# Patient Record
Sex: MALE | Race: WHITE | NOT HISPANIC OR LATINO
[De-identification: names, ages, dates, MRNs, and addresses within clinical notes are randomized per-mention and may not be internally consistent; named-entity substitution may affect disease eponyms.]

---

## 2017-03-15 ENCOUNTER — APPOINTMENT (OUTPATIENT)
Dept: ELECTROPHYSIOLOGY | Facility: CLINIC | Age: 72
End: 2017-03-15

## 2017-04-05 ENCOUNTER — APPOINTMENT (OUTPATIENT)
Dept: ELECTROPHYSIOLOGY | Facility: CLINIC | Age: 72
End: 2017-04-05

## 2017-04-05 VITALS
WEIGHT: 183 LBS | HEIGHT: 70 IN | BODY MASS INDEX: 26.2 KG/M2 | DIASTOLIC BLOOD PRESSURE: 67 MMHG | SYSTOLIC BLOOD PRESSURE: 138 MMHG | HEART RATE: 60 BPM

## 2017-04-05 VITALS
SYSTOLIC BLOOD PRESSURE: 138 MMHG | HEART RATE: 50 BPM | HEIGHT: 70 IN | WEIGHT: 183 LBS | DIASTOLIC BLOOD PRESSURE: 67 MMHG | BODY MASS INDEX: 26.2 KG/M2

## 2017-05-15 ENCOUNTER — APPOINTMENT (OUTPATIENT)
Dept: ELECTROPHYSIOLOGY | Facility: CLINIC | Age: 72
End: 2017-05-15

## 2017-06-02 ENCOUNTER — APPOINTMENT (OUTPATIENT)
Dept: ORTHOPEDIC SURGERY | Facility: CLINIC | Age: 72
End: 2017-06-02

## 2017-06-12 ENCOUNTER — APPOINTMENT (OUTPATIENT)
Dept: ELECTROPHYSIOLOGY | Facility: CLINIC | Age: 72
End: 2017-06-12

## 2017-07-13 ENCOUNTER — APPOINTMENT (OUTPATIENT)
Dept: ELECTROPHYSIOLOGY | Facility: CLINIC | Age: 72
End: 2017-07-13
Payer: COMMERCIAL

## 2017-07-13 PROCEDURE — 93298 REM INTERROG DEV EVAL SCRMS: CPT

## 2017-07-13 PROCEDURE — 93299: CPT

## 2017-07-16 ENCOUNTER — MOBILE ON CALL (OUTPATIENT)
Age: 72
End: 2017-07-16

## 2017-07-19 ENCOUNTER — MEDICATION RENEWAL (OUTPATIENT)
Age: 72
End: 2017-07-19

## 2017-07-20 ENCOUNTER — MEDICATION RENEWAL (OUTPATIENT)
Age: 72
End: 2017-07-20

## 2017-09-11 ENCOUNTER — APPOINTMENT (OUTPATIENT)
Dept: ELECTROPHYSIOLOGY | Facility: CLINIC | Age: 72
End: 2017-09-11
Payer: COMMERCIAL

## 2017-09-11 PROCEDURE — 93299: CPT

## 2017-09-11 PROCEDURE — 93298 REM INTERROG DEV EVAL SCRMS: CPT

## 2017-10-05 ENCOUNTER — APPOINTMENT (OUTPATIENT)
Dept: ELECTROPHYSIOLOGY | Facility: CLINIC | Age: 72
End: 2017-10-05
Payer: COMMERCIAL

## 2017-10-05 VITALS
HEIGHT: 70 IN | DIASTOLIC BLOOD PRESSURE: 77 MMHG | SYSTOLIC BLOOD PRESSURE: 142 MMHG | HEART RATE: 61 BPM | BODY MASS INDEX: 25.77 KG/M2 | WEIGHT: 180 LBS

## 2017-10-05 PROCEDURE — 93285 PRGRMG DEV EVAL SCRMS IP: CPT

## 2017-10-11 ENCOUNTER — APPOINTMENT (OUTPATIENT)
Dept: ELECTROPHYSIOLOGY | Facility: CLINIC | Age: 72
End: 2017-10-11
Payer: COMMERCIAL

## 2017-10-11 PROCEDURE — 93299: CPT

## 2017-10-11 PROCEDURE — 93298 REM INTERROG DEV EVAL SCRMS: CPT

## 2017-11-10 ENCOUNTER — APPOINTMENT (OUTPATIENT)
Dept: ELECTROPHYSIOLOGY | Facility: CLINIC | Age: 72
End: 2017-11-10
Payer: COMMERCIAL

## 2017-11-10 PROCEDURE — 93299: CPT

## 2017-11-10 PROCEDURE — 93298 REM INTERROG DEV EVAL SCRMS: CPT

## 2017-12-11 ENCOUNTER — APPOINTMENT (OUTPATIENT)
Dept: ELECTROPHYSIOLOGY | Facility: CLINIC | Age: 72
End: 2017-12-11
Payer: COMMERCIAL

## 2017-12-11 PROCEDURE — 93299: CPT

## 2017-12-11 PROCEDURE — 93298 REM INTERROG DEV EVAL SCRMS: CPT

## 2018-01-02 ENCOUNTER — RX RENEWAL (OUTPATIENT)
Age: 73
End: 2018-01-02

## 2018-01-09 ENCOUNTER — APPOINTMENT (OUTPATIENT)
Dept: ELECTROPHYSIOLOGY | Facility: CLINIC | Age: 73
End: 2018-01-09
Payer: COMMERCIAL

## 2018-01-09 PROCEDURE — 93298 REM INTERROG DEV EVAL SCRMS: CPT

## 2018-01-09 PROCEDURE — 93299: CPT

## 2018-02-06 ENCOUNTER — APPOINTMENT (OUTPATIENT)
Dept: CARDIOLOGY | Facility: CLINIC | Age: 73
End: 2018-02-06
Payer: COMMERCIAL

## 2018-02-06 ENCOUNTER — NON-APPOINTMENT (OUTPATIENT)
Age: 73
End: 2018-02-06

## 2018-02-06 VITALS — DIASTOLIC BLOOD PRESSURE: 70 MMHG | SYSTOLIC BLOOD PRESSURE: 146 MMHG

## 2018-02-06 VITALS — BODY MASS INDEX: 25.2 KG/M2 | WEIGHT: 176 LBS | HEIGHT: 70 IN

## 2018-02-06 VITALS — DIASTOLIC BLOOD PRESSURE: 74 MMHG | OXYGEN SATURATION: 96 % | HEART RATE: 57 BPM | SYSTOLIC BLOOD PRESSURE: 164 MMHG

## 2018-02-06 PROCEDURE — 93000 ELECTROCARDIOGRAM COMPLETE: CPT

## 2018-02-06 PROCEDURE — 99214 OFFICE O/P EST MOD 30 MIN: CPT | Mod: 25

## 2018-02-08 ENCOUNTER — APPOINTMENT (OUTPATIENT)
Dept: ELECTROPHYSIOLOGY | Facility: CLINIC | Age: 73
End: 2018-02-08
Payer: COMMERCIAL

## 2018-02-08 PROCEDURE — 93299: CPT

## 2018-02-08 PROCEDURE — 93298 REM INTERROG DEV EVAL SCRMS: CPT

## 2018-03-12 ENCOUNTER — APPOINTMENT (OUTPATIENT)
Dept: ELECTROPHYSIOLOGY | Facility: CLINIC | Age: 73
End: 2018-03-12
Payer: COMMERCIAL

## 2018-03-12 PROCEDURE — 93296 REM INTERROG EVL PM/IDS: CPT

## 2018-03-12 PROCEDURE — 93294 REM INTERROG EVL PM/LDLS PM: CPT

## 2018-03-26 ENCOUNTER — RX RENEWAL (OUTPATIENT)
Age: 73
End: 2018-03-26

## 2018-04-09 ENCOUNTER — APPOINTMENT (OUTPATIENT)
Dept: ELECTROPHYSIOLOGY | Facility: CLINIC | Age: 73
End: 2018-04-09
Payer: COMMERCIAL

## 2018-04-09 PROCEDURE — 93298 REM INTERROG DEV EVAL SCRMS: CPT

## 2018-04-09 PROCEDURE — 93299: CPT

## 2018-04-19 ENCOUNTER — APPOINTMENT (OUTPATIENT)
Dept: ELECTROPHYSIOLOGY | Facility: CLINIC | Age: 73
End: 2018-04-19
Payer: COMMERCIAL

## 2018-04-19 VITALS
HEART RATE: 63 BPM | BODY MASS INDEX: 25.2 KG/M2 | WEIGHT: 176 LBS | HEIGHT: 70 IN | DIASTOLIC BLOOD PRESSURE: 74 MMHG | SYSTOLIC BLOOD PRESSURE: 142 MMHG

## 2018-04-19 PROCEDURE — 93285 PRGRMG DEV EVAL SCRMS IP: CPT

## 2018-05-11 ENCOUNTER — APPOINTMENT (OUTPATIENT)
Dept: CARDIOLOGY | Facility: CLINIC | Age: 73
End: 2018-05-11
Payer: COMMERCIAL

## 2018-05-11 VITALS
SYSTOLIC BLOOD PRESSURE: 150 MMHG | OXYGEN SATURATION: 94 % | HEIGHT: 70 IN | BODY MASS INDEX: 25.77 KG/M2 | WEIGHT: 180 LBS | DIASTOLIC BLOOD PRESSURE: 72 MMHG | HEART RATE: 63 BPM

## 2018-05-11 VITALS — DIASTOLIC BLOOD PRESSURE: 70 MMHG | SYSTOLIC BLOOD PRESSURE: 146 MMHG

## 2018-05-11 PROCEDURE — 99214 OFFICE O/P EST MOD 30 MIN: CPT

## 2018-06-09 ENCOUNTER — APPOINTMENT (OUTPATIENT)
Dept: ELECTROPHYSIOLOGY | Facility: CLINIC | Age: 73
End: 2018-06-09
Payer: COMMERCIAL

## 2018-06-09 PROCEDURE — 93298 REM INTERROG DEV EVAL SCRMS: CPT

## 2018-06-09 PROCEDURE — 93299: CPT

## 2018-07-09 ENCOUNTER — APPOINTMENT (OUTPATIENT)
Dept: ELECTROPHYSIOLOGY | Facility: CLINIC | Age: 73
End: 2018-07-09
Payer: COMMERCIAL

## 2018-07-09 PROCEDURE — 93299: CPT

## 2018-07-09 PROCEDURE — 93298 REM INTERROG DEV EVAL SCRMS: CPT

## 2018-08-08 ENCOUNTER — APPOINTMENT (OUTPATIENT)
Dept: ELECTROPHYSIOLOGY | Facility: CLINIC | Age: 73
End: 2018-08-08
Payer: COMMERCIAL

## 2018-08-08 PROCEDURE — 93298 REM INTERROG DEV EVAL SCRMS: CPT

## 2018-08-08 PROCEDURE — 93299: CPT

## 2018-09-07 ENCOUNTER — APPOINTMENT (OUTPATIENT)
Dept: ELECTROPHYSIOLOGY | Facility: CLINIC | Age: 73
End: 2018-09-07
Payer: COMMERCIAL

## 2018-09-07 PROCEDURE — 93298 REM INTERROG DEV EVAL SCRMS: CPT

## 2018-09-07 PROCEDURE — 93299: CPT

## 2018-09-10 ENCOUNTER — RX RENEWAL (OUTPATIENT)
Age: 73
End: 2018-09-10

## 2018-09-14 ENCOUNTER — NON-APPOINTMENT (OUTPATIENT)
Age: 73
End: 2018-09-14

## 2018-09-14 ENCOUNTER — APPOINTMENT (OUTPATIENT)
Dept: CARDIOLOGY | Facility: CLINIC | Age: 73
End: 2018-09-14
Payer: COMMERCIAL

## 2018-09-14 VITALS
WEIGHT: 180 LBS | BODY MASS INDEX: 25.77 KG/M2 | SYSTOLIC BLOOD PRESSURE: 150 MMHG | HEART RATE: 68 BPM | DIASTOLIC BLOOD PRESSURE: 71 MMHG | HEIGHT: 70 IN | OXYGEN SATURATION: 96 %

## 2018-09-14 PROCEDURE — 93000 ELECTROCARDIOGRAM COMPLETE: CPT

## 2018-09-14 PROCEDURE — 99214 OFFICE O/P EST MOD 30 MIN: CPT | Mod: 25

## 2018-09-19 ENCOUNTER — MEDICATION RENEWAL (OUTPATIENT)
Age: 73
End: 2018-09-19

## 2018-09-20 LAB
ALBUMIN SERPL ELPH-MCNC: 3.4 G/DL
ALP BLD-CCNC: 105 U/L
ALT SERPL-CCNC: 19 U/L
ANION GAP SERPL CALC-SCNC: 11 MMOL/L
AST SERPL-CCNC: 24 U/L
BASOPHILS # BLD AUTO: 0.02 K/UL
BASOPHILS NFR BLD AUTO: 0.3 %
BILIRUB SERPL-MCNC: 0.8 MG/DL
BUN SERPL-MCNC: 10 MG/DL
CALCIUM SERPL-MCNC: 9.5 MG/DL
CHLORIDE SERPL-SCNC: 103 MMOL/L
CHOLEST SERPL-MCNC: 143 MG/DL
CHOLEST/HDLC SERPL: 4 RATIO
CO2 SERPL-SCNC: 27 MMOL/L
CREAT SERPL-MCNC: 0.84 MG/DL
EOSINOPHIL # BLD AUTO: 0.22 K/UL
EOSINOPHIL NFR BLD AUTO: 3.7 %
GLUCOSE SERPL-MCNC: 92 MG/DL
HBA1C MFR BLD HPLC: 6.2 %
HCT VFR BLD CALC: 56.2 %
HDLC SERPL-MCNC: 36 MG/DL
HGB BLD-MCNC: 17.6 G/DL
IMM GRANULOCYTES NFR BLD AUTO: 0.2 %
LDLC SERPL CALC-MCNC: 94 MG/DL
LYMPHOCYTES # BLD AUTO: 1.18 K/UL
LYMPHOCYTES NFR BLD AUTO: 20.1 %
MAN DIFF?: NORMAL
MCHC RBC-ENTMCNC: 25.4 PG
MCHC RBC-ENTMCNC: 31.3 GM/DL
MCV RBC AUTO: 81.2 FL
MONOCYTES # BLD AUTO: 0.75 K/UL
MONOCYTES NFR BLD AUTO: 12.8 %
NEUTROPHILS # BLD AUTO: 3.69 K/UL
NEUTROPHILS NFR BLD AUTO: 62.9 %
PLATELET # BLD AUTO: 259 K/UL
POTASSIUM SERPL-SCNC: 5 MMOL/L
PROT SERPL-MCNC: 7.4 G/DL
PSA SERPL-MCNC: 2.92 NG/ML
RBC # BLD: 6.92 M/UL
RBC # FLD: 18.3 %
SODIUM SERPL-SCNC: 141 MMOL/L
TRIGL SERPL-MCNC: 67 MG/DL
WBC # FLD AUTO: 5.87 K/UL

## 2018-09-28 ENCOUNTER — MEDICATION RENEWAL (OUTPATIENT)
Age: 73
End: 2018-09-28

## 2018-10-07 ENCOUNTER — APPOINTMENT (OUTPATIENT)
Dept: ELECTROPHYSIOLOGY | Facility: CLINIC | Age: 73
End: 2018-10-07
Payer: COMMERCIAL

## 2018-10-07 PROCEDURE — 93299: CPT

## 2018-10-07 PROCEDURE — 93298 REM INTERROG DEV EVAL SCRMS: CPT

## 2018-10-29 ENCOUNTER — APPOINTMENT (OUTPATIENT)
Dept: ELECTROPHYSIOLOGY | Facility: CLINIC | Age: 73
End: 2018-10-29
Payer: COMMERCIAL

## 2018-10-29 VITALS
HEART RATE: 63 BPM | DIASTOLIC BLOOD PRESSURE: 71 MMHG | HEIGHT: 70 IN | SYSTOLIC BLOOD PRESSURE: 148 MMHG | BODY MASS INDEX: 24.77 KG/M2 | WEIGHT: 173 LBS

## 2018-10-29 PROCEDURE — 93285 PRGRMG DEV EVAL SCRMS IP: CPT

## 2018-11-03 ENCOUNTER — RX RENEWAL (OUTPATIENT)
Age: 73
End: 2018-11-03

## 2018-11-07 ENCOUNTER — APPOINTMENT (OUTPATIENT)
Dept: ELECTROPHYSIOLOGY | Facility: CLINIC | Age: 73
End: 2018-11-07
Payer: COMMERCIAL

## 2018-11-07 PROCEDURE — 93299: CPT

## 2018-11-07 PROCEDURE — 93298 REM INTERROG DEV EVAL SCRMS: CPT

## 2018-11-29 ENCOUNTER — APPOINTMENT (OUTPATIENT)
Dept: ELECTROPHYSIOLOGY | Facility: CLINIC | Age: 73
End: 2018-11-29
Payer: COMMERCIAL

## 2018-11-29 VITALS
HEIGHT: 70 IN | SYSTOLIC BLOOD PRESSURE: 130 MMHG | WEIGHT: 171 LBS | DIASTOLIC BLOOD PRESSURE: 70 MMHG | HEART RATE: 76 BPM | BODY MASS INDEX: 24.48 KG/M2

## 2018-11-29 PROCEDURE — 99213 OFFICE O/P EST LOW 20 MIN: CPT

## 2018-11-29 PROCEDURE — 93291 INTERROG DEV EVAL SCRMS IP: CPT

## 2018-12-08 ENCOUNTER — APPOINTMENT (OUTPATIENT)
Dept: ELECTROPHYSIOLOGY | Facility: CLINIC | Age: 73
End: 2018-12-08
Payer: COMMERCIAL

## 2018-12-08 PROCEDURE — 93298 REM INTERROG DEV EVAL SCRMS: CPT

## 2018-12-08 PROCEDURE — 93299: CPT

## 2018-12-10 ENCOUNTER — APPOINTMENT (OUTPATIENT)
Dept: CARDIOLOGY | Facility: CLINIC | Age: 73
End: 2018-12-10
Payer: COMMERCIAL

## 2018-12-10 PROCEDURE — 93306 TTE W/DOPPLER COMPLETE: CPT

## 2018-12-13 ENCOUNTER — APPOINTMENT (OUTPATIENT)
Dept: CARDIOLOGY | Facility: CLINIC | Age: 73
End: 2018-12-13

## 2018-12-14 ENCOUNTER — APPOINTMENT (OUTPATIENT)
Dept: CARDIOLOGY | Facility: CLINIC | Age: 73
End: 2018-12-14
Payer: COMMERCIAL

## 2018-12-14 PROCEDURE — 93015 CV STRESS TEST SUPVJ I&R: CPT

## 2018-12-17 ENCOUNTER — APPOINTMENT (OUTPATIENT)
Dept: CARDIOLOGY | Facility: CLINIC | Age: 73
End: 2018-12-17

## 2018-12-20 ENCOUNTER — MEDICATION RENEWAL (OUTPATIENT)
Age: 73
End: 2018-12-20

## 2018-12-26 ENCOUNTER — APPOINTMENT (OUTPATIENT)
Dept: CARDIOLOGY | Facility: CLINIC | Age: 73
End: 2018-12-26

## 2018-12-31 NOTE — PHYSICAL EXAM
[General Appearance - Well Developed] : well developed [Normal Appearance] : normal appearance [Well Groomed] : well groomed [General Appearance - Well Nourished] : well nourished [No Deformities] : no deformities [General Appearance - In No Acute Distress] : no acute distress [Normal Conjunctiva] : the conjunctiva exhibited no abnormalities [Eyelids - No Xanthelasma] : the eyelids demonstrated no xanthelasmas [Normal Oral Mucosa] : normal oral mucosa [No Oral Pallor] : no oral pallor [No Oral Cyanosis] : no oral cyanosis [Normal Jugular Venous A Waves Present] : normal jugular venous A waves present [Normal Jugular Venous V Waves Present] : normal jugular venous V waves present [No Jugular Venous Canela A Waves] : no jugular venous canela A waves [Respiration, Rhythm And Depth] : normal respiratory rhythm and effort [Exaggerated Use Of Accessory Muscles For Inspiration] : no accessory muscle use [Auscultation Breath Sounds / Voice Sounds] : lungs were clear to auscultation bilaterally [Heart Rate And Rhythm] : heart rate and rhythm were normal [Heart Sounds] : normal S1 and S2 [Murmurs] : no murmurs present [Abdomen Soft] : soft [Abdomen Tenderness] : non-tender [Abdomen Mass (___ Cm)] : no abdominal mass palpated [Abnormal Walk] : normal gait [Gait - Sufficient For Exercise Testing] : the gait was sufficient for exercise testing [Nail Clubbing] : no clubbing of the fingernails [Cyanosis, Localized] : no localized cyanosis [Petechial Hemorrhages (___cm)] : no petechial hemorrhages [Skin Color & Pigmentation] : normal skin color and pigmentation [] : no rash [No Venous Stasis] : no venous stasis [Skin Lesions] : no skin lesions [No Skin Ulcers] : no skin ulcer [No Xanthoma] : no  xanthoma was observed [Oriented To Time, Place, And Person] : oriented to person, place, and time [Affect] : the affect was normal [Mood] : the mood was normal [No Anxiety] : not feeling anxious

## 2018-12-31 NOTE — ASSESSMENT
[FreeTextEntry1] : Interrogation reveals episode of PACs with clear p waves prior to QRS.  Will continue to monitor.

## 2018-12-31 NOTE — HISTORY OF PRESENT ILLNESS
[FreeTextEntry1] : Patient presents with palpitations. He marked episode on his Linq. He was feeling in usual state otherwise. CHARLENE TAM  denies chest pain, chest pressure, shortness of breath, lightheadedness, dizziness, syncope, presyncope, orthopnea, PND, or edema.

## 2019-01-04 ENCOUNTER — TRANSCRIPTION ENCOUNTER (OUTPATIENT)
Age: 74
End: 2019-01-04

## 2019-01-08 ENCOUNTER — APPOINTMENT (OUTPATIENT)
Dept: ELECTROPHYSIOLOGY | Facility: CLINIC | Age: 74
End: 2019-01-08
Payer: COMMERCIAL

## 2019-01-08 PROCEDURE — 93299: CPT

## 2019-01-08 PROCEDURE — 93298 REM INTERROG DEV EVAL SCRMS: CPT

## 2019-01-15 ENCOUNTER — APPOINTMENT (OUTPATIENT)
Dept: CARDIOLOGY | Facility: CLINIC | Age: 74
End: 2019-01-15
Payer: COMMERCIAL

## 2019-01-15 ENCOUNTER — NON-APPOINTMENT (OUTPATIENT)
Age: 74
End: 2019-01-15

## 2019-01-15 VITALS
SYSTOLIC BLOOD PRESSURE: 131 MMHG | DIASTOLIC BLOOD PRESSURE: 68 MMHG | BODY MASS INDEX: 25.34 KG/M2 | OXYGEN SATURATION: 97 % | HEIGHT: 70 IN | HEART RATE: 57 BPM | WEIGHT: 177 LBS

## 2019-01-15 VITALS — SYSTOLIC BLOOD PRESSURE: 128 MMHG | DIASTOLIC BLOOD PRESSURE: 68 MMHG

## 2019-01-15 PROCEDURE — 36415 COLL VENOUS BLD VENIPUNCTURE: CPT

## 2019-01-15 PROCEDURE — 99214 OFFICE O/P EST MOD 30 MIN: CPT | Mod: 25

## 2019-01-15 PROCEDURE — 93000 ELECTROCARDIOGRAM COMPLETE: CPT

## 2019-01-15 NOTE — REASON FOR VISIT
[Follow-Up - Clinic] : a clinic follow-up of [Atrial Fibrillation] : atrial fibrillation [Hypertension] : hypertension [Medication Management] : Medication management

## 2019-01-15 NOTE — DISCUSSION/SUMMARY
[___ Month(s)] : [unfilled] month(s) [With Me] : with me [FreeTextEntry1] : \par \par Hypertension: Blood pressure has responded well to recent titration of medications and is now satisfactorily controlled.\par \par Atrial fibrillation: Paroxysmal; now well controlled with Multaq; continue Xarelto; check LFTs today.\par \par Preprocedure cardiac examination:  Mr. Johnson appears optimized from a cardiac standpoint for upcoming GI endoscopic procedures (colonoscopy and/or endoscopy); I recommend discontinuing Xarelto 3 days prior to these procedures and resuming 24 hours afterwards; he should Metoprolol.\par

## 2019-01-15 NOTE — REVIEW OF SYSTEMS
[Eyeglasses] : currently wearing eyeglasses [Impotence] : impotence [Negative] : Heme/Lymph [Headache] : no headache [Recent Weight Gain (___ Lbs)] : recent [unfilled] ~Ulb weight gain [Loss Of Hearing] : no hearing loss [see HPI] : see HPI [Shortness Of Breath] : no shortness of breath [Dyspnea on exertion] : not dyspnea during exertion [Chest  Pressure] : no chest pressure [Chest Pain] : no chest pain [Lower Ext Edema] : no extremity edema [Palpitations] : palpitations [Under Stress] : not under stress [FreeTextEntry5] : Erectile dysfunction

## 2019-01-15 NOTE — HISTORY OF PRESENT ILLNESS
[FreeTextEntry1] : Denny Johnson is a 73-year-old man with a history of paroxysmal atrial fibrillation, implantable loop monitor, and hypertension, who returns for  routine examination.  Approximately one month ago he described frequent (daily), symptomatic recurrences of atrial fibrillation (confirmed with interrogation of his implantable loop recorder); symptoms have almost completely resolved following the initiation of Multaq.  He feels well - offers no complaints; denies dyspnea, angina, syncope, and lightheadedness.

## 2019-01-20 ENCOUNTER — RX RENEWAL (OUTPATIENT)
Age: 74
End: 2019-01-20

## 2019-01-25 LAB
ALBUMIN SERPL ELPH-MCNC: 3.8 G/DL
ALP BLD-CCNC: 123 U/L
ALT SERPL-CCNC: 21 U/L
AST SERPL-CCNC: 27 U/L
BASOPHILS # BLD AUTO: 0.03 K/UL
BASOPHILS NFR BLD AUTO: 0.3 %
BILIRUB DIRECT SERPL-MCNC: 0.2 MG/DL
BILIRUB INDIRECT SERPL-MCNC: 0.6 MG/DL
BILIRUB SERPL-MCNC: 0.8 MG/DL
EOSINOPHIL # BLD AUTO: 0.15 K/UL
EOSINOPHIL NFR BLD AUTO: 1.4 %
HCT VFR BLD CALC: 54.5 %
HGB BLD-MCNC: 16.4 G/DL
IMM GRANULOCYTES NFR BLD AUTO: 0.4 %
LYMPHOCYTES # BLD AUTO: 1.44 K/UL
LYMPHOCYTES NFR BLD AUTO: 13.7 %
MAN DIFF?: NORMAL
MCHC RBC-ENTMCNC: 23.6 PG
MCHC RBC-ENTMCNC: 30.1 GM/DL
MCV RBC AUTO: 78.4 FL
MONOCYTES # BLD AUTO: 1.39 K/UL
MONOCYTES NFR BLD AUTO: 13.2 %
NEUTROPHILS # BLD AUTO: 7.48 K/UL
NEUTROPHILS NFR BLD AUTO: 71 %
PLATELET # BLD AUTO: 321 K/UL
PROT SERPL-MCNC: 7.4 G/DL
RBC # BLD: 6.95 M/UL
RBC # FLD: 19.6 %
WBC # FLD AUTO: 10.53 K/UL

## 2019-02-04 ENCOUNTER — MEDICATION RENEWAL (OUTPATIENT)
Age: 74
End: 2019-02-04

## 2019-02-08 ENCOUNTER — APPOINTMENT (OUTPATIENT)
Dept: ELECTROPHYSIOLOGY | Facility: CLINIC | Age: 74
End: 2019-02-08
Payer: COMMERCIAL

## 2019-02-08 PROCEDURE — 93298 REM INTERROG DEV EVAL SCRMS: CPT

## 2019-02-08 PROCEDURE — 93299: CPT

## 2019-03-11 ENCOUNTER — APPOINTMENT (OUTPATIENT)
Dept: ELECTROPHYSIOLOGY | Facility: CLINIC | Age: 74
End: 2019-03-11
Payer: COMMERCIAL

## 2019-03-11 PROCEDURE — 93298 REM INTERROG DEV EVAL SCRMS: CPT

## 2019-03-11 PROCEDURE — 93299: CPT

## 2019-04-11 ENCOUNTER — APPOINTMENT (OUTPATIENT)
Dept: ELECTROPHYSIOLOGY | Facility: CLINIC | Age: 74
End: 2019-04-11
Payer: COMMERCIAL

## 2019-04-11 PROCEDURE — 93298 REM INTERROG DEV EVAL SCRMS: CPT

## 2019-04-11 PROCEDURE — 93299: CPT

## 2019-05-03 ENCOUNTER — APPOINTMENT (OUTPATIENT)
Dept: ELECTROPHYSIOLOGY | Facility: CLINIC | Age: 74
End: 2019-05-03
Payer: COMMERCIAL

## 2019-05-03 VITALS
SYSTOLIC BLOOD PRESSURE: 116 MMHG | WEIGHT: 171 LBS | HEART RATE: 60 BPM | DIASTOLIC BLOOD PRESSURE: 66 MMHG | BODY MASS INDEX: 24.48 KG/M2 | HEIGHT: 70 IN

## 2019-05-03 PROCEDURE — 93285 PRGRMG DEV EVAL SCRMS IP: CPT

## 2019-05-12 ENCOUNTER — APPOINTMENT (OUTPATIENT)
Dept: ELECTROPHYSIOLOGY | Facility: CLINIC | Age: 74
End: 2019-05-12
Payer: COMMERCIAL

## 2019-05-12 PROCEDURE — 93298 REM INTERROG DEV EVAL SCRMS: CPT

## 2019-05-12 PROCEDURE — 93299: CPT

## 2019-06-12 ENCOUNTER — APPOINTMENT (OUTPATIENT)
Dept: ELECTROPHYSIOLOGY | Facility: CLINIC | Age: 74
End: 2019-06-12
Payer: COMMERCIAL

## 2019-06-12 PROCEDURE — 93299: CPT

## 2019-06-12 PROCEDURE — 93298 REM INTERROG DEV EVAL SCRMS: CPT

## 2019-06-13 ENCOUNTER — MEDICATION RENEWAL (OUTPATIENT)
Age: 74
End: 2019-06-13

## 2019-06-18 ENCOUNTER — RX RENEWAL (OUTPATIENT)
Age: 74
End: 2019-06-18

## 2019-06-18 ENCOUNTER — MEDICATION RENEWAL (OUTPATIENT)
Age: 74
End: 2019-06-18

## 2019-06-19 ENCOUNTER — NON-APPOINTMENT (OUTPATIENT)
Age: 74
End: 2019-06-19

## 2019-06-19 ENCOUNTER — APPOINTMENT (OUTPATIENT)
Dept: ELECTROPHYSIOLOGY | Facility: CLINIC | Age: 74
End: 2019-06-19

## 2019-06-19 VITALS
DIASTOLIC BLOOD PRESSURE: 58 MMHG | HEART RATE: 61 BPM | HEIGHT: 70 IN | SYSTOLIC BLOOD PRESSURE: 119 MMHG | WEIGHT: 171 LBS | BODY MASS INDEX: 24.48 KG/M2

## 2019-06-28 ENCOUNTER — APPOINTMENT (OUTPATIENT)
Dept: ELECTROPHYSIOLOGY | Facility: CLINIC | Age: 74
End: 2019-06-28
Payer: COMMERCIAL

## 2019-06-28 ENCOUNTER — NON-APPOINTMENT (OUTPATIENT)
Age: 74
End: 2019-06-28

## 2019-06-28 VITALS — WEIGHT: 171 LBS | HEIGHT: 70 IN | BODY MASS INDEX: 24.48 KG/M2

## 2019-06-28 VITALS — DIASTOLIC BLOOD PRESSURE: 63 MMHG | SYSTOLIC BLOOD PRESSURE: 114 MMHG | HEART RATE: 57 BPM

## 2019-06-28 PROCEDURE — 93291 INTERROG DEV EVAL SCRMS IP: CPT

## 2019-06-28 PROCEDURE — 99213 OFFICE O/P EST LOW 20 MIN: CPT

## 2019-07-13 ENCOUNTER — APPOINTMENT (OUTPATIENT)
Dept: ELECTROPHYSIOLOGY | Facility: CLINIC | Age: 74
End: 2019-07-13
Payer: COMMERCIAL

## 2019-07-13 PROCEDURE — 93298 REM INTERROG DEV EVAL SCRMS: CPT

## 2019-07-13 PROCEDURE — 93299: CPT

## 2019-07-29 NOTE — HISTORY OF PRESENT ILLNESS
[FreeTextEntry1] : This is a 74 year old man with paroxysmal atrial fibrillation, He is feeling well since beginning Multaq. CHARLENE ANEL  denies chest pain, chest pressure, shortness of breath, lightheadedness, dizziness, palpitations, syncope, presyncope, orthopnea, PND, or edema.

## 2019-07-29 NOTE — PHYSICAL EXAM
[Normal Appearance] : normal appearance [General Appearance - Well Developed] : well developed [Well Groomed] : well groomed [General Appearance - Well Nourished] : well nourished [No Deformities] : no deformities [Normal Conjunctiva] : the conjunctiva exhibited no abnormalities [General Appearance - In No Acute Distress] : no acute distress [Eyelids - No Xanthelasma] : the eyelids demonstrated no xanthelasmas [Normal Oral Mucosa] : normal oral mucosa [No Oral Pallor] : no oral pallor [No Oral Cyanosis] : no oral cyanosis [Normal Jugular Venous A Waves Present] : normal jugular venous A waves present [Normal Jugular Venous V Waves Present] : normal jugular venous V waves present [No Jugular Venous Canela A Waves] : no jugular venous canela A waves [Exaggerated Use Of Accessory Muscles For Inspiration] : no accessory muscle use [Respiration, Rhythm And Depth] : normal respiratory rhythm and effort [Auscultation Breath Sounds / Voice Sounds] : lungs were clear to auscultation bilaterally [Heart Rate And Rhythm] : heart rate and rhythm were normal [Heart Sounds] : normal S1 and S2 [Murmurs] : no murmurs present [Abdomen Tenderness] : non-tender [Abdomen Soft] : soft [Abdomen Mass (___ Cm)] : no abdominal mass palpated [Abnormal Walk] : normal gait [Gait - Sufficient For Exercise Testing] : the gait was sufficient for exercise testing [Nail Clubbing] : no clubbing of the fingernails [Cyanosis, Localized] : no localized cyanosis [Petechial Hemorrhages (___cm)] : no petechial hemorrhages [Skin Color & Pigmentation] : normal skin color and pigmentation [] : no rash [No Venous Stasis] : no venous stasis [Skin Lesions] : no skin lesions [No Skin Ulcers] : no skin ulcer [No Xanthoma] : no  xanthoma was observed [Oriented To Time, Place, And Person] : oriented to person, place, and time [Affect] : the affect was normal [Mood] : the mood was normal [No Anxiety] : not feeling anxious

## 2019-08-12 ENCOUNTER — MEDICATION RENEWAL (OUTPATIENT)
Age: 74
End: 2019-08-12

## 2019-08-13 ENCOUNTER — APPOINTMENT (OUTPATIENT)
Dept: ELECTROPHYSIOLOGY | Facility: CLINIC | Age: 74
End: 2019-08-13
Payer: COMMERCIAL

## 2019-08-13 PROCEDURE — 93299: CPT

## 2019-08-13 PROCEDURE — 93298 REM INTERROG DEV EVAL SCRMS: CPT

## 2019-08-20 ENCOUNTER — APPOINTMENT (OUTPATIENT)
Dept: CARDIOLOGY | Facility: CLINIC | Age: 74
End: 2019-08-20
Payer: COMMERCIAL

## 2019-08-20 ENCOUNTER — NON-APPOINTMENT (OUTPATIENT)
Age: 74
End: 2019-08-20

## 2019-08-20 VITALS — WEIGHT: 177 LBS | HEIGHT: 70 IN | BODY MASS INDEX: 25.34 KG/M2

## 2019-08-20 VITALS — OXYGEN SATURATION: 98 % | HEART RATE: 55 BPM | SYSTOLIC BLOOD PRESSURE: 133 MMHG | DIASTOLIC BLOOD PRESSURE: 68 MMHG

## 2019-08-20 PROCEDURE — 93000 ELECTROCARDIOGRAM COMPLETE: CPT

## 2019-08-20 PROCEDURE — 99214 OFFICE O/P EST MOD 30 MIN: CPT | Mod: 25

## 2019-08-20 NOTE — HISTORY OF PRESENT ILLNESS
[FreeTextEntry1] : Denny Johnson is a 74-year-old man with a history of paroxysmal atrial fibrillation, implantable loop monitor, and hypertension, who returns for  routine cardiac examination.   He continues to feel well and offers no new complaints. He is tolerating anticoagulation. He has moderated his intake of alcohol in an effort to reduce the frequency of atrial fibrillation recurrences. His functional status remains excellent.

## 2019-08-20 NOTE — REVIEW OF SYSTEMS
[Eyeglasses] : currently wearing eyeglasses [see HPI] : see HPI [Palpitations] : palpitations [Impotence] : impotence [Negative] : Heme/Lymph [Headache] : no headache [Recent Weight Loss (___ Lbs)] : no recent weight loss [Recent Weight Gain (___ Lbs)] : no recent weight gain [Loss Of Hearing] : no hearing loss [Shortness Of Breath] : no shortness of breath [Dyspnea on exertion] : not dyspnea during exertion [Chest Pain] : no chest pain [Chest  Pressure] : no chest pressure [FreeTextEntry5] : Erectile dysfunction [Lower Ext Edema] : no extremity edema [Under Stress] : not under stress

## 2019-08-20 NOTE — PHYSICAL EXAM
[General Appearance - In No Acute Distress] : no acute distress [No Oral Cyanosis] : no oral cyanosis [Respiration, Rhythm And Depth] : normal respiratory rhythm and effort [Auscultation Breath Sounds / Voice Sounds] : lungs were clear to auscultation bilaterally [Heart Rate And Rhythm] : heart rate and rhythm were normal [Heart Sounds] : normal S1 and S2 [Murmurs] : no murmurs present [Abnormal Walk] : normal gait [Edema] : no peripheral edema present [Nail Clubbing] : no clubbing of the fingernails [Cyanosis, Localized] : no localized cyanosis [Oriented To Time, Place, And Person] : oriented to person, place, and time [Impaired Insight] : insight and judgment were intact [Mood] : the mood was normal [Affect] : the affect was normal [Normal Appearance] : normal appearance

## 2019-08-20 NOTE — DISCUSSION/SUMMARY
[___ Month(s)] : [unfilled] month(s) [With Me] : with me [FreeTextEntry1] : \par Hypertension: Satisfactory control; continue current antihypertensive regimen.\par \par Atrial fibrillation: Paroxysmal; continue Multaq and Xarelto; check metabolic panel.\par \par

## 2019-08-27 ENCOUNTER — LABORATORY RESULT (OUTPATIENT)
Age: 74
End: 2019-08-27

## 2019-08-27 LAB
ALBUMIN SERPL ELPH-MCNC: 3.4 G/DL
ALP BLD-CCNC: 116 U/L
ALT SERPL-CCNC: 14 U/L
ANION GAP SERPL CALC-SCNC: 16 MMOL/L
AST SERPL-CCNC: 19 U/L
BILIRUB SERPL-MCNC: 0.8 MG/DL
BUN SERPL-MCNC: 18 MG/DL
CALCIUM SERPL-MCNC: 8.9 MG/DL
CHLORIDE SERPL-SCNC: 101 MMOL/L
CO2 SERPL-SCNC: 22 MMOL/L
CREAT SERPL-MCNC: 1.02 MG/DL
GLUCOSE SERPL-MCNC: 97 MG/DL
POTASSIUM SERPL-SCNC: 4.5 MMOL/L
PROT SERPL-MCNC: 6.5 G/DL
SODIUM SERPL-SCNC: 139 MMOL/L

## 2019-08-28 LAB
BASOPHILS # BLD AUTO: 0.03 K/UL
BASOPHILS NFR BLD AUTO: 0.6 %
EOSINOPHIL # BLD AUTO: 0.14 K/UL
EOSINOPHIL NFR BLD AUTO: 2.6 %
HCT VFR BLD CALC: 48.7 %
HGB BLD-MCNC: 14.1 G/DL
IMM GRANULOCYTES NFR BLD AUTO: 0.2 %
LYMPHOCYTES # BLD AUTO: 0.79 K/UL
LYMPHOCYTES NFR BLD AUTO: 14.7 %
MAN DIFF?: NORMAL
MCHC RBC-ENTMCNC: 22 PG
MCHC RBC-ENTMCNC: 29 GM/DL
MCV RBC AUTO: 76 FL
MONOCYTES # BLD AUTO: 0.97 K/UL
MONOCYTES NFR BLD AUTO: 18.1 %
NEUTROPHILS # BLD AUTO: 3.42 K/UL
NEUTROPHILS NFR BLD AUTO: 63.8 %
PLATELET # BLD AUTO: 337 K/UL
RBC # BLD: 6.41 M/UL
RBC # FLD: 20.8 %
WBC # FLD AUTO: 5.36 K/UL

## 2019-09-09 ENCOUNTER — TRANSCRIPTION ENCOUNTER (OUTPATIENT)
Age: 74
End: 2019-09-09

## 2019-09-13 ENCOUNTER — APPOINTMENT (OUTPATIENT)
Dept: ELECTROPHYSIOLOGY | Facility: CLINIC | Age: 74
End: 2019-09-13
Payer: COMMERCIAL

## 2019-09-13 PROCEDURE — 93298 REM INTERROG DEV EVAL SCRMS: CPT

## 2019-09-13 PROCEDURE — 93299: CPT

## 2019-10-15 ENCOUNTER — APPOINTMENT (OUTPATIENT)
Dept: ELECTROPHYSIOLOGY | Facility: CLINIC | Age: 74
End: 2019-10-15
Payer: COMMERCIAL

## 2019-10-15 PROCEDURE — 93298 REM INTERROG DEV EVAL SCRMS: CPT

## 2019-10-15 PROCEDURE — 93299: CPT

## 2019-10-24 ENCOUNTER — APPOINTMENT (OUTPATIENT)
Dept: UROLOGY | Facility: CLINIC | Age: 74
End: 2019-10-24
Payer: COMMERCIAL

## 2019-10-24 VITALS
DIASTOLIC BLOOD PRESSURE: 62 MMHG | HEART RATE: 64 BPM | OXYGEN SATURATION: 97 % | TEMPERATURE: 97.8 F | BODY MASS INDEX: 23.77 KG/M2 | WEIGHT: 166 LBS | HEIGHT: 70 IN | SYSTOLIC BLOOD PRESSURE: 116 MMHG

## 2019-10-24 PROCEDURE — 99203 OFFICE O/P NEW LOW 30 MIN: CPT

## 2019-10-24 NOTE — REVIEW OF SYSTEMS
[Feeling Tired] : feeling tired [Eyesight Problems] : eyesight problems [Palpitations] : palpitations [Heartburn] : heartburn [Loss of interest] : loss of interest in sexual activity [Poor quality erections] : Poor quality erections [Blood in urine that you can see] : blood visible in urine [Wake up at night to urinate  How many times?  ___] : wakes up to urinate [unfilled] times during the night [Wait a long time to urinate] : waits a long time to urinate [see HPI] : see HPI [Joint Pain] : joint pain [Negative] : Heme/Lymph

## 2019-10-24 NOTE — PHYSICAL EXAM
[General Appearance - Well Developed] : well developed [General Appearance - Well Nourished] : well nourished [Normal Appearance] : normal appearance [Well Groomed] : well groomed [Edema] : no peripheral edema [General Appearance - In No Acute Distress] : no acute distress [Respiration, Rhythm And Depth] : normal respiratory rhythm and effort [Exaggerated Use Of Accessory Muscles For Inspiration] : no accessory muscle use [Abdomen Tenderness] : non-tender [Abdomen Soft] : soft [Costovertebral Angle Tenderness] : no ~M costovertebral angle tenderness [Urethral Meatus] : meatus normal [Penis Abnormality] : normal circumcised penis [Urinary Bladder Findings] : the bladder was normal on palpation [Scrotum] : the scrotum was normal [Testes Tenderness] : no tenderness of the testes [Testes Mass (___cm)] : there were no testicular masses [No Prostate Nodules] : no prostate nodules [Prostate Size ___ gm] : prostate size [unfilled] gm [Normal Station and Gait] : the gait and station were normal for the patient's age [] : no rash [No Focal Deficits] : no focal deficits [Oriented To Time, Place, And Person] : oriented to person, place, and time [Affect] : the affect was normal [Mood] : the mood was normal [Not Anxious] : not anxious [No Palpable Adenopathy] : no palpable adenopathy

## 2019-10-24 NOTE — HISTORY OF PRESENT ILLNESS
[FreeTextEntry1] : 74 year old man  with complaint of gross hematuria. This began about a month ago, without any associated symptoms. He was put on Abx by urgent care center and his cardiologist held his xarelto. Hematuria then spontaneously resolved. \par It is mild in severity as the patient denies any further gross hematuria or other LUTS. Nothing makes symptoms occur. It is associated with nothing.\par No current gross hematuria, no dysuria, no frequency, no urgency, no hesitancy, no straining. No incontinence. No fevers, no chills, no nausea, no vomiting, no flank pain. Of note, his wife was a heavy smoker and he was exposed to benzene many years ago. \par \par No family history contributory to gross hematuria.

## 2019-10-24 NOTE — ASSESSMENT
[FreeTextEntry1] : 74 year old man with gross hematuria, second hand smoke and aromatic chemical exposure. We discussed that the differential diagnosis includes both benign and malignant conditions including renal stones, BPH, urinary tract infections, and cancer of the bladder or ureter or kidney. Cystoscopy was recommended to rule out pathology in the bladder. CT Urogram was recommended to evaluate for presence of nephroureteral stones or malignancies. Urinalysis and urine culture were recommended to check for urinary tract infection. Pt agrees and understands.

## 2019-10-25 ENCOUNTER — APPOINTMENT (OUTPATIENT)
Dept: INTERNAL MEDICINE | Facility: CLINIC | Age: 74
End: 2019-10-25
Payer: COMMERCIAL

## 2019-10-25 VITALS
BODY MASS INDEX: 23.62 KG/M2 | OXYGEN SATURATION: 98 % | WEIGHT: 165 LBS | SYSTOLIC BLOOD PRESSURE: 124 MMHG | DIASTOLIC BLOOD PRESSURE: 58 MMHG | HEIGHT: 70 IN | RESPIRATION RATE: 18 BRPM | TEMPERATURE: 98.1 F | HEART RATE: 68 BPM

## 2019-10-25 DIAGNOSIS — Z13.1 ENCOUNTER FOR SCREENING FOR DIABETES MELLITUS: ICD-10-CM

## 2019-10-25 DIAGNOSIS — Z13.220 ENCOUNTER FOR SCREENING FOR LIPOID DISORDERS: ICD-10-CM

## 2019-10-25 LAB
APPEARANCE: ABNORMAL
BACTERIA: NEGATIVE
BILIRUBIN URINE: NEGATIVE
BLOOD URINE: NORMAL
COLOR: YELLOW
GLUCOSE QUALITATIVE U: NEGATIVE
HYALINE CASTS: 0 /LPF
KETONES URINE: NEGATIVE
LEUKOCYTE ESTERASE URINE: NEGATIVE
MICROSCOPIC-UA: NORMAL
NITRITE URINE: NEGATIVE
PH URINE: 6
PROTEIN URINE: NORMAL
RED BLOOD CELLS URINE: 8 /HPF
SPECIFIC GRAVITY URINE: 1.02
SQUAMOUS EPITHELIAL CELLS: 1 /HPF
UROBILINOGEN URINE: NORMAL
WHITE BLOOD CELLS URINE: 2 /HPF

## 2019-10-25 PROCEDURE — 90472 IMMUNIZATION ADMIN EACH ADD: CPT

## 2019-10-25 PROCEDURE — G0008: CPT

## 2019-10-25 PROCEDURE — 99205 OFFICE O/P NEW HI 60 MIN: CPT | Mod: 25

## 2019-10-25 PROCEDURE — 90662 IIV NO PRSV INCREASED AG IM: CPT

## 2019-10-25 PROCEDURE — 90732 PPSV23 VACC 2 YRS+ SUBQ/IM: CPT

## 2019-10-25 NOTE — HEALTH RISK ASSESSMENT
[Very Good] : ~his/her~  mood as very good [No] : In the past 12 months have you used drugs other than those required for medical reasons? No [No falls in past year] : Patient reported no falls in the past year [1] : 2) Feeling down, depressed, or hopeless for several days (1) [Patient reported colonoscopy was normal] : Patient reported colonoscopy was normal [With Family] : lives with family [Retired] : retired [College] : College [] :  [Feels Safe at Home] : Feels safe at home [Fully functional (bathing, dressing, toileting, transferring, walking, feeding)] : Fully functional (bathing, dressing, toileting, transferring, walking, feeding) [Fully functional (using the telephone, shopping, preparing meals, housekeeping, doing laundry, using] : Fully functional and needs no help or supervision to perform IADLs (using the telephone, shopping, preparing meals, housekeeping, doing laundry, using transportation, managing medications and managing finances) [Aggressive treatment] : aggressive treatment [] : No [de-identified] : Neurology, cardiology, gastroenterology [de-identified] : sedentary [Audit-CScore] : Zero [Change in mental status noted] : No change in mental status noted [Sexually Active] : not sexually active [ColonoscopyComments] : as per GI [FreeTextEntry2] : Retired [AdvancecareDate] : 10/19

## 2019-10-25 NOTE — PHYSICAL EXAM
[No Acute Distress] : no acute distress [Well Nourished] : well nourished [Well Developed] : well developed [Well-Appearing] : well-appearing [Normal Sclera/Conjunctiva] : normal sclera/conjunctiva [PERRL] : pupils equal round and reactive to light [EOMI] : extraocular movements intact [Normal Outer Ear/Nose] : the outer ears and nose were normal in appearance [Normal Oropharynx] : the oropharynx was normal [No JVD] : no jugular venous distention [No Lymphadenopathy] : no lymphadenopathy [Supple] : supple [Thyroid Normal, No Nodules] : the thyroid was normal and there were no nodules present [No Respiratory Distress] : no respiratory distress  [No Accessory Muscle Use] : no accessory muscle use [Clear to Auscultation] : lungs were clear to auscultation bilaterally [Normal Rate] : normal rate  [Regular Rhythm] : with a regular rhythm [Normal S1, S2] : normal S1 and S2 [No Murmur] : no murmur heard [No Abdominal Bruit] : a ~M bruit was not heard ~T in the abdomen [No Varicosities] : no varicosities [No Carotid Bruits] : no carotid bruits [Pedal Pulses Present] : the pedal pulses are present [No Palpable Aorta] : no palpable aorta [No Extremity Clubbing/Cyanosis] : no extremity clubbing/cyanosis [No Edema] : there was no peripheral edema [Soft] : abdomen soft [Non Tender] : non-tender [Non-distended] : non-distended [No Masses] : no abdominal mass palpated [No HSM] : no HSM [Normal Bowel Sounds] : normal bowel sounds [Normal Posterior Cervical Nodes] : no posterior cervical lymphadenopathy [Normal Anterior Cervical Nodes] : no anterior cervical lymphadenopathy [No Joint Swelling] : no joint swelling [No Spinal Tenderness] : no spinal tenderness [No CVA Tenderness] : no CVA  tenderness [Coordination Grossly Intact] : coordination grossly intact [No Focal Deficits] : no focal deficits [No Rash] : no rash [Grossly Normal Strength/Tone] : grossly normal strength/tone [Deep Tendon Reflexes (DTR)] : deep tendon reflexes were 2+ and symmetric [Normal Gait] : normal gait [Normal Affect] : the affect was normal [Normal Insight/Judgement] : insight and judgment were intact

## 2019-10-25 NOTE — PLAN
[FreeTextEntry1] : Mr. Johnson presents for an initial primary care evaluation. He is been given a prescription for comprehensive blood profile which will include Lyme titers and testosterone level. He will followup with Dr. Butler for urology. His only the patient is active Lyme disease. Mr. Johnson will receive the high-dose flu vaccine as well as a tetanus/pertussis vaccine. He will make an appointment to receive the shingles vaccine.

## 2019-10-25 NOTE — HISTORY OF PRESENT ILLNESS
[FreeTextEntry1] : Fatigue [de-identified] : Mr. Johnson is a 74-year-old male who presents for her initial primary care evaluation. He is complaining of some episodes of muscle soreness and fatigue. He denies any chest pain or palpitations. He has no shortness of breath. Mr. Chery does have a history of atrial fibrillation and is currently on anticoagulation therapy with xarelto 20 mg daily and takes multi-pack 400 mg daily. A possibly 4 weeks ago he had no recurrent hematuria. He went for evaluation with Dr. Maco Butler yesterday and is scheduled for a CT scan as well cystoscopy. The patient is concerned that he may have Lyme disease because of his fatigue and muscle ache. He states he has had that circular rashes on his body in the past, however, he does not have any documented episode of a tick bite. He is scheduled for endoscopy and colonoscopy with Dr. Lemons on 11/18. He currently is wearing a loop recorder. Mr. Johnson states that he is also been experiencing some erectile dysfunction, however, he has not had relations with his wife in several years.

## 2019-10-28 LAB — BACTERIA UR CULT: NORMAL

## 2019-10-31 ENCOUNTER — FORM ENCOUNTER (OUTPATIENT)
Age: 74
End: 2019-10-31

## 2019-11-01 ENCOUNTER — OUTPATIENT (OUTPATIENT)
Dept: OUTPATIENT SERVICES | Facility: HOSPITAL | Age: 74
LOS: 1 days | End: 2019-11-01
Payer: COMMERCIAL

## 2019-11-01 ENCOUNTER — APPOINTMENT (OUTPATIENT)
Dept: CT IMAGING | Facility: CLINIC | Age: 74
End: 2019-11-01
Payer: COMMERCIAL

## 2019-11-01 ENCOUNTER — APPOINTMENT (OUTPATIENT)
Dept: INTERNAL MEDICINE | Facility: CLINIC | Age: 74
End: 2019-11-01
Payer: COMMERCIAL

## 2019-11-01 DIAGNOSIS — R31.0 GROSS HEMATURIA: ICD-10-CM

## 2019-11-01 PROCEDURE — 90471 IMMUNIZATION ADMIN: CPT

## 2019-11-01 PROCEDURE — 74178 CT ABD&PLV WO CNTR FLWD CNTR: CPT | Mod: 26

## 2019-11-01 PROCEDURE — 90715 TDAP VACCINE 7 YRS/> IM: CPT

## 2019-11-01 PROCEDURE — 82565 ASSAY OF CREATININE: CPT

## 2019-11-01 PROCEDURE — 74178 CT ABD&PLV WO CNTR FLWD CNTR: CPT

## 2019-11-05 ENCOUNTER — APPOINTMENT (OUTPATIENT)
Dept: UROLOGY | Facility: CLINIC | Age: 74
End: 2019-11-05
Payer: COMMERCIAL

## 2019-11-05 ENCOUNTER — LABORATORY RESULT (OUTPATIENT)
Age: 74
End: 2019-11-05

## 2019-11-05 VITALS
OXYGEN SATURATION: 98 % | SYSTOLIC BLOOD PRESSURE: 125 MMHG | HEIGHT: 70 IN | WEIGHT: 163 LBS | HEART RATE: 67 BPM | DIASTOLIC BLOOD PRESSURE: 69 MMHG | BODY MASS INDEX: 23.34 KG/M2

## 2019-11-05 PROCEDURE — 52000 CYSTOURETHROSCOPY: CPT

## 2019-11-06 ENCOUNTER — CLINICAL ADVICE (OUTPATIENT)
Age: 74
End: 2019-11-06

## 2019-11-07 ENCOUNTER — FORM ENCOUNTER (OUTPATIENT)
Age: 74
End: 2019-11-07

## 2019-11-07 LAB — URINE CYTOLOGY: NORMAL

## 2019-11-08 ENCOUNTER — RX RENEWAL (OUTPATIENT)
Age: 74
End: 2019-11-08

## 2019-11-08 ENCOUNTER — APPOINTMENT (OUTPATIENT)
Dept: CT IMAGING | Facility: CLINIC | Age: 74
End: 2019-11-08
Payer: COMMERCIAL

## 2019-11-08 ENCOUNTER — OUTPATIENT (OUTPATIENT)
Dept: OUTPATIENT SERVICES | Facility: HOSPITAL | Age: 74
LOS: 1 days | End: 2019-11-08
Payer: COMMERCIAL

## 2019-11-08 DIAGNOSIS — Z00.8 ENCOUNTER FOR OTHER GENERAL EXAMINATION: ICD-10-CM

## 2019-11-08 LAB
ALBUMIN SERPL ELPH-MCNC: 3.5 G/DL
ALP BLD-CCNC: 139 U/L
ALT SERPL-CCNC: 19 U/L
ANION GAP SERPL CALC-SCNC: 16 MMOL/L
APPEARANCE: ABNORMAL
APTT BLD: 51.3 SEC
AST SERPL-CCNC: 18 U/L
B BURGDOR AB SER-IMP: NEGATIVE
B BURGDOR IGG+IGM SER QL IB: NORMAL
B BURGDOR IGM PATRN SER IB-IMP: NEGATIVE
B BURGDOR18KD IGG SER QL IB: NORMAL
B BURGDOR23KD IGG SER QL IB: NORMAL
B BURGDOR23KD IGM SER QL IB: NORMAL
B BURGDOR28KD IGG SER QL IB: NORMAL
B BURGDOR30KD IGG SER QL IB: NORMAL
B BURGDOR31KD IGG SER QL IB: NORMAL
B BURGDOR39KD IGG SER QL IB: NORMAL
B BURGDOR39KD IGM SER QL IB: NORMAL
B BURGDOR41KD IGG SER QL IB: NORMAL
B BURGDOR41KD IGM SER QL IB: NORMAL
B BURGDOR45KD IGG SER QL IB: NORMAL
B BURGDOR58KD IGG SER QL IB: NORMAL
B BURGDOR66KD IGG SER QL IB: NORMAL
B BURGDOR93KD IGG SER QL IB: NORMAL
BACTERIA: NEGATIVE
BASOPHILS # BLD AUTO: 0.04 K/UL
BASOPHILS NFR BLD AUTO: 0.5 %
BILIRUB SERPL-MCNC: 1 MG/DL
BILIRUBIN URINE: NEGATIVE
BLOOD URINE: ABNORMAL
BUN SERPL-MCNC: 16 MG/DL
CALCIUM SERPL-MCNC: 9.2 MG/DL
CHLORIDE SERPL-SCNC: 96 MMOL/L
CHOLEST SERPL-MCNC: 131 MG/DL
CHOLEST/HDLC SERPL: 3.6 RATIO
CO2 SERPL-SCNC: 25 MMOL/L
COLOR: YELLOW
CREAT SERPL-MCNC: 1.02 MG/DL
EOSINOPHIL # BLD AUTO: 0.13 K/UL
EOSINOPHIL NFR BLD AUTO: 1.7 %
ESTIMATED AVERAGE GLUCOSE: 123 MG/DL
GLUCOSE QUALITATIVE U: NEGATIVE
GLUCOSE SERPL-MCNC: 86 MG/DL
HBA1C MFR BLD HPLC: 5.9 %
HCT VFR BLD CALC: 54 %
HDLC SERPL-MCNC: 36 MG/DL
HGB BLD-MCNC: 15.3 G/DL
HYALINE CASTS: 0 /LPF
IMM GRANULOCYTES NFR BLD AUTO: 0.3 %
INR PPP: 3.57 RATIO
IRON SERPL-MCNC: 21 UG/DL
KETONES URINE: NEGATIVE
LDLC SERPL CALC-MCNC: 82 MG/DL
LEUKOCYTE ESTERASE URINE: NEGATIVE
LYMPHOCYTES # BLD AUTO: 0.96 K/UL
LYMPHOCYTES NFR BLD AUTO: 12.9 %
MAN DIFF?: NORMAL
MCHC RBC-ENTMCNC: 21.2 PG
MCHC RBC-ENTMCNC: 28.3 GM/DL
MCV RBC AUTO: 74.9 FL
MICROSCOPIC-UA: NORMAL
MONOCYTES # BLD AUTO: 0.83 K/UL
MONOCYTES NFR BLD AUTO: 11.1 %
NEUTROPHILS # BLD AUTO: 5.47 K/UL
NEUTROPHILS NFR BLD AUTO: 73.5 %
NITRITE URINE: NEGATIVE
PH URINE: 6
PLATELET # BLD AUTO: 430 K/UL
POTASSIUM SERPL-SCNC: 5.3 MMOL/L
PROT SERPL-MCNC: 7.3 G/DL
PROTEIN URINE: ABNORMAL
PSA SERPL-MCNC: 3.27 NG/ML
PT BLD: 41.9 SEC
RBC # BLD: 7.21 M/UL
RBC # FLD: 21 %
RED BLOOD CELLS URINE: 98 /HPF
SODIUM SERPL-SCNC: 137 MMOL/L
SPECIFIC GRAVITY URINE: 1.02
SQUAMOUS EPITHELIAL CELLS: 10 /HPF
TRIGL SERPL-MCNC: 67 MG/DL
TSH SERPL-ACNC: 3.11 UIU/ML
URINE COMMENTS: NORMAL
UROBILINOGEN URINE: NORMAL
WBC # FLD AUTO: 7.45 K/UL
WHITE BLOOD CELLS URINE: 3 /HPF

## 2019-11-08 PROCEDURE — 71260 CT THORAX DX C+: CPT | Mod: 26

## 2019-11-08 PROCEDURE — 71260 CT THORAX DX C+: CPT

## 2019-11-11 LAB
TESTOST BND SERPL-MCNC: 8.5 PG/ML
TESTOST SERPL-MCNC: 243.9 NG/DL

## 2019-11-13 ENCOUNTER — APPOINTMENT (OUTPATIENT)
Dept: UROLOGY | Facility: CLINIC | Age: 74
End: 2019-11-13
Payer: COMMERCIAL

## 2019-11-13 VITALS
HEIGHT: 68.3 IN | DIASTOLIC BLOOD PRESSURE: 73 MMHG | BODY MASS INDEX: 26.43 KG/M2 | WEIGHT: 174.4 LBS | SYSTOLIC BLOOD PRESSURE: 122 MMHG | HEART RATE: 64 BPM

## 2019-11-13 PROCEDURE — 99214 OFFICE O/P EST MOD 30 MIN: CPT

## 2019-11-14 NOTE — ASSESSMENT
[FreeTextEntry1] : Discussed surgical options with patients. Explained pre and post operative procedures. Risks and benefits explained to patient for a right radical nephrectomy. Surgical consent signed at time of visit. \par Instructed patient that he will need to obtain medical, cardiac and hematology clearance. \par Dr. Ibarra PCP\par Dr. Lopes - cardiology\par Recent HGB/HCT 15/54. Dx: polycythemia\par Pt. is also Xarelto for atrial fibrillation and has a loop recorder.\par \par Will proceed with scheduled surgery pending all clearances requested.

## 2019-11-14 NOTE — HISTORY OF PRESENT ILLNESS
[FreeTextEntry1] : 75 yo presents to the office for his initial visit with Dr. Brown as a referral from Dr. Butler for right renal mass. His initial complaint was gross hematuria which began approximately one month ago. Recent CT urogram showed a partially necrotic large mass in lower pole right kidney measuring 9.7 x 10 x 7.5 cm, Mild thickening of bladder noted. 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 Urine cytology (11/5/19) negative.  \par Pt. has been exposed to second hand smoke and aromatic chemical exposure in the past.

## 2019-11-14 NOTE — PHYSICAL EXAM
[Normal Appearance] : normal appearance [General Appearance - In No Acute Distress] : no acute distress [Edema] : no peripheral edema [Urethral Meatus] : meatus normal [Abdomen Soft] : soft [Normal Station and Gait] : the gait and station were normal for the patient's age [No Focal Deficits] : no focal deficits [] : no rash [Oriented To Time, Place, And Person] : oriented to person, place, and time [No Palpable Adenopathy] : no palpable adenopathy [Affect] : the affect was normal

## 2019-11-15 ENCOUNTER — APPOINTMENT (OUTPATIENT)
Dept: ELECTROPHYSIOLOGY | Facility: CLINIC | Age: 74
End: 2019-11-15
Payer: COMMERCIAL

## 2019-11-15 PROCEDURE — 93298 REM INTERROG DEV EVAL SCRMS: CPT

## 2019-11-15 PROCEDURE — 93299: CPT

## 2019-11-19 ENCOUNTER — APPOINTMENT (OUTPATIENT)
Age: 74
End: 2019-11-19

## 2019-11-19 LAB
ALBUMIN SERPL ELPH-MCNC: 3.4 G/DL
ALP BLD-CCNC: 149 U/L
ALT SERPL-CCNC: 23 U/L
ANION GAP SERPL CALC-SCNC: 16 MMOL/L
AST SERPL-CCNC: 19 U/L
B2 MICROGLOB SERPL-MCNC: 4 MG/L
BASOPHILS # BLD AUTO: 0.03 K/UL
BASOPHILS NFR BLD AUTO: 0.5 %
BILIRUB SERPL-MCNC: 1 MG/DL
BUN SERPL-MCNC: 15 MG/DL
CALCIUM SERPL-MCNC: 9 MG/DL
CHLORIDE SERPL-SCNC: 97 MMOL/L
CO2 SERPL-SCNC: 25 MMOL/L
CREAT SERPL-MCNC: 1 MG/DL
EOSINOPHIL # BLD AUTO: 0.08 K/UL
EOSINOPHIL NFR BLD AUTO: 1.2 %
EPO SERPL-MCNC: 81.8 MIU/ML
FERRITIN SERPL-MCNC: 475 NG/ML
GLUCOSE SERPL-MCNC: 132 MG/DL
HCT VFR BLD CALC: 52 %
HGB BLD-MCNC: 14.8 G/DL
IMM GRANULOCYTES NFR BLD AUTO: 0.5 %
LDH SERPL-CCNC: 122 U/L
LYMPHOCYTES # BLD AUTO: 0.91 K/UL
LYMPHOCYTES NFR BLD AUTO: 13.9 %
MAN DIFF?: NORMAL
MCHC RBC-ENTMCNC: 21.5 PG
MCHC RBC-ENTMCNC: 28.5 GM/DL
MCV RBC AUTO: 75.7 FL
MONOCYTES # BLD AUTO: 0.73 K/UL
MONOCYTES NFR BLD AUTO: 11.2 %
NEUTROPHILS # BLD AUTO: 4.76 K/UL
NEUTROPHILS NFR BLD AUTO: 72.7 %
PLATELET # BLD AUTO: 410 K/UL
POTASSIUM SERPL-SCNC: 4.6 MMOL/L
PROT SERPL-MCNC: 7.2 G/DL
RBC # BLD: 6.87 M/UL
RBC # FLD: 21.2 %
SODIUM SERPL-SCNC: 138 MMOL/L
WBC # FLD AUTO: 6.54 K/UL

## 2019-11-20 ENCOUNTER — OUTPATIENT (OUTPATIENT)
Dept: OUTPATIENT SERVICES | Facility: HOSPITAL | Age: 74
LOS: 1 days | End: 2019-11-20
Payer: COMMERCIAL

## 2019-11-20 VITALS
RESPIRATION RATE: 20 BRPM | HEIGHT: 70 IN | WEIGHT: 162.92 LBS | DIASTOLIC BLOOD PRESSURE: 53 MMHG | OXYGEN SATURATION: 100 % | TEMPERATURE: 98 F | SYSTOLIC BLOOD PRESSURE: 110 MMHG | HEART RATE: 61 BPM

## 2019-11-20 DIAGNOSIS — Z90.49 ACQUIRED ABSENCE OF OTHER SPECIFIED PARTS OF DIGESTIVE TRACT: Chronic | ICD-10-CM

## 2019-11-20 DIAGNOSIS — Z90.89 ACQUIRED ABSENCE OF OTHER ORGANS: Chronic | ICD-10-CM

## 2019-11-20 DIAGNOSIS — Z98.890 OTHER SPECIFIED POSTPROCEDURAL STATES: Chronic | ICD-10-CM

## 2019-11-20 DIAGNOSIS — C64.9 MALIGNANT NEOPLASM OF UNSPECIFIED KIDNEY, EXCEPT RENAL PELVIS: ICD-10-CM

## 2019-11-20 DIAGNOSIS — Z01.818 ENCOUNTER FOR OTHER PREPROCEDURAL EXAMINATION: ICD-10-CM

## 2019-11-20 DIAGNOSIS — N28.89 OTHER SPECIFIED DISORDERS OF KIDNEY AND URETER: ICD-10-CM

## 2019-11-20 DIAGNOSIS — Z29.9 ENCOUNTER FOR PROPHYLACTIC MEASURES, UNSPECIFIED: ICD-10-CM

## 2019-11-20 LAB
ANION GAP SERPL CALC-SCNC: 5 MMOL/L — SIGNIFICANT CHANGE UP (ref 5–17)
APPEARANCE UR: CLEAR — SIGNIFICANT CHANGE UP
APTT BLD: 51.1 SEC — HIGH (ref 27.5–36.3)
BASOPHILS # BLD AUTO: 0.03 K/UL — SIGNIFICANT CHANGE UP (ref 0–0.2)
BASOPHILS NFR BLD AUTO: 0.4 % — SIGNIFICANT CHANGE UP (ref 0–2)
BILIRUB UR-MCNC: NEGATIVE — SIGNIFICANT CHANGE UP
BUN SERPL-MCNC: 25 MG/DL — HIGH (ref 7–23)
CALCIUM SERPL-MCNC: 8.7 MG/DL — SIGNIFICANT CHANGE UP (ref 8.5–10.1)
CHLORIDE SERPL-SCNC: 103 MMOL/L — SIGNIFICANT CHANGE UP (ref 96–108)
CO2 SERPL-SCNC: 26 MMOL/L — SIGNIFICANT CHANGE UP (ref 22–31)
COLOR SPEC: YELLOW — SIGNIFICANT CHANGE UP
CREAT SERPL-MCNC: 1.09 MG/DL — SIGNIFICANT CHANGE UP (ref 0.5–1.3)
DIFF PNL FLD: ABNORMAL
EOSINOPHIL # BLD AUTO: 0.1 K/UL — SIGNIFICANT CHANGE UP (ref 0–0.5)
EOSINOPHIL NFR BLD AUTO: 1.3 % — SIGNIFICANT CHANGE UP (ref 0–6)
GLUCOSE SERPL-MCNC: 95 MG/DL — SIGNIFICANT CHANGE UP (ref 70–99)
GLUCOSE UR QL: NEGATIVE MG/DL — SIGNIFICANT CHANGE UP
HCT VFR BLD CALC: 49.4 % — SIGNIFICANT CHANGE UP (ref 39–50)
HGB BLD-MCNC: 14.1 G/DL — SIGNIFICANT CHANGE UP (ref 13–17)
IMM GRANULOCYTES NFR BLD AUTO: 0.3 % — SIGNIFICANT CHANGE UP (ref 0–1.5)
INR BLD: 3.29 RATIO — HIGH (ref 0.88–1.16)
JAK2 GENE MUT ANL BLD/T: NORMAL
KETONES UR-MCNC: ABNORMAL
LEUKOCYTE ESTERASE UR-ACNC: ABNORMAL
LYMPHOCYTES # BLD AUTO: 0.81 K/UL — LOW (ref 1–3.3)
LYMPHOCYTES # BLD AUTO: 10.3 % — LOW (ref 13–44)
MCHC RBC-ENTMCNC: 21.3 PG — LOW (ref 27–34)
MCHC RBC-ENTMCNC: 28.5 GM/DL — LOW (ref 32–36)
MCV RBC AUTO: 74.5 FL — LOW (ref 80–100)
MONOCYTES # BLD AUTO: 0.87 K/UL — SIGNIFICANT CHANGE UP (ref 0–0.9)
MONOCYTES NFR BLD AUTO: 11.1 % — SIGNIFICANT CHANGE UP (ref 2–14)
NEUTROPHILS # BLD AUTO: 6.03 K/UL — SIGNIFICANT CHANGE UP (ref 1.8–7.4)
NEUTROPHILS NFR BLD AUTO: 76.6 % — SIGNIFICANT CHANGE UP (ref 43–77)
NITRITE UR-MCNC: NEGATIVE — SIGNIFICANT CHANGE UP
PH UR: 5 — SIGNIFICANT CHANGE UP (ref 5–8)
PLATELET # BLD AUTO: 326 K/UL — SIGNIFICANT CHANGE UP (ref 150–400)
POTASSIUM SERPL-MCNC: 3.9 MMOL/L — SIGNIFICANT CHANGE UP (ref 3.5–5.3)
POTASSIUM SERPL-SCNC: 3.9 MMOL/L — SIGNIFICANT CHANGE UP (ref 3.5–5.3)
PROT UR-MCNC: 15 MG/DL
PROTHROM AB SERPL-ACNC: 37.9 SEC — HIGH (ref 10–12.9)
RBC # BLD: 6.63 M/UL — HIGH (ref 4.2–5.8)
RBC # FLD: 21 % — HIGH (ref 10.3–14.5)
SODIUM SERPL-SCNC: 134 MMOL/L — LOW (ref 135–145)
SP GR SPEC: 1.01 — SIGNIFICANT CHANGE UP (ref 1.01–1.02)
UROBILINOGEN FLD QL: NEGATIVE MG/DL — SIGNIFICANT CHANGE UP
WBC # BLD: 7.86 K/UL — SIGNIFICANT CHANGE UP (ref 3.8–10.5)
WBC # FLD AUTO: 7.86 K/UL — SIGNIFICANT CHANGE UP (ref 3.8–10.5)

## 2019-11-20 PROCEDURE — 86900 BLOOD TYPING SEROLOGIC ABO: CPT

## 2019-11-20 PROCEDURE — 86920 COMPATIBILITY TEST SPIN: CPT

## 2019-11-20 PROCEDURE — 85610 PROTHROMBIN TIME: CPT

## 2019-11-20 PROCEDURE — 85730 THROMBOPLASTIN TIME PARTIAL: CPT

## 2019-11-20 PROCEDURE — 36415 COLL VENOUS BLD VENIPUNCTURE: CPT

## 2019-11-20 PROCEDURE — 86850 RBC ANTIBODY SCREEN: CPT

## 2019-11-20 PROCEDURE — G0463: CPT | Mod: 25

## 2019-11-20 PROCEDURE — 93010 ELECTROCARDIOGRAM REPORT: CPT

## 2019-11-20 PROCEDURE — 81001 URINALYSIS AUTO W/SCOPE: CPT

## 2019-11-20 PROCEDURE — 80048 BASIC METABOLIC PNL TOTAL CA: CPT

## 2019-11-20 PROCEDURE — 85025 COMPLETE CBC W/AUTO DIFF WBC: CPT

## 2019-11-20 PROCEDURE — 87086 URINE CULTURE/COLONY COUNT: CPT

## 2019-11-20 PROCEDURE — 86901 BLOOD TYPING SEROLOGIC RH(D): CPT

## 2019-11-20 PROCEDURE — 93005 ELECTROCARDIOGRAM TRACING: CPT

## 2019-11-20 RX ORDER — METOPROLOL TARTRATE 50 MG
1 TABLET ORAL
Qty: 0 | Refills: 0 | DISCHARGE

## 2019-11-20 NOTE — H&P PST ADULT - NSICDXPASTMEDICALHX_GEN_ALL_CORE_FT
PAST MEDICAL HISTORY:  Atrial fibrillation has loop recorder    BPH (benign prostatic hyperplasia)     Colon polyp benign    GERD (gastroesophageal reflux disease)     Hematuria     HTN (hypertension)     Renal mass

## 2019-11-20 NOTE — H&P PST ADULT - HISTORY OF PRESENT ILLNESS
73 y/o male with right renal mass. Pt reports he had hematuria, he was sent for renal imaging study which showed right renal mass. Scheduled for Laparoscopic right radical nephrectomy.

## 2019-11-20 NOTE — H&P PST ADULT - ASSESSMENT
75 y/o male with right renal mass.  Scheduled for Laparoscopic right radical nephrectomy.  Plan  1. Stop all NSAIDS, herbal supplements and vitamins for 7 days.  2. NPO at midnight.  3. Take the following medications ( Multaq, Lisinopril, Cartia, Famotidine ) with small sips of water on the morning of your procedure/surgery.  4. Use EZ sponges as directed  5. Labs, EKG as per surgeon  6. PMD visit for optimization prior to surgery as per surgeon  7. Pt to follow preop Xarelto instructions from cardiologist and surgeon.     CAPRINI SCORE [CLOT]  AGE RELATED RISK FACTORS                                                       MOBILITY RELATED FACTORS  [ ] Age 41-60 years                                            (1 Point)                  [ ] Bed rest                                                        (1 Point)  [ ] Age: 61-74 years                                           (2 Points)                 [ ] Plaster cast                                                   (2 Points)  [ x ] Age= 75 years                                              (3 Points)                 [ ] Bed bound for more than 72 hours                 (2 Points)    DISEASE RELATED RISK FACTORS                                               GENDER SPECIFIC FACTORS  [ ] Edema in the lower extremities                       (1 Point)                  [ ] Pregnancy                                                     (1 Point)  [ ] Varicose veins                                               (1 Point)                  [ ] Post-partum < 6 weeks                                   (1 Point)             [ ] BMI > 25 Kg/m2                                            (1 Point)                  [ ] Hormonal therapy  or oral contraception          (1 Point)                 [ ] Sepsis (in the previous month)                        (1 Point)                  [ ] History of pregnancy complications                 (1 point)  [ ] Pneumonia or serious lung disease                                               [ ] Unexplained or recurrent                     (1 Point)           (in the previous month)                               (1 Point)  [ ] Abnormal pulmonary function test                     (1 Point)                 SURGERY RELATED RISK FACTORS  [ ] Acute myocardial infarction                              (1 Point)                 [ ]  Section                                             (1 Point)  [ ] Congestive heart failure (in the previous month)  (1 Point)               [ ] Minor surgery                                                  (1 Point)   [ ] Inflammatory bowel disease                             (1 Point)                 [ ] Arthroscopic surgery                                        (2 Points)  [ ] Central venous access                                      (2 Points)                [ x ] General surgery lasting more than 45 minutes   (2 Points)       [ ] Stroke (in the previous month)                          (5 Points)               [ ] Elective arthroplasty                                         (5 Points)     (x )  malignancy                                                             (2 points )                                                                                                                                      HEMATOLOGY RELATED FACTORS                                                 TRAUMA RELATED RISK FACTORS  [ ] Prior episodes of VTE                                     (3 Points)                 [ ] Fracture of the hip, pelvis, or leg                       (5 Points)  [ ] Positive family history for VTE                         (3 Points)                 [ ] Acute spinal cord injury (in the previous month)  (5 Points)  [ ] Prothrombin 13671 A                                     (3 Points)                 [ ] Paralysis  (less than 1 month)                             (5 Points)  [ ] Factor V Leiden                                             (3 Points)                  [ ] Multiple Trauma within 1 month                        (5 Points)  [ ] Lupus anticoagulants                                     (3 Points)                                                           [ ] Anticardiolipin antibodies                               (3 Points)                                                       [ ] High homocysteine in the blood                      (3 Points)                                             [ ] Other congenital or acquired thrombophilia      (3 Points)                                                [ ] Heparin induced thrombocytopenia                  (3 Points)    (  ) Malignancy                                        Total Score [       7   ] 73 y/o male with right renal mass.  Scheduled for Laparoscopic right radical nephrectomy.  Plan  1. Stop all NSAIDS, herbal supplements and vitamins for 7 days.  2. NPO at midnight.  3. Take the following medications ( Multaq, Lisinopril, Cartia, Famotidine ) with small sips of water on the morning of your procedure/surgery.  4. Use EZ sponges as directed  5. Labs, EKG as per surgeon. Paul CT report on chart.  6. PMD and cardiologist visit for optimization prior to surgery as per surgeon  7. Pt to follow preop Xarelto instructions from cardiologist and surgeon.     CAPRINI SCORE [CLOT]  AGE RELATED RISK FACTORS                                                       MOBILITY RELATED FACTORS  [ ] Age 41-60 years                                            (1 Point)                  [ ] Bed rest                                                        (1 Point)  [ ] Age: 61-74 years                                           (2 Points)                 [ ] Plaster cast                                                   (2 Points)  [ x ] Age= 75 years                                              (3 Points)                 [ ] Bed bound for more than 72 hours                 (2 Points)    DISEASE RELATED RISK FACTORS                                               GENDER SPECIFIC FACTORS  [ ] Edema in the lower extremities                       (1 Point)                  [ ] Pregnancy                                                     (1 Point)  [ ] Varicose veins                                               (1 Point)                  [ ] Post-partum < 6 weeks                                   (1 Point)             [ ] BMI > 25 Kg/m2                                            (1 Point)                  [ ] Hormonal therapy  or oral contraception          (1 Point)                 [ ] Sepsis (in the previous month)                        (1 Point)                  [ ] History of pregnancy complications                 (1 point)  [ ] Pneumonia or serious lung disease                                               [ ] Unexplained or recurrent                     (1 Point)           (in the previous month)                               (1 Point)  [ ] Abnormal pulmonary function test                     (1 Point)                 SURGERY RELATED RISK FACTORS  [ ] Acute myocardial infarction                              (1 Point)                 [ ]  Section                                             (1 Point)  [ ] Congestive heart failure (in the previous month)  (1 Point)               [ ] Minor surgery                                                  (1 Point)   [ ] Inflammatory bowel disease                             (1 Point)                 [ ] Arthroscopic surgery                                        (2 Points)  [ ] Central venous access                                      (2 Points)                [ x ] General surgery lasting more than 45 minutes   (2 Points)       [ ] Stroke (in the previous month)                          (5 Points)               [ ] Elective arthroplasty                                         (5 Points)     (x )  malignancy                                                             (2 points )                                                                                                                                      HEMATOLOGY RELATED FACTORS                                                 TRAUMA RELATED RISK FACTORS  [ ] Prior episodes of VTE                                     (3 Points)                 [ ] Fracture of the hip, pelvis, or leg                       (5 Points)  [ ] Positive family history for VTE                         (3 Points)                 [ ] Acute spinal cord injury (in the previous month)  (5 Points)  [ ] Prothrombin 07019 A                                     (3 Points)                 [ ] Paralysis  (less than 1 month)                             (5 Points)  [ ] Factor V Leiden                                             (3 Points)                  [ ] Multiple Trauma within 1 month                        (5 Points)  [ ] Lupus anticoagulants                                     (3 Points)                                                           [ ] Anticardiolipin antibodies                               (3 Points)                                                       [ ] High homocysteine in the blood                      (3 Points)                                             [ ] Other congenital or acquired thrombophilia      (3 Points)                                                [ ] Heparin induced thrombocytopenia                  (3 Points)    (  ) Malignancy                                        Total Score [       7   ]

## 2019-11-20 NOTE — H&P PST ADULT - NSANTHOSAYNRD_GEN_A_CORE
No. KORY screening performed.  STOP BANG Legend: 0-2 = LOW Risk; 3-4 = INTERMEDIATE Risk; 5-8 = HIGH Risk

## 2019-11-20 NOTE — H&P PST ADULT - NSICDXPROBLEM_GEN_ALL_CORE_FT
PROBLEM DIAGNOSES  Problem: Need for prophylactic measure  Assessment and Plan: The Caprini score indicates that this patient is at high risk for a VTE event (score 6 or greater). Surgical patients in this group will benefit from both pharmacologic prophylaxis and intermittent compression devices.  The surgical team will determine the balance between VTE risk and bleeding risk, and other clinical considerations  6

## 2019-11-20 NOTE — H&P PST ADULT - NSICDXPASTSURGICALHX_GEN_ALL_CORE_FT
PAST SURGICAL HISTORY:  H/O colonoscopy and upper endoscopy 2019    H/O hemorrhoidectomy     S/P cholecystectomy     S/P tonsillectomy

## 2019-11-21 ENCOUNTER — APPOINTMENT (OUTPATIENT)
Age: 74
End: 2019-11-21
Payer: COMMERCIAL

## 2019-11-21 VITALS
TEMPERATURE: 98.1 F | DIASTOLIC BLOOD PRESSURE: 74 MMHG | BODY MASS INDEX: 23.6 KG/M2 | HEART RATE: 61 BPM | RESPIRATION RATE: 16 BRPM | HEIGHT: 69.5 IN | SYSTOLIC BLOOD PRESSURE: 143 MMHG | WEIGHT: 163 LBS

## 2019-11-21 DIAGNOSIS — Z01.818 ENCOUNTER FOR OTHER PREPROCEDURAL EXAMINATION: ICD-10-CM

## 2019-11-21 DIAGNOSIS — N28.89 OTHER SPECIFIED DISORDERS OF KIDNEY AND URETER: ICD-10-CM

## 2019-11-21 DIAGNOSIS — C64.9 MALIGNANT NEOPLASM OF UNSPECIFIED KIDNEY, EXCEPT RENAL PELVIS: ICD-10-CM

## 2019-11-21 LAB
CULTURE RESULTS: SIGNIFICANT CHANGE UP
SPECIMEN SOURCE: SIGNIFICANT CHANGE UP
T(9;22)(ABL1,BCR)/CONTROL BLD/T: NORMAL

## 2019-11-21 PROCEDURE — 99244 OFF/OP CNSLTJ NEW/EST MOD 40: CPT

## 2019-11-21 RX ORDER — UBIDECARENONE 100 MG
100 CAPSULE ORAL DAILY
Refills: 0 | Status: DISCONTINUED | COMMUNITY
End: 2019-11-21

## 2019-11-22 ENCOUNTER — APPOINTMENT (OUTPATIENT)
Dept: CARDIOLOGY | Facility: CLINIC | Age: 74
End: 2019-11-22
Payer: COMMERCIAL

## 2019-11-22 ENCOUNTER — APPOINTMENT (OUTPATIENT)
Dept: INTERNAL MEDICINE | Facility: CLINIC | Age: 74
End: 2019-11-22
Payer: COMMERCIAL

## 2019-11-22 VITALS
HEART RATE: 63 BPM | HEIGHT: 69.5 IN | BODY MASS INDEX: 24.76 KG/M2 | WEIGHT: 171 LBS | DIASTOLIC BLOOD PRESSURE: 51 MMHG | OXYGEN SATURATION: 99 % | SYSTOLIC BLOOD PRESSURE: 102 MMHG

## 2019-11-22 PROBLEM — I48.91 UNSPECIFIED ATRIAL FIBRILLATION: Chronic | Status: ACTIVE | Noted: 2019-11-20

## 2019-11-22 PROBLEM — N40.0 BENIGN PROSTATIC HYPERPLASIA WITHOUT LOWER URINARY TRACT SYMPTOMS: Chronic | Status: ACTIVE | Noted: 2019-11-20

## 2019-11-22 PROBLEM — K63.5 POLYP OF COLON: Chronic | Status: ACTIVE | Noted: 2019-11-20

## 2019-11-22 PROBLEM — R31.9 HEMATURIA, UNSPECIFIED: Chronic | Status: ACTIVE | Noted: 2019-11-20

## 2019-11-22 PROBLEM — I10 ESSENTIAL (PRIMARY) HYPERTENSION: Chronic | Status: ACTIVE | Noted: 2019-11-20

## 2019-11-22 PROBLEM — K21.9 GASTRO-ESOPHAGEAL REFLUX DISEASE WITHOUT ESOPHAGITIS: Chronic | Status: ACTIVE | Noted: 2019-11-20

## 2019-11-22 PROCEDURE — 99214 OFFICE O/P EST MOD 30 MIN: CPT

## 2019-11-22 PROCEDURE — 99213 OFFICE O/P EST LOW 20 MIN: CPT

## 2019-11-22 NOTE — REVIEW OF SYSTEMS
[Eyeglasses] : currently wearing eyeglasses [Impotence] : impotence [Negative] : Heme/Lymph [Fever] : no fever [Recent Weight Gain (___ Lbs)] : no recent weight gain [Chills] : no chills [Recent Weight Loss (___ Lbs)] : no recent weight loss [Loss Of Hearing] : no hearing loss [Shortness Of Breath] : no shortness of breath [Dyspnea on exertion] : not dyspnea during exertion [Chest  Pressure] : no chest pressure [Chest Pain] : no chest pain [Lower Ext Edema] : no extremity edema [Palpitations] : no palpitations [see HPI] : see HPI [Hematuria] : hematuria [Under Stress] : under stress [FreeTextEntry5] : Erectile dysfunction

## 2019-11-22 NOTE — PHYSICAL EXAM
[Normal Appearance] : normal appearance [General Appearance - In No Acute Distress] : no acute distress [No Oral Cyanosis] : no oral cyanosis [Respiration, Rhythm And Depth] : normal respiratory rhythm and effort [Auscultation Breath Sounds / Voice Sounds] : lungs were clear to auscultation bilaterally [Heart Rate And Rhythm] : heart rate and rhythm were normal [Heart Sounds] : normal S1 and S2 [Murmurs] : no murmurs present [Abnormal Walk] : normal gait [Nail Clubbing] : no clubbing of the fingernails [Cyanosis, Localized] : no localized cyanosis [Oriented To Time, Place, And Person] : oriented to person, place, and time [Impaired Insight] : insight and judgment were intact [Affect] : the affect was normal [Mood] : the mood was normal [FreeTextEntry1] : No wheeze or crackles [Edema] : no peripheral edema present

## 2019-11-22 NOTE — ASSESSMENT
[Patient Optimized for Surgery] : Patient optimized for surgery [No Further Testing Recommended] : no further testing recommended [As per surgery] : as per surgery [Modify anticoagulant treatment prior to procedure] : Modify anticoagulant treatment prior to procedure [FreeTextEntry4] : There is no contraindication to planned right nephrectomy with sedation/anesthesia. [FreeTextEntry5] : as per cardiology

## 2019-11-22 NOTE — REASON FOR VISIT
[Atrial Fibrillation] : atrial fibrillation [Hypertension] : hypertension [Medication Management] : Medication management [FreeTextEntry1] : preoperative cardiac examination

## 2019-11-22 NOTE — PHYSICAL EXAM
[No Acute Distress] : no acute distress [Well Developed] : well developed [Well Nourished] : well nourished [Well-Appearing] : well-appearing [Normal Sclera/Conjunctiva] : normal sclera/conjunctiva [PERRL] : pupils equal round and reactive to light [Normal Oropharynx] : the oropharynx was normal [EOMI] : extraocular movements intact [Normal Outer Ear/Nose] : the outer ears and nose were normal in appearance [No JVD] : no jugular venous distention [Supple] : supple [No Lymphadenopathy] : no lymphadenopathy [No Respiratory Distress] : no respiratory distress  [Thyroid Normal, No Nodules] : the thyroid was normal and there were no nodules present [Clear to Auscultation] : lungs were clear to auscultation bilaterally [Normal Rate] : normal rate  [No Accessory Muscle Use] : no accessory muscle use [No Carotid Bruits] : no carotid bruits [No Murmur] : no murmur heard [Regular Rhythm] : with a regular rhythm [Normal S1, S2] : normal S1 and S2 [No Varicosities] : no varicosities [No Abdominal Bruit] : a ~M bruit was not heard ~T in the abdomen [Pedal Pulses Present] : the pedal pulses are present [No Edema] : there was no peripheral edema [No Palpable Aorta] : no palpable aorta [Soft] : abdomen soft [No Extremity Clubbing/Cyanosis] : no extremity clubbing/cyanosis [Non Tender] : non-tender [No HSM] : no HSM [Non-distended] : non-distended [No Masses] : no abdominal mass palpated [Normal Posterior Cervical Nodes] : no posterior cervical lymphadenopathy [Normal Anterior Cervical Nodes] : no anterior cervical lymphadenopathy [Normal Bowel Sounds] : normal bowel sounds [No Spinal Tenderness] : no spinal tenderness [No CVA Tenderness] : no CVA  tenderness [No Rash] : no rash [No Joint Swelling] : no joint swelling [Grossly Normal Strength/Tone] : grossly normal strength/tone [No Focal Deficits] : no focal deficits [Coordination Grossly Intact] : coordination grossly intact [Deep Tendon Reflexes (DTR)] : deep tendon reflexes were 2+ and symmetric [Normal Gait] : normal gait [Normal Insight/Judgement] : insight and judgment were intact [Normal Affect] : the affect was normal

## 2019-11-22 NOTE — DISCUSSION/SUMMARY
[FreeTextEntry1] : \par Preoperative cardiac examination: Mr. Johnson appears optimized from a cardiac standpoint to proceed with nephrectomy as planned.  He has already stopped all over-the-counter supplements; I recommended that he discontinue anticoagulation with Xarelto 3 days prior to surgery; anticoagulation should be resumed postoperatively once hemostasis has been achieved. He will continue perioperative use of diltiazem, metoprolol, and Multaq; he will not take hydrochlorothiazide on the morning of surgery.\par \par Atrial fibrillation: Paroxysmal; currently in sinus rhythm.\par \par Hypertension: Controlled.\par

## 2019-11-22 NOTE — HISTORY OF PRESENT ILLNESS
[FreeTextEntry1] : Denny Johnson is a 74-year-old man with a history of paroxysmal atrial fibrillation (with implantable loop monitor), and hypertension, who returns for  cardiac examination prior to surgery.  He recently experienced gross hematuria and urologic evaluation revealed the presence of a right renal mass suspicious for renal cell carcinoma; he is scheduled for nephrectomy next week at [facility].   He continues to feel well from a cardiac standpoint and has not been experiencing shortness of breath, angina, or palpitations.  His baseline function status is excellent (> 4 METS).\par

## 2019-11-22 NOTE — HISTORY OF PRESENT ILLNESS
[FreeTextEntry1] : right nephrectomy [FreeTextEntry3] : Dr. Santhosh Hanna [FreeTextEntry4] : Mr. Johnson presents for preoperative evaluation for scheduled right nephrectomy. He has a history of gross hematuria. CT scan of the abdomen or pelvis demonstrated a necrotic mass at the lower pole of the right kidney. Patient is now scheduled for elective right nephrectomy.

## 2019-11-26 ENCOUNTER — OUTPATIENT (OUTPATIENT)
Dept: OUTPATIENT SERVICES | Facility: HOSPITAL | Age: 74
LOS: 1 days | End: 2019-11-26
Payer: COMMERCIAL

## 2019-11-26 ENCOUNTER — OTHER (OUTPATIENT)
Age: 74
End: 2019-11-26

## 2019-11-26 DIAGNOSIS — Z98.890 OTHER SPECIFIED POSTPROCEDURAL STATES: Chronic | ICD-10-CM

## 2019-11-26 DIAGNOSIS — Z90.49 ACQUIRED ABSENCE OF OTHER SPECIFIED PARTS OF DIGESTIVE TRACT: Chronic | ICD-10-CM

## 2019-11-26 DIAGNOSIS — Z90.89 ACQUIRED ABSENCE OF OTHER ORGANS: Chronic | ICD-10-CM

## 2019-11-26 PROCEDURE — 85730 THROMBOPLASTIN TIME PARTIAL: CPT

## 2019-11-26 PROCEDURE — 85610 PROTHROMBIN TIME: CPT

## 2019-11-26 PROCEDURE — 36415 COLL VENOUS BLD VENIPUNCTURE: CPT

## 2019-11-28 DIAGNOSIS — N28.89 OTHER SPECIFIED DISORDERS OF KIDNEY AND URETER: ICD-10-CM

## 2019-11-28 DIAGNOSIS — C64.9 MALIGNANT NEOPLASM OF UNSPECIFIED KIDNEY, EXCEPT RENAL PELVIS: ICD-10-CM

## 2019-11-29 DIAGNOSIS — N28.89 OTHER SPECIFIED DISORDERS OF KIDNEY AND URETER: ICD-10-CM

## 2019-11-29 DIAGNOSIS — C64.9 MALIGNANT NEOPLASM OF UNSPECIFIED KIDNEY, EXCEPT RENAL PELVIS: ICD-10-CM

## 2019-12-03 ENCOUNTER — MEDICATION RENEWAL (OUTPATIENT)
Age: 74
End: 2019-12-03

## 2019-12-04 LAB
APTT BLD: 32.5 SEC
INR PPP: 1.39 RATIO
PT BLD: 16 SEC

## 2019-12-05 ENCOUNTER — APPOINTMENT (OUTPATIENT)
Dept: UROLOGY | Facility: HOSPITAL | Age: 74
End: 2019-12-05

## 2019-12-05 ENCOUNTER — INPATIENT (INPATIENT)
Facility: HOSPITAL | Age: 74
LOS: 3 days | Discharge: HOME | DRG: 657 | End: 2019-12-09
Attending: UROLOGY | Admitting: UROLOGY
Payer: MEDICARE

## 2019-12-05 ENCOUNTER — RESULT REVIEW (OUTPATIENT)
Age: 74
End: 2019-12-05

## 2019-12-05 VITALS
OXYGEN SATURATION: 99 % | SYSTOLIC BLOOD PRESSURE: 127 MMHG | HEIGHT: 70 IN | WEIGHT: 162.92 LBS | RESPIRATION RATE: 16 BRPM | DIASTOLIC BLOOD PRESSURE: 64 MMHG | HEART RATE: 65 BPM | TEMPERATURE: 98 F

## 2019-12-05 DIAGNOSIS — Z90.89 ACQUIRED ABSENCE OF OTHER ORGANS: Chronic | ICD-10-CM

## 2019-12-05 DIAGNOSIS — Z98.890 OTHER SPECIFIED POSTPROCEDURAL STATES: Chronic | ICD-10-CM

## 2019-12-05 DIAGNOSIS — N28.89 OTHER SPECIFIED DISORDERS OF KIDNEY AND URETER: ICD-10-CM

## 2019-12-05 DIAGNOSIS — C64.9 MALIGNANT NEOPLASM OF UNSPECIFIED KIDNEY, EXCEPT RENAL PELVIS: ICD-10-CM

## 2019-12-05 DIAGNOSIS — Z90.49 ACQUIRED ABSENCE OF OTHER SPECIFIED PARTS OF DIGESTIVE TRACT: Chronic | ICD-10-CM

## 2019-12-05 LAB
ANION GAP SERPL CALC-SCNC: 5 MMOL/L — SIGNIFICANT CHANGE UP (ref 5–17)
APTT BLD: 34.9 SEC — SIGNIFICANT CHANGE UP (ref 27.5–36.3)
BASOPHILS # BLD AUTO: 0.03 K/UL — SIGNIFICANT CHANGE UP (ref 0–0.2)
BASOPHILS NFR BLD AUTO: 0.4 % — SIGNIFICANT CHANGE UP (ref 0–2)
BUN SERPL-MCNC: 18 MG/DL — SIGNIFICANT CHANGE UP (ref 7–23)
CALCIUM SERPL-MCNC: 8.7 MG/DL — SIGNIFICANT CHANGE UP (ref 8.5–10.1)
CHLORIDE SERPL-SCNC: 103 MMOL/L — SIGNIFICANT CHANGE UP (ref 96–108)
CO2 SERPL-SCNC: 28 MMOL/L — SIGNIFICANT CHANGE UP (ref 22–31)
CREAT SERPL-MCNC: 1.14 MG/DL — SIGNIFICANT CHANGE UP (ref 0.5–1.3)
EOSINOPHIL # BLD AUTO: 0.03 K/UL — SIGNIFICANT CHANGE UP (ref 0–0.5)
EOSINOPHIL NFR BLD AUTO: 0.4 % — SIGNIFICANT CHANGE UP (ref 0–6)
GLUCOSE SERPL-MCNC: 108 MG/DL — HIGH (ref 70–99)
HCT VFR BLD CALC: 44.1 % — SIGNIFICANT CHANGE UP (ref 39–50)
HGB BLD-MCNC: 12.5 G/DL — LOW (ref 13–17)
IMM GRANULOCYTES NFR BLD AUTO: 0.4 % — SIGNIFICANT CHANGE UP (ref 0–1.5)
INR BLD: 1.55 RATIO — HIGH (ref 0.88–1.16)
LYMPHOCYTES # BLD AUTO: 0.47 K/UL — LOW (ref 1–3.3)
LYMPHOCYTES # BLD AUTO: 6.2 % — LOW (ref 13–44)
MCHC RBC-ENTMCNC: 21.2 PG — LOW (ref 27–34)
MCHC RBC-ENTMCNC: 28.3 GM/DL — LOW (ref 32–36)
MCV RBC AUTO: 74.6 FL — LOW (ref 80–100)
MONOCYTES # BLD AUTO: 0.63 K/UL — SIGNIFICANT CHANGE UP (ref 0–0.9)
MONOCYTES NFR BLD AUTO: 8.3 % — SIGNIFICANT CHANGE UP (ref 2–14)
NEUTROPHILS # BLD AUTO: 6.44 K/UL — SIGNIFICANT CHANGE UP (ref 1.8–7.4)
NEUTROPHILS NFR BLD AUTO: 84.3 % — HIGH (ref 43–77)
PLATELET # BLD AUTO: 277 K/UL — SIGNIFICANT CHANGE UP (ref 150–400)
POTASSIUM SERPL-MCNC: 4.3 MMOL/L — SIGNIFICANT CHANGE UP (ref 3.5–5.3)
POTASSIUM SERPL-SCNC: 4.3 MMOL/L — SIGNIFICANT CHANGE UP (ref 3.5–5.3)
PROTHROM AB SERPL-ACNC: 17.4 SEC — HIGH (ref 10–12.9)
RBC # BLD: 5.91 M/UL — HIGH (ref 4.2–5.8)
RBC # FLD: 20.5 % — HIGH (ref 10.3–14.5)
SODIUM SERPL-SCNC: 136 MMOL/L — SIGNIFICANT CHANGE UP (ref 135–145)
WBC # BLD: 7.63 K/UL — SIGNIFICANT CHANGE UP (ref 3.8–10.5)
WBC # FLD AUTO: 7.63 K/UL — SIGNIFICANT CHANGE UP (ref 3.8–10.5)

## 2019-12-05 PROCEDURE — 85027 COMPLETE CBC AUTOMATED: CPT

## 2019-12-05 PROCEDURE — 86901 BLOOD TYPING SEROLOGIC RH(D): CPT

## 2019-12-05 PROCEDURE — 93005 ELECTROCARDIOGRAM TRACING: CPT

## 2019-12-05 PROCEDURE — 88309 TISSUE EXAM BY PATHOLOGIST: CPT

## 2019-12-05 PROCEDURE — 84100 ASSAY OF PHOSPHORUS: CPT

## 2019-12-05 PROCEDURE — 85730 THROMBOPLASTIN TIME PARTIAL: CPT

## 2019-12-05 PROCEDURE — 85025 COMPLETE CBC W/AUTO DIFF WBC: CPT

## 2019-12-05 PROCEDURE — 83735 ASSAY OF MAGNESIUM: CPT

## 2019-12-05 PROCEDURE — 50545 LAPARO RADICAL NEPHRECTOMY: CPT | Mod: RT

## 2019-12-05 PROCEDURE — 88309 TISSUE EXAM BY PATHOLOGIST: CPT | Mod: 26

## 2019-12-05 PROCEDURE — 86900 BLOOD TYPING SEROLOGIC ABO: CPT

## 2019-12-05 PROCEDURE — 86850 RBC ANTIBODY SCREEN: CPT

## 2019-12-05 PROCEDURE — 99024 POSTOP FOLLOW-UP VISIT: CPT

## 2019-12-05 PROCEDURE — 85610 PROTHROMBIN TIME: CPT

## 2019-12-05 PROCEDURE — C1889: CPT

## 2019-12-05 PROCEDURE — 50545 LAPARO RADICAL NEPHRECTOMY: CPT | Mod: AS,RT

## 2019-12-05 PROCEDURE — 80048 BASIC METABOLIC PNL TOTAL CA: CPT

## 2019-12-05 PROCEDURE — 36415 COLL VENOUS BLD VENIPUNCTURE: CPT

## 2019-12-05 RX ORDER — HYDROCHLOROTHIAZIDE 25 MG
25 TABLET ORAL DAILY
Refills: 0 | Status: DISCONTINUED | OUTPATIENT
Start: 2019-12-05 | End: 2019-12-06

## 2019-12-05 RX ORDER — DILTIAZEM HCL 120 MG
180 CAPSULE, EXT RELEASE 24 HR ORAL DAILY
Refills: 0 | Status: DISCONTINUED | OUTPATIENT
Start: 2019-12-05 | End: 2019-12-06

## 2019-12-05 RX ORDER — LISINOPRIL 2.5 MG/1
40 TABLET ORAL DAILY
Refills: 0 | Status: DISCONTINUED | OUTPATIENT
Start: 2019-12-05 | End: 2019-12-09

## 2019-12-05 RX ORDER — DRONEDARONE 400 MG/1
400 TABLET, FILM COATED ORAL
Refills: 0 | Status: DISCONTINUED | OUTPATIENT
Start: 2019-12-05 | End: 2019-12-09

## 2019-12-05 RX ORDER — FAMOTIDINE 10 MG/ML
20 INJECTION INTRAVENOUS
Refills: 0 | Status: DISCONTINUED | OUTPATIENT
Start: 2019-12-05 | End: 2019-12-09

## 2019-12-05 RX ORDER — SENNA PLUS 8.6 MG/1
2 TABLET ORAL AT BEDTIME
Refills: 0 | Status: DISCONTINUED | OUTPATIENT
Start: 2019-12-05 | End: 2019-12-09

## 2019-12-05 RX ORDER — SODIUM CHLORIDE 9 MG/ML
1000 INJECTION INTRAMUSCULAR; INTRAVENOUS; SUBCUTANEOUS
Refills: 0 | Status: DISCONTINUED | OUTPATIENT
Start: 2019-12-05 | End: 2019-12-09

## 2019-12-05 RX ORDER — CEFAZOLIN SODIUM 1 G
2000 VIAL (EA) INJECTION EVERY 8 HOURS
Refills: 0 | Status: COMPLETED | OUTPATIENT
Start: 2019-12-05 | End: 2019-12-06

## 2019-12-05 RX ORDER — HEPARIN SODIUM 5000 [USP'U]/ML
5000 INJECTION INTRAVENOUS; SUBCUTANEOUS THREE TIMES A DAY
Refills: 0 | Status: DISCONTINUED | OUTPATIENT
Start: 2019-12-05 | End: 2019-12-08

## 2019-12-05 RX ORDER — HYDROMORPHONE HYDROCHLORIDE 2 MG/ML
0.5 INJECTION INTRAMUSCULAR; INTRAVENOUS; SUBCUTANEOUS
Refills: 0 | Status: DISCONTINUED | OUTPATIENT
Start: 2019-12-05 | End: 2019-12-06

## 2019-12-05 RX ORDER — METOPROLOL TARTRATE 50 MG
50 TABLET ORAL DAILY
Refills: 0 | Status: DISCONTINUED | OUTPATIENT
Start: 2019-12-05 | End: 2019-12-08

## 2019-12-05 RX ORDER — SODIUM CHLORIDE 9 MG/ML
1000 INJECTION, SOLUTION INTRAVENOUS
Refills: 0 | Status: DISCONTINUED | OUTPATIENT
Start: 2019-12-05 | End: 2019-12-05

## 2019-12-05 RX ORDER — OXYCODONE AND ACETAMINOPHEN 5; 325 MG/1; MG/1
1 TABLET ORAL EVERY 4 HOURS
Refills: 0 | Status: DISCONTINUED | OUTPATIENT
Start: 2019-12-05 | End: 2019-12-09

## 2019-12-05 RX ORDER — ONDANSETRON 8 MG/1
4 TABLET, FILM COATED ORAL ONCE
Refills: 0 | Status: DISCONTINUED | OUTPATIENT
Start: 2019-12-05 | End: 2019-12-05

## 2019-12-05 RX ORDER — OXYCODONE HYDROCHLORIDE 5 MG/1
10 TABLET ORAL ONCE
Refills: 0 | Status: DISCONTINUED | OUTPATIENT
Start: 2019-12-05 | End: 2019-12-05

## 2019-12-05 RX ORDER — MORPHINE SULFATE 50 MG/1
4 CAPSULE, EXTENDED RELEASE ORAL EVERY 6 HOURS
Refills: 0 | Status: DISCONTINUED | OUTPATIENT
Start: 2019-12-05 | End: 2019-12-09

## 2019-12-05 RX ORDER — FENTANYL CITRATE 50 UG/ML
50 INJECTION INTRAVENOUS
Refills: 0 | Status: DISCONTINUED | OUTPATIENT
Start: 2019-12-05 | End: 2019-12-05

## 2019-12-05 RX ORDER — OXYCODONE AND ACETAMINOPHEN 5; 325 MG/1; MG/1
2 TABLET ORAL EVERY 6 HOURS
Refills: 0 | Status: DISCONTINUED | OUTPATIENT
Start: 2019-12-05 | End: 2019-12-09

## 2019-12-05 RX ADMIN — HEPARIN SODIUM 5000 UNIT(S): 5000 INJECTION INTRAVENOUS; SUBCUTANEOUS at 21:13

## 2019-12-05 RX ADMIN — OXYCODONE AND ACETAMINOPHEN 1 TABLET(S): 5; 325 TABLET ORAL at 21:14

## 2019-12-05 RX ADMIN — FAMOTIDINE 20 MILLIGRAM(S): 10 INJECTION INTRAVENOUS at 18:46

## 2019-12-05 RX ADMIN — SENNA PLUS 2 TABLET(S): 8.6 TABLET ORAL at 21:14

## 2019-12-05 RX ADMIN — HYDROMORPHONE HYDROCHLORIDE 0.5 MILLIGRAM(S): 2 INJECTION INTRAMUSCULAR; INTRAVENOUS; SUBCUTANEOUS at 16:05

## 2019-12-05 RX ADMIN — Medication 100 MILLIGRAM(S): at 21:20

## 2019-12-05 NOTE — PROGRESS NOTE ADULT - SUBJECTIVE AND OBJECTIVE BOX
POC    74yMale with right kidney mass POD 0 s/p laparoscopic radical right nephrectomy presents for POC.  Pt seen and examined without complaints. Pain is controlled. Denies SOB/CP/palpitations, N/V.     ceFAZolin   IVPB 2000 milliGRAM(s) IV Intermittent every 8 hours  diltiazem    milliGRAM(s) Oral daily  dronedarone 400 milliGRAM(s) Oral two times a day  famotidine    Tablet 20 milliGRAM(s) Oral two times a day  heparin  Injectable 5000 Unit(s) SubCutaneous three times a day  hydrochlorothiazide 25 milliGRAM(s) Oral daily  HYDROmorphone  Injectable 0.5 milliGRAM(s) IV Push every 5 minutes PRN  lisinopril 40 milliGRAM(s) Oral daily  metoprolol succinate ER 50 milliGRAM(s) Oral daily  morphine  - Injectable 4 milliGRAM(s) IV Push every 6 hours PRN  oxycodone    5 mG/acetaminophen 325 mG 1 Tablet(s) Oral every 4 hours PRN  oxycodone    5 mG/acetaminophen 325 mG 2 Tablet(s) Oral every 6 hours PRN  senna 2 Tablet(s) Oral at bedtime  sodium chloride 0.9%. 1000 milliLiter(s) IV Continuous <Continuous>      Vital Signs Last 24 Hrs  T(C): 35 (05 Dec 2019 20:47), Max: 36.6 (05 Dec 2019 17:15)  T(F): 95 (05 Dec 2019 20:47), Max: 97.9 (05 Dec 2019 17:15)  HR: 45 (05 Dec 2019 20:47) (45 - 71)  BP: 104/47 (05 Dec 2019 20:47) (103/84 - 127/64)  BP(mean): --  RR: 18 (05 Dec 2019 20:47) (12 - 22)  SpO2: 98% (05 Dec 2019 20:47) (95% - 100%)    I&O's Summary    05 Dec 2019 07:01  -  05 Dec 2019 20:55  --------------------------------------------------------  IN: 1750 mL / OUT: 460 mL / NET: 1290 mL        Physical Exam  Gen: NAD, comfortable in bed  Lungs: CTAB  Heart: S1/S2,regular rhythm  Abd: Soft, ND, + incisional tenderness, no rebound no guarding. +BS. incisions c/d/i  : leung in place with clear urine  Neuro: A&Ox3  Extremities: venodynes in place. no edema. pulses 2+                          12.5   7.63  )-----------( 277      ( 05 Dec 2019 16:30 )             44.1       12-05    136  |  103  |  18  ----------------------------<  108<H>  4.3   |  28  |  1.14    Ca    8.7      05 Dec 2019 16:30        A/P: 74y Male POD 0 s/p laparoscopic radical right nephrectomy presents with incsional tenderness.  DVT prophylaxis/OOB  Encourage Incentive spirometry  Strict I&O's  pain control  ADAT  AM labs

## 2019-12-05 NOTE — ASU PREOP CHECKLIST - PATIENT'S PERSONAL PROPERTY GIVEN TO
I reviewed the H&P, I examined the patient, and there are no changes in the patient's condition.  
on unit/locker 7d

## 2019-12-06 DIAGNOSIS — R42 DIZZINESS AND GIDDINESS: ICD-10-CM

## 2019-12-06 DIAGNOSIS — I10 ESSENTIAL (PRIMARY) HYPERTENSION: ICD-10-CM

## 2019-12-06 DIAGNOSIS — I48.0 PAROXYSMAL ATRIAL FIBRILLATION: ICD-10-CM

## 2019-12-06 LAB
ANION GAP SERPL CALC-SCNC: 5 MMOL/L — SIGNIFICANT CHANGE UP (ref 5–17)
BASOPHILS # BLD AUTO: 0.01 K/UL — SIGNIFICANT CHANGE UP (ref 0–0.2)
BASOPHILS NFR BLD AUTO: 0.2 % — SIGNIFICANT CHANGE UP (ref 0–2)
BUN SERPL-MCNC: 17 MG/DL — SIGNIFICANT CHANGE UP (ref 7–23)
CALCIUM SERPL-MCNC: 8.5 MG/DL — SIGNIFICANT CHANGE UP (ref 8.5–10.1)
CHLORIDE SERPL-SCNC: 106 MMOL/L — SIGNIFICANT CHANGE UP (ref 96–108)
CO2 SERPL-SCNC: 27 MMOL/L — SIGNIFICANT CHANGE UP (ref 22–31)
CREAT SERPL-MCNC: 1.45 MG/DL — HIGH (ref 0.5–1.3)
EOSINOPHIL # BLD AUTO: 0.11 K/UL — SIGNIFICANT CHANGE UP (ref 0–0.5)
EOSINOPHIL NFR BLD AUTO: 1.7 % — SIGNIFICANT CHANGE UP (ref 0–6)
GLUCOSE SERPL-MCNC: 105 MG/DL — HIGH (ref 70–99)
HCT VFR BLD CALC: 45.4 % — SIGNIFICANT CHANGE UP (ref 39–50)
HGB BLD-MCNC: 12.9 G/DL — LOW (ref 13–17)
IMM GRANULOCYTES NFR BLD AUTO: 0.5 % — SIGNIFICANT CHANGE UP (ref 0–1.5)
LYMPHOCYTES # BLD AUTO: 1.05 K/UL — SIGNIFICANT CHANGE UP (ref 1–3.3)
LYMPHOCYTES # BLD AUTO: 16.6 % — SIGNIFICANT CHANGE UP (ref 13–44)
MCHC RBC-ENTMCNC: 21.4 PG — LOW (ref 27–34)
MCHC RBC-ENTMCNC: 28.4 GM/DL — LOW (ref 32–36)
MCV RBC AUTO: 75.3 FL — LOW (ref 80–100)
MONOCYTES # BLD AUTO: 0.52 K/UL — SIGNIFICANT CHANGE UP (ref 0–0.9)
MONOCYTES NFR BLD AUTO: 8.2 % — SIGNIFICANT CHANGE UP (ref 2–14)
NEUTROPHILS # BLD AUTO: 4.62 K/UL — SIGNIFICANT CHANGE UP (ref 1.8–7.4)
NEUTROPHILS NFR BLD AUTO: 72.8 % — SIGNIFICANT CHANGE UP (ref 43–77)
PLATELET # BLD AUTO: 281 K/UL — SIGNIFICANT CHANGE UP (ref 150–400)
POTASSIUM SERPL-MCNC: 4.1 MMOL/L — SIGNIFICANT CHANGE UP (ref 3.5–5.3)
POTASSIUM SERPL-SCNC: 4.1 MMOL/L — SIGNIFICANT CHANGE UP (ref 3.5–5.3)
RBC # BLD: 6.03 M/UL — HIGH (ref 4.2–5.8)
RBC # FLD: 20.7 % — HIGH (ref 10.3–14.5)
SODIUM SERPL-SCNC: 138 MMOL/L — SIGNIFICANT CHANGE UP (ref 135–145)
WBC # BLD: 6.34 K/UL — SIGNIFICANT CHANGE UP (ref 3.8–10.5)
WBC # FLD AUTO: 6.34 K/UL — SIGNIFICANT CHANGE UP (ref 3.8–10.5)

## 2019-12-06 PROCEDURE — 99223 1ST HOSP IP/OBS HIGH 75: CPT

## 2019-12-06 PROCEDURE — 93010 ELECTROCARDIOGRAM REPORT: CPT

## 2019-12-06 RX ORDER — ONDANSETRON 8 MG/1
4 TABLET, FILM COATED ORAL EVERY 6 HOURS
Refills: 0 | Status: DISCONTINUED | OUTPATIENT
Start: 2019-12-06 | End: 2019-12-06

## 2019-12-06 RX ORDER — HYDROCHLOROTHIAZIDE 25 MG
12.5 TABLET ORAL DAILY
Refills: 0 | Status: DISCONTINUED | OUTPATIENT
Start: 2019-12-06 | End: 2019-12-06

## 2019-12-06 RX ORDER — SODIUM CHLORIDE 9 MG/ML
1000 INJECTION, SOLUTION INTRAVENOUS
Refills: 0 | Status: DISCONTINUED | OUTPATIENT
Start: 2019-12-06 | End: 2019-12-06

## 2019-12-06 RX ORDER — DILTIAZEM HCL 120 MG
120 CAPSULE, EXT RELEASE 24 HR ORAL DAILY
Refills: 0 | Status: DISCONTINUED | OUTPATIENT
Start: 2019-12-06 | End: 2019-12-08

## 2019-12-06 RX ADMIN — OXYCODONE AND ACETAMINOPHEN 1 TABLET(S): 5; 325 TABLET ORAL at 11:30

## 2019-12-06 RX ADMIN — FAMOTIDINE 20 MILLIGRAM(S): 10 INJECTION INTRAVENOUS at 05:28

## 2019-12-06 RX ADMIN — Medication 100 MILLIGRAM(S): at 05:28

## 2019-12-06 RX ADMIN — Medication 100 MILLIGRAM(S): at 15:01

## 2019-12-06 RX ADMIN — Medication 200 MILLIGRAM(S): at 20:58

## 2019-12-06 RX ADMIN — OXYCODONE AND ACETAMINOPHEN 1 TABLET(S): 5; 325 TABLET ORAL at 06:05

## 2019-12-06 RX ADMIN — OXYCODONE AND ACETAMINOPHEN 1 TABLET(S): 5; 325 TABLET ORAL at 10:58

## 2019-12-06 RX ADMIN — HEPARIN SODIUM 5000 UNIT(S): 5000 INJECTION INTRAVENOUS; SUBCUTANEOUS at 21:07

## 2019-12-06 RX ADMIN — SODIUM CHLORIDE 125 MILLILITER(S): 9 INJECTION INTRAMUSCULAR; INTRAVENOUS; SUBCUTANEOUS at 14:58

## 2019-12-06 RX ADMIN — OXYCODONE AND ACETAMINOPHEN 1 TABLET(S): 5; 325 TABLET ORAL at 15:00

## 2019-12-06 RX ADMIN — HEPARIN SODIUM 5000 UNIT(S): 5000 INJECTION INTRAVENOUS; SUBCUTANEOUS at 14:59

## 2019-12-06 RX ADMIN — HEPARIN SODIUM 5000 UNIT(S): 5000 INJECTION INTRAVENOUS; SUBCUTANEOUS at 05:28

## 2019-12-06 NOTE — CONSULT NOTE ADULT - PROBLEM SELECTOR RECOMMENDATION 9
possibly due to low normal SBP , dehydration ( worsened bun/creatinine )  or vasovagal reaction , doubt SB contributing to his symptoms , encourage po fluid intake , decrease Cardizem to 120 mg po daily possibly due to low normal SBP , dehydration ( worsened bun/creatinine )  or vasovagal reaction , doubt SB contributing to his symptoms , encourage po fluid intake , decrease Cardizem to 120 mg po daily.

## 2019-12-06 NOTE — PROGRESS NOTE ADULT - SUBJECTIVE AND OBJECTIVE BOX
Pt resting in bed feels well,+ mild lower Abd pain, tolerating diet, No CP, dyspnea, N/V. + Pain/tearing of  Rt eye since post op     Vital Signs Last 24 Hrs  T(C): 36.2 (06 Dec 2019 05:00), Max: 36.6 (05 Dec 2019 17:15)  T(F): 97.1 (06 Dec 2019 05:00), Max: 97.9 (05 Dec 2019 17:15)  HR: 46 (06 Dec 2019 05:00) (45 - 71)  BP: 122/50 (06 Dec 2019 05:00) (103/84 - 127/64)  BP(mean): --  RR: 18 (06 Dec 2019 05:00) (12 - 22)  SpO2: 95% (06 Dec 2019 05:00) (94% - 100%)    I-2450   O- 2620 leung    Heart: [id number]    Lungs: CTA B/L    Abdomen: ND, +BS, Incisions with dermabond-no erythema or exudates  Rt DAMARIS with min serosanguinous , Mild lower Abd tenderness to palpation, no rebound or guarding    Extremities:  No swelling , nontender    A/P:  POD#1 S/P Robotic Prostatectomy  feels well, tolerating Diet   remove  DAMARIS drain, D/C home today  Rt eye pain/tearing, possible corneal abrasion  to see Opthamologist as an outpatient and will discuss with Anesthesia prior to discharge  Pt seen with Dr Brown,

## 2019-12-06 NOTE — PROVIDER CONTACT NOTE (OTHER) - SITUATION
Pt s/p lap rad R. nephrectomy with HR:46, BP:122/50. PA made aware. Pt asymptomatic. Morning cardiac meds held. Medicine to possibly be put on pt's case. PA will pass to day shift and Dr. Brown.

## 2019-12-06 NOTE — CONSULT NOTE ADULT - SUBJECTIVE AND OBJECTIVE BOX
CHIEF COMPLAINT: dizziness     HPI: 74 year old male with hx of  HTN PAF (loop in place ) who underwent right  radical nephrectomy  yesterday  patient tolerated well , Patient felt mild dizziness this morning when he got out ot bed  and ambulating , lasted briefly , patient denies any chest pain or shortness of breath or headache or nausea , Patient ekg showed marked sinus bradycardia in 40s ,   patient baseline EKG showing sinus bradycardia low 50s , blood pressure is low normal range SBP  ,   patients heart rate during surgery varying between 50-60 . Patient did recieve  cartia , metoprolol     Patient had normal exercise stress test  9 METS 90% MPHR  in december 2018 , Echo  dec 2018 normal LVEF mild AI TR.        PAST MEDICAL & SURGICAL HISTORY:  Colon polyp: benign  BPH (benign prostatic hyperplasia)  Hematuria  GERD (gastroesophageal reflux disease)  HTN (hypertension)  Atrial fibrillation: has loop recorder  Renal mass  H/O colonoscopy: and upper endoscopy 2019  S/P tonsillectomy  H/O hemorrhoidectomy  S/P cholecystectomy      Allergies    No Known Allergies    Intolerances        SOCIAL HISTORY: never smoker    FAMILY HISTORY:  Family history of breast cancer in mother      MEDICATIONS:Home Medications:  Calcium 500+D oral tablet, chewable: 0.5 tab(s) orally once a day (05 Dec 2019 10:34)  Cartia  mg/24 hours oral capsule, extended release: 1 cap(s) orally once a day (05 Dec 2019 10:34)  famotidine 20 mg oral tablet: 1 tab(s) orally 2 times a day (05 Dec 2019 10:34)  hydroCHLOROthiazide 25 mg oral tablet: 1 tab(s) orally once a day (05 Dec 2019 10:34)  lisinopril 40 mg oral tablet: 1 tab(s) orally once a day (05 Dec 2019 10:34)  magnesium: 1  orally once a day (05 Dec 2019 10:34)  Metamucil: 1 tbsp orally once a day with OJ (05 Dec 2019 10:34)  metoprolol succinate 50 mg oral capsule, extended release: 1 cap(s) orally once a day (at bedtime) (05 Dec 2019 10:34)  Multaq 400 mg oral tablet: 1 tab(s) orally 2 times a day (05 Dec 2019 10:34)  multivitamin: 1 tab(s) orally once a day (05 Dec 2019 10:34)  sildenafil 100 mg oral tablet: 1 tab(s) orally once a day, As Needed (05 Dec 2019 10:34)  Xarelto 20 mg oral tablet: 1 tab(s) orally once a day (in the evening)  pt to follow preop instructions by surgeon and cardiologist (05 Dec 2019 10:34)    MEDICATIONS  (STANDING):  ceFAZolin   IVPB 2000 milliGRAM(s) IV Intermittent every 8 hours  diltiazem    milliGRAM(s) Oral daily  dronedarone 400 milliGRAM(s) Oral two times a day  famotidine    Tablet 20 milliGRAM(s) Oral two times a day  heparin  Injectable 5000 Unit(s) SubCutaneous three times a day  hydrochlorothiazide 25 milliGRAM(s) Oral daily  lisinopril 40 milliGRAM(s) Oral daily  metoprolol succinate ER 50 milliGRAM(s) Oral daily  senna 2 Tablet(s) Oral at bedtime  sodium chloride 0.9%. 1000 milliLiter(s) (125 mL/Hr) IV Continuous <Continuous>    MEDICATIONS  (PRN):  HYDROmorphone  Injectable 0.5 milliGRAM(s) IV Push every 5 minutes PRN Moderate Pain (4 - 6)  morphine  - Injectable 4 milliGRAM(s) IV Push every 6 hours PRN breakthrough pain  oxycodone    5 mG/acetaminophen 325 mG 1 Tablet(s) Oral every 4 hours PRN Moderate Pain (4 - 6)  oxycodone    5 mG/acetaminophen 325 mG 2 Tablet(s) Oral every 6 hours PRN Severe Pain (7 - 10)      REVIEW OF SYSTEMS:    CONSTITUTIONAL: No weakness, fevers or chills  EYES/ENT: No visual changes;  No vertigo or throat pain   NECK: No pain or stiffness  RESPIRATORY: No cough, wheezing, hemoptysis; No shortness of breath  CARDIOVASCULAR: No chest pain or palpitations  GASTROINTESTINAL: post op pain  No nausea, vomiting, or hematemesis; No diarrhea or constipation. No melena or hematochezia.  GENITOURINARY: No dysuria, frequency or hematuria  NEUROLOGICAL: No numbness or weakness  SKIN: No itching, burning, rashes, or lesions   All other review of systems is negative unless indicated above    Vital Signs Last 24 Hrs  T(C): 36.3 (06 Dec 2019 10:56), Max: 36.6 (05 Dec 2019 17:15)  T(F): 97.4 (06 Dec 2019 10:56), Max: 97.9 (05 Dec 2019 17:15)  HR: 46 (06 Dec 2019 10:56) (45 - 71)  BP: 116/57 (06 Dec 2019 10:56) (103/84 - 124/47)  BP(mean): --  RR: 16 (06 Dec 2019 10:56) (12 - 22)  SpO2: 98% (06 Dec 2019 10:56) (94% - 100%)    I&O's Summary    05 Dec 2019 07:01  -  06 Dec 2019 07:00  --------------------------------------------------------  IN: 1750 mL / OUT: 1460 mL / NET: 290 mL        PHYSICAL EXAM:    Constitutional: NAD, awake and alert, well-developed  HEENT: PERR, EOMI,  No oral cyanosis  Neck:  supple,  No JVD  Respiratory: Breath sounds are clear bilaterally, No wheezing, rales or rhonchi  Cardiovascular: S1 and S2, regular bradycardia , no Murmurs, gallops or rubs  Gastrointestinal: Bowel Sounds present, soft, nontender.   Extremities: No peripheral edema. No clubbing or cyanosis.  Vascular: 2+ peripheral pulses  Neurological: A/O x 3, no focal deficits  Musculoskeletal: no calf tenderness.  Skin: No rashes.      LABS: All Labs Reviewed:                        12.9   6.34  )-----------( 281      ( 06 Dec 2019 06:47 )             45.4                         12.5   7.63  )-----------( 277      ( 05 Dec 2019 16:30 )             44.1     06 Dec 2019 06:47    138    |  106    |  17     ----------------------------<  105    4.1     |  27     |  1.45   05 Dec 2019 16:30    136    |  103    |  18     ----------------------------<  108    4.3     |  28     |  1.14     Ca    8.5        06 Dec 2019 06:47  Ca    8.7        05 Dec 2019 16:30      PT/INR - ( 05 Dec 2019 10:35 )   PT: 17.4 sec;   INR: 1.55 ratio         PTT - ( 05 Dec 2019 10:35 )  PTT:34.9 sec      Blood Culture:         RADIOLOGY/EKG:    < from: 12 Lead ECG (11.20.19 @ 08:47) >  inus bradycardia  Junctional ST depression, probably normal  Borderline ECG  Confirmed by ALBARO AWAD MD ([id number]) on 11/20/2019 11:26:24 AM    < end of copied text >      ekg 12/6/19  marked sinus bradycardia 44 CHIEF COMPLAINT: dizziness     HPI: 74 year old male with hx of  HTN PAF (loop in place ) who underwent right  radical nephrectomy  yesterday  patient tolerated well , Patient felt mild dizziness this morning when he got out ot bed  and ambulating , lasted briefly , patient denies any chest pain or shortness of breath or headache or nausea , Patient ekg showed marked sinus bradycardia in 40s ,   patient baseline EKG showing sinus bradycardia low 50s , blood pressure is low normal range SBP  ,   patients heart rate during surgery varying between 50-60 . Patient did not receive  cartia , metoprolol     Patient had normal exercise stress test  9 METS 90% MPHR  in december 2018 , Echo  dec 2018 normal LVEF mild AI TR.        PAST MEDICAL & SURGICAL HISTORY:  Colon polyp: benign  BPH (benign prostatic hyperplasia)  Hematuria  GERD (gastroesophageal reflux disease)  HTN (hypertension)  Atrial fibrillation: has loop recorder  Renal mass  H/O colonoscopy: and upper endoscopy 2019  S/P tonsillectomy  H/O hemorrhoidectomy  S/P cholecystectomy      Allergies    No Known Allergies    Intolerances        SOCIAL HISTORY: never smoker    FAMILY HISTORY:  Family history of breast cancer in mother      MEDICATIONS:Home Medications:  Calcium 500+D oral tablet, chewable: 0.5 tab(s) orally once a day (05 Dec 2019 10:34)  Cartia  mg/24 hours oral capsule, extended release: 1 cap(s) orally once a day (05 Dec 2019 10:34)  famotidine 20 mg oral tablet: 1 tab(s) orally 2 times a day (05 Dec 2019 10:34)  hydroCHLOROthiazide 25 mg oral tablet: 1 tab(s) orally once a day (05 Dec 2019 10:34)  lisinopril 40 mg oral tablet: 1 tab(s) orally once a day (05 Dec 2019 10:34)  magnesium: 1  orally once a day (05 Dec 2019 10:34)  Metamucil: 1 tbsp orally once a day with OJ (05 Dec 2019 10:34)  metoprolol succinate 50 mg oral capsule, extended release: 1 cap(s) orally once a day (at bedtime) (05 Dec 2019 10:34)  Multaq 400 mg oral tablet: 1 tab(s) orally 2 times a day (05 Dec 2019 10:34)  multivitamin: 1 tab(s) orally once a day (05 Dec 2019 10:34)  sildenafil 100 mg oral tablet: 1 tab(s) orally once a day, As Needed (05 Dec 2019 10:34)  Xarelto 20 mg oral tablet: 1 tab(s) orally once a day (in the evening)  pt to follow preop instructions by surgeon and cardiologist (05 Dec 2019 10:34)    MEDICATIONS  (STANDING):  ceFAZolin   IVPB 2000 milliGRAM(s) IV Intermittent every 8 hours  diltiazem    milliGRAM(s) Oral daily  dronedarone 400 milliGRAM(s) Oral two times a day  famotidine    Tablet 20 milliGRAM(s) Oral two times a day  heparin  Injectable 5000 Unit(s) SubCutaneous three times a day  hydrochlorothiazide 25 milliGRAM(s) Oral daily  lisinopril 40 milliGRAM(s) Oral daily  metoprolol succinate ER 50 milliGRAM(s) Oral daily  senna 2 Tablet(s) Oral at bedtime  sodium chloride 0.9%. 1000 milliLiter(s) (125 mL/Hr) IV Continuous <Continuous>    MEDICATIONS  (PRN):  HYDROmorphone  Injectable 0.5 milliGRAM(s) IV Push every 5 minutes PRN Moderate Pain (4 - 6)  morphine  - Injectable 4 milliGRAM(s) IV Push every 6 hours PRN breakthrough pain  oxycodone    5 mG/acetaminophen 325 mG 1 Tablet(s) Oral every 4 hours PRN Moderate Pain (4 - 6)  oxycodone    5 mG/acetaminophen 325 mG 2 Tablet(s) Oral every 6 hours PRN Severe Pain (7 - 10)      REVIEW OF SYSTEMS:    CONSTITUTIONAL: No weakness, fevers or chills  EYES/ENT: No visual changes;  No vertigo or throat pain   NECK: No pain or stiffness  RESPIRATORY: No cough, wheezing, hemoptysis; No shortness of breath  CARDIOVASCULAR: No chest pain or palpitations  GASTROINTESTINAL: post op pain  No nausea, vomiting, or hematemesis; No diarrhea or constipation. No melena or hematochezia.  GENITOURINARY: No dysuria, frequency or hematuria  NEUROLOGICAL: No numbness or weakness  SKIN: No itching, burning, rashes, or lesions   All other review of systems is negative unless indicated above    Vital Signs Last 24 Hrs  T(C): 36.3 (06 Dec 2019 10:56), Max: 36.6 (05 Dec 2019 17:15)  T(F): 97.4 (06 Dec 2019 10:56), Max: 97.9 (05 Dec 2019 17:15)  HR: 46 (06 Dec 2019 10:56) (45 - 71)  BP: 116/57 (06 Dec 2019 10:56) (103/84 - 124/47)  BP(mean): --  RR: 16 (06 Dec 2019 10:56) (12 - 22)  SpO2: 98% (06 Dec 2019 10:56) (94% - 100%)    I&O's Summary    05 Dec 2019 07:01  -  06 Dec 2019 07:00  --------------------------------------------------------  IN: 1750 mL / OUT: 1460 mL / NET: 290 mL        PHYSICAL EXAM:    Constitutional: NAD, awake and alert, well-developed  HEENT: PERR, EOMI,  No oral cyanosis  Neck:  supple,  No JVD  Respiratory: Breath sounds are clear bilaterally, No wheezing, rales or rhonchi  Cardiovascular: S1 and S2, regular bradycardia , no Murmurs, gallops or rubs  Gastrointestinal: Bowel Sounds present, soft, nontender.   Extremities: No peripheral edema. No clubbing or cyanosis.  Vascular: 2+ peripheral pulses  Neurological: A/O x 3, no focal deficits  Musculoskeletal: no calf tenderness.  Skin: No rashes.      LABS: All Labs Reviewed:                        12.9   6.34  )-----------( 281      ( 06 Dec 2019 06:47 )             45.4                         12.5   7.63  )-----------( 277      ( 05 Dec 2019 16:30 )             44.1     06 Dec 2019 06:47    138    |  106    |  17     ----------------------------<  105    4.1     |  27     |  1.45   05 Dec 2019 16:30    136    |  103    |  18     ----------------------------<  108    4.3     |  28     |  1.14     Ca    8.5        06 Dec 2019 06:47  Ca    8.7        05 Dec 2019 16:30      PT/INR - ( 05 Dec 2019 10:35 )   PT: 17.4 sec;   INR: 1.55 ratio         PTT - ( 05 Dec 2019 10:35 )  PTT:34.9 sec      Blood Culture:         RADIOLOGY/EKG:    < from: 12 Lead ECG (11.20.19 @ 08:47) >  inus bradycardia  Junctional ST depression, probably normal  Borderline ECG  Confirmed by ALBARO AWAD MD ([id number]) on 11/20/2019 11:26:24 AM    < end of copied text >      ekg 12/6/19  marked sinus bradycardia 44

## 2019-12-06 NOTE — PROGRESS NOTE ADULT - SUBJECTIVE AND OBJECTIVE BOX
Pt resting in bed, Min RLQ Abd pain while resting in bed, worse with movement/ambulation, no CP, dyspnea, N/V tolerating diet.  Pt felt "lightheaded" when ambulating earlier this AM, currently denies dizziness/weakness,.     Vital Signs Last 24 Hrs  T(C): 36.2 (06 Dec 2019 05:00), Max: 36.6 (05 Dec 2019 17:15)  T(F): 97.1 (06 Dec 2019 05:00), Max: 97.9 (05 Dec 2019 17:15)  HR: 46 (06 Dec 2019 05:00) (45 - 71)  BP: 122/50 (06 Dec 2019 05:00) (103/84 - 127/64)  BP(mean): --  RR: 18 (06 Dec 2019 05:00) (12 - 22)  SpO2: 95% (06 Dec 2019 05:00) (94% - 100%)     I and O's   I -150   1300 urine                              12.9   6.34  )-----------( 281      ( 06 Dec 2019 06:47 )             45.4     12-06    138  |  106  |  17  ----------------------------<  105<H>  4.1   |  27  |  1.45<H>    Ca    8.5      06 Dec 2019 06:47            PT/INR - ( 05 Dec 2019 10:35 )   PT: 17.4 sec;   INR: 1.55 ratio         PTT - ( 05 Dec 2019 10:35 )  PTT:34.9 sec      EKG: Sinus bradycardia 44     RADIOLOGY STUDIES:          Gen: Pt resting in bed, comfortable     Heart: S1S2 rate-40's, regular    Lungs: CTA B/L    Abdomen: ND, +BS, soft, Incisions with dermabond, no erythema or exudate, RT LQ dressing C/D/I, mild tenderness to palpation of RLQ, no rebound or guarding    Quiroz: Clear yellow urine    Extremities Venodynes on, no swelling or tenderness     A/P:   POD#1 S/P Robotic Rt Radical Nephrectomy  Tolerating diet  minimal pain  Hx Afib on Multiple Cardiac meds, has a loop recorder  EKG done due to HR in 40's , Sinus bradycardia 46, 60s pre-op and runs in 50s at home  Cardiology consult to Pt Cardiologist Dr Lopes  Above discussed with Dr Brown

## 2019-12-07 DIAGNOSIS — R00.1 BRADYCARDIA, UNSPECIFIED: ICD-10-CM

## 2019-12-07 LAB
ANION GAP SERPL CALC-SCNC: 6 MMOL/L — SIGNIFICANT CHANGE UP (ref 5–17)
BASOPHILS # BLD AUTO: 0.01 K/UL — SIGNIFICANT CHANGE UP (ref 0–0.2)
BASOPHILS NFR BLD AUTO: 0.1 % — SIGNIFICANT CHANGE UP (ref 0–2)
BUN SERPL-MCNC: 13 MG/DL — SIGNIFICANT CHANGE UP (ref 7–23)
CALCIUM SERPL-MCNC: 8.5 MG/DL — SIGNIFICANT CHANGE UP (ref 8.5–10.1)
CHLORIDE SERPL-SCNC: 110 MMOL/L — HIGH (ref 96–108)
CO2 SERPL-SCNC: 25 MMOL/L — SIGNIFICANT CHANGE UP (ref 22–31)
CREAT SERPL-MCNC: 1.2 MG/DL — SIGNIFICANT CHANGE UP (ref 0.5–1.3)
EOSINOPHIL # BLD AUTO: 0.15 K/UL — SIGNIFICANT CHANGE UP (ref 0–0.5)
EOSINOPHIL NFR BLD AUTO: 2.1 % — SIGNIFICANT CHANGE UP (ref 0–6)
GLUCOSE SERPL-MCNC: 89 MG/DL — SIGNIFICANT CHANGE UP (ref 70–99)
HCT VFR BLD CALC: 46.3 % — SIGNIFICANT CHANGE UP (ref 39–50)
HGB BLD-MCNC: 13 G/DL — SIGNIFICANT CHANGE UP (ref 13–17)
IMM GRANULOCYTES NFR BLD AUTO: 0.3 % — SIGNIFICANT CHANGE UP (ref 0–1.5)
LYMPHOCYTES # BLD AUTO: 0.59 K/UL — LOW (ref 1–3.3)
LYMPHOCYTES # BLD AUTO: 8.1 % — LOW (ref 13–44)
MAGNESIUM SERPL-MCNC: 2.2 MG/DL — SIGNIFICANT CHANGE UP (ref 1.6–2.6)
MCHC RBC-ENTMCNC: 21.1 PG — LOW (ref 27–34)
MCHC RBC-ENTMCNC: 28.1 GM/DL — LOW (ref 32–36)
MCV RBC AUTO: 75.3 FL — LOW (ref 80–100)
MONOCYTES # BLD AUTO: 0.5 K/UL — SIGNIFICANT CHANGE UP (ref 0–0.9)
MONOCYTES NFR BLD AUTO: 6.9 % — SIGNIFICANT CHANGE UP (ref 2–14)
NEUTROPHILS # BLD AUTO: 6.02 K/UL — SIGNIFICANT CHANGE UP (ref 1.8–7.4)
NEUTROPHILS NFR BLD AUTO: 82.5 % — HIGH (ref 43–77)
PHOSPHATE SERPL-MCNC: 2.5 MG/DL — SIGNIFICANT CHANGE UP (ref 2.5–4.5)
PLATELET # BLD AUTO: 255 K/UL — SIGNIFICANT CHANGE UP (ref 150–400)
POTASSIUM SERPL-MCNC: 4.3 MMOL/L — SIGNIFICANT CHANGE UP (ref 3.5–5.3)
POTASSIUM SERPL-SCNC: 4.3 MMOL/L — SIGNIFICANT CHANGE UP (ref 3.5–5.3)
RBC # BLD: 6.15 M/UL — HIGH (ref 4.2–5.8)
RBC # FLD: 21.1 % — HIGH (ref 10.3–14.5)
SODIUM SERPL-SCNC: 141 MMOL/L — SIGNIFICANT CHANGE UP (ref 135–145)
WBC # BLD: 7.29 K/UL — SIGNIFICANT CHANGE UP (ref 3.8–10.5)
WBC # FLD AUTO: 7.29 K/UL — SIGNIFICANT CHANGE UP (ref 3.8–10.5)

## 2019-12-07 PROCEDURE — 99233 SBSQ HOSP IP/OBS HIGH 50: CPT

## 2019-12-07 RX ADMIN — Medication 200 MILLIGRAM(S): at 05:24

## 2019-12-07 RX ADMIN — HEPARIN SODIUM 5000 UNIT(S): 5000 INJECTION INTRAVENOUS; SUBCUTANEOUS at 13:27

## 2019-12-07 RX ADMIN — SENNA PLUS 2 TABLET(S): 8.6 TABLET ORAL at 09:00

## 2019-12-07 RX ADMIN — SODIUM CHLORIDE 125 MILLILITER(S): 9 INJECTION INTRAMUSCULAR; INTRAVENOUS; SUBCUTANEOUS at 12:23

## 2019-12-07 RX ADMIN — HEPARIN SODIUM 5000 UNIT(S): 5000 INJECTION INTRAVENOUS; SUBCUTANEOUS at 21:33

## 2019-12-07 RX ADMIN — FAMOTIDINE 20 MILLIGRAM(S): 10 INJECTION INTRAVENOUS at 17:16

## 2019-12-07 RX ADMIN — HEPARIN SODIUM 5000 UNIT(S): 5000 INJECTION INTRAVENOUS; SUBCUTANEOUS at 05:34

## 2019-12-07 RX ADMIN — LISINOPRIL 40 MILLIGRAM(S): 2.5 TABLET ORAL at 05:34

## 2019-12-07 NOTE — PROGRESS NOTE ADULT - SUBJECTIVE AND OBJECTIVE BOX
CHIEF COMPLAINT: dizziness     HPI: 74 year old male with hx of  HTN PAF (loop in place ) who underwent right  radical nephrectomy  yesterday  patient tolerated well , Patient felt mild dizziness this morning when he got out ot bed  and ambulating , lasted briefly , patient denies any chest pain or shortness of breath or headache or nausea , Patient ekg showed marked sinus bradycardia in 40s ,   patient baseline EKG showing sinus bradycardia low 50s , blood pressure is low normal range SBP  ,   patients heart rate during surgery varying between 50-60 . Patient did not receive  cartia , metoprolol     Patient had normal exercise stress test  9 METS 90% MPHR  in december 2018 , Echo  dec 2018 normal LVEF mild AI TR.      12/7/19  Patient is feeling nauseous ,  patient had persistently bradycardic  in high 30s to low 40s , did not recieve BB or Cardizem for last 2 days , on multaq  patient  denies dizziness       PAST MEDICAL & SURGICAL HISTORY:  Colon polyp: benign  BPH (benign prostatic hyperplasia)  Hematuria  GERD (gastroesophageal reflux disease)  HTN (hypertension)  Atrial fibrillation: has loop recorder  Renal mass  H/O colonoscopy: and upper endoscopy 2019  S/P tonsillectomy  H/O hemorrhoidectomy  S/P cholecystectomy    MEDICATIONS  (STANDING):  diltiazem    milliGRAM(s) Oral daily  dronedarone 400 milliGRAM(s) Oral two times a day  famotidine    Tablet 20 milliGRAM(s) Oral two times a day  heparin  Injectable 5000 Unit(s) SubCutaneous three times a day  lisinopril 40 milliGRAM(s) Oral daily  metoprolol succinate ER 50 milliGRAM(s) Oral daily  senna 2 Tablet(s) Oral at bedtime  sodium chloride 0.9%. 1000 milliLiter(s) (125 mL/Hr) IV Continuous <Continuous>    MEDICATIONS  (PRN):  morphine  - Injectable 4 milliGRAM(s) IV Push every 6 hours PRN breakthrough pain  oxycodone    5 mG/acetaminophen 325 mG 1 Tablet(s) Oral every 4 hours PRN Moderate Pain (4 - 6)  oxycodone    5 mG/acetaminophen 325 mG 2 Tablet(s) Oral every 6 hours PRN Severe Pain (7 - 10)  trimethobenzamide Injectable 200 milliGRAM(s) IntraMuscular every 6 hours PRN Nausea and/or Vomiting    REVIEW OF SYSTEMS:    as above   All other review of systems is negative unless indicated above    Vital Signs Last 24 Hrs  T(C): 36.6 (07 Dec 2019 05:41), Max: 36.6 (07 Dec 2019 05:41)  T(F): 97.8 (07 Dec 2019 05:41), Max: 97.8 (07 Dec 2019 05:41)  HR: 43 (07 Dec 2019 06:49) (43 - 49)  BP: 159/52 (07 Dec 2019 06:49) (116/57 - 182/62)  BP(mean): --  RR: 20 (07 Dec 2019 05:41) (16 - 20)  SpO2: 95% (07 Dec 2019 05:41) (95% - 98%)    I&O's Summary    06 Dec 2019 07:01  -  07 Dec 2019 07:00  --------------------------------------------------------  IN: 1050 mL / OUT: 950 mL / NET: 100 mL            PHYSICAL EXAM:    Constitutional: NAD, awake and alert, well-developed  HEENT: PERR, EOMI,  No oral cyanosis  Neck:  supple,  No JVD  Respiratory: Breath sounds are clear bilaterally, No wheezing, rales or rhonchi  Cardiovascular: S1 and S2, regular bradycardia , no Murmurs, gallops or rubs  Gastrointestinal: Bowel Sounds present, soft, nontender.   Extremities: No peripheral edema. No clubbing or cyanosis.  Vascular: 2+ peripheral pulses  Neurological: A/O x 3, no focal deficits  Musculoskeletal: no calf tenderness.  Skin: No rashes.      LABS: All Labs Reviewed:                          13.0   7.29  )-----------( 255      ( 07 Dec 2019 07:56 )             46.3     12-06    138  |  106  |  17  ----------------------------<  105<H>  4.1   |  27  |  1.45<H>    Ca    8.5      06 Dec 2019 06:47            PT/INR - ( 05 Dec 2019 10:35 )   PT: 17.4 sec;   INR: 1.55 ratio         PTT - ( 05 Dec 2019 10:35 )  PTT:34.9 sec          RADIOLOGY/EKG:    < from: 12 Lead ECG (11.20.19 @ 08:47) >  inus bradycardia  Junctional ST depression, probably normal  Borderline ECG  Confirmed by RITESH ESCOBEDO, ALBARO ([id number]) on 11/20/2019 11:26:24 AM    < end of copied text >      ekg 12/6/19  marked sinus bradycardia 44

## 2019-12-07 NOTE — PROGRESS NOTE ADULT - ASSESSMENT
73 yo male POD#2 s/p lap right radical nephrectomy with continued nausea and bradycardia.  -Cardiac meds changed by Dr. Palla.  Mostly held yesterday due to bradycardia.  Will continue to monitor heart rate today  -Continue tigan for nausea, unfortunately cannot add other anti-emetics which prolong qt interval  -OOB to chair today  -Free voiding without difficulty  -Heparin for dvt ppx. 73 yo male POD#2 s/p lap right radical nephrectomy with continued nausea and bradycardia.  -Cardiac meds changed by Dr. Palla.  Mostly held yesterday due to bradycardia.  Will continue to monitor heart rate today.  -Continue tigan for nausea, unfortunately cannot add other anti-emetics which prolong qt interval. Continue IVF.  ADAT.   -Encourage OOB to chair and ambulation today.  -Free voiding without difficulty  -Heparin for dvt ppx.  Can resume xarelto tomorrow 12/8 per Dr. Brown.     Discussed above with Dr. Brown.

## 2019-12-07 NOTE — PROGRESS NOTE ADULT - PROBLEM SELECTOR PLAN 2
currently markedly sinus bradycardia , resume AC ASAP  discussed with surgery PA who will discuss with surgeon

## 2019-12-07 NOTE — PROGRESS NOTE ADULT - SUBJECTIVE AND OBJECTIVE BOX
Pt complaining mostly about constant nausea, tigan not helping much.  Only able to tolerate liquids and crackers.  Was slightly dizzy when he got up yesterday.  Denies any dizziness, palpations, CP, SOB, abdominal pain while lying in bed now.  No flatus or BM.  Wants to try to get OOB to chair today.  All meds except lisinopril held overnight due to continued bradycardia.      Vital Signs Last 24 Hrs  T(F): 97.8 (07 Dec 2019 05:41), Max: 97.8 (07 Dec 2019 05:41)  HR: 43 (07 Dec 2019 06:49) (43 - 49)  BP: 159/52 (07 Dec 2019 06:49) (116/57 - 182/62)  RR: 20 (07 Dec 2019 05:41) (16 - 20)  SpO2: 95% (07 Dec 2019 05:41) (95% - 98%)    Gen- uncomfortable  CV- regular, bradycardic  Chest- CTA bilat  Abd- soft, NTND, hypoactive BS, incisions intact with dermabond, dressing c/d/i  Ext- no edema    Labs: Pt complaining mostly about constant nausea, tigan not helping much.  Only able to tolerate liquids and crackers.  Was slightly dizzy when he got up yesterday.  Denies any dizziness, palpations, CP, SOB, abdominal pain while lying in bed now.  No flatus or BM.  Wants to try to get OOB to chair today.  All meds except lisinopril held overnight due to continued bradycardia.      Vital Signs Last 24 Hrs  T(F): 97.8 (07 Dec 2019 05:41), Max: 97.8 (07 Dec 2019 05:41)  HR: 43 (07 Dec 2019 06:49) (43 - 49)  BP: 159/52 (07 Dec 2019 06:49) (116/57 - 182/62)  RR: 20 (07 Dec 2019 05:41) (16 - 20)  SpO2: 95% (07 Dec 2019 05:41) (95% - 98%)    Gen- uncomfortable  CV- regular, bradycardic  Chest- CTA bilat  Abd- soft, NTND, hypoactive BS, incisions intact with dermabond, dressing c/d/i  Ext- no edema    Labs:  12-07    141  |  110<H>  |  13  ----------------------------<  89  4.3   |  25  |  1.20    Ca    8.5      07 Dec 2019 07:56  Phos  2.5     12-07  Mg     2.2     12-07                            13.0   7.29  )-----------( 255      ( 07 Dec 2019 07:56 )             46.3

## 2019-12-07 NOTE — PROGRESS NOTE ADULT - PROBLEM SELECTOR PLAN 1
persistent  , without dizziness, possible due to medication and vagal effect from surgery  as patient is nauseous , will hold BB CCB  , will continue multaq if HR permits , for now on hold , would avoid pain medication

## 2019-12-08 LAB
ANION GAP SERPL CALC-SCNC: 6 MMOL/L — SIGNIFICANT CHANGE UP (ref 5–17)
BUN SERPL-MCNC: 12 MG/DL — SIGNIFICANT CHANGE UP (ref 7–23)
CALCIUM SERPL-MCNC: 8.5 MG/DL — SIGNIFICANT CHANGE UP (ref 8.5–10.1)
CHLORIDE SERPL-SCNC: 110 MMOL/L — HIGH (ref 96–108)
CO2 SERPL-SCNC: 25 MMOL/L — SIGNIFICANT CHANGE UP (ref 22–31)
CREAT SERPL-MCNC: 1.08 MG/DL — SIGNIFICANT CHANGE UP (ref 0.5–1.3)
GLUCOSE SERPL-MCNC: 88 MG/DL — SIGNIFICANT CHANGE UP (ref 70–99)
HCT VFR BLD CALC: 42.3 % — SIGNIFICANT CHANGE UP (ref 39–50)
HGB BLD-MCNC: 12.4 G/DL — LOW (ref 13–17)
MCHC RBC-ENTMCNC: 21.7 PG — LOW (ref 27–34)
MCHC RBC-ENTMCNC: 29.3 GM/DL — LOW (ref 32–36)
MCV RBC AUTO: 74 FL — LOW (ref 80–100)
PLATELET # BLD AUTO: 246 K/UL — SIGNIFICANT CHANGE UP (ref 150–400)
POTASSIUM SERPL-MCNC: 3.7 MMOL/L — SIGNIFICANT CHANGE UP (ref 3.5–5.3)
POTASSIUM SERPL-SCNC: 3.7 MMOL/L — SIGNIFICANT CHANGE UP (ref 3.5–5.3)
RBC # BLD: 5.72 M/UL — SIGNIFICANT CHANGE UP (ref 4.2–5.8)
RBC # FLD: 20.8 % — HIGH (ref 10.3–14.5)
SODIUM SERPL-SCNC: 141 MMOL/L — SIGNIFICANT CHANGE UP (ref 135–145)
WBC # BLD: 5.57 K/UL — SIGNIFICANT CHANGE UP (ref 3.8–10.5)
WBC # FLD AUTO: 5.57 K/UL — SIGNIFICANT CHANGE UP (ref 3.8–10.5)

## 2019-12-08 PROCEDURE — 99233 SBSQ HOSP IP/OBS HIGH 50: CPT

## 2019-12-08 RX ORDER — BENZOCAINE AND MENTHOL 5; 1 G/100ML; G/100ML
1 LIQUID ORAL ONCE
Refills: 0 | Status: COMPLETED | OUTPATIENT
Start: 2019-12-08 | End: 2019-12-08

## 2019-12-08 RX ORDER — AMLODIPINE BESYLATE 2.5 MG/1
5 TABLET ORAL DAILY
Refills: 0 | Status: DISCONTINUED | OUTPATIENT
Start: 2019-12-08 | End: 2019-12-09

## 2019-12-08 RX ORDER — RIVAROXABAN 15 MG-20MG
20 KIT ORAL
Refills: 0 | Status: DISCONTINUED | OUTPATIENT
Start: 2019-12-08 | End: 2019-12-09

## 2019-12-08 RX ADMIN — FAMOTIDINE 20 MILLIGRAM(S): 10 INJECTION INTRAVENOUS at 06:36

## 2019-12-08 RX ADMIN — AMLODIPINE BESYLATE 5 MILLIGRAM(S): 2.5 TABLET ORAL at 08:40

## 2019-12-08 RX ADMIN — HEPARIN SODIUM 5000 UNIT(S): 5000 INJECTION INTRAVENOUS; SUBCUTANEOUS at 06:37

## 2019-12-08 RX ADMIN — LISINOPRIL 40 MILLIGRAM(S): 2.5 TABLET ORAL at 06:36

## 2019-12-08 RX ADMIN — SENNA PLUS 2 TABLET(S): 8.6 TABLET ORAL at 06:37

## 2019-12-08 RX ADMIN — RIVAROXABAN 20 MILLIGRAM(S): KIT at 17:41

## 2019-12-08 RX ADMIN — BENZOCAINE AND MENTHOL 1 LOZENGE: 5; 1 LIQUID ORAL at 21:08

## 2019-12-08 NOTE — PROGRESS NOTE ADULT - PROBLEM SELECTOR PLAN 2
currently markedly sinus bradycardia , resume AC ASAP  discussed with surgery PA who will discuss with surgeon  león starting today ( spoke to PA  yesterday )

## 2019-12-08 NOTE — PROGRESS NOTE ADULT - SUBJECTIVE AND OBJECTIVE BOX
CHIEF COMPLAINT: dizziness     HPI: 74 year old male with hx of  HTN PAF (loop in place ) who underwent right  radical nephrectomy  yesterday  patient tolerated well , Patient felt mild dizziness this morning when he got out ot bed  and ambulating , lasted briefly , patient denies any chest pain or shortness of breath or headache or nausea , Patient ekg showed marked sinus bradycardia in 40s ,   patient baseline EKG showing sinus bradycardia low 50s , blood pressure is low normal range SBP  ,   patients heart rate during surgery varying between 50-60 . Patient did not receive  cartia , metoprolol     Patient had normal exercise stress test  9 METS 90% MPHR  in december 2018 , Echo  dec 2018 normal LVEF mild AI TR.      12/7/19  Patient is feeling nauseous ,  patient had persistently bradycardic  in high 30s to low 40s , did not recieve BB or Cardizem for last 2 days , on multaq  patient  denies dizziness   12/8/19 Patient is feeling nauseous  still markedly bradycardic , did not recieve  BB CCB and multaq for after surgery , now hypertensive , improved renal function with FLUIDs       PAST MEDICAL & SURGICAL HISTORY:  Colon polyp: benign  BPH (benign prostatic hyperplasia)  Hematuria  GERD (gastroesophageal reflux disease)  HTN (hypertension)  Atrial fibrillation: has loop recorder  Renal mass  H/O colonoscopy: and upper endoscopy 2019  S/P tonsillectomy  H/O hemorrhoidectomy  S/P cholecystectomy    MEDICATIONS  (STANDING):  diltiazem    milliGRAM(s) Oral daily  dronedarone 400 milliGRAM(s) Oral two times a day  famotidine    Tablet 20 milliGRAM(s) Oral two times a day  heparin  Injectable 5000 Unit(s) SubCutaneous three times a day  lisinopril 40 milliGRAM(s) Oral daily  metoprolol succinate ER 50 milliGRAM(s) Oral daily  senna 2 Tablet(s) Oral at bedtime  sodium chloride 0.9%. 1000 milliLiter(s) (125 mL/Hr) IV Continuous <Continuous>    MEDICATIONS  (PRN):  morphine  - Injectable 4 milliGRAM(s) IV Push every 6 hours PRN breakthrough pain  oxycodone    5 mG/acetaminophen 325 mG 1 Tablet(s) Oral every 4 hours PRN Moderate Pain (4 - 6)  oxycodone    5 mG/acetaminophen 325 mG 2 Tablet(s) Oral every 6 hours PRN Severe Pain (7 - 10)  trimethobenzamide Injectable 200 milliGRAM(s) IntraMuscular every 6 hours PRN Nausea and/or Vomiting    REVIEW OF SYSTEMS:    as above   All other review of systems is negative unless indicated above    Vital Signs Last 24 Hrs  T(C): 36.3 (08 Dec 2019 05:09), Max: 36.6 (07 Dec 2019 11:05)  T(F): 97.4 (08 Dec 2019 05:09), Max: 97.8 (07 Dec 2019 11:05)  HR: 51 (08 Dec 2019 06:33) (46 - 53)  BP: 170/64 (08 Dec 2019 06:33) (134/54 - 170/64)  BP(mean): --  RR: 18 (07 Dec 2019 16:53) (17 - 18)  SpO2: 99% (08 Dec 2019 05:09) (95% - 99%)    I&O's Summary    07 Dec 2019 07:01  -  08 Dec 2019 07:00  --------------------------------------------------------  IN: 3629 mL / OUT: 1900 mL / NET: 1729 mL              PHYSICAL EXAM:    Constitutional: NAD, awake and alert, well-developed  HEENT: PERR, EOMI,  No oral cyanosis  Neck:  supple,  No JVD  Respiratory: Breath sounds are clear bilaterally, No wheezing, rales or rhonchi  Cardiovascular: S1 and S2, regular bradycardia , no Murmurs, gallops or rubs  Gastrointestinal: Bowel Sounds present, soft, nontender.   Extremities: No peripheral edema. No clubbing or cyanosis.  Vascular: 2+ peripheral pulses  Neurological: A/O x 3, no focal deficits  Musculoskeletal: no calf tenderness.  Skin: No rashes.      LABS: All Labs Reviewed:                          13.0   7.29  )-----------( 255      ( 07 Dec 2019 07:56 )             46.3     12-07    141  |  110<H>  |  13  ----------------------------<  89  4.3   |  25  |  1.20    Ca    8.5      07 Dec 2019 07:56  Phos  2.5     12-07  Mg     2.2     12-07        < from: 12 Lead ECG (12.06.19 @ 07:01) >  Marked sinus bradycardia  Abnormal ECG  When compared with ECG of 20-NOV-2019 08:47,  No significant change was found  Confirmed by MD JOAQUIM, STEPHANIE ([id number]) on 12/7/2019 12:54:01 PM    < end of copied text >                    RADIOLOGY/EKG:    < from: 12 Lead ECG (11.20.19 @ 08:47) >  inus bradycardia  Junctional ST depression, probably normal  Borderline ECG  Confirmed by ALBARO AWAD MD ([id number]) on 11/20/2019 11:26:24 AM    < end of copied text >      ekg 12/6/19  marked sinus bradycardia 44

## 2019-12-08 NOTE — PROGRESS NOTE ADULT - PROBLEM SELECTOR PLAN 1
persistent  , without dizziness, possible due to medication and vagal effect from surgery  as patient is nauseous , will hold BB CCB  , will continue multaq if HR permits , for now on hold for after surgery due to parameters , would avoid pain medication, may be vagotonia contributing to his worsening of bradycardia ?

## 2019-12-09 ENCOUNTER — TRANSCRIPTION ENCOUNTER (OUTPATIENT)
Age: 74
End: 2019-12-09

## 2019-12-09 VITALS
OXYGEN SATURATION: 98 % | DIASTOLIC BLOOD PRESSURE: 75 MMHG | SYSTOLIC BLOOD PRESSURE: 129 MMHG | TEMPERATURE: 98 F | RESPIRATION RATE: 18 BRPM | HEART RATE: 75 BPM

## 2019-12-09 LAB
ANION GAP SERPL CALC-SCNC: 7 MMOL/L — SIGNIFICANT CHANGE UP (ref 5–17)
BUN SERPL-MCNC: 15 MG/DL — SIGNIFICANT CHANGE UP (ref 7–23)
CALCIUM SERPL-MCNC: 8.6 MG/DL — SIGNIFICANT CHANGE UP (ref 8.5–10.1)
CHLORIDE SERPL-SCNC: 108 MMOL/L — SIGNIFICANT CHANGE UP (ref 96–108)
CO2 SERPL-SCNC: 25 MMOL/L — SIGNIFICANT CHANGE UP (ref 22–31)
CREAT SERPL-MCNC: 1.04 MG/DL — SIGNIFICANT CHANGE UP (ref 0.5–1.3)
GLUCOSE SERPL-MCNC: 91 MG/DL — SIGNIFICANT CHANGE UP (ref 70–99)
HCT VFR BLD CALC: 45.7 % — SIGNIFICANT CHANGE UP (ref 39–50)
HGB BLD-MCNC: 13.2 G/DL — SIGNIFICANT CHANGE UP (ref 13–17)
MCHC RBC-ENTMCNC: 21.4 PG — LOW (ref 27–34)
MCHC RBC-ENTMCNC: 28.9 GM/DL — LOW (ref 32–36)
MCV RBC AUTO: 74.1 FL — LOW (ref 80–100)
PLATELET # BLD AUTO: 284 K/UL — SIGNIFICANT CHANGE UP (ref 150–400)
POTASSIUM SERPL-MCNC: 3.8 MMOL/L — SIGNIFICANT CHANGE UP (ref 3.5–5.3)
POTASSIUM SERPL-SCNC: 3.8 MMOL/L — SIGNIFICANT CHANGE UP (ref 3.5–5.3)
RBC # BLD: 6.17 M/UL — HIGH (ref 4.2–5.8)
RBC # FLD: 21.1 % — HIGH (ref 10.3–14.5)
SODIUM SERPL-SCNC: 140 MMOL/L — SIGNIFICANT CHANGE UP (ref 135–145)
WBC # BLD: 5.2 K/UL — SIGNIFICANT CHANGE UP (ref 3.8–10.5)
WBC # FLD AUTO: 5.2 K/UL — SIGNIFICANT CHANGE UP (ref 3.8–10.5)

## 2019-12-09 PROCEDURE — 99233 SBSQ HOSP IP/OBS HIGH 50: CPT

## 2019-12-09 RX ORDER — METOPROLOL TARTRATE 50 MG
50 TABLET ORAL ONCE
Refills: 0 | Status: COMPLETED | OUTPATIENT
Start: 2019-12-09 | End: 2019-12-09

## 2019-12-09 RX ORDER — DILTIAZEM HCL 120 MG
1 CAPSULE, EXT RELEASE 24 HR ORAL
Qty: 0 | Refills: 0 | DISCHARGE

## 2019-12-09 RX ADMIN — LISINOPRIL 40 MILLIGRAM(S): 2.5 TABLET ORAL at 05:24

## 2019-12-09 RX ADMIN — AMLODIPINE BESYLATE 5 MILLIGRAM(S): 2.5 TABLET ORAL at 05:24

## 2019-12-09 RX ADMIN — RIVAROXABAN 20 MILLIGRAM(S): KIT at 17:54

## 2019-12-09 RX ADMIN — Medication 50 MILLIGRAM(S): at 17:54

## 2019-12-09 RX ADMIN — FAMOTIDINE 20 MILLIGRAM(S): 10 INJECTION INTRAVENOUS at 05:24

## 2019-12-09 RX ADMIN — DRONEDARONE 400 MILLIGRAM(S): 400 TABLET, FILM COATED ORAL at 17:54

## 2019-12-09 RX ADMIN — SENNA PLUS 2 TABLET(S): 8.6 TABLET ORAL at 05:24

## 2019-12-09 NOTE — DISCHARGE NOTE PROVIDER - HOSPITAL COURSE
74 year old male with history of HTN, PAF on Xarelto, Multaq, Toprol and Diltiazem prior to admission, ILR who is s/p right radical nephrectomy on 12/6/19.  Post op patient has had significant bradycardia and Multaq, Metoprolol and Diltiazem have been held.  Patient had one brief episodes of tachycardia on12/8.         TELEMETRY: SR with HR 80s at present.  Episode of  atrial tachycardia yesterday with -130, episodes of bradycardia with sleep with HR 30-40s, no pauses.        ILR interrogation:    No events noted, no pauses.        Plan:    Resume Multaq 400mg BID and Toprol 50mg daily to be given now.    Continue to hold Diltiazem.    Patient has ILR and will send transmission tomorrow.    Discussed possibility of staying for one more night for monitoring and patient would prefer to go home.    Stable for discharge from EP standpoint.

## 2019-12-09 NOTE — PROGRESS NOTE ADULT - PROBLEM SELECTOR PLAN 2
Back on xarelto but off his cardiac meds.  D/w EP NP and await Dr. Mi's decision regarding further therapy.

## 2019-12-09 NOTE — SBIRT NOTE ADULT - NSSBIRTALCPOSREINDET_GEN_A_CORE
Positive reinforcement provided of importance to adhere to NIH/NIAAA guidelines for safe ETOH consumption. Guidelines provided. Pt verbalizes understanding

## 2019-12-09 NOTE — PROGRESS NOTE ADULT - SUBJECTIVE AND OBJECTIVE BOX
CHIEF COMPLAINT: dizziness     HPI: 74 year old male with hx of  HTN PAF (loop in place ) who underwent right  radical nephrectomy  yesterday  patient tolerated well , Patient felt mild dizziness this morning when he got out ot bed  and ambulating , lasted briefly , patient denies any chest pain or shortness of breath or headache or nausea , Patient ekg showed marked sinus bradycardia in 40s ,   patient baseline EKG showing sinus bradycardia low 50s , blood pressure is low normal range SBP  ,   patients heart rate during surgery varying between 50-60 . Patient did not receive  cartia , metoprolol     Patient had normal exercise stress test  9 METS 90% MPHR  in 2018 , Echo  dec 2018 normal LVEF mild AI TR.      19  Patient is feeling nauseous ,  patient had persistently bradycardic  in high 30s to low 40s , did not recieve BB or Cardizem for last 2 days , on multaq  patient  denies dizziness   19 Patient is feeling nauseous  still markedly bradycardic , did not recieve  BB CCB and multaq for after surgery , now hypertensive , improved renal function with FLUIDs   19: No nausea today.  Last night had SVT.  Andreas last night as well.   No CP or SOB.  Incisional pain as well.     PAST MEDICAL & SURGICAL HISTORY:  Colon polyp: benign  BPH (benign prostatic hyperplasia)  Hematuria  GERD (gastroesophageal reflux disease)  HTN (hypertension)  Atrial fibrillation: has loop recorder  Renal mass  H/O colonoscopy: and upper endoscopy   S/P tonsillectomy  H/O hemorrhoidectomy  S/P cholecystectomy    MEDICATIONS  (STANDING):  amLODIPine   Tablet 5 milliGRAM(s) Oral daily  famotidine    Tablet 20 milliGRAM(s) Oral two times a day  lisinopril 40 milliGRAM(s) Oral daily  rivaroxaban 20 milliGRAM(s) Oral with dinner  senna 2 Tablet(s) Oral at bedtime  sodium chloride 0.9%. 1000 milliLiter(s) (125 mL/Hr) IV Continuous <Continuous>    MEDICATIONS  (PRN):  morphine  - Injectable 4 milliGRAM(s) IV Push every 6 hours PRN breakthrough pain  oxycodone    5 mG/acetaminophen 325 mG 1 Tablet(s) Oral every 4 hours PRN Moderate Pain (4 - 6)  oxycodone    5 mG/acetaminophen 325 mG 2 Tablet(s) Oral every 6 hours PRN Severe Pain (7 - 10)  trimethobenzamide Injectable 200 milliGRAM(s) IntraMuscular every 6 hours PRN Nausea and/or Vomiting      Vital Signs Last 24 Hrs  T(C): 36.6 (09 Dec 2019 11:00), Max: 36.9 (08 Dec 2019 19:43)  T(F): 97.9 (09 Dec 2019 11:00), Max: 98.5 (08 Dec 2019 19:43)  HR: 51 (09 Dec 2019 11:00) (45 - 67)  BP: 150/59 (09 Dec 2019 11:00) (125/62 - 174/70)  BP(mean): --  RR: 18 (09 Dec 2019 11:00) (18 - 18)  SpO2: 97% (09 Dec 2019 11:00) (95% - 97%)    I&O's Detail      Daily     Daily Weight in k.1 (09 Dec 2019 08:12)    PHYSICAL EXAM:  Constitutional: NAD, awake and alert, well-developed  HEENT: PERR, EOMI,  No oral cyanosis  Neck:  supple,  No JVD  Respiratory: Breath sounds are clear bilaterally, No wheezing, rales or rhonchi  Cardiovascular: S1 and S2, regular bradycardia , no Murmurs, gallops or rubs  Gastrointestinal: Bowel Sounds present, soft incision covered and bandaged.   Extremities: No peripheral edema. No clubbing or cyanosis.  Vascular: 2+ peripheral pulses  Neurological: A/O x 3, no focal deficits  Musculoskeletal: no calf tenderness.  Skin: No rashes.      LABS: All Labs Reviewed:                          13.2   5.20  )-----------( 284      ( 09 Dec 2019 07:29 )             45.7         140  |  108  |  15  ----------------------------<  91  3.8   |  25  |  1.04    Ca    8.6      09 Dec 2019 07:29    < from: 12 Lead ECG (19 @ 07:01) >  Marked sinus bradycardia  Abnormal ECG  When compared with ECG of 2019 08:47,  No significant change was found  Confirmed by MD JOAQUIM, STEPHANIE ([id number]) on 2019 12:54:01 PM    < end of copied text >    RADIOLOGY/EKG:    < from: 12 Lead ECG (19 @ 08:47) >  inus bradycardia  Junctional ST depression, probably normal  Borderline ECG  Confirmed by ALBARO AWAD MD ([id number]) on 2019 11:26:24 AM    < end of copied text >      ekg 19  marked sinus bradycardia 44     TELE: NSR now.  SVT and PAT last night.  SB last night as well. yes

## 2019-12-09 NOTE — DISCHARGE NOTE PROVIDER - CARE PROVIDER_API CALL
Santhosh Brown)  Urology  [address] [place] [address]  Phone: [phone]  Fax: [phone]  Follow Up Time: 2 weeks    PMD,   1 week  Phone: (   )    -  Fax: (   )    -  Follow Up Time:

## 2019-12-09 NOTE — CONSULT NOTE ADULT - SUBJECTIVE AND OBJECTIVE BOX
HPI:  74 year old male with history of HTN, PAF on Xarelto, Multaq, Toprol and Diltiazem prior to admission, ILR who is s/p right radical nephrectomy on 12/6/19.  Post op patient has had significant bradycardia and Multaq, Metoprolol and Diltiazem have been held.  Patient had one brief episodes of SVT on12/8    74y Male    PAST MEDICAL & SURGICAL HISTORY:  Colon polyp: benign  BPH (benign prostatic hyperplasia)  Hematuria  GERD (gastroesophageal reflux disease)  HTN (hypertension)  Atrial fibrillation: has loop recorder  Renal mass  H/O colonoscopy: and upper endoscopy 2019  S/P tonsillectomy  H/O hemorrhoidectomy  S/P cholecystectomy      MEDICATIONS  (STANDING):  amLODIPine   Tablet 5 milliGRAM(s) Oral daily  dronedarone 400 milliGRAM(s) Oral two times a day  famotidine    Tablet 20 milliGRAM(s) Oral two times a day  lisinopril 40 milliGRAM(s) Oral daily  rivaroxaban 20 milliGRAM(s) Oral with dinner  senna 2 Tablet(s) Oral at bedtime  sodium chloride 0.9%. 1000 milliLiter(s) (125 mL/Hr) IV Continuous <Continuous>    MEDICATIONS  (PRN):  morphine  - Injectable 4 milliGRAM(s) IV Push every 6 hours PRN breakthrough pain  oxycodone    5 mG/acetaminophen 325 mG 1 Tablet(s) Oral every 4 hours PRN Moderate Pain (4 - 6)  oxycodone    5 mG/acetaminophen 325 mG 2 Tablet(s) Oral every 6 hours PRN Severe Pain (7 - 10)  trimethobenzamide Injectable 200 milliGRAM(s) IntraMuscular every 6 hours PRN Nausea and/or Vomiting      Allergies    No Known Allergies    Intolerances        SOCIAL HISTORY: Denies tobacco, etoh abuse or illicit drug use    FAMILY HISTORY:  Family history of breast cancer in mother      Vital Signs Last 24 Hrs  T(C): 36.6 (09 Dec 2019 11:00), Max: 36.9 (08 Dec 2019 19:43)  T(F): 97.9 (09 Dec 2019 11:00), Max: 98.5 (08 Dec 2019 19:43)  HR: 51 (09 Dec 2019 11:00) (45 - 67)  BP: 150/59 (09 Dec 2019 11:00) (125/62 - 174/70)  BP(mean): --  RR: 18 (09 Dec 2019 11:00) (18 - 18)  SpO2: 97% (09 Dec 2019 11:00) (95% - 97%)    REVIEW OF SYSTEMS:    CONSTITUTIONAL:  As per HPI.  HEENT:  Eyes:  No diplopia or blurred vision. ENT:  No earache, sore throat or runny nose.  CARDIOVASCULAR:  No pressure, squeezing, strangling, tightness, heaviness or aching about the chest, neck, axilla or epigastrium.  RESPIRATORY:  No cough, shortness of breath, PND or orthopnea.  GASTROINTESTINAL:  No nausea, vomiting or diarrhea.  GENITOURINARY:  No dysuria, frequency or urgency.  MUSCULOSKELETAL:  As per HPI.  SKIN:  No change in skin, hair or nails.  NEUROLOGIC:  No paresthesias, fasciculations, seizures or weakness.  PSYCHIATRIC:  No disorder of thought or mood.  ENDOCRINE:  No heat or cold intolerance, polyuria or polydipsia.  HEMATOLOGICAL:  No easy bruising or bleedings:  .     PHYSICAL EXAMINATION:    GENERAL APPEARANCE:  Pt. is not currently dyspneic, in no distress. Pt. is alert, oriented, and pleasant.  HEENT:  Pupils are normal and react normally. No icterus. Mucous membranes well colored.  NECK:  Supple. No lymphadenopathy. Jugular venous pressure not elevated. Carotids equal.   HEART:   The cardiac impulse has a normal quality. There are no murmurs, rubs or gallops noted  CHEST:  Chest is clear to auscultation. Normal respiratory effort.  ABDOMEN:  Soft and nontender.   EXTREMITIES:  There is no edema.   SKIN:  No rash or significant lesions are noted.    I&O's Summary      LABS:                        13.2   5.20  )-----------( 284      ( 09 Dec 2019 07:29 )             45.7     12-09    140  |  108  |  15  ----------------------------<  91  3.8   |  25  |  1.04    Ca    8.6      09 Dec 2019 07:29                    EKG:    TELEMETRY:    CARDIAC TESTS:    RADIOLOGY & ADDITIONAL STUDIES:    ASSESSMENT & PLAN: HPI:  74 year old male with history of HTN, PAF on Xarelto, Multaq, Toprol and Diltiazem prior to admission, ILR who is s/p right radical nephrectomy on 12/6/19.  Post op patient has had significant bradycardia and Multaq, Metoprolol and Diltiazem have been held.  Patient had one brief episodes of SVT on12/8.  EP asked to consult to evaluate recent tachycardia.    74y Male    PAST MEDICAL & SURGICAL HISTORY:  Colon polyp: benign  BPH (benign prostatic hyperplasia)  Hematuria  GERD (gastroesophageal reflux disease)  HTN (hypertension)  Atrial fibrillation: has loop recorder  Renal mass  H/O colonoscopy: and upper endoscopy 2019  S/P tonsillectomy  H/O hemorrhoidectomy  S/P cholecystectomy      MEDICATIONS  (STANDING):  amLODIPine   Tablet 5 milliGRAM(s) Oral daily  dronedarone 400 milliGRAM(s) Oral two times a day  famotidine    Tablet 20 milliGRAM(s) Oral two times a day  lisinopril 40 milliGRAM(s) Oral daily  rivaroxaban 20 milliGRAM(s) Oral with dinner  senna 2 Tablet(s) Oral at bedtime  sodium chloride 0.9%. 1000 milliLiter(s) (125 mL/Hr) IV Continuous <Continuous>    MEDICATIONS  (PRN):  morphine  - Injectable 4 milliGRAM(s) IV Push every 6 hours PRN breakthrough pain  oxycodone    5 mG/acetaminophen 325 mG 1 Tablet(s) Oral every 4 hours PRN Moderate Pain (4 - 6)  oxycodone    5 mG/acetaminophen 325 mG 2 Tablet(s) Oral every 6 hours PRN Severe Pain (7 - 10)  trimethobenzamide Injectable 200 milliGRAM(s) IntraMuscular every 6 hours PRN Nausea and/or Vomiting      Allergies    No Known Allergies        SOCIAL HISTORY: Denies tobacco, etoh abuse or illicit drug use    FAMILY HISTORY:  Family history of breast cancer in mother      Vital Signs Last 24 Hrs  T(C): 36.6 (09 Dec 2019 11:00), Max: 36.9 (08 Dec 2019 19:43)  T(F): 97.9 (09 Dec 2019 11:00), Max: 98.5 (08 Dec 2019 19:43)  HR: 51 (09 Dec 2019 11:00) (45 - 67)  BP: 150/59 (09 Dec 2019 11:00) (125/62 - 174/70)  BP(mean): --  RR: 18 (09 Dec 2019 11:00) (18 - 18)  SpO2: 97% (09 Dec 2019 11:00) (95% - 97%)    REVIEW OF SYSTEMS:    CONSTITUTIONAL:  As per HPI.  HEENT:  Eyes:  No diplopia or blurred vision. ENT:  No earache, sore throat or runny nose.  CARDIOVASCULAR:  No pressure, squeezing, strangling, tightness, heaviness or aching about the chest, neck, axilla or epigastrium.  RESPIRATORY:  No cough, shortness of breath, PND or orthopnea.  GASTROINTESTINAL:  No nausea, vomiting or diarrhea.  GENITOURINARY:  No dysuria, frequency or urgency.  MUSCULOSKELETAL:  As per HPI.  SKIN:  No change in skin, hair or nails.  NEUROLOGIC:  No paresthesias, fasciculations, seizures or weakness.  PSYCHIATRIC:  No disorder of thought or mood.  ENDOCRINE:  No heat or cold intolerance, polyuria or polydipsia.  HEMATOLOGICAL:  No easy bruising or bleedings:  .     PHYSICAL EXAMINATION:    GENERAL APPEARANCE:  Pt. is not currently dyspneic, in no distress. Pt. is alert, oriented, and pleasant.  HEENT:  Pupils are normal and react normally. No icterus. Mucous membranes well colored.  NECK:  Supple. No lymphadenopathy. Jugular venous pressure not elevated. Carotids equal.   HEART:   The cardiac impulse has a normal quality. There are no murmurs, rubs or gallops noted  CHEST:  Chest is clear to auscultation. Normal respiratory effort.  ABDOMEN:  Soft and nontender. Nephrectomy site with dressing C/D/I.  EXTREMITIES:  There is no edema.   SKIN:  No rash or significant lesions are noted.    I&O's Summary      LABS:                        13.2   5.20  )-----------( 284      ( 09 Dec 2019 07:29 )             45.7     12-09    140  |  108  |  15  ----------------------------<  91  3.8   |  25  |  1.04    Ca    8.6      09 Dec 2019 07:29                    EKG:    TELEMETRY: SR with HR 80s at present.  Episode of tachycardia yesterday with -130 yesterday, episodes of bradycardia mostly with sleep with HR 30-40s.    CARDIAC TESTS:      RADIOLOGY & ADDITIONAL STUDIES:    ASSESSMENT & PLAN: HPI:  74 year old male with history of HTN, PAF on Xarelto, Multaq, Toprol and Diltiazem prior to admission, ILR who is s/p right radical nephrectomy on 19.  Post op patient has had significant bradycardia and Multaq, Metoprolol and Diltiazem have been held.  Patient had one brief episodes of tachycardia on.  EP asked to consult to evaluate recent tachycardia.        PAST MEDICAL & SURGICAL HISTORY:  Colon polyp: benign  BPH (benign prostatic hyperplasia)  Hematuria  GERD (gastroesophageal reflux disease)  HTN (hypertension)  Atrial fibrillation: has loop recorder  Renal mass  H/O colonoscopy: and upper endoscopy   S/P tonsillectomy  H/O hemorrhoidectomy  S/P cholecystectomy      MEDICATIONS  (STANDING):  amLODIPine   Tablet 5 milliGRAM(s) Oral daily  dronedarone 400 milliGRAM(s) Oral two times a day  famotidine    Tablet 20 milliGRAM(s) Oral two times a day  lisinopril 40 milliGRAM(s) Oral daily  rivaroxaban 20 milliGRAM(s) Oral with dinner  senna 2 Tablet(s) Oral at bedtime  sodium chloride 0.9%. 1000 milliLiter(s) (125 mL/Hr) IV Continuous <Continuous>    MEDICATIONS  (PRN):  morphine  - Injectable 4 milliGRAM(s) IV Push every 6 hours PRN breakthrough pain  oxycodone    5 mG/acetaminophen 325 mG 1 Tablet(s) Oral every 4 hours PRN Moderate Pain (4 - 6)  oxycodone    5 mG/acetaminophen 325 mG 2 Tablet(s) Oral every 6 hours PRN Severe Pain (7 - 10)  trimethobenzamide Injectable 200 milliGRAM(s) IntraMuscular every 6 hours PRN Nausea and/or Vomiting      Allergies    No Known Allergies        SOCIAL HISTORY: Denies tobacco, etoh abuse or illicit drug use    FAMILY HISTORY:  Family history of breast cancer in mother      Vital Signs Last 24 Hrs  T(C): 36.6 (09 Dec 2019 11:00), Max: 36.9 (08 Dec 2019 19:43)  T(F): 97.9 (09 Dec 2019 11:00), Max: 98.5 (08 Dec 2019 19:43)  HR: 51 (09 Dec 2019 11:00) (45 - 67)  BP: 150/59 (09 Dec 2019 11:00) (125/62 - 174/70)  BP(mean): --  RR: 18 (09 Dec 2019 11:00) (18 - 18)  SpO2: 97% (09 Dec 2019 11:00) (95% - 97%)    REVIEW OF SYSTEMS:    CONSTITUTIONAL:  As per HPI.  HEENT:  Eyes:  No diplopia or blurred vision. ENT:  No earache, sore throat or runny nose.  CARDIOVASCULAR:  No pressure, squeezing, strangling, tightness, heaviness or aching about the chest, neck, axilla or epigastrium.  RESPIRATORY:  No cough, shortness of breath, PND or orthopnea.  GASTROINTESTINAL:  No nausea, vomiting or diarrhea.  GENITOURINARY:  No dysuria, frequency or urgency.  MUSCULOSKELETAL:  As per HPI.  SKIN:  No change in skin, hair or nails.  NEUROLOGIC:  No paresthesias, fasciculations, seizures or weakness.  PSYCHIATRIC:  No disorder of thought or mood.  ENDOCRINE:  No heat or cold intolerance, polyuria or polydipsia.  HEMATOLOGICAL:  No easy bruising or bleedings:  .     PHYSICAL EXAMINATION:    GENERAL APPEARANCE:  Pt. is not currently dyspneic, in no distress. Pt. is alert, oriented, and pleasant.  HEENT:  Pupils are normal and react normally. No icterus. Mucous membranes well colored.  NECK:  Supple. No lymphadenopathy. Jugular venous pressure not elevated. Carotids equal.   HEART:   The cardiac impulse has a normal quality. There are no murmurs, rubs or gallops noted  CHEST:  Chest is clear to auscultation. Normal respiratory effort.  ABDOMEN:  Soft and nontender. Nephrectomy site with dressing C/D/I.  EXTREMITIES:  There is no edema.   SKIN:  No rash or significant lesions are noted.    I&O's Summary      LABS:                        13.2   5.20  )-----------( 284      ( 09 Dec 2019 07:29 )             45.7     -    140  |  108  |  15  ----------------------------<  91  3.8   |  25  |  1.04    Ca    8.6      09 Dec 2019 07:29      EK19:  SB@44BPM  NC:124ms  QRS:106ms  QT/QTc:520/444ms.    TELEMETRY: SR with HR 80s at present.  Episode of  atrial tachycardia yesterday with -130, episodes of bradycardia with sleep with HR 30-40s, no pauses. HPI:  74 year old male with history of HTN, PAF on Xarelto, Multaq, Toprol and Diltiazem prior to admission, ILR who is s/p right radical nephrectomy on 19.  Post op patient has had significant bradycardia and Multaq, Metoprolol and Diltiazem have been held.  Patient had one brief episodes of tachycardia on.  EP asked to consult to evaluate recent tachycardia.        PAST MEDICAL & SURGICAL HISTORY:  Colon polyp: benign  BPH (benign prostatic hyperplasia)  Hematuria  GERD (gastroesophageal reflux disease)  HTN (hypertension)  Atrial fibrillation: has loop recorder  Renal mass  H/O colonoscopy: and upper endoscopy   S/P tonsillectomy  H/O hemorrhoidectomy  S/P cholecystectomy      MEDICATIONS  (STANDING):  amLODIPine   Tablet 5 milliGRAM(s) Oral daily  dronedarone 400 milliGRAM(s) Oral two times a day  famotidine    Tablet 20 milliGRAM(s) Oral two times a day  lisinopril 40 milliGRAM(s) Oral daily  rivaroxaban 20 milliGRAM(s) Oral with dinner  senna 2 Tablet(s) Oral at bedtime  sodium chloride 0.9%. 1000 milliLiter(s) (125 mL/Hr) IV Continuous <Continuous>    MEDICATIONS  (PRN):  morphine  - Injectable 4 milliGRAM(s) IV Push every 6 hours PRN breakthrough pain  oxycodone    5 mG/acetaminophen 325 mG 1 Tablet(s) Oral every 4 hours PRN Moderate Pain (4 - 6)  oxycodone    5 mG/acetaminophen 325 mG 2 Tablet(s) Oral every 6 hours PRN Severe Pain (7 - 10)  trimethobenzamide Injectable 200 milliGRAM(s) IntraMuscular every 6 hours PRN Nausea and/or Vomiting      Allergies    No Known Allergies        SOCIAL HISTORY: Denies tobacco, etoh abuse or illicit drug use    FAMILY HISTORY:  Family history of breast cancer in mother      Vital Signs Last 24 Hrs  T(C): 36.6 (09 Dec 2019 11:00), Max: 36.9 (08 Dec 2019 19:43)  T(F): 97.9 (09 Dec 2019 11:00), Max: 98.5 (08 Dec 2019 19:43)  HR: 51 (09 Dec 2019 11:00) (45 - 67)  BP: 150/59 (09 Dec 2019 11:00) (125/62 - 174/70)  BP(mean): --  RR: 18 (09 Dec 2019 11:00) (18 - 18)  SpO2: 97% (09 Dec 2019 11:00) (95% - 97%)    REVIEW OF SYSTEMS:    CONSTITUTIONAL:  As per HPI.  HEENT:  Eyes:  No diplopia or blurred vision. ENT:  No earache, sore throat or runny nose.  CARDIOVASCULAR:  No pressure, squeezing, strangling, tightness, heaviness or aching about the chest, neck, axilla or epigastrium.  RESPIRATORY:  No cough, shortness of breath, PND or orthopnea.  GASTROINTESTINAL:  No nausea, vomiting or diarrhea.  GENITOURINARY:  No dysuria, frequency or urgency.  MUSCULOSKELETAL:  As per HPI.  SKIN:  No change in skin, hair or nails.  NEUROLOGIC:  No paresthesias, fasciculations, seizures or weakness.  PSYCHIATRIC:  No disorder of thought or mood.  ENDOCRINE:  No heat or cold intolerance, polyuria or polydipsia.  HEMATOLOGICAL:  No easy bruising or bleedings:  .     PHYSICAL EXAMINATION:    GENERAL APPEARANCE:  Pt. is not currently dyspneic, in no distress. Pt. is alert, oriented, and pleasant.  HEENT:  Pupils are normal and react normally. No icterus. Mucous membranes well colored.  NECK:  Supple. No lymphadenopathy. Jugular venous pressure not elevated. Carotids equal.   HEART:   The cardiac impulse has a normal quality. There are no murmurs, rubs or gallops noted  CHEST:  Chest is clear to auscultation. Normal respiratory effort.  ABDOMEN:  Soft and nontender. Nephrectomy site with dressing C/D/I.  EXTREMITIES:  There is no edema.   SKIN:  No rash or significant lesions are noted.    I&O's Summary      LABS:                        13.2   5.20  )-----------( 284      ( 09 Dec 2019 07:29 )             45.7     -    140  |  108  |  15  ----------------------------<  91  3.8   |  25  |  1.04    Ca    8.6      09 Dec 2019 07:29      EK19:  SB@44BPM  MT:124ms  QRS:106ms  QT/QTc:520/444ms.    TELEMETRY: SR with HR 80s at present.  Episode of  atrial tachycardia yesterday with -130, episodes of bradycardia with sleep with HR 30-40s, no pauses.    ILR interrogation:  No events noted, no pauses.

## 2019-12-09 NOTE — DISCHARGE NOTE PROVIDER - NSDCMRMEDTOKEN_GEN_ALL_CORE_FT
Calcium 500+D oral tablet, chewable: 0.5 tab(s) orally once a day  famotidine 20 mg oral tablet: 1 tab(s) orally 2 times a day  hydroCHLOROthiazide 25 mg oral tablet: 1 tab(s) orally once a day  lisinopril 40 mg oral tablet: 1 tab(s) orally once a day  magnesium: 1  orally once a day  Metamucil: 1 tbsp orally once a day with OJ  metoprolol succinate 50 mg oral capsule, extended release: 1 cap(s) orally once a day (at bedtime)  Multaq 400 mg oral tablet: 1 tab(s) orally 2 times a day  multivitamin: 1 tab(s) orally once a day  sildenafil 100 mg oral tablet: 1 tab(s) orally once a day, As Needed  Xarelto 20 mg oral tablet: 1 tab(s) orally once a day (in the evening)  pt to follow preop instructions by surgeon and cardiologist

## 2019-12-09 NOTE — DISCHARGE NOTE PROVIDER - PROVIDER TOKENS
PROVIDER:[TOKEN:[[id number]],FOLLOWUP:[2 weeks]],FREE:[LAST:[PMD],PHONE:[(   )    -],FAX:[(   )    -],ADDRESS:[1 week]]

## 2019-12-09 NOTE — PROGRESS NOTE ADULT - SUBJECTIVE AND OBJECTIVE BOX
Status Post:  Lap Right Radical Nephrectomy    Post Operative Day #: 4    SUBJECTIVE:    Flatus: Y             Bowel Movement: N  Pain (0-10): 3           Pain Control Adequate: Y  Nausea: N         Vomiting: N  Diarrhea/Constipation?  N  Chest Pain: N   SOB:  N      MEDICATIONS  (STANDING):  amLODIPine   Tablet 5 milliGRAM(s) Oral daily  dronedarone 400 milliGRAM(s) Oral two times a day  famotidine    Tablet 20 milliGRAM(s) Oral two times a day  lisinopril 40 milliGRAM(s) Oral daily  rivaroxaban 20 milliGRAM(s) Oral with dinner  senna 2 Tablet(s) Oral at bedtime  sodium chloride 0.9%. 1000 milliLiter(s) (125 mL/Hr) IV Continuous <Continuous>    MEDICATIONS  (PRN):  morphine  - Injectable 4 milliGRAM(s) IV Push every 6 hours PRN breakthrough pain  oxycodone    5 mG/acetaminophen 325 mG 1 Tablet(s) Oral every 4 hours PRN Moderate Pain (4 - 6)  oxycodone    5 mG/acetaminophen 325 mG 2 Tablet(s) Oral every 6 hours PRN Severe Pain (7 - 10)  trimethobenzamide Injectable 200 milliGRAM(s) IntraMuscular every 6 hours PRN Nausea and/or Vomiting      OBJECTIVE:  Vital Signs Last 24 Hrs  T(C): 36.7 (09 Dec 2019 05:22), Max: 36.9 (08 Dec 2019 19:43)  T(F): 98 (09 Dec 2019 05:22), Max: 98.5 (08 Dec 2019 19:43)  HR: 67 (09 Dec 2019 09:30) (45 - 67)  BP: 125/62 (09 Dec 2019 09:30) (125/62 - 174/70)  BP(mean): --  RR: 18 (09 Dec 2019 05:22) (18 - 18)  SpO2: 95% (09 Dec 2019 05:22) (95% - 99%)  I&O's Detail      CBC Full  -  ( 09 Dec 2019 07:29 )  WBC Count : 5.20 K/uL  Hemoglobin : 13.2 g/dL  Hematocrit : 45.7 %  Platelet Count - Automated : 284 K/uL    CBC Full  -  ( 08 Dec 2019 07:14 )  WBC Count : 5.57 K/uL  Hemoglobin : 12.4 g/dL  Hematocrit : 42.3 %  Platelet Count - Automated : 246 K/uL    WBC Count : 7.29 K/uL  Hemoglobin : 13.0 g/dL  Hematocrit : 46.3 %  Platelet Count - Automated : 255 K/uL  Mean Cell Volume : 75.3 fl  Mean Cell Hemoglobin : 21.1 pg  Mean Cell Hemoglobin Concentration : 28.1 gm/dL    09 Dec 2019 07:29    140    |  108    |  15     ----------------------------<  91     3.8     |  25     |  1.04   08 Dec 2019 07:14    141    |  110    |  12     ----------------------------<  88     3.7     |  25     |  1.08   07 Dec 2019 07:56    141    |  110    |  13     ----------------------------<  89     4.3     |  25     |  1.20     Ca    8.6        09 Dec 2019 07:29  Ca    8.5        08 Dec 2019 07:14  Ca    8.5        07 Dec 2019 07:56        PE:  Gen- NAD  CV- regular, bradycardic  Chest- CTA bilat  Abd- soft, NTND, hypoactive BS, incisions intact with dermabond, dressing c/d/i  Ext- no edema      A/P:  75 yo male POD#4 s/p lap right radical nephrectomy with continued bradycardia and HTN.  - Labs Stable  - F/U Cardiology   -D/C Home upon cardiology clearance.    Discussed above with Dr. Brown.

## 2019-12-09 NOTE — SBIRT NOTE ADULT - NSSBIRTDRGPOSREINDET_GEN_A_CORE
Positive reinforcement provided of importance of only using drugs for prescribed medical reasons. Pt verbalizes understanding.

## 2019-12-09 NOTE — CONSULT NOTE ADULT - ASSESSMENT
ASSESSMENT & PLAN:  74 year old male with history of HTN, PAF on Xarelto, Multaq, Toprol and Diltiazem prior to admission, ILR who is s/p right radical nephrectomy on 12/6/19.  Post op patient has had significant bradycardia and Multaq, Metoprolol and Diltiazem have been held.  Patient had one brief episodes of tachycardia on12/8.     Plan:  Resume Multaq 400mg daily and Toprol 50mg daily.  Continue telemonitoring. ASSESSMENT & PLAN:  74 year old male with history of HTN, PAF on Xarelto, Multaq, Toprol and Diltiazem prior to admission, ILR who is s/p right radical nephrectomy on 12/6/19.  Post op patient has had significant bradycardia and Multaq, Metoprolol and Diltiazem have been held.  Patient had one brief episodes of tachycardia on12/8.     Plan:  Resume Multaq 400mg daily and Toprol 50mg daily to be given now.  Continue to hold Diltiazem.  Patient has ILR and will send transmission tomorrow.  Discussed possibility of staying for one more night for monitoring and patient would prefer to go home.  Stable for discharge from EP standpoint.  Plan discussed with Dr. Oreilly, RN, patient and Surgical PA. ASSESSMENT & PLAN:  74 year old male with history of HTN, PAF on Xarelto, Multaq, Toprol and Diltiazem prior to admission, ILR who is s/p right radical nephrectomy on 12/6/19.  Post op patient has had significant bradycardia and Multaq, Metoprolol and Diltiazem have been held.  Patient had one brief episodes of tachycardia on12/8.     Plan:  Resume Multaq 400mg BID and Toprol 50mg daily to be given now.  Continue to hold Diltiazem.  Patient has ILR and will send transmission tomorrow.  Discussed possibility of staying for one more night for monitoring and patient would prefer to go home.  Stable for discharge from EP standpoint.  Plan discussed with Dr. Oreilly, RN, patient and Surgical PA.

## 2019-12-09 NOTE — DISCHARGE NOTE NURSING/CASE MANAGEMENT/SOCIAL WORK - PATIENT PORTAL LINK FT
You can access the FollowMyHealth Patient Portal offered by [facility] by registering at the following website: [email or web address]. By joining [facility]’s FollowMyHealth portal, you will also be able to view your health information using other applications (apps) compatible with our system.

## 2019-12-09 NOTE — DISCHARGE NOTE PROVIDER - NSDCFUSCHEDAPPT_GEN_ALL_CORE_FT
CHARLENE TAM ; 12/11/2019 ; [facility] [address] ; 12/16/2019 ; [facility] Card[facility] [address] ; 12/18/2019 ; [facility] [address] ; 01/03/2020 ; [facility] Cardio[facility] [address] ; 02/14/2020 ; [facility] HemOnc [address]

## 2019-12-09 NOTE — DISCHARGE NOTE PROVIDER - CARE PROVIDERS DIRECT ADDRESSES
,[email or web address],DirectAddress_Unknown

## 2019-12-09 NOTE — PROGRESS NOTE ADULT - PROBLEM SELECTOR PLAN 1
Meds on hold.  Await EP decision on therapy as restarting meds with either lower dosing or need for device with this.

## 2019-12-11 ENCOUNTER — APPOINTMENT (OUTPATIENT)
Dept: ORTHOPEDIC SURGERY | Facility: CLINIC | Age: 74
End: 2019-12-11
Payer: MEDICARE

## 2019-12-11 ENCOUNTER — APPOINTMENT (OUTPATIENT)
Dept: UROLOGY | Facility: CLINIC | Age: 74
End: 2019-12-11

## 2019-12-11 VITALS
TEMPERATURE: 97.4 F | SYSTOLIC BLOOD PRESSURE: 145 MMHG | HEIGHT: 69.5 IN | BODY MASS INDEX: 23.6 KG/M2 | WEIGHT: 163 LBS | HEART RATE: 44 BPM | DIASTOLIC BLOOD PRESSURE: 57 MMHG

## 2019-12-11 DIAGNOSIS — M70.62 TROCHANTERIC BURSITIS, LEFT HIP: ICD-10-CM

## 2019-12-11 DIAGNOSIS — Z86.79 PERSONAL HISTORY OF OTHER DISEASES OF THE CIRCULATORY SYSTEM: ICD-10-CM

## 2019-12-11 PROCEDURE — 73502 X-RAY EXAM HIP UNI 2-3 VIEWS: CPT | Mod: LT

## 2019-12-11 PROCEDURE — 99214 OFFICE O/P EST MOD 30 MIN: CPT | Mod: 25

## 2019-12-11 PROCEDURE — 20610 DRAIN/INJ JOINT/BURSA W/O US: CPT | Mod: LT

## 2019-12-12 DIAGNOSIS — C64.1 MALIGNANT NEOPLASM OF RIGHT KIDNEY, EXCEPT RENAL PELVIS: ICD-10-CM

## 2019-12-12 DIAGNOSIS — K21.9 GASTRO-ESOPHAGEAL REFLUX DISEASE WITHOUT ESOPHAGITIS: ICD-10-CM

## 2019-12-12 DIAGNOSIS — Z95.818 PRESENCE OF OTHER CARDIAC IMPLANTS AND GRAFTS: ICD-10-CM

## 2019-12-12 DIAGNOSIS — I47.1 SUPRAVENTRICULAR TACHYCARDIA: ICD-10-CM

## 2019-12-12 DIAGNOSIS — I10 ESSENTIAL (PRIMARY) HYPERTENSION: ICD-10-CM

## 2019-12-12 DIAGNOSIS — N40.0 BENIGN PROSTATIC HYPERPLASIA WITHOUT LOWER URINARY TRACT SYMPTOMS: ICD-10-CM

## 2019-12-12 DIAGNOSIS — I48.0 PAROXYSMAL ATRIAL FIBRILLATION: ICD-10-CM

## 2019-12-12 DIAGNOSIS — E86.0 DEHYDRATION: ICD-10-CM

## 2019-12-16 ENCOUNTER — APPOINTMENT (OUTPATIENT)
Dept: ELECTROPHYSIOLOGY | Facility: CLINIC | Age: 74
End: 2019-12-16
Payer: MEDICARE

## 2019-12-16 PROCEDURE — 93298 REM INTERROG DEV EVAL SCRMS: CPT

## 2019-12-16 PROCEDURE — 93299: CPT

## 2019-12-18 ENCOUNTER — APPOINTMENT (OUTPATIENT)
Dept: UROLOGY | Facility: CLINIC | Age: 74
End: 2019-12-18

## 2019-12-18 ENCOUNTER — APPOINTMENT (OUTPATIENT)
Dept: UROLOGY | Facility: CLINIC | Age: 74
End: 2019-12-18
Payer: MEDICARE

## 2019-12-18 ENCOUNTER — LABORATORY RESULT (OUTPATIENT)
Age: 74
End: 2019-12-18

## 2019-12-18 VITALS
DIASTOLIC BLOOD PRESSURE: 68 MMHG | HEIGHT: 69.5 IN | BODY MASS INDEX: 24.18 KG/M2 | WEIGHT: 167 LBS | SYSTOLIC BLOOD PRESSURE: 154 MMHG | HEART RATE: 97 BPM

## 2019-12-18 PROCEDURE — 99024 POSTOP FOLLOW-UP VISIT: CPT

## 2019-12-18 NOTE — DISCUSSION/SUMMARY
[de-identified] : At this time, due to trochanteric bursitis of the left hip, I recommended ice and elevation.  He will be reassessed in two to three weeks.\par \par The patient has been advised that their blood pressure today was outside normal ranges and the patient was instructed accordingly. Our Blood Pressure Monitoring Sheet with instructions was given to the patient.\par

## 2019-12-18 NOTE — PHYSICAL EXAM
[de-identified] : Right Hip:\par Range of Motion in Degrees:\par 	                                 Claimant:	   Normal:	\par Flexion (Active) 	                 120 	   120-degrees	\par Flexion (Passive)	                 120	   120-degrees	\par Extension (Active)	                 -30	   -30-degrees	\par Extension (Passive)	 -30	   -30-degrees	\par Abduction (Active)	                 45-50	   27-54-omvogmp	\par Abduction (Passive)	 45-50	   72-66-erpdgsk	\par Adduction (Active)  	 20-30	   42-35-ruowfug	\par Adduction (Passive)	 20-30	   56-88-vxgzwkn	\par Internal Rotation (Active) 	 35	   35-degrees	\par Internal Rotation (Passive)	 35	   35-degrees	\par External Rotation (Active)	 45	   45-degrees	\par External Rotation (Passive)	 45	   45-degrees	\par \par No tenderness with internal or external rotation or axial load.  No tenderness to palpation over the greater trochanter.  Negative Trendelenburg.  No tenderness with resisted abduction.  No weakness to flexion, extension, abduction or adduction.  No evidence of instability.  No motor or sensory deficits.  2+ DP and PT pulses.  Skin is intact.  No scars, rashes or lesions.  \par  \par Left Hip: \par Range of Motion in Degrees:\par 	                                 Claimant:	          Normal:	\par Flexion (Active) 	                 120 	          120-degrees	\par Flexion (Passive)	                 120	          120-degrees	\par Extension (Active)	                 -30	          -30-degrees	\par Extension (Passive)	 -30	          -30-degrees	\par Abduction (Active)	                45-50	          58-97-udhnwiy	\par Abduction (Passive)	45-50	          91-11-tqvvohr	\par Adduction (Active)                	20-30	          19-37-mhfqsyz	\par Adduction (Passive)	20-30	          82-10-qpftsvv	\par Internal Rotation (Active) 	35	          35-degrees	\par Internal Rotation (Passive)	35	          35-degrees	\par External Rotation (Active)	45	          45-degrees	\par External Rotation (Passive)	45	          45-degrees	\par \par No tenderness with internal or external rotation or axial load.  Point tenderness over the greater trochanter with pain with resisted abduction.  Negative Trendelenburg.  No weakness to flexion, extension, abduction or adduction.  No evidence of instability.  No motor or sensory deficits.  2+ DP and PT pulses.  Skin is intact.  No scars, rashes or lesions.  \par   [de-identified] : Ambulating with a slightly antalgic to antalgic gait.  Station:  Normal.  [de-identified] : Appearance:  Well-developed, well-nourished male in no acute distress.\par   [de-identified] : Radiographs, two to three views of the left hip, show a small ossification around the greater trochanter.

## 2019-12-18 NOTE — PROCEDURE
[de-identified] : Consent: \par At this time, I have recommended an injection to the left hip.  The risks and benefits of the procedure were discussed with the patient in detail.  Upon verbal consent of the patient, we proceeded with the injection as noted below.  \par \par Procedure:  \par After a sterile prep, the patient underwent an injection of 9 cc of 1% Lidocaine without epinephrine and 1 cc of Kenalog into the left hip.  The patient tolerated the procedure well.  There were no complications.  \par

## 2019-12-18 NOTE — CONSULT LETTER
[Dear  ___] : Dear  [unfilled], [Consult Letter:] : I had the pleasure of evaluating your patient, [unfilled]. [Please see my note below.] : Please see my note below. [Consult Closing:] : Thank you very much for allowing me to participate in the care of this patient.  If you have any questions, please do not hesitate to contact me. [Sincerely,] : Sincerely, [FreeTextEntry3] : Abel Flores III, MD\par [facility]/saray

## 2019-12-18 NOTE — ADDENDUM
[FreeTextEntry1] : This note was written by Riya Garza on 12/17/2019 acting as a scribe for FRANCISCO BAUTISTA III, MD

## 2019-12-18 NOTE — HISTORY OF PRESENT ILLNESS
[2] : the ailment interference is 2/10 [(Does not interfere) 0] : the ailment interference is 0/10 (does not interfere) [de-identified] : The patient comes in today with complaints to his left hip.  Most recently he had a nephrectomy done for a tumor.  Since that time, he has had pain over his left hip.  The patient states the onset/injury occurred on 12/8/19. The patient states the pain is intermittent.  The patient describes the pain as cramping.  The patient notes, "stop movement" makes his symptoms better, while, "moving" makes his symptoms worse.  The patient indicates pain is at a level of 5 on a pain scale of 0-10. [] : No

## 2019-12-20 LAB
ANION GAP SERPL CALC-SCNC: 15 MMOL/L
BASOPHILS # BLD AUTO: 0.1 K/UL
BASOPHILS NFR BLD AUTO: 1.2 %
BUN SERPL-MCNC: 27 MG/DL
CALCIUM SERPL-MCNC: 9.7 MG/DL
CHLORIDE SERPL-SCNC: 100 MMOL/L
CO2 SERPL-SCNC: 26 MMOL/L
CREAT SERPL-MCNC: 1.3 MG/DL
EOSINOPHIL # BLD AUTO: 0.2 K/UL
EOSINOPHIL NFR BLD AUTO: 2.3 %
GLUCOSE SERPL-MCNC: 58 MG/DL
HCT VFR BLD CALC: 52 %
HGB BLD-MCNC: 14.6 G/DL
IMM GRANULOCYTES NFR BLD AUTO: 0.3 %
LYMPHOCYTES # BLD AUTO: 1.48 K/UL
LYMPHOCYTES NFR BLD AUTO: 17.2 %
MAN DIFF?: NORMAL
MCHC RBC-ENTMCNC: 21.7 PG
MCHC RBC-ENTMCNC: 28.1 GM/DL
MCV RBC AUTO: 77.3 FL
MONOCYTES # BLD AUTO: 0.84 K/UL
MONOCYTES NFR BLD AUTO: 9.8 %
NEUTROPHILS # BLD AUTO: 5.93 K/UL
NEUTROPHILS NFR BLD AUTO: 69.2 %
PLATELET # BLD AUTO: 289 K/UL
POTASSIUM SERPL-SCNC: 4.8 MMOL/L
PSA FREE FLD-MCNC: 23 %
PSA FREE SERPL-MCNC: 0.91 NG/ML
PSA SERPL-MCNC: 4.04 NG/ML
RBC # BLD: 6.73 M/UL
RBC # FLD: 23.3 %
SODIUM SERPL-SCNC: 141 MMOL/L
WBC # FLD AUTO: 8.58 K/UL

## 2019-12-22 NOTE — PHYSICAL EXAM
[General Appearance - Well Nourished] : well nourished [Normal Appearance] : normal appearance [Well Groomed] : well groomed [General Appearance - Well Developed] : well developed [General Appearance - In No Acute Distress] : no acute distress [Abdomen Soft] : soft [Abdomen Tenderness] : non-tender [Abdomen Mass (___ Cm)] : no abdominal mass palpated [Abdomen Hernia] : no hernia was discovered [Skin Color & Pigmentation] : normal skin color and pigmentation [] : no respiratory distress [Exaggerated Use Of Accessory Muscles For Inspiration] : no accessory muscle use [Oriented To Time, Place, And Person] : oriented to person, place, and time [Mood] : the mood was normal [Affect] : the affect was normal [Not Anxious] : not anxious [FreeTextEntry1] : incisions are healed

## 2019-12-22 NOTE — ASSESSMENT
[FreeTextEntry1] : results of the pathology were explained to the patient\par the patient has already seen dr. Morfin in the past, and we recommend to continue follow up with Dr. Morfin as well\par \par 1. a follow-up Ct was ordered for 6 months from now\par 2. new CBC and BMP was ordered\par 3. new PSA was also ordered, last one was normal on 2016\par

## 2019-12-22 NOTE — HISTORY OF PRESENT ILLNESS
[None] : no symptoms [FreeTextEntry1] : 73 yo male patient after Rt lap radical nephrectomy on 12/5/2019\par pathology: 10cm confined clear cell RCC, grade 4, with focal rhabdoid features.\par \par patient is feeling good, no pain, no urinary complains\par is being followed by his cardiologist due to the episodes of tachycardia and bradycardia he had surrounding the operation and changing his medication\par \par

## 2020-01-02 ENCOUNTER — APPOINTMENT (OUTPATIENT)
Dept: ORTHOPEDIC SURGERY | Facility: CLINIC | Age: 75
End: 2020-01-02

## 2020-01-02 ENCOUNTER — OUTPATIENT (OUTPATIENT)
Dept: OUTPATIENT SERVICES | Facility: HOSPITAL | Age: 75
LOS: 1 days | Discharge: HOME | End: 2020-01-02

## 2020-01-02 DIAGNOSIS — R71.8 OTHER ABNORMALITY OF RED BLOOD CELLS: ICD-10-CM

## 2020-01-02 DIAGNOSIS — Z98.890 OTHER SPECIFIED POSTPROCEDURAL STATES: Chronic | ICD-10-CM

## 2020-01-02 DIAGNOSIS — Z90.49 ACQUIRED ABSENCE OF OTHER SPECIFIED PARTS OF DIGESTIVE TRACT: Chronic | ICD-10-CM

## 2020-01-02 DIAGNOSIS — Z90.89 ACQUIRED ABSENCE OF OTHER ORGANS: Chronic | ICD-10-CM

## 2020-01-02 NOTE — RESULTS/DATA
[FreeTextEntry1] : I reviewed recent blood work results with patient.\par \par 1/20/19:\par WBC 7.86, hemoglobin 14.1 g/dL, hematocrit 49.4%, platelets 326,000

## 2020-01-02 NOTE — HISTORY OF PRESENT ILLNESS
[de-identified] : 74M with past medical history of osteoarthritis, paroxysmal atrial fibrillation ( + loop recorder; on anticoagulation with Rivaroxaban), and gross hematuria noticed in October 2019, who was diagnosed with a right large necrotic kidney mass, and was found with elevated hematocrit ( 54%) and liver function tests, a referred for initial hematologic evaluation. Patient reports being exposed to secondhand tobacco and aromatic chemical exposure ( benzene) in the past. Patient has been complaining of fatigue and muscular ache. On 11/18/19 patient underwent EGD/colonoscopy, consistent with gastritis. Most recent blood work consistent with hemoglobin 14.3 g/dL and hematocrit 49.4%. Of note, in September 2018, patient's hemoglobin was 17.6 g/dL. Denies ever needing therapeutic phlebotomy. \par CT abdomen performed 11/1/19 showed a 9.7x10.0x7.5 cm partially heterogeneous mass extending off the lower lobe of the right kidney with invasion of the lower pole renal collecting system, highly concerning for renal cell carcinoma. No gross urothelial lesions. Few scattered pulmonary nodules measuring up to 4 mm were nonspecific, unlikely to represent post inflammatory benign nodules, but possibly representing metastatic disease. Mild thickening of the bladder. Hemangioma of the spleen. On 11/8/19 patient had a CT chest that showed tree-in-bud opacities in the right lung, representing mucoid in packet distal small airways.\par  [FreeTextEntry1] : expectant surveillance\par \par \par

## 2020-01-02 NOTE — CONSULT LETTER
[Dear  ___] : Dear  [unfilled], [Consult Letter:] : I had the pleasure of evaluating your patient, [unfilled]. [Please see my note below.] : Please see my note below. [Consult Closing:] : Thank you very much for allowing me to participate in the care of this patient.  If you have any questions, please do not hesitate to contact me. [Sincerely,] : Sincerely, [DrContsance  ___] : Dr. PARKER [FreeTextEntry2] : Román Ibarra M.D.  [FreeTextEntry3] : GEMMA DIAMOND M.D.\par Hematology/ Oncology\par [facility] [place] [facility] [place]\par [facility]\par [address] [place] [address], [facility] [[email or web address]] D\par [place] [address]\par Telephone: [phone]\par FAX: [phone]\par \par \par

## 2020-01-02 NOTE — ASSESSMENT
[FreeTextEntry1] : Mr. TAM's questions were answered to his satisfaction. He expressed his understanding and willingness to comply with the above recommendations, and will return to the office to review the results of the blood tests in days / weeks.\par \par

## 2020-01-03 ENCOUNTER — APPOINTMENT (OUTPATIENT)
Dept: ELECTROPHYSIOLOGY | Facility: CLINIC | Age: 75
End: 2020-01-03
Payer: MEDICARE

## 2020-01-03 ENCOUNTER — APPOINTMENT (OUTPATIENT)
Dept: CARDIOLOGY | Facility: CLINIC | Age: 75
End: 2020-01-03
Payer: MEDICARE

## 2020-01-03 ENCOUNTER — NON-APPOINTMENT (OUTPATIENT)
Age: 75
End: 2020-01-03

## 2020-01-03 VITALS
SYSTOLIC BLOOD PRESSURE: 147 MMHG | HEART RATE: 54 BPM | BODY MASS INDEX: 24.44 KG/M2 | DIASTOLIC BLOOD PRESSURE: 73 MMHG | OXYGEN SATURATION: 100 % | WEIGHT: 165 LBS | HEIGHT: 69 IN

## 2020-01-03 VITALS — SYSTOLIC BLOOD PRESSURE: 131 MMHG | HEART RATE: 52 BPM | DIASTOLIC BLOOD PRESSURE: 67 MMHG

## 2020-01-03 VITALS
HEIGHT: 69 IN | OXYGEN SATURATION: 100 % | DIASTOLIC BLOOD PRESSURE: 70 MMHG | SYSTOLIC BLOOD PRESSURE: 146 MMHG | HEART RATE: 48 BPM | BODY MASS INDEX: 23.25 KG/M2 | WEIGHT: 157 LBS

## 2020-01-03 VITALS — DIASTOLIC BLOOD PRESSURE: 62 MMHG | SYSTOLIC BLOOD PRESSURE: 130 MMHG

## 2020-01-03 PROCEDURE — 93291 INTERROG DEV EVAL SCRMS IP: CPT

## 2020-01-03 PROCEDURE — 99214 OFFICE O/P EST MOD 30 MIN: CPT

## 2020-01-03 PROCEDURE — 93000 ELECTROCARDIOGRAM COMPLETE: CPT

## 2020-01-03 NOTE — REVIEW OF SYSTEMS
[Eyeglasses] : currently wearing eyeglasses [see HPI] : see HPI [Impotence] : impotence [Negative] : Heme/Lymph [Fever] : no fever [Chills] : no chills [Shortness Of Breath] : no shortness of breath [Loss Of Hearing] : no hearing loss [Dyspnea on exertion] : not dyspnea during exertion [Chest  Pressure] : no chest pressure [Chest Pain] : no chest pain [Lower Ext Edema] : no extremity edema [Abdominal Pain] : no abdominal pain [Palpitations] : no palpitations [FreeTextEntry5] : Erectile dysfunction

## 2020-01-03 NOTE — REASON FOR VISIT
[Atrial Fibrillation] : atrial fibrillation [Hypertension] : hypertension [Medication Management] : Medication management [Abnormal ECG] : an abnormal ECG

## 2020-01-03 NOTE — PHYSICAL EXAM
[Normal Appearance] : normal appearance [General Appearance - In No Acute Distress] : no acute distress [No Oral Cyanosis] : no oral cyanosis [Respiration, Rhythm And Depth] : normal respiratory rhythm and effort [Auscultation Breath Sounds / Voice Sounds] : lungs were clear to auscultation bilaterally [Heart Rate And Rhythm] : heart rate and rhythm were normal [Heart Sounds] : normal S1 and S2 [Murmurs] : no murmurs present [Abnormal Walk] : normal gait [Edema] : no peripheral edema present [Nail Clubbing] : no clubbing of the fingernails [Cyanosis, Localized] : no localized cyanosis [Oriented To Time, Place, And Person] : oriented to person, place, and time [Impaired Insight] : insight and judgment were intact [Affect] : the affect was normal [Mood] : the mood was normal [Well Groomed] : well groomed [Abdomen Soft] : soft [FreeTextEntry1] : No JVD

## 2020-01-03 NOTE — HISTORY OF PRESENT ILLNESS
[FreeTextEntry1] : Denny Johnson is a 74-year-old man with a history of paroxysmal atrial fibrillation (with implantable loop monitor), hypertension, and recently diagnosed renal cell carcinoma status post right laparoscopic radical nephrectomy (12/2019) who returns for cardiac examination.  Postoperatively he experienced marked bradycardia and diltiazem was discontinued.  He has now recovered from surgery and feeling well.  He has returned to work and his everyday activities.  He has not been experiencing angina, dyspnea, or palpitations; tolerating anticoagulation.\par

## 2020-01-03 NOTE — DISCUSSION/SUMMARY
[___ Month(s)] : [unfilled] month(s) [FreeTextEntry1] : \par Atrial fibrillation: Paroxysmal; currently in sinus rhythm; continue anticoagulation.\par \par Sinus bradycardia: Asymptomatic; decrease dose of metoprolol to 25 mg daily and continue to withhold diltiazem.\par \par Hypertension: Fair control; reevaluate in one month given the need to decrease dose of metoprolol - if blood pressure rises, will likely add low dose amlodipine. [With Me] : with me

## 2020-01-07 ENCOUNTER — APPOINTMENT (OUTPATIENT)
Age: 75
End: 2020-01-07
Payer: MEDICARE

## 2020-01-07 ENCOUNTER — RESULT REVIEW (OUTPATIENT)
Age: 75
End: 2020-01-07

## 2020-01-07 VITALS
HEART RATE: 51 BPM | TEMPERATURE: 97.6 F | WEIGHT: 158 LBS | HEIGHT: 69 IN | SYSTOLIC BLOOD PRESSURE: 144 MMHG | BODY MASS INDEX: 23.4 KG/M2 | RESPIRATION RATE: 16 BRPM | DIASTOLIC BLOOD PRESSURE: 74 MMHG

## 2020-01-07 PROCEDURE — 99214 OFFICE O/P EST MOD 30 MIN: CPT

## 2020-01-07 NOTE — REVIEW OF SYSTEMS
[Negative] : Integumentary [FreeTextEntry7] : complaining of postoperative mild discomfort right flank. [FreeTextEntry8] : s/p right radical nephrectomy

## 2020-01-07 NOTE — ASSESSMENT
[FreeTextEntry1] : Mr. TAM 's questions were answered to his satisfaction. He  expressed his  understanding and willingness to comply with the above recommendations, and  will return to the office in 3 months.\par \par \par

## 2020-01-07 NOTE — HISTORY OF PRESENT ILLNESS
[de-identified] : Returning for followup; seen last in November, preoperatively, for erythrocytosis. On 12/5/19 patient underwent laparoscopic right radical nephrectomy, for a grade 4, 10 cm confined renal cell carcinoma with rhabdoid features. Work up for underlying MPD was negative ( [id number]), and from oncologic perspective he is cured surgically. Feels great; postoperative recovery uneventful. Hemoglobin today at 13.9 g/dL. Complains of having 5/10 pain left hip, mostly with weight bearing. Was evaluated by Dr. Flores; on 12/11/19, he had treatment for intertrochanteric bursitis with intraarticular injection of lidocaine and Kenalog. Feeling better. [de-identified] : 74 M with past medical history of osteoarthritis, paroxysmal atrial fibrillation ( + loop recorder; on anticoagulation with Rivaroxaban), right renal carcinoma s/p nephrectomy 12/5/19, and erythrocytosis.  [FreeTextEntry1] : expectant surveillance\par \par \par

## 2020-01-07 NOTE — PHYSICAL EXAM
[Restricted in physically strenuous activity but ambulatory and able to carry out work of a light or sedentary nature] : Status 1- Restricted in physically strenuous activity but ambulatory and able to carry out work of a light or sedentary nature, e.g., light house work, office work [Normal] : full range of motion and no deformities appreciated [de-identified] : s/p redical nephrectomy - right [de-identified] : right lower quadrant horizontal nephrectomy scar well healed

## 2020-01-16 ENCOUNTER — APPOINTMENT (OUTPATIENT)
Dept: ELECTROPHYSIOLOGY | Facility: CLINIC | Age: 75
End: 2020-01-16
Payer: MEDICARE

## 2020-01-16 PROCEDURE — 93298 REM INTERROG DEV EVAL SCRMS: CPT

## 2020-01-16 PROCEDURE — G2066: CPT

## 2020-01-29 NOTE — REVIEW OF SYSTEMS
[Eyeglasses] : currently wearing eyeglasses [Impotence] : impotence [see HPI] : see HPI [Negative] : Heme/Lymph [Loss Of Hearing] : no hearing loss [Chills] : no chills [Fever] : no fever [Chest  Pressure] : no chest pressure [Dyspnea on exertion] : not dyspnea during exertion [Shortness Of Breath] : no shortness of breath [Chest Pain] : no chest pain [Palpitations] : no palpitations [Lower Ext Edema] : no extremity edema [Abdominal Pain] : no abdominal pain [FreeTextEntry5] : Erectile dysfunction

## 2020-01-29 NOTE — HISTORY OF PRESENT ILLNESS
[FreeTextEntry1] : Charlene Tam is a 75-year-old man with  paroxysmal atrial fibrillation, hypertension, and recently diagnosed renal cell carcinoma status post right laparoscopic radical nephrectomy (12/2019) Postoperatively he experienced marked bradycardia and diltiazem was discontinued. He has now recovered from surgery and feeling well. He has returned to work and his everyday activities. CHARLENE TAM  denies chest pain, chest pressure, shortness of breath, lightheadedness, dizziness, palpitations, syncope, presyncope, orthopnea, PND, or edema. He has noted on his home BP monitor irregular heart rates.

## 2020-01-29 NOTE — PHYSICAL EXAM
[Normal Appearance] : normal appearance [Well Groomed] : well groomed [General Appearance - In No Acute Distress] : no acute distress [Normal Conjunctiva] : the conjunctiva exhibited no abnormalities [Eyelids - No Xanthelasma] : the eyelids demonstrated no xanthelasmas [No Oral Cyanosis] : no oral cyanosis [Respiration, Rhythm And Depth] : normal respiratory rhythm and effort [Exaggerated Use Of Accessory Muscles For Inspiration] : no accessory muscle use [Heart Rate And Rhythm] : heart rate and rhythm were normal [Heart Sounds] : normal S1 and S2 [Auscultation Breath Sounds / Voice Sounds] : lungs were clear to auscultation bilaterally [Murmurs] : no murmurs present [Edema] : no peripheral edema present [Abdomen Soft] : soft [Cyanosis, Localized] : no localized cyanosis [Nail Clubbing] : no clubbing of the fingernails [Abnormal Walk] : normal gait [] : no rash [Skin Color & Pigmentation] : normal skin color and pigmentation [No Venous Stasis] : no venous stasis [Skin Lesions] : no skin lesions [Oriented To Time, Place, And Person] : oriented to person, place, and time [Impaired Insight] : insight and judgment were intact [Mood] : the mood was normal [Affect] : the affect was normal [FreeTextEntry1] : No JVD

## 2020-02-05 ENCOUNTER — RX RENEWAL (OUTPATIENT)
Age: 75
End: 2020-02-05

## 2020-02-07 ENCOUNTER — RX RENEWAL (OUTPATIENT)
Age: 75
End: 2020-02-07

## 2020-02-12 ENCOUNTER — NON-APPOINTMENT (OUTPATIENT)
Age: 75
End: 2020-02-12

## 2020-02-12 ENCOUNTER — APPOINTMENT (OUTPATIENT)
Dept: CARDIOLOGY | Facility: CLINIC | Age: 75
End: 2020-02-12
Payer: MEDICARE

## 2020-02-12 VITALS
HEIGHT: 69 IN | OXYGEN SATURATION: 98 % | DIASTOLIC BLOOD PRESSURE: 69 MMHG | WEIGHT: 170 LBS | BODY MASS INDEX: 25.18 KG/M2 | SYSTOLIC BLOOD PRESSURE: 120 MMHG | HEART RATE: 58 BPM

## 2020-02-12 VITALS — DIASTOLIC BLOOD PRESSURE: 70 MMHG | SYSTOLIC BLOOD PRESSURE: 118 MMHG

## 2020-02-12 DIAGNOSIS — Z01.810 ENCOUNTER FOR PREPROCEDURAL CARDIOVASCULAR EXAMINATION: ICD-10-CM

## 2020-02-12 PROCEDURE — 93000 ELECTROCARDIOGRAM COMPLETE: CPT

## 2020-02-12 PROCEDURE — 99214 OFFICE O/P EST MOD 30 MIN: CPT

## 2020-02-12 NOTE — PHYSICAL EXAM
[Well Groomed] : well groomed [General Appearance - In No Acute Distress] : no acute distress [No Oral Cyanosis] : no oral cyanosis [Respiration, Rhythm And Depth] : normal respiratory rhythm and effort [Auscultation Breath Sounds / Voice Sounds] : lungs were clear to auscultation bilaterally [Heart Sounds] : normal S1 and S2 [Edema] : no peripheral edema present [FreeTextEntry1] : No flank or back tenderness [Abnormal Walk] : normal gait [Nail Clubbing] : no clubbing of the fingernails [Cyanosis, Localized] : no localized cyanosis [Oriented To Time, Place, And Person] : oriented to person, place, and time [Impaired Insight] : insight and judgment were intact [Affect] : the affect was normal [Mood] : the mood was normal

## 2020-02-12 NOTE — HISTORY OF PRESENT ILLNESS
[FreeTextEntry1] : Denny Johnson is a 75-year-old man with a history of paroxysmal atrial fibrillation (with implantable loop monitor), hypertension, and recently diagnosed renal cell carcinoma status post right laparoscopic radical nephrectomy (12/2019) who returns for cardiac examination.  He continues to feel well from a cardiovascular standpoint and offers no new complaints -- plans to have his implantable loop recorder replaced (currently at end of life) later this month.  He denies angina, palpitations, dyspnea.  He reports moderately elevated blood pressure at home monitoring (although blood pressure is well-controlled during today's visit).

## 2020-02-12 NOTE — REVIEW OF SYSTEMS
[Headache] : no headache [Eyeglasses] : currently wearing eyeglasses [Loss Of Hearing] : no hearing loss [Shortness Of Breath] : no shortness of breath [Dyspnea on exertion] : not dyspnea during exertion [Chest  Pressure] : no chest pressure [Chest Pain] : no chest pain [Lower Ext Edema] : no extremity edema [Palpitations] : no palpitations [Abdominal Pain] : no abdominal pain [see HPI] : see HPI [Impotence] : impotence [Lower Back Pain] : lower back pain [Negative] : Heme/Lymph [FreeTextEntry5] : Erectile dysfunction

## 2020-02-12 NOTE — DISCUSSION/SUMMARY
[___ Month(s)] : [unfilled] month(s) [With Me] : with me [FreeTextEntry1] : \par Atrial fibrillation: Rare paroxysms of atrial fibrillation and currently in sinus rhythm; continue anticoagulation, metoprolol, Multaq.\par \par Sinus bradycardia: Mild, asymptomatic, and improved with discontinuation of diltiazem and reduction in metoprolol dose.\par \par Hypertension: Blood pressure presently normotensive -- if persistent elevation on home monitoring he will contact me for further instructions (seems to experience elevated BP readings in the evening/night hours).\par

## 2020-02-12 NOTE — REASON FOR VISIT
[Abnormal ECG] : an abnormal ECG [Atrial Fibrillation] : atrial fibrillation [Hypertension] : hypertension [Medication Management] : Medication management

## 2020-02-14 ENCOUNTER — APPOINTMENT (OUTPATIENT)
Age: 75
End: 2020-02-14

## 2020-02-17 ENCOUNTER — APPOINTMENT (OUTPATIENT)
Dept: ELECTROPHYSIOLOGY | Facility: CLINIC | Age: 75
End: 2020-02-17
Payer: MEDICARE

## 2020-02-18 ENCOUNTER — APPOINTMENT (OUTPATIENT)
Dept: CARDIOLOGY | Facility: CLINIC | Age: 75
End: 2020-02-18

## 2020-02-18 PROCEDURE — G2066: CPT

## 2020-02-18 PROCEDURE — 93298 REM INTERROG DEV EVAL SCRMS: CPT

## 2020-02-26 ENCOUNTER — OUTPATIENT (OUTPATIENT)
Dept: OUTPATIENT SERVICES | Facility: HOSPITAL | Age: 75
LOS: 1 days | Discharge: HOME | End: 2020-02-26
Payer: MEDICARE

## 2020-02-26 VITALS
SYSTOLIC BLOOD PRESSURE: 126 MMHG | HEART RATE: 53 BPM | OXYGEN SATURATION: 100 % | DIASTOLIC BLOOD PRESSURE: 57 MMHG | TEMPERATURE: 97 F | RESPIRATION RATE: 18 BRPM

## 2020-02-26 VITALS
OXYGEN SATURATION: 100 % | HEART RATE: 53 BPM | DIASTOLIC BLOOD PRESSURE: 59 MMHG | RESPIRATION RATE: 18 BRPM | SYSTOLIC BLOOD PRESSURE: 142 MMHG | HEIGHT: 69 IN | WEIGHT: 166.01 LBS | TEMPERATURE: 98 F

## 2020-02-26 DIAGNOSIS — Z90.49 ACQUIRED ABSENCE OF OTHER SPECIFIED PARTS OF DIGESTIVE TRACT: Chronic | ICD-10-CM

## 2020-02-26 DIAGNOSIS — Z98.890 OTHER SPECIFIED POSTPROCEDURAL STATES: Chronic | ICD-10-CM

## 2020-02-26 DIAGNOSIS — R00.1 BRADYCARDIA, UNSPECIFIED: ICD-10-CM

## 2020-02-26 DIAGNOSIS — Z90.89 ACQUIRED ABSENCE OF OTHER ORGANS: Chronic | ICD-10-CM

## 2020-02-26 DIAGNOSIS — I48.0 PAROXYSMAL ATRIAL FIBRILLATION: ICD-10-CM

## 2020-02-26 PROCEDURE — 33286 RMVL SUBQ CAR RHYTHM MNTR: CPT | Mod: 59

## 2020-02-26 PROCEDURE — 33285 INSJ SUBQ CAR RHYTHM MNTR: CPT

## 2020-02-26 PROCEDURE — C1764: CPT

## 2020-02-26 PROCEDURE — 33286 RMVL SUBQ CAR RHYTHM MNTR: CPT

## 2020-02-26 NOTE — PACU DISCHARGE NOTE - COMMENTS
Discharge instructions given to patient. Answered all questions. Verbalized understanding. All belongings are with the patient. Home monitor device given to patient. Awaiting  to arrive. Discharge instructions given to patient. Answered all questions. Verbalized understanding. All belongings are with the patient. Home monitor device given to patient. Awaiting  to arrive. left the unit at 1144 with Alem Andre- friend.

## 2020-02-26 NOTE — ASU PREOP CHECKLIST - HEART RATE (BEATS/MIN)
Spoke with patient and notified Dr. Hinson reviewed blood work and vitamin d low  Patient is currently not on any vitamin d  Patient instructed to start Vitamin D 50,000 iu once weekly for 8 weeks then recheck.   Vitamin D e-scribed to Good Value. Lab order submitted  Patient verbalize understanding   53

## 2020-02-26 NOTE — ASU PATIENT PROFILE, ADULT - PSH
H/O colonoscopy  and upper endoscopy 2019  H/O hemorrhoidectomy    S/P cholecystectomy    S/P tonsillectomy

## 2020-02-26 NOTE — ASU PATIENT PROFILE, ADULT - PMH
Atrial fibrillation  has loop recorder  BPH (benign prostatic hyperplasia)    Colon polyp  benign  GERD (gastroesophageal reflux disease)    Hematuria    HTN (hypertension)    Renal mass

## 2020-02-27 DIAGNOSIS — I10 ESSENTIAL (PRIMARY) HYPERTENSION: ICD-10-CM

## 2020-02-27 DIAGNOSIS — I48.0 PAROXYSMAL ATRIAL FIBRILLATION: ICD-10-CM

## 2020-02-27 DIAGNOSIS — Z45.09 ENCOUNTER FOR ADJUSTMENT AND MANAGEMENT OF OTHER CARDIAC DEVICE: ICD-10-CM

## 2020-02-27 DIAGNOSIS — N40.0 BENIGN PROSTATIC HYPERPLASIA WITHOUT LOWER URINARY TRACT SYMPTOMS: ICD-10-CM

## 2020-03-02 ENCOUNTER — APPOINTMENT (OUTPATIENT)
Dept: UROLOGY | Facility: CLINIC | Age: 75
End: 2020-03-02
Payer: MEDICARE

## 2020-03-02 VITALS — DIASTOLIC BLOOD PRESSURE: 72 MMHG | SYSTOLIC BLOOD PRESSURE: 116 MMHG | HEART RATE: 62 BPM

## 2020-03-02 PROCEDURE — 99213 OFFICE O/P EST LOW 20 MIN: CPT | Mod: 24

## 2020-03-07 NOTE — ASSESSMENT
[FreeTextEntry1] : pain is most likely musculoskeletal in nature\par discussed with patient that CT will be done at the end of this month instead in 3 more months\par will come for a repeat visit with the CT\par

## 2020-03-07 NOTE — PHYSICAL EXAM
[General Appearance - Well Developed] : well developed [Normal Appearance] : normal appearance [General Appearance - Well Nourished] : well nourished [Well Groomed] : well groomed [Abdomen Soft] : soft [General Appearance - In No Acute Distress] : no acute distress [Abdomen Tenderness] : non-tender [Abdomen Mass (___ Cm)] : no abdominal mass palpated [Abdomen Hernia] : no hernia was discovered [Costovertebral Angle Tenderness] : no ~M costovertebral angle tenderness [Skin Color & Pigmentation] : normal skin color and pigmentation [Edema] : no peripheral edema [] : no respiratory distress [Oriented To Time, Place, And Person] : oriented to person, place, and time [Exaggerated Use Of Accessory Muscles For Inspiration] : no accessory muscle use [Affect] : the affect was normal [Mood] : the mood was normal [Not Anxious] : not anxious [Urethral Meatus] : meatus normal [Urinary Bladder Findings] : the bladder was normal on palpation [Scrotum] : the scrotum was normal [Testes Mass (___cm)] : there were no testicular masses [No Prostate Nodules] : no prostate nodules [No Focal Deficits] : no focal deficits

## 2020-03-07 NOTE — HISTORY OF PRESENT ILLNESS
[None] : no symptoms [2] : 2 [Right Flank] : right flank [FreeTextEntry1] : 12/2019 RT radical nephrectomy  - clear cell RCC\par patient was scheduled for a CT in 6 months\par comes today due to vague 2/10 back pain\par \par has no other complains, no urinary symptoms

## 2020-03-12 ENCOUNTER — APPOINTMENT (OUTPATIENT)
Dept: ELECTROPHYSIOLOGY | Facility: CLINIC | Age: 75
End: 2020-03-12
Payer: MEDICARE

## 2020-03-12 VITALS
BODY MASS INDEX: 24.05 KG/M2 | TEMPERATURE: 97.8 F | HEIGHT: 70 IN | HEART RATE: 54 BPM | WEIGHT: 168 LBS | OXYGEN SATURATION: 100 % | RESPIRATION RATE: 18 BRPM | SYSTOLIC BLOOD PRESSURE: 114 MMHG | DIASTOLIC BLOOD PRESSURE: 52 MMHG

## 2020-03-12 PROCEDURE — 93285 PRGRMG DEV EVAL SCRMS IP: CPT

## 2020-03-12 PROCEDURE — 99213 OFFICE O/P EST LOW 20 MIN: CPT

## 2020-03-12 NOTE — ASSESSMENT
[FreeTextEntry1] : No auto activated or self-activated events.\par Rhythm is normal sinus.\par No PAF seen.

## 2020-03-12 NOTE — HISTORY OF PRESENT ILLNESS
[None] : The patient complains of no symptoms [FreeTextEntry1] : Charlene Tam is a 75-year-old man with  paroxysmal atrial fibrillation, hypertension, and recently diagnosed renal cell carcinoma status post right laparoscopic radical nephrectomy (12/2019) Postoperatively he experienced marked bradycardia and diltiazem was discontinued. He has now recovered from surgery and feeling well. He has returned to work and his everyday activities. CHARLENE TAM  denies chest pain, chest pressure, shortness of breath, lightheadedness, dizziness, palpitations, syncope, presyncope, orthopnea, PND, or edema. He has noted on his home BP monitor irregular heart rates.

## 2020-03-12 NOTE — PHYSICAL EXAM
[General Appearance - Well Developed] : well developed [Normal Appearance] : normal appearance [Well Groomed] : well groomed [General Appearance - Well Nourished] : well nourished [No Deformities] : no deformities [General Appearance - In No Acute Distress] : no acute distress [Heart Rate And Rhythm] : heart rate and rhythm were normal [Heart Sounds] : normal S1 and S2 [Murmurs] : no murmurs present [Respiration, Rhythm And Depth] : normal respiratory rhythm and effort [Exaggerated Use Of Accessory Muscles For Inspiration] : no accessory muscle use [Auscultation Breath Sounds / Voice Sounds] : lungs were clear to auscultation bilaterally [Clean] : clean [Dry] : dry [Well-Healed] : well-healed [Nail Clubbing] : no clubbing of the fingernails [Cyanosis, Localized] : no localized cyanosis [Petechial Hemorrhages (___cm)] : no petechial hemorrhages [] : no ischemic changes [FreeTextEntry2] : left chest wall healing well with no edema, no drainage or hematoma

## 2020-03-12 NOTE — DISCUSSION/SUMMARY
[Patient] : the patient [FreeTextEntry1] : Continue monthly remote monitoring.\par Follow up in 6 months.\par Continue Xarelto.

## 2020-03-23 ENCOUNTER — OUTPATIENT (OUTPATIENT)
Dept: OUTPATIENT SERVICES | Facility: HOSPITAL | Age: 75
LOS: 1 days | End: 2020-03-23
Payer: MEDICARE

## 2020-03-23 ENCOUNTER — RESULT REVIEW (OUTPATIENT)
Age: 75
End: 2020-03-23

## 2020-03-23 ENCOUNTER — APPOINTMENT (OUTPATIENT)
Dept: CT IMAGING | Facility: CLINIC | Age: 75
End: 2020-03-23
Payer: MEDICARE

## 2020-03-23 DIAGNOSIS — Z90.49 ACQUIRED ABSENCE OF OTHER SPECIFIED PARTS OF DIGESTIVE TRACT: Chronic | ICD-10-CM

## 2020-03-23 DIAGNOSIS — Z98.890 OTHER SPECIFIED POSTPROCEDURAL STATES: Chronic | ICD-10-CM

## 2020-03-23 DIAGNOSIS — C64.9 MALIGNANT NEOPLASM OF UNSPECIFIED KIDNEY, EXCEPT RENAL PELVIS: ICD-10-CM

## 2020-03-23 DIAGNOSIS — Z90.89 ACQUIRED ABSENCE OF OTHER ORGANS: Chronic | ICD-10-CM

## 2020-03-23 PROCEDURE — 74160 CT ABDOMEN W/CONTRAST: CPT

## 2020-03-23 PROCEDURE — 82565 ASSAY OF CREATININE: CPT

## 2020-03-23 PROCEDURE — 74160 CT ABDOMEN W/CONTRAST: CPT | Mod: 26

## 2020-04-01 ENCOUNTER — APPOINTMENT (OUTPATIENT)
Dept: UROLOGY | Facility: CLINIC | Age: 75
End: 2020-04-01

## 2020-04-06 ENCOUNTER — OUTPATIENT (OUTPATIENT)
Dept: OUTPATIENT SERVICES | Facility: HOSPITAL | Age: 75
LOS: 1 days | Discharge: HOME | End: 2020-04-06

## 2020-04-06 DIAGNOSIS — Z90.49 ACQUIRED ABSENCE OF OTHER SPECIFIED PARTS OF DIGESTIVE TRACT: Chronic | ICD-10-CM

## 2020-04-06 DIAGNOSIS — R71.8 OTHER ABNORMALITY OF RED BLOOD CELLS: ICD-10-CM

## 2020-04-06 DIAGNOSIS — Z98.890 OTHER SPECIFIED POSTPROCEDURAL STATES: Chronic | ICD-10-CM

## 2020-04-06 DIAGNOSIS — Z90.89 ACQUIRED ABSENCE OF OTHER ORGANS: Chronic | ICD-10-CM

## 2020-04-07 ENCOUNTER — APPOINTMENT (OUTPATIENT)
Dept: UROLOGY | Facility: CLINIC | Age: 75
End: 2020-04-07
Payer: MEDICARE

## 2020-04-07 PROCEDURE — 99213 OFFICE O/P EST LOW 20 MIN: CPT | Mod: 95

## 2020-04-07 NOTE — HISTORY OF PRESENT ILLNESS
[Home] : at home, [unfilled] , at the time of the visit. [Other Location: e.g. Home (Enter Location, City,State)___] : at [unfilled] [Patient] : the patient [FreeTextEntry1] : The patient-doctor relationship has been established in a face to face fashion via real time video/audio HIPAA compliant communication using telemedicine software.  The patient's identity has been confirmed.  The patient was previously emailed a copy of the telemedicine consent.  They have had a chance to review and has now given verbal consent and has requested care to be assessed and treated through telemedicine and understands there maybe limitations in this process and they may need further follow up care in the office and or hospital settings.   \par \par 75 year old s/p radical nephrectomy right, Dec 5 2019\par Was complaining of flank pain and was concerned    Due to the fact that the RCC was 10 cm we repeated a CT scan.   This was performed on 3/23/2020   It was personally reviewed by me and reveals no evidence of recurrence   there is a stable 3mm LLL nodule that is unchaged on the lower chest\par He feels well\par Pain is less\par \par

## 2020-04-07 NOTE — ASSESSMENT
[FreeTextEntry1] : Discussed the results if the CT scan with the patient in a 15 min appointment.   He will also need a chest Xray to be completed this month\par he will need another CT of abdomen in 6 mos

## 2020-04-10 ENCOUNTER — APPOINTMENT (OUTPATIENT)
Dept: INTERNAL MEDICINE | Facility: CLINIC | Age: 75
End: 2020-04-10

## 2020-04-10 DIAGNOSIS — M75.21 BICIPITAL TENDINITIS, RIGHT SHOULDER: ICD-10-CM

## 2020-04-13 ENCOUNTER — APPOINTMENT (OUTPATIENT)
Age: 75
End: 2020-04-13

## 2020-04-14 ENCOUNTER — APPOINTMENT (OUTPATIENT)
Dept: ELECTROPHYSIOLOGY | Facility: CLINIC | Age: 75
End: 2020-04-14
Payer: MEDICARE

## 2020-04-14 PROCEDURE — 93298 REM INTERROG DEV EVAL SCRMS: CPT

## 2020-04-14 PROCEDURE — G2066: CPT

## 2020-05-19 ENCOUNTER — APPOINTMENT (OUTPATIENT)
Dept: ELECTROPHYSIOLOGY | Facility: CLINIC | Age: 75
End: 2020-05-19
Payer: MEDICARE

## 2020-05-19 PROCEDURE — G2066: CPT

## 2020-05-19 PROCEDURE — 93298 REM INTERROG DEV EVAL SCRMS: CPT

## 2020-05-22 ENCOUNTER — APPOINTMENT (OUTPATIENT)
Dept: INTERNAL MEDICINE | Facility: CLINIC | Age: 75
End: 2020-05-22
Payer: MEDICARE

## 2020-05-22 VITALS
RESPIRATION RATE: 16 BRPM | WEIGHT: 177 LBS | OXYGEN SATURATION: 97 % | HEART RATE: 72 BPM | DIASTOLIC BLOOD PRESSURE: 68 MMHG | BODY MASS INDEX: 25.34 KG/M2 | HEIGHT: 70 IN | TEMPERATURE: 98.6 F | SYSTOLIC BLOOD PRESSURE: 112 MMHG

## 2020-05-22 DIAGNOSIS — Z01.818 ENCOUNTER FOR OTHER PREPROCEDURAL EXAMINATION: ICD-10-CM

## 2020-05-22 DIAGNOSIS — Z87.39 PERSONAL HISTORY OF OTHER DISEASES OF THE MUSCULOSKELETAL SYSTEM AND CONNECTIVE TISSUE: ICD-10-CM

## 2020-05-22 DIAGNOSIS — Z23 ENCOUNTER FOR IMMUNIZATION: ICD-10-CM

## 2020-05-22 DIAGNOSIS — R79.1 ABNORMAL COAGULATION PROFILE: ICD-10-CM

## 2020-05-22 DIAGNOSIS — Z87.448 PERSONAL HISTORY OF OTHER DISEASES OF URINARY SYSTEM: ICD-10-CM

## 2020-05-22 DIAGNOSIS — R82.90 UNSPECIFIED ABNORMAL FINDINGS IN URINE: ICD-10-CM

## 2020-05-22 DIAGNOSIS — Z86.79 PERSONAL HISTORY OF OTHER DISEASES OF THE CIRCULATORY SYSTEM: ICD-10-CM

## 2020-05-22 PROCEDURE — 99214 OFFICE O/P EST MOD 30 MIN: CPT

## 2020-05-22 NOTE — PLAN
[FreeTextEntry1] : Mr. Johnson presents for a followup evaluation. He does have a right-sided abdominal hernia in the area where he had a right robotic nephrectomy. He will followup with urology. Patient will most likely require a CT scan abdomen. A CT scan of the abdomen performed in March does not show any evidence of a hernia. Mr. Johnson will followup in this office in 6 months time. He has been given a prescription for comprehensive blood profile.

## 2020-05-22 NOTE — PHYSICAL EXAM
[No Acute Distress] : no acute distress [Well Nourished] : well nourished [Well Developed] : well developed [PERRL] : pupils equal round and reactive to light [Normal Sclera/Conjunctiva] : normal sclera/conjunctiva [Well-Appearing] : well-appearing [Normal Outer Ear/Nose] : the outer ears and nose were normal in appearance [EOMI] : extraocular movements intact [No JVD] : no jugular venous distention [Normal Oropharynx] : the oropharynx was normal [No Lymphadenopathy] : no lymphadenopathy [Thyroid Normal, No Nodules] : the thyroid was normal and there were no nodules present [No Respiratory Distress] : no respiratory distress  [Supple] : supple [Normal Rate] : normal rate  [No Accessory Muscle Use] : no accessory muscle use [Clear to Auscultation] : lungs were clear to auscultation bilaterally [Normal S1, S2] : normal S1 and S2 [Regular Rhythm] : with a regular rhythm [No Carotid Bruits] : no carotid bruits [No Abdominal Bruit] : a ~M bruit was not heard ~T in the abdomen [No Murmur] : no murmur heard [No Varicosities] : no varicosities [Pedal Pulses Present] : the pedal pulses are present [No Edema] : there was no peripheral edema [No Palpable Aorta] : no palpable aorta [Non Tender] : non-tender [Soft] : abdomen soft [No Extremity Clubbing/Cyanosis] : no extremity clubbing/cyanosis [Non-distended] : non-distended [No Masses] : no abdominal mass palpated [No HSM] : no HSM [Normal Posterior Cervical Nodes] : no posterior cervical lymphadenopathy [Normal Bowel Sounds] : normal bowel sounds [Normal Anterior Cervical Nodes] : no anterior cervical lymphadenopathy [No Spinal Tenderness] : no spinal tenderness [No CVA Tenderness] : no CVA  tenderness [Grossly Normal Strength/Tone] : grossly normal strength/tone [No Rash] : no rash [No Joint Swelling] : no joint swelling [Coordination Grossly Intact] : coordination grossly intact [No Focal Deficits] : no focal deficits [Normal Gait] : normal gait [Deep Tendon Reflexes (DTR)] : deep tendon reflexes were 2+ and symmetric [Normal Insight/Judgement] : insight and judgment were intact [Normal Affect] : the affect was normal

## 2020-05-22 NOTE — HISTORY OF PRESENT ILLNESS
[FreeTextEntry1] : Followup [de-identified] : Mr. Johnson presents for a followup evaluation. 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 Mr. Johnson has had postoperative followup. In the past several weeks he has noticed a "bulge" in the right side of his abdomen. It is nontender. He did have a CT scan of the abdomen in March which did not show any evidence of a hernia in that area although there was a hernia above the umbilicus. Patient denies any chest pain, shortness of breath or palpitations.

## 2020-05-27 ENCOUNTER — APPOINTMENT (OUTPATIENT)
Dept: UROLOGY | Facility: CLINIC | Age: 75
End: 2020-05-27
Payer: MEDICARE

## 2020-05-27 VITALS
DIASTOLIC BLOOD PRESSURE: 69 MMHG | HEIGHT: 70 IN | WEIGHT: 173 LBS | SYSTOLIC BLOOD PRESSURE: 131 MMHG | OXYGEN SATURATION: 99 % | HEART RATE: 54 BPM | TEMPERATURE: 97.8 F | BODY MASS INDEX: 24.77 KG/M2

## 2020-05-27 PROCEDURE — 99213 OFFICE O/P EST LOW 20 MIN: CPT

## 2020-05-28 ENCOUNTER — RECORD ABSTRACTING (OUTPATIENT)
Age: 75
End: 2020-05-28

## 2020-05-28 LAB
ALBUMIN SERPL ELPH-MCNC: 4.5 G/DL
ALP BLD-CCNC: 86 U/L
ALT SERPL-CCNC: 12 U/L
ANION GAP SERPL CALC-SCNC: 12 MMOL/L
APPEARANCE: CLEAR
AST SERPL-CCNC: 16 U/L
BACTERIA: NEGATIVE
BASOPHILS # BLD AUTO: 0.04 K/UL
BASOPHILS NFR BLD AUTO: 0.8 %
BILIRUB SERPL-MCNC: 1.1 MG/DL
BILIRUBIN URINE: NEGATIVE
BLOOD URINE: NEGATIVE
BUN SERPL-MCNC: 22 MG/DL
CALCIUM SERPL-MCNC: 9.1 MG/DL
CHLORIDE SERPL-SCNC: 101 MMOL/L
CHOLEST SERPL-MCNC: 175 MG/DL
CHOLEST/HDLC SERPL: 4.3 RATIO
CO2 SERPL-SCNC: 26 MMOL/L
COLOR: YELLOW
CREAT SERPL-MCNC: 1.65 MG/DL
EOSINOPHIL # BLD AUTO: 0.24 K/UL
EOSINOPHIL NFR BLD AUTO: 4.5 %
ESTIMATED AVERAGE GLUCOSE: 103 MG/DL
GLUCOSE QUALITATIVE U: NEGATIVE
GLUCOSE SERPL-MCNC: 99 MG/DL
HBA1C MFR BLD HPLC: 5.2 %
HCT VFR BLD CALC: 48.3 %
HDLC SERPL-MCNC: 41 MG/DL
HGB BLD-MCNC: 15.8 G/DL
HYALINE CASTS: 2 /LPF
IMM GRANULOCYTES NFR BLD AUTO: 0.4 %
IRON SERPL-MCNC: 65 UG/DL
KETONES URINE: NEGATIVE
LDLC SERPL CALC-MCNC: 88 MG/DL
LEUKOCYTE ESTERASE URINE: NEGATIVE
LYMPHOCYTES # BLD AUTO: 1.26 K/UL
LYMPHOCYTES NFR BLD AUTO: 23.7 %
MAN DIFF?: NORMAL
MCHC RBC-ENTMCNC: 31.1 PG
MCHC RBC-ENTMCNC: 32.7 GM/DL
MCV RBC AUTO: 95.1 FL
MICROSCOPIC-UA: NORMAL
MONOCYTES # BLD AUTO: 0.55 K/UL
MONOCYTES NFR BLD AUTO: 10.4 %
NEUTROPHILS # BLD AUTO: 3.2 K/UL
NEUTROPHILS NFR BLD AUTO: 60.2 %
NITRITE URINE: NEGATIVE
PH URINE: 6
PLATELET # BLD AUTO: 174 K/UL
POTASSIUM SERPL-SCNC: 4.3 MMOL/L
PROT SERPL-MCNC: 6.9 G/DL
PROTEIN URINE: ABNORMAL
PSA SERPL-MCNC: 3.22 NG/ML
RBC # BLD: 5.08 M/UL
RBC # FLD: 11.9 %
RED BLOOD CELLS URINE: 2 /HPF
SODIUM SERPL-SCNC: 139 MMOL/L
SPECIFIC GRAVITY URINE: 1.02
SQUAMOUS EPITHELIAL CELLS: 1 /HPF
TRIGL SERPL-MCNC: 228 MG/DL
TSH SERPL-ACNC: 3.35 UIU/ML
UROBILINOGEN URINE: NORMAL
WBC # FLD AUTO: 5.31 K/UL
WHITE BLOOD CELLS URINE: 1 /HPF

## 2020-05-30 NOTE — PHYSICAL EXAM
[General Appearance - Well Developed] : well developed [General Appearance - Well Nourished] : well nourished [Well Groomed] : well groomed [Normal Appearance] : normal appearance [General Appearance - In No Acute Distress] : no acute distress [Abdomen Soft] : soft [Skin Color & Pigmentation] : normal skin color and pigmentation [Abdomen Tenderness] : non-tender [Edema] : no peripheral edema [] : no respiratory distress [Exaggerated Use Of Accessory Muscles For Inspiration] : no accessory muscle use [Normal Station and Gait] : the gait and station were normal for the patient's age [No Palpable Adenopathy] : no palpable adenopathy [FreeTextEntry1] : large RT side hernia, no signs of incarceration

## 2020-05-30 NOTE — ASSESSMENT
[FreeTextEntry1] : 1. patient will come for next visit in 9/2020 with a new CT scan\par 2. patient referred to a consultation with general surgeon to discuss treatment options or follow up on the hernia.\par 3. refer to Dr washburn\par

## 2020-05-30 NOTE — HISTORY OF PRESENT ILLNESS
[None] : no symptoms [FreeTextEntry1] : 12/2019 Rt radical nephrectomy - 10cm RCC\par last CT 3/23/2020 with no evidence of disease, there is an unchanged 3mm LLL finding\par \par patient is here for follow up\par feeling good, no pain and urinary complains\par describes a new bulge on the right abdomen, with no GI complains or pain

## 2020-06-11 LAB
ANION GAP SERPL CALC-SCNC: 15 MMOL/L
BUN SERPL-MCNC: 29 MG/DL
CALCIUM SERPL-MCNC: 9.9 MG/DL
CHLORIDE SERPL-SCNC: 104 MMOL/L
CO2 SERPL-SCNC: 25 MMOL/L
CREAT SERPL-MCNC: 1.79 MG/DL
GLUCOSE SERPL-MCNC: 98 MG/DL
POTASSIUM SERPL-SCNC: 4.1 MMOL/L
SODIUM SERPL-SCNC: 144 MMOL/L

## 2020-06-19 ENCOUNTER — APPOINTMENT (OUTPATIENT)
Dept: ELECTROPHYSIOLOGY | Facility: CLINIC | Age: 75
End: 2020-06-19
Payer: MEDICARE

## 2020-06-19 PROCEDURE — 93298 REM INTERROG DEV EVAL SCRMS: CPT

## 2020-06-19 PROCEDURE — G2066: CPT

## 2020-07-07 RX ORDER — FOLIC ACID 0.8 MG
500 TABLET ORAL
Refills: 0 | Status: ACTIVE | COMMUNITY
Start: 2020-07-07

## 2020-07-07 RX ORDER — HYDROCHLOROTHIAZIDE 25 MG/1
25 TABLET ORAL
Qty: 90 | Refills: 1 | Status: DISCONTINUED | COMMUNITY
Start: 2018-09-20 | End: 2020-07-07

## 2020-07-10 ENCOUNTER — APPOINTMENT (OUTPATIENT)
Dept: ELECTROPHYSIOLOGY | Facility: CLINIC | Age: 75
End: 2020-07-10
Payer: MEDICARE

## 2020-07-10 ENCOUNTER — NON-APPOINTMENT (OUTPATIENT)
Age: 75
End: 2020-07-10

## 2020-07-10 VITALS — OXYGEN SATURATION: 99 % | SYSTOLIC BLOOD PRESSURE: 156 MMHG | DIASTOLIC BLOOD PRESSURE: 66 MMHG | HEART RATE: 51 BPM

## 2020-07-10 PROCEDURE — 93000 ELECTROCARDIOGRAM COMPLETE: CPT

## 2020-07-10 PROCEDURE — 99214 OFFICE O/P EST MOD 30 MIN: CPT

## 2020-07-10 NOTE — DISCUSSION/SUMMARY
[Paroxysmal Atrial Fibrillation] : paroxysmal atrial fibrillation [Stable] : stable [None] : none [Patient] : the patient

## 2020-07-15 ENCOUNTER — APPOINTMENT (OUTPATIENT)
Dept: CARDIOLOGY | Facility: CLINIC | Age: 75
End: 2020-07-15
Payer: MEDICARE

## 2020-07-15 VITALS
DIASTOLIC BLOOD PRESSURE: 71 MMHG | HEIGHT: 70 IN | SYSTOLIC BLOOD PRESSURE: 144 MMHG | HEART RATE: 47 BPM | WEIGHT: 173 LBS | BODY MASS INDEX: 24.77 KG/M2 | OXYGEN SATURATION: 99 %

## 2020-07-15 VITALS — SYSTOLIC BLOOD PRESSURE: 140 MMHG | DIASTOLIC BLOOD PRESSURE: 70 MMHG

## 2020-07-15 PROCEDURE — 99214 OFFICE O/P EST MOD 30 MIN: CPT

## 2020-07-15 NOTE — REVIEW OF SYSTEMS
[Eyeglasses] : currently wearing eyeglasses [see HPI] : see HPI [Impotence] : impotence [Negative] : Endocrine [Chills] : no chills [Fever] : no fever [Shortness Of Breath] : no shortness of breath [Loss Of Hearing] : no hearing loss [Dyspnea on exertion] : not dyspnea during exertion [Chest  Pressure] : no chest pressure [Lower Ext Edema] : no extremity edema [Chest Pain] : no chest pain [Abdominal Pain] : no abdominal pain [FreeTextEntry5] : Erectile dysfunction [Palpitations] : no palpitations

## 2020-07-15 NOTE — DISCUSSION/SUMMARY
[With Me] : with me [___ Month(s)] : [unfilled] month(s) [FreeTextEntry1] : \par Atrial fibrillation: Infrequent paroxysms of atrial fibrillation -- currently controlled with metoprolol and Multaq; continue anticoagulation.\par \par Sinus bradycardia: Stable and still asymptomatic (HR in 50s).\par \par Hypertension: Modest increase in blood pressure following discontinuation of HCTZ - he plans to followup with Dr. Hernandes to discuss results of repeat basic metabolic panel and next Pepcid and management of his hypertension / renal insufficiency.\par

## 2020-07-15 NOTE — HISTORY OF PRESENT ILLNESS
[FreeTextEntry1] : Charlene Tam is a 75-year-old man with  paroxysmal atrial fibrillation, hypertension, and recently diagnosed renal cell carcinoma status post right laparoscopic radical nephrectomy (12/2019) Postoperatively he experienced marked bradycardia and diltiazem was discontinued. He has now recovered from surgery and feeling well. He has returned to work and his everyday activities.\par He saw a nephrologist for elevated creatine, and diuretic discontinued. \par \par  CHARLENE TAM  denies chest pain, chest pressure, shortness of breath, lightheadedness, dizziness, palpitations, syncope, presyncope, orthopnea, PND, or edema. He has noted on his home BP monitor irregular heart rates.

## 2020-07-15 NOTE — ASSESSMENT
[FreeTextEntry1] : Atrial Fibrillation: No arrhythmia detected in Linq interrogation. \par On multaq need to monitor ECG and LFTs. \par ECG sinus bradycardia with acceptable QTc LFTs from MAY WNL.

## 2020-07-15 NOTE — PHYSICAL EXAM
[Normal Appearance] : normal appearance [Well Groomed] : well groomed [General Appearance - In No Acute Distress] : no acute distress [Normal Conjunctiva] : the conjunctiva exhibited no abnormalities [Eyelids - No Xanthelasma] : the eyelids demonstrated no xanthelasmas [No Oral Cyanosis] : no oral cyanosis [Respiration, Rhythm And Depth] : normal respiratory rhythm and effort [Heart Rate And Rhythm] : heart rate and rhythm were normal [Exaggerated Use Of Accessory Muscles For Inspiration] : no accessory muscle use [Auscultation Breath Sounds / Voice Sounds] : lungs were clear to auscultation bilaterally [Heart Sounds] : normal S1 and S2 [Murmurs] : no murmurs present [Edema] : no peripheral edema present [Abdomen Soft] : soft [Abnormal Walk] : normal gait [] : no rash [Skin Color & Pigmentation] : normal skin color and pigmentation [Nail Clubbing] : no clubbing of the fingernails [Cyanosis, Localized] : no localized cyanosis [No Venous Stasis] : no venous stasis [Skin Lesions] : no skin lesions [Affect] : the affect was normal [Oriented To Time, Place, And Person] : oriented to person, place, and time [Impaired Insight] : insight and judgment were intact [Mood] : the mood was normal [FreeTextEntry1] : No JVD

## 2020-07-15 NOTE — PHYSICAL EXAM
[Well Groomed] : well groomed [General Appearance - In No Acute Distress] : no acute distress [Auscultation Breath Sounds / Voice Sounds] : lungs were clear to auscultation bilaterally [Respiration, Rhythm And Depth] : normal respiratory rhythm and effort [Edema] : no peripheral edema present [Heart Sounds] : normal S1 and S2 [Abnormal Walk] : normal gait [Oriented To Time, Place, And Person] : oriented to person, place, and time [Mood] : the mood was normal [Affect] : the affect was normal [Impaired Insight] : insight and judgment were intact [Normal Appearance] : normal appearance [Eyelids - No Xanthelasma] : the eyelids demonstrated no xanthelasmas [FreeTextEntry1] : Bradycardic

## 2020-07-15 NOTE — HISTORY OF PRESENT ILLNESS
[FreeTextEntry1] : Denny Johnson is a 75-year-old man with a history of paroxysmal atrial fibrillation (with implantable loop monitor), hypertension, and recently diagnosed renal cell carcinoma status post right laparoscopic radical nephrectomy (12/2019) who returns for cardiac examination. \par \par Since our last encounter he established care with a nephrologist (Dr Marcio Hernandes) due to an elevated serum creatinine (1.79) and diuretic therapy (HCTZ 25 mg daily) was discontinued. He also met with Dr. Dylan Manning (electrophysiology), Christina Bautista & Stephanie (urology), and Dr. Vasquez (surgeon - for evaluation of an incisional hernia).\par \par He has been feeling well.  He is anticipating hernia repair surgery in the early fall; awaiting CT imaging of the abdomen.  He offers no cardiac complaints; tolerating prescribed pharmacotherapy.

## 2020-07-15 NOTE — REVIEW OF SYSTEMS
[Eyeglasses] : currently wearing eyeglasses [Impotence] : impotence [Lower Back Pain] : lower back pain [Negative] : Heme/Lymph [Loss Of Hearing] : no hearing loss [Shortness Of Breath] : no shortness of breath [Dyspnea on exertion] : not dyspnea during exertion [Chest  Pressure] : no chest pressure [Chest Pain] : no chest pain [Lower Ext Edema] : no extremity edema [Palpitations] : no palpitations [see HPI] : see HPI [FreeTextEntry5] : Erectile dysfunction

## 2020-07-15 NOTE — REASON FOR VISIT
[Atrial Fibrillation] : atrial fibrillation [Abnormal ECG] : an abnormal ECG [Medication Management] : Medication management [Hypertension] : hypertension

## 2020-07-24 ENCOUNTER — APPOINTMENT (OUTPATIENT)
Dept: ELECTROPHYSIOLOGY | Facility: CLINIC | Age: 75
End: 2020-07-24
Payer: MEDICARE

## 2020-07-24 PROCEDURE — 93298 REM INTERROG DEV EVAL SCRMS: CPT

## 2020-07-24 PROCEDURE — G2066: CPT

## 2020-07-28 ENCOUNTER — RX RENEWAL (OUTPATIENT)
Age: 75
End: 2020-07-28

## 2020-07-29 ENCOUNTER — APPOINTMENT (OUTPATIENT)
Dept: CT IMAGING | Facility: CLINIC | Age: 75
End: 2020-07-29
Payer: MEDICARE

## 2020-07-29 ENCOUNTER — OUTPATIENT (OUTPATIENT)
Dept: OUTPATIENT SERVICES | Facility: HOSPITAL | Age: 75
LOS: 1 days | End: 2020-07-29
Payer: MEDICARE

## 2020-07-29 DIAGNOSIS — K43.2 INCISIONAL HERNIA WITHOUT OBSTRUCTION OR GANGRENE: ICD-10-CM

## 2020-07-29 DIAGNOSIS — K43.9 VENTRAL HERNIA WITHOUT OBSTRUCTION OR GANGRENE: ICD-10-CM

## 2020-07-29 DIAGNOSIS — Z98.890 OTHER SPECIFIED POSTPROCEDURAL STATES: Chronic | ICD-10-CM

## 2020-07-29 DIAGNOSIS — Z90.49 ACQUIRED ABSENCE OF OTHER SPECIFIED PARTS OF DIGESTIVE TRACT: Chronic | ICD-10-CM

## 2020-07-29 DIAGNOSIS — Z90.89 ACQUIRED ABSENCE OF OTHER ORGANS: Chronic | ICD-10-CM

## 2020-07-29 PROCEDURE — 74176 CT ABD & PELVIS W/O CONTRAST: CPT

## 2020-07-29 PROCEDURE — 74176 CT ABD & PELVIS W/O CONTRAST: CPT | Mod: 26

## 2020-08-27 ENCOUNTER — APPOINTMENT (OUTPATIENT)
Dept: ELECTROPHYSIOLOGY | Facility: CLINIC | Age: 75
End: 2020-08-27
Payer: MEDICARE

## 2020-08-28 PROCEDURE — G2066: CPT

## 2020-08-28 PROCEDURE — 93298 REM INTERROG DEV EVAL SCRMS: CPT

## 2020-09-18 ENCOUNTER — APPOINTMENT (OUTPATIENT)
Dept: ELECTROPHYSIOLOGY | Facility: CLINIC | Age: 75
End: 2020-09-18
Payer: MEDICARE

## 2020-09-18 VITALS
BODY MASS INDEX: 24.34 KG/M2 | HEART RATE: 60 BPM | WEIGHT: 170 LBS | HEIGHT: 70 IN | OXYGEN SATURATION: 100 % | DIASTOLIC BLOOD PRESSURE: 73 MMHG | SYSTOLIC BLOOD PRESSURE: 155 MMHG

## 2020-09-18 PROCEDURE — 99212 OFFICE O/P EST SF 10 MIN: CPT

## 2020-09-18 PROCEDURE — 93285 PRGRMG DEV EVAL SCRMS IP: CPT

## 2020-09-18 NOTE — PHYSICAL EXAM
[General Appearance - Well Developed] : well developed [General Appearance - Well Nourished] : well nourished [Respiration, Rhythm And Depth] : normal respiratory rhythm and effort [Auscultation Breath Sounds / Voice Sounds] : lungs were clear to auscultation bilaterally [Heart Rate And Rhythm] : heart rate and rhythm were normal [Heart Sounds] : normal S1 and S2 [Murmurs] : no murmurs present [Abdomen Soft] : soft [Abdomen Tenderness] : non-tender [Abnormal Walk] : normal gait [Skin Color & Pigmentation] : normal skin color and pigmentation [] : no rash [Oriented To Time, Place, And Person] : oriented to person, place, and time [Impaired Insight] : insight and judgment were intact

## 2020-09-18 NOTE — ASSESSMENT
[FreeTextEntry1] : 75 year old male with history of PAF on Xarelto and Metoprolol with ILR who presents for routine ILR follow up.\par ILR interrogation reveals no symptoms episodes, no tachy, angela or pause episodes, AF burden 0%.

## 2020-09-18 NOTE — HISTORY OF PRESENT ILLNESS
[FreeTextEntry1] : 75 year old male with history of PAF on Metoprolol and Xarelto with ILR in place who presents for routine ILR follow up.  Patient denies any CP, palpitations, SOB, dizziness, lightheadedness, near syncope or syncope.

## 2020-09-18 NOTE — DISCUSSION/SUMMARY
[FreeTextEntry1] : Continue current meds\par Continue monthly remote follow up\par In office follow up in 6 months or sooner for symptoms or concerns.

## 2020-10-01 ENCOUNTER — APPOINTMENT (OUTPATIENT)
Dept: ELECTROPHYSIOLOGY | Facility: CLINIC | Age: 75
End: 2020-10-01
Payer: MEDICARE

## 2020-10-02 ENCOUNTER — RESULT REVIEW (OUTPATIENT)
Age: 75
End: 2020-10-02

## 2020-10-02 ENCOUNTER — APPOINTMENT (OUTPATIENT)
Dept: CT IMAGING | Facility: CLINIC | Age: 75
End: 2020-10-02
Payer: MEDICARE

## 2020-10-02 ENCOUNTER — APPOINTMENT (OUTPATIENT)
Dept: RADIOLOGY | Facility: CLINIC | Age: 75
End: 2020-10-02
Payer: MEDICARE

## 2020-10-02 ENCOUNTER — OUTPATIENT (OUTPATIENT)
Dept: OUTPATIENT SERVICES | Facility: HOSPITAL | Age: 75
LOS: 1 days | End: 2020-10-02
Payer: MEDICARE

## 2020-10-02 DIAGNOSIS — Z90.49 ACQUIRED ABSENCE OF OTHER SPECIFIED PARTS OF DIGESTIVE TRACT: Chronic | ICD-10-CM

## 2020-10-02 DIAGNOSIS — Z98.890 OTHER SPECIFIED POSTPROCEDURAL STATES: Chronic | ICD-10-CM

## 2020-10-02 DIAGNOSIS — Z90.89 ACQUIRED ABSENCE OF OTHER ORGANS: Chronic | ICD-10-CM

## 2020-10-02 DIAGNOSIS — C64.9 MALIGNANT NEOPLASM OF UNSPECIFIED KIDNEY, EXCEPT RENAL PELVIS: ICD-10-CM

## 2020-10-02 PROCEDURE — 74176 CT ABD & PELVIS W/O CONTRAST: CPT | Mod: 26

## 2020-10-02 PROCEDURE — 93298 REM INTERROG DEV EVAL SCRMS: CPT

## 2020-10-02 PROCEDURE — 71046 X-RAY EXAM CHEST 2 VIEWS: CPT | Mod: 26

## 2020-10-02 PROCEDURE — 71046 X-RAY EXAM CHEST 2 VIEWS: CPT

## 2020-10-02 PROCEDURE — G2066: CPT

## 2020-10-02 PROCEDURE — 74176 CT ABD & PELVIS W/O CONTRAST: CPT

## 2020-10-09 ENCOUNTER — TRANSCRIPTION ENCOUNTER (OUTPATIENT)
Age: 75
End: 2020-10-09

## 2020-10-14 ENCOUNTER — APPOINTMENT (OUTPATIENT)
Dept: UROLOGY | Facility: CLINIC | Age: 75
End: 2020-10-14
Payer: MEDICARE

## 2020-10-14 VITALS
HEART RATE: 55 BPM | TEMPERATURE: 98.2 F | DIASTOLIC BLOOD PRESSURE: 70 MMHG | WEIGHT: 181.8 LBS | SYSTOLIC BLOOD PRESSURE: 159 MMHG | BODY MASS INDEX: 26.03 KG/M2 | RESPIRATION RATE: 16 BRPM | HEIGHT: 70 IN

## 2020-10-14 PROCEDURE — 99213 OFFICE O/P EST LOW 20 MIN: CPT

## 2020-10-14 NOTE — HISTORY OF PRESENT ILLNESS
[FreeTextEntry1] : here for RCC followup\par Was a 10 cm RCC\par Has incisional hernia on right \par seeing Dr Vasquez for repair\par Wears a binder

## 2020-10-14 NOTE — PHYSICAL EXAM
[General Appearance - Well Developed] : well developed [General Appearance - Well Nourished] : well nourished [Normal Appearance] : normal appearance [Well Groomed] : well groomed [General Appearance - In No Acute Distress] : no acute distress [Abdomen Soft] : soft [Abdomen Tenderness] : non-tender [Costovertebral Angle Tenderness] : no ~M costovertebral angle tenderness [Urethral Meatus] : meatus normal [Urinary Bladder Findings] : the bladder was normal on palpation [Scrotum] : the scrotum was normal [Testes Mass (___cm)] : there were no testicular masses [No Prostate Nodules] : no prostate nodules [Edema] : no peripheral edema [] : no respiratory distress [Respiration, Rhythm And Depth] : normal respiratory rhythm and effort [Exaggerated Use Of Accessory Muscles For Inspiration] : no accessory muscle use [Oriented To Time, Place, And Person] : oriented to person, place, and time [Affect] : the affect was normal [Mood] : the mood was normal [Not Anxious] : not anxious [Normal Station and Gait] : the gait and station were normal for the patient's age [No Focal Deficits] : no focal deficits [No Palpable Adenopathy] : no palpable adenopathy [FreeTextEntry1] : right incisional hernia

## 2020-10-14 NOTE — ASSESSMENT
[FreeTextEntry1] : CT was negative for recurrence as well as Chest Xray\par Saw Dr Mcdaniels for nephrology\par Creatinine 1.7 GFR 36\par Followup with Nephrology and Us with 6 month with CT chest/ abd and pelvis

## 2020-11-03 ENCOUNTER — OUTPATIENT (OUTPATIENT)
Dept: OUTPATIENT SERVICES | Facility: HOSPITAL | Age: 75
LOS: 1 days | End: 2020-11-03
Payer: MEDICARE

## 2020-11-03 DIAGNOSIS — Z90.5 ACQUIRED ABSENCE OF KIDNEY: Chronic | ICD-10-CM

## 2020-11-03 DIAGNOSIS — Z01.818 ENCOUNTER FOR OTHER PREPROCEDURAL EXAMINATION: ICD-10-CM

## 2020-11-03 DIAGNOSIS — K43.2 INCISIONAL HERNIA WITHOUT OBSTRUCTION OR GANGRENE: ICD-10-CM

## 2020-11-03 DIAGNOSIS — Z98.890 OTHER SPECIFIED POSTPROCEDURAL STATES: Chronic | ICD-10-CM

## 2020-11-03 DIAGNOSIS — Z90.49 ACQUIRED ABSENCE OF OTHER SPECIFIED PARTS OF DIGESTIVE TRACT: Chronic | ICD-10-CM

## 2020-11-03 DIAGNOSIS — Z90.89 ACQUIRED ABSENCE OF OTHER ORGANS: Chronic | ICD-10-CM

## 2020-11-03 LAB
ANION GAP SERPL CALC-SCNC: 3 MMOL/L — LOW (ref 5–17)
APTT BLD: 49.2 SEC — HIGH (ref 27.5–35.5)
BASOPHILS # BLD AUTO: 0.05 K/UL — SIGNIFICANT CHANGE UP (ref 0–0.2)
BASOPHILS NFR BLD AUTO: 0.9 % — SIGNIFICANT CHANGE UP (ref 0–2)
BUN SERPL-MCNC: 20 MG/DL — SIGNIFICANT CHANGE UP (ref 7–23)
CALCIUM SERPL-MCNC: 9.1 MG/DL — SIGNIFICANT CHANGE UP (ref 8.5–10.1)
CHLORIDE SERPL-SCNC: 109 MMOL/L — HIGH (ref 96–108)
CO2 SERPL-SCNC: 30 MMOL/L — SIGNIFICANT CHANGE UP (ref 22–31)
CREAT SERPL-MCNC: 1.41 MG/DL — HIGH (ref 0.5–1.3)
EOSINOPHIL # BLD AUTO: 0.2 K/UL — SIGNIFICANT CHANGE UP (ref 0–0.5)
EOSINOPHIL NFR BLD AUTO: 3.5 % — SIGNIFICANT CHANGE UP (ref 0–6)
GLUCOSE SERPL-MCNC: 82 MG/DL — SIGNIFICANT CHANGE UP (ref 70–99)
HCT VFR BLD CALC: 49.9 % — SIGNIFICANT CHANGE UP (ref 39–50)
HGB BLD-MCNC: 16 G/DL — SIGNIFICANT CHANGE UP (ref 13–17)
IMM GRANULOCYTES NFR BLD AUTO: 0.4 % — SIGNIFICANT CHANGE UP (ref 0–1.5)
INR BLD: 2.25 RATIO — HIGH (ref 0.88–1.16)
LYMPHOCYTES # BLD AUTO: 0.92 K/UL — LOW (ref 1–3.3)
LYMPHOCYTES # BLD AUTO: 16.2 % — SIGNIFICANT CHANGE UP (ref 13–44)
MCHC RBC-ENTMCNC: 29.9 PG — SIGNIFICANT CHANGE UP (ref 27–34)
MCHC RBC-ENTMCNC: 32.1 GM/DL — SIGNIFICANT CHANGE UP (ref 32–36)
MCV RBC AUTO: 93.1 FL — SIGNIFICANT CHANGE UP (ref 80–100)
MONOCYTES # BLD AUTO: 0.46 K/UL — SIGNIFICANT CHANGE UP (ref 0–0.9)
MONOCYTES NFR BLD AUTO: 8.1 % — SIGNIFICANT CHANGE UP (ref 2–14)
NEUTROPHILS # BLD AUTO: 4.04 K/UL — SIGNIFICANT CHANGE UP (ref 1.8–7.4)
NEUTROPHILS NFR BLD AUTO: 70.9 % — SIGNIFICANT CHANGE UP (ref 43–77)
PLATELET # BLD AUTO: 166 K/UL — SIGNIFICANT CHANGE UP (ref 150–400)
POTASSIUM SERPL-MCNC: 4 MMOL/L — SIGNIFICANT CHANGE UP (ref 3.5–5.3)
POTASSIUM SERPL-SCNC: 4 MMOL/L — SIGNIFICANT CHANGE UP (ref 3.5–5.3)
PROTHROM AB SERPL-ACNC: 25.3 SEC — HIGH (ref 10.6–13.6)
RBC # BLD: 5.36 M/UL — SIGNIFICANT CHANGE UP (ref 4.2–5.8)
RBC # FLD: 13.1 % — SIGNIFICANT CHANGE UP (ref 10.3–14.5)
SODIUM SERPL-SCNC: 142 MMOL/L — SIGNIFICANT CHANGE UP (ref 135–145)
WBC # BLD: 5.69 K/UL — SIGNIFICANT CHANGE UP (ref 3.8–10.5)
WBC # FLD AUTO: 5.69 K/UL — SIGNIFICANT CHANGE UP (ref 3.8–10.5)

## 2020-11-03 PROCEDURE — 85610 PROTHROMBIN TIME: CPT

## 2020-11-03 PROCEDURE — 93010 ELECTROCARDIOGRAM REPORT: CPT

## 2020-11-03 PROCEDURE — 80048 BASIC METABOLIC PNL TOTAL CA: CPT

## 2020-11-03 PROCEDURE — 93005 ELECTROCARDIOGRAM TRACING: CPT

## 2020-11-03 PROCEDURE — 36415 COLL VENOUS BLD VENIPUNCTURE: CPT

## 2020-11-03 PROCEDURE — 85730 THROMBOPLASTIN TIME PARTIAL: CPT

## 2020-11-03 PROCEDURE — 85025 COMPLETE CBC W/AUTO DIFF WBC: CPT

## 2020-11-03 RX ORDER — DRONEDARONE 400 MG/1
1 TABLET, FILM COATED ORAL
Qty: 0 | Refills: 0 | DISCHARGE

## 2020-11-03 RX ORDER — LISINOPRIL 2.5 MG/1
1 TABLET ORAL
Qty: 0 | Refills: 0 | DISCHARGE

## 2020-11-03 RX ORDER — METOPROLOL TARTRATE 50 MG
1 TABLET ORAL
Qty: 0 | Refills: 0 | DISCHARGE

## 2020-11-03 NOTE — ASU PATIENT PROFILE, ADULT - PSH
H/O colonoscopy  and upper endoscopy 2019  H/O hemorrhoidectomy    History of other surgery  Excision of skin cancer  History of right nephrectomy  12/5/2019  S/P cholecystectomy    S/P tonsillectomy

## 2020-11-03 NOTE — ASU PATIENT PROFILE, ADULT - PMH
Atrial fibrillation  has loop recorder  BPH (benign prostatic hyperplasia)    Colon polyp  benign  Erectile dysfunction    GERD (gastroesophageal reflux disease)    Hematuria    Hemorrhoids    History of gallstones    History of kidney cancer  Right - kidney removed  History of skin cancer  left hand fourth digit  HTN (hypertension)    Incisional hernia    Renal mass

## 2020-11-03 NOTE — CHART NOTE - NSCHARTNOTEFT_GEN_A_CORE
Vital signs  Pulse 57  Temperature 97.9   Respirations 16  B/P 153/68  SpO2 100%    Plan   1. NPO after midnight  2. Take the following medications with sips of water on the day of procedure: Multaq. Lisinopril  3. Use E-Z sponge as directed  4. Use Mupirocin as directed.  5. Drink a quart of extra  fluids the day before your surgery.  6 Medical optimization for surgery with Dr. Ibarra and cardiac evaluation with Dr. Rudolph Sampson  7. CBC, BMP, PT/ INR and PTT sent to lab  8. EKG done  9. Stop Xarelto 72 hours prior to surgery as instructed by Cardiologist.     Denies travel outside of state or country in the last 14 days.   Denies contact with known Coronavirus positive person.  Denies fever, chills, cough, congestion, runny nose, SOB or difficulty breathing, fatigue, muscle or body ache, headache. loss of taste or smell, N/V/D.

## 2020-11-04 DIAGNOSIS — Z01.818 ENCOUNTER FOR OTHER PREPROCEDURAL EXAMINATION: ICD-10-CM

## 2020-11-04 DIAGNOSIS — K43.2 INCISIONAL HERNIA WITHOUT OBSTRUCTION OR GANGRENE: ICD-10-CM

## 2020-11-05 ENCOUNTER — APPOINTMENT (OUTPATIENT)
Dept: ELECTROPHYSIOLOGY | Facility: CLINIC | Age: 75
End: 2020-11-05
Payer: MEDICARE

## 2020-11-06 ENCOUNTER — APPOINTMENT (OUTPATIENT)
Dept: INTERNAL MEDICINE | Facility: CLINIC | Age: 75
End: 2020-11-06
Payer: MEDICARE

## 2020-11-06 ENCOUNTER — APPOINTMENT (OUTPATIENT)
Dept: CARDIOLOGY | Facility: CLINIC | Age: 75
End: 2020-11-06
Payer: MEDICARE

## 2020-11-06 ENCOUNTER — OUTPATIENT (OUTPATIENT)
Dept: OUTPATIENT SERVICES | Facility: HOSPITAL | Age: 75
LOS: 1 days | End: 2020-11-06
Payer: MEDICARE

## 2020-11-06 VITALS
DIASTOLIC BLOOD PRESSURE: 74 MMHG | RESPIRATION RATE: 18 BRPM | HEART RATE: 53 BPM | TEMPERATURE: 98 F | WEIGHT: 175 LBS | HEIGHT: 70 IN | OXYGEN SATURATION: 98 % | SYSTOLIC BLOOD PRESSURE: 118 MMHG | BODY MASS INDEX: 25.05 KG/M2

## 2020-11-06 VITALS
HEIGHT: 70 IN | OXYGEN SATURATION: 99 % | DIASTOLIC BLOOD PRESSURE: 66 MMHG | BODY MASS INDEX: 24.62 KG/M2 | SYSTOLIC BLOOD PRESSURE: 144 MMHG | WEIGHT: 172 LBS | HEART RATE: 54 BPM

## 2020-11-06 DIAGNOSIS — Z90.5 ACQUIRED ABSENCE OF KIDNEY: Chronic | ICD-10-CM

## 2020-11-06 DIAGNOSIS — Z12.5 ENCOUNTER FOR SCREENING FOR MALIGNANT NEOPLASM OF PROSTATE: ICD-10-CM

## 2020-11-06 DIAGNOSIS — Z98.890 OTHER SPECIFIED POSTPROCEDURAL STATES: Chronic | ICD-10-CM

## 2020-11-06 DIAGNOSIS — Z87.898 PERSONAL HISTORY OF OTHER SPECIFIED CONDITIONS: ICD-10-CM

## 2020-11-06 DIAGNOSIS — K40.90 UNILATERAL INGUINAL HERNIA, W/OUT OBSTRUCTION OR GANGRENE, NOT SPECIFIED AS RECURRENT: ICD-10-CM

## 2020-11-06 DIAGNOSIS — Z90.89 ACQUIRED ABSENCE OF OTHER ORGANS: Chronic | ICD-10-CM

## 2020-11-06 DIAGNOSIS — Z90.49 ACQUIRED ABSENCE OF OTHER SPECIFIED PARTS OF DIGESTIVE TRACT: Chronic | ICD-10-CM

## 2020-11-06 DIAGNOSIS — Z11.59 ENCOUNTER FOR SCREENING FOR OTHER VIRAL DISEASES: ICD-10-CM

## 2020-11-06 DIAGNOSIS — N28.89 OTHER SPECIFIED DISORDERS OF KIDNEY AND URETER: ICD-10-CM

## 2020-11-06 PROBLEM — Z85.828 PERSONAL HISTORY OF OTHER MALIGNANT NEOPLASM OF SKIN: Chronic | Status: ACTIVE | Noted: 2020-11-03

## 2020-11-06 PROBLEM — Z87.19 PERSONAL HISTORY OF OTHER DISEASES OF THE DIGESTIVE SYSTEM: Chronic | Status: ACTIVE | Noted: 2020-11-03

## 2020-11-06 PROBLEM — Z85.528 PERSONAL HISTORY OF OTHER MALIGNANT NEOPLASM OF KIDNEY: Chronic | Status: ACTIVE | Noted: 2020-11-03

## 2020-11-06 PROBLEM — K64.9 UNSPECIFIED HEMORRHOIDS: Chronic | Status: ACTIVE | Noted: 2020-11-03

## 2020-11-06 PROBLEM — N52.9 MALE ERECTILE DYSFUNCTION, UNSPECIFIED: Chronic | Status: ACTIVE | Noted: 2020-11-03

## 2020-11-06 PROBLEM — K43.2 INCISIONAL HERNIA WITHOUT OBSTRUCTION OR GANGRENE: Chronic | Status: ACTIVE | Noted: 2020-11-03

## 2020-11-06 PROCEDURE — 93298 REM INTERROG DEV EVAL SCRMS: CPT

## 2020-11-06 PROCEDURE — G2066: CPT

## 2020-11-06 PROCEDURE — 99214 OFFICE O/P EST MOD 30 MIN: CPT

## 2020-11-06 PROCEDURE — U0003: CPT

## 2020-11-06 PROCEDURE — 99215 OFFICE O/P EST HI 40 MIN: CPT

## 2020-11-06 RX ORDER — SODIUM CHLORIDE 9 MG/ML
3 INJECTION INTRAMUSCULAR; INTRAVENOUS; SUBCUTANEOUS EVERY 8 HOURS
Refills: 0 | Status: DISCONTINUED | OUTPATIENT
Start: 2020-11-09 | End: 2020-11-10

## 2020-11-06 RX ORDER — ONDANSETRON 8 MG/1
4 TABLET, FILM COATED ORAL ONCE
Refills: 0 | Status: DISCONTINUED | OUTPATIENT
Start: 2020-11-09 | End: 2020-11-09

## 2020-11-06 RX ORDER — FENTANYL CITRATE 50 UG/ML
50 INJECTION INTRAVENOUS
Refills: 0 | Status: DISCONTINUED | OUTPATIENT
Start: 2020-11-09 | End: 2020-11-09

## 2020-11-06 RX ORDER — SODIUM CHLORIDE 9 MG/ML
1000 INJECTION, SOLUTION INTRAVENOUS
Refills: 0 | Status: DISCONTINUED | OUTPATIENT
Start: 2020-11-09 | End: 2020-11-09

## 2020-11-06 NOTE — ASSESSMENT
[Patient Optimized for Surgery] : Patient optimized for surgery [As per surgery] : as per surgery [FreeTextEntry4] : COVID PCR per surgery\par \par Advised to hold all vitamins, NSAIDS, fish oil , supplements 7 days prior. Pt last dose Xarelto 11/4/20. \par \par Cardiac clearance per Dr. Lopes . \par  [FreeTextEntry2] : close airway monitoring and oxygen saturation monitoring is required .

## 2020-11-06 NOTE — PHYSICAL EXAM
[Normal Appearance] : normal appearance [Well Groomed] : well groomed [General Appearance - In No Acute Distress] : no acute distress [Respiration, Rhythm And Depth] : normal respiratory rhythm and effort [Auscultation Breath Sounds / Voice Sounds] : lungs were clear to auscultation bilaterally [Heart Sounds] : normal S1 and S2 [Edema] : no peripheral edema present [Abnormal Walk] : normal gait [Oriented To Time, Place, And Person] : oriented to person, place, and time [Impaired Insight] : insight and judgment were intact [Affect] : the affect was normal [Mood] : the mood was normal [FreeTextEntry1] : Bradycardia [Nail Clubbing] : no clubbing of the fingernails [Cyanosis, Localized] : no localized cyanosis

## 2020-11-06 NOTE — REVIEW OF SYSTEMS
[Back Pain] : back pain [Negative] : Neurological [Fever] : no fever [Chills] : no chills [Fatigue] : no fatigue [FreeTextEntry7] : see HPI  [FreeTextEntry8] : H/ of erectile dysfunction

## 2020-11-06 NOTE — REVIEW OF SYSTEMS
[Eyeglasses] : currently wearing eyeglasses [see HPI] : see HPI [Impotence] : impotence [Lower Back Pain] : lower back pain [Negative] : Heme/Lymph [Loss Of Hearing] : no hearing loss [Shortness Of Breath] : no shortness of breath [Dyspnea on exertion] : not dyspnea during exertion [Chest  Pressure] : no chest pressure [Chest Pain] : no chest pain [Lower Ext Edema] : no extremity edema [Palpitations] : no palpitations [FreeTextEntry5] : Erectile dysfunction

## 2020-11-06 NOTE — PHYSICAL EXAM
[No Acute Distress] : no acute distress [Well Nourished] : well nourished [Well Developed] : well developed [No Respiratory Distress] : no respiratory distress  [No Accessory Muscle Use] : no accessory muscle use [Clear to Auscultation] : lungs were clear to auscultation bilaterally [Normal Rate] : normal rate  [Regular Rhythm] : with a regular rhythm [Normal S1, S2] : normal S1 and S2 [Pedal Pulses Present] : the pedal pulses are present [No Extremity Clubbing/Cyanosis] : no extremity clubbing/cyanosis [Soft] : abdomen soft [Non Tender] : non-tender [Non-distended] : non-distended [Normal Bowel Sounds] : normal bowel sounds [Normal Posterior Cervical Nodes] : no posterior cervical lymphadenopathy [Normal Anterior Cervical Nodes] : no anterior cervical lymphadenopathy [Coordination Grossly Intact] : coordination grossly intact [No Focal Deficits] : no focal deficits [Normal Gait] : normal gait [Normal Affect] : the affect was normal [Alert and Oriented x3] : oriented to person, place, and time [Normal Insight/Judgement] : insight and judgment were intact [Normal Sclera/Conjunctiva] : normal sclera/conjunctiva [PERRL] : pupils equal round and reactive to light [EOMI] : extraocular movements intact [Normal Oropharynx] : the oropharynx was normal [No Lymphadenopathy] : no lymphadenopathy [Supple] : supple

## 2020-11-06 NOTE — HISTORY OF PRESENT ILLNESS
[FreeTextEntry1] : Denny Johnson is a 75-year-old man with a history of paroxysmal atrial fibrillation (with implantable loop monitor - recent interrogation reveals 0% burden), hypertension, renal cell carcinoma status post right laparoscopic radical nephrectomy (12/2019), mild renal insufficiency, and incisional hernia who returns for cardiac examination prior to hernia repair.  He continues to feel well from a cardiovascular standpoint and describes a continued good functional status (> 4 METS).  He has not been experiencing angina or dyspnea.

## 2020-11-06 NOTE — REASON FOR VISIT
[Abnormal ECG] : an abnormal ECG [Atrial Fibrillation] : atrial fibrillation [Hypertension] : hypertension [Medication Management] : Medication management [FreeTextEntry1] : Preoperative cardiac examination

## 2020-11-06 NOTE — DISCUSSION/SUMMARY
[___ Month(s)] : [unfilled] month(s) [With Me] : with me [FreeTextEntry1] : \par Preoperative cardiac examination: Mr. Johnson is stable from a cardiovascular standpoint to proceed with elective hernia repair as scheduled.  He has already discontinued anticoagulation with Xarelto -- anticoagulation should be resumed postoperatively once hemostasis has been achieved.  He should continue perioperative beta blockade with metoprolol.\par \par Atrial fibrillation: Infrequent paroxysms of atrial fibrillation --continue metoprolol and Multaq.\par \par Sinus bradycardia: Chronic, mild and asymptomatic bradycardia.\par \par Hypertension: Satisfactory control.\par \par Renal insufficiency: Mild; stable.

## 2020-11-06 NOTE — RESULTS/DATA
[] : results reviewed [de-identified] : WBC- 5.69, H/H 16.0, 49.9  [de-identified] : PTT 49.2, PT 25.3, INR 2.25 ( pt  on Xarelto when lab performed)  [de-identified] : CREAT 1.41   [de-identified] : per  cardiology

## 2020-11-06 NOTE — HISTORY OF PRESENT ILLNESS
[Chronic Kidney Disease] : chronic kidney disease [(Patient denies any chest pain, claudication, dyspnea on exertion, orthopnea, palpitations or syncope)] : Patient denies any chest pain, claudication, dyspnea on exertion, orthopnea, palpitations or syncope [Atrial Fibrillation] : atrial fibrillation [No Pertinent Pulmonary History] : no history of asthma, COPD, sleep apnea, or smoking [Chronic Anticoagulation] : chronic anticoagulation [Anticoagulants: _____] : Anticoagulants: [unfilled] [Implantable Device/Pacemaker] : implantable device/pacemaker [No Adverse Anesthesia Reaction] : no adverse anesthesia reaction in self or family member [Aortic Stenosis] : no aortic stenosis [Recent Myocardial Infarction] : no recent myocardial infarction [Asthma] : no asthma [COPD] : no COPD [Sleep Apnea] : no sleep apnea [Smoker] : not a smoker [Family Member] : no family member with adverse anesthesia reaction/sudden death [Self] : no previous adverse anesthesia reaction [Diabetes] : no diabetes [FreeTextEntry1] : laparoscopic repair incisional hernia with right flank with mesh  [FreeTextEntry2] : 11/9/20 [FreeTextEntry3] : Dr. Vasquez  [FreeTextEntry4] : Pt is a 75 yr. old male with a h/ of Paroxysmal a fib ( on Xarelto) , Chronic mild renal insufficiency, renal cell cancer ( s/p right nephrectomy 11/2019) , HTN. \par Pt developed hernia after nephrectomy surgery last year. It has become larger and more bothersome. He is scheduled for surgical repair  11/9/20.\par Sees Dr. Lopes, cardiology regularly, has implantable loop recorder. \par Denies fever, chills, shortness of breath, chest pain, abdominal pain, night sweats. \par  [FreeTextEntry5] : has implantable loop monitor  [FreeTextEntry7] : ECG- 11/3/20 Sinus rhythm \par stress test - 12/18 No ischemia , see cardiac clearance  [FreeTextEntry8] : Able to climb 1 flight of steps without difficulty

## 2020-11-07 DIAGNOSIS — Z11.59 ENCOUNTER FOR SCREENING FOR OTHER VIRAL DISEASES: ICD-10-CM

## 2020-11-07 LAB — SARS-COV-2 RNA SPEC QL NAA+PROBE: SIGNIFICANT CHANGE UP

## 2020-11-09 ENCOUNTER — RESULT REVIEW (OUTPATIENT)
Age: 75
End: 2020-11-09

## 2020-11-09 ENCOUNTER — OUTPATIENT (OUTPATIENT)
Dept: INPATIENT UNIT | Facility: HOSPITAL | Age: 75
LOS: 1 days | Discharge: HOME | End: 2020-11-09
Payer: MEDICARE

## 2020-11-09 VITALS
SYSTOLIC BLOOD PRESSURE: 155 MMHG | HEIGHT: 70 IN | WEIGHT: 175.27 LBS | TEMPERATURE: 97 F | OXYGEN SATURATION: 100 % | DIASTOLIC BLOOD PRESSURE: 82 MMHG | RESPIRATION RATE: 16 BRPM | HEART RATE: 56 BPM

## 2020-11-09 DIAGNOSIS — Z90.49 ACQUIRED ABSENCE OF OTHER SPECIFIED PARTS OF DIGESTIVE TRACT: Chronic | ICD-10-CM

## 2020-11-09 DIAGNOSIS — Z98.890 OTHER SPECIFIED POSTPROCEDURAL STATES: Chronic | ICD-10-CM

## 2020-11-09 DIAGNOSIS — K43.2 INCISIONAL HERNIA WITHOUT OBSTRUCTION OR GANGRENE: ICD-10-CM

## 2020-11-09 DIAGNOSIS — Z90.5 ACQUIRED ABSENCE OF KIDNEY: Chronic | ICD-10-CM

## 2020-11-09 DIAGNOSIS — Z90.89 ACQUIRED ABSENCE OF OTHER ORGANS: Chronic | ICD-10-CM

## 2020-11-09 PROCEDURE — 88302 TISSUE EXAM BY PATHOLOGIST: CPT

## 2020-11-09 PROCEDURE — C1889: CPT

## 2020-11-09 PROCEDURE — 88302 TISSUE EXAM BY PATHOLOGIST: CPT | Mod: 26

## 2020-11-09 PROCEDURE — C1763: CPT

## 2020-11-09 PROCEDURE — C9290: CPT

## 2020-11-09 RX ORDER — ACETAMINOPHEN 500 MG
975 TABLET ORAL ONCE
Refills: 0 | Status: COMPLETED | OUTPATIENT
Start: 2020-11-09 | End: 2020-11-09

## 2020-11-09 RX ORDER — OXYCODONE AND ACETAMINOPHEN 5; 325 MG/1; MG/1
1 TABLET ORAL EVERY 4 HOURS
Refills: 0 | Status: DISCONTINUED | OUTPATIENT
Start: 2020-11-09 | End: 2020-11-10

## 2020-11-09 RX ORDER — DRONEDARONE 400 MG/1
200 TABLET, FILM COATED ORAL
Refills: 0 | Status: DISCONTINUED | OUTPATIENT
Start: 2020-11-09 | End: 2020-11-10

## 2020-11-09 RX ORDER — KETOROLAC TROMETHAMINE 30 MG/ML
15 SYRINGE (ML) INJECTION EVERY 8 HOURS
Refills: 0 | Status: DISCONTINUED | OUTPATIENT
Start: 2020-11-09 | End: 2020-11-10

## 2020-11-09 RX ORDER — FAMOTIDINE 10 MG/ML
20 INJECTION INTRAVENOUS ONCE
Refills: 0 | Status: COMPLETED | OUTPATIENT
Start: 2020-11-09 | End: 2020-11-09

## 2020-11-09 RX ORDER — OXYCODONE HYDROCHLORIDE 5 MG/1
5 TABLET ORAL ONCE
Refills: 0 | Status: DISCONTINUED | OUTPATIENT
Start: 2020-11-09 | End: 2020-11-09

## 2020-11-09 RX ORDER — ACETAMINOPHEN 500 MG
650 TABLET ORAL EVERY 6 HOURS
Refills: 0 | Status: DISCONTINUED | OUTPATIENT
Start: 2020-11-09 | End: 2020-11-10

## 2020-11-09 RX ADMIN — DRONEDARONE 200 MILLIGRAM(S): 400 TABLET, FILM COATED ORAL at 23:05

## 2020-11-09 RX ADMIN — OXYCODONE AND ACETAMINOPHEN 1 TABLET(S): 5; 325 TABLET ORAL at 22:18

## 2020-11-09 RX ADMIN — Medication 975 MILLIGRAM(S): at 08:05

## 2020-11-09 RX ADMIN — OXYCODONE AND ACETAMINOPHEN 1 TABLET(S): 5; 325 TABLET ORAL at 23:05

## 2020-11-09 RX ADMIN — OXYCODONE HYDROCHLORIDE 5 MILLIGRAM(S): 5 TABLET ORAL at 13:59

## 2020-11-09 RX ADMIN — FENTANYL CITRATE 50 MICROGRAM(S): 50 INJECTION INTRAVENOUS at 13:55

## 2020-11-09 RX ADMIN — SODIUM CHLORIDE 3 MILLILITER(S): 9 INJECTION INTRAMUSCULAR; INTRAVENOUS; SUBCUTANEOUS at 21:41

## 2020-11-09 RX ADMIN — FENTANYL CITRATE 50 MICROGRAM(S): 50 INJECTION INTRAVENOUS at 13:37

## 2020-11-09 RX ADMIN — FAMOTIDINE 20 MILLIGRAM(S): 10 INJECTION INTRAVENOUS at 08:05

## 2020-11-09 RX ADMIN — OXYCODONE HYDROCHLORIDE 5 MILLIGRAM(S): 5 TABLET ORAL at 13:29

## 2020-11-09 NOTE — BRIEF OPERATIVE NOTE - NSICDXBRIEFPROCEDURE_GEN_ALL_CORE_FT
PROCEDURES:  Repair of incisional hernia 09-Nov-2020 12:33:29  Camacho Cardozo  Incisional hernia repair with mesh 09-Nov-2020 12:33:12  Camacho Cardozo

## 2020-11-09 NOTE — ASU PATIENT PROFILE, ADULT - NS PRO AD ANY ON CHART
HPI     Last MLC exam 02/27/2018  Last DAM exam 09/17/2018 for angle closure, aqueous misdirection or right   eye.  Glaucoma  DLS 08/20/2018  Nanophthalmos OU  PCIOL OU  YAG OU  Narrow angle s/p LPI OU    COSOPT BID OU,     Latanoprost QHS OS     Update Glasses and CL RX  Has not worn CLs since before eye surgery     She is here today to update her spec and CL RX.    Last edited by MADELINE Lezama, OD on 10/15/2018  3:12 PM. (History)            Assessment /Plan     For exam results, see Encounter Report.    Nanophthalmos, bilateral    Pseudophakia of both eyes      Updated eyeglass and CL Rx.  RTC to me one year.  FU with Dr. Martinez as scheduled.                  No

## 2020-11-09 NOTE — BRIEF OPERATIVE NOTE - NSICDXBRIEFPREOP_GEN_ALL_CORE_FT
PRE-OP DIAGNOSIS:  Incisional hernia without obstruction or gangrene 09-Nov-2020 12:34:24  Camacho Cardozo

## 2020-11-09 NOTE — BRIEF OPERATIVE NOTE - OPERATION/FINDINGS
Epigastric fat containing incisional hernia closed with figure of 8x2 using unique banks.  Large right sided incisional hernia repaired with Acell mesh.

## 2020-11-10 ENCOUNTER — TRANSCRIPTION ENCOUNTER (OUTPATIENT)
Age: 75
End: 2020-11-10

## 2020-11-10 VITALS
OXYGEN SATURATION: 98 % | DIASTOLIC BLOOD PRESSURE: 48 MMHG | RESPIRATION RATE: 17 BRPM | SYSTOLIC BLOOD PRESSURE: 118 MMHG | TEMPERATURE: 98 F | HEART RATE: 51 BPM

## 2020-11-10 DIAGNOSIS — K43.2 INCISIONAL HERNIA WITHOUT OBSTRUCTION OR GANGRENE: ICD-10-CM

## 2020-11-10 RX ADMIN — DRONEDARONE 200 MILLIGRAM(S): 400 TABLET, FILM COATED ORAL at 09:09

## 2020-11-10 RX ADMIN — Medication 15 MILLIGRAM(S): at 11:30

## 2020-11-10 RX ADMIN — Medication 15 MILLIGRAM(S): at 10:56

## 2020-11-10 RX ADMIN — SODIUM CHLORIDE 3 MILLILITER(S): 9 INJECTION INTRAMUSCULAR; INTRAVENOUS; SUBCUTANEOUS at 05:18

## 2020-11-10 NOTE — DISCHARGE NOTE PROVIDER - NSDCFUADDINST_GEN_ALL_CORE_FT
Do not lift anything greater than 10lbs for 4-6 weeks. Do not do any abdominal exercises for 4-6 weeks.  Leave skin staples in place, they will be removed in Dr. Vasquez's office.  Please record drain outputs daily and bring a log of these outputs to your follow up appointment. Please call Dr. Vasquez's office to schedule follow up appointment in within 1 week.

## 2020-11-10 NOTE — DISCHARGE NOTE NURSING/CASE MANAGEMENT/SOCIAL WORK - PATIENT PORTAL LINK FT
You can access the FollowMyHealth Patient Portal offered by [facility] by registering at the following website: [email or web address]. By joining [employer]’s FollowMyHealth portal, you will also be able to view your health information using other applications (apps) compatible with our system.

## 2020-11-10 NOTE — DISCHARGE NOTE PROVIDER - NSDCCPCAREPLAN_GEN_ALL_CORE_FT
PRINCIPAL DISCHARGE DIAGNOSIS  Diagnosis: Incisional hernia  Assessment and Plan of Treatment:

## 2020-11-10 NOTE — DISCHARGE NOTE PROVIDER - CARE PROVIDER_API CALL
[facility]  [address] [place] [address]  Phone: [phone]  Fax: [phone]  Follow Up Time: 2 weeks

## 2020-11-10 NOTE — DISCHARGE NOTE PROVIDER - NSDCMRMEDTOKEN_GEN_ALL_CORE_FT
Calcium 500+D oral tablet, chewable: 0.5 tab(s) orally once a day  famotidine 20 mg oral tablet: 1 tab(s) orally 2 times a day  lisinopril 40 mg oral tablet: 1 tab(s) orally once a day (in the morning)  magnesium: 1  orally once a day  Metamucil: 1 tbsp orally once a day with OJ  metoprolol succinate 50 mg oral capsule, extended release: 0.5 cap(s) orally once a day (at bedtime)  Multaq 400 mg oral tablet: 0.5 tab(s) orally 2 times a day  multivitamin: 1 tab(s) orally once a day  sildenafil 100 mg oral tablet: 1 tab(s) orally once a day, As Needed  Xarelto 20 mg oral tablet: 1 tab(s) orally once a day (in the evening)  pt to follow preop instructions by surgeon and cardiologist

## 2020-11-10 NOTE — DISCHARGE NOTE PROVIDER - NSDCFUSCHEDAPPT_GEN_ALL_CORE_FT
CHARLENE TAM ; 11/18/2020 ; [facility] [address]   CHARLENE TAM ; 12/11/2020 ; [facility] [address] CHARLENE Bhatia ; 01/08/2021 ; [facility] [address]

## 2020-11-11 ENCOUNTER — APPOINTMENT (OUTPATIENT)
Dept: INTERNAL MEDICINE | Facility: CLINIC | Age: 75
End: 2020-11-11

## 2020-11-15 DIAGNOSIS — E78.00 PURE HYPERCHOLESTEROLEMIA, UNSPECIFIED: ICD-10-CM

## 2020-11-15 DIAGNOSIS — Z53.31 LAPAROSCOPIC SURGICAL PROCEDURE CONVERTED TO OPEN PROCEDURE: ICD-10-CM

## 2020-11-15 DIAGNOSIS — K43.0 INCISIONAL HERNIA WITH OBSTRUCTION, WITHOUT GANGRENE: ICD-10-CM

## 2020-11-15 DIAGNOSIS — K43.2 INCISIONAL HERNIA WITHOUT OBSTRUCTION OR GANGRENE: ICD-10-CM

## 2020-11-15 DIAGNOSIS — I48.91 UNSPECIFIED ATRIAL FIBRILLATION: ICD-10-CM

## 2020-11-15 DIAGNOSIS — N18.9 CHRONIC KIDNEY DISEASE, UNSPECIFIED: ICD-10-CM

## 2020-11-15 DIAGNOSIS — Z90.5 ACQUIRED ABSENCE OF KIDNEY: ICD-10-CM

## 2020-11-15 DIAGNOSIS — K21.9 GASTRO-ESOPHAGEAL REFLUX DISEASE WITHOUT ESOPHAGITIS: ICD-10-CM

## 2020-11-15 DIAGNOSIS — I12.9 HYPERTENSIVE CHRONIC KIDNEY DISEASE WITH STAGE 1 THROUGH STAGE 4 CHRONIC KIDNEY DISEASE, OR UNSPECIFIED CHRONIC KIDNEY DISEASE: ICD-10-CM

## 2020-11-15 DIAGNOSIS — Z90.49 ACQUIRED ABSENCE OF OTHER SPECIFIED PARTS OF DIGESTIVE TRACT: ICD-10-CM

## 2020-11-15 DIAGNOSIS — Z79.01 LONG TERM (CURRENT) USE OF ANTICOAGULANTS: ICD-10-CM

## 2020-11-18 ENCOUNTER — APPOINTMENT (OUTPATIENT)
Dept: CARDIOLOGY | Facility: CLINIC | Age: 75
End: 2020-11-18

## 2020-12-10 ENCOUNTER — APPOINTMENT (OUTPATIENT)
Dept: ELECTROPHYSIOLOGY | Facility: CLINIC | Age: 75
End: 2020-12-10
Payer: MEDICARE

## 2020-12-11 PROCEDURE — 93298 REM INTERROG DEV EVAL SCRMS: CPT

## 2020-12-11 PROCEDURE — G2066: CPT

## 2020-12-13 NOTE — PHYSICAL EXAM
[General Appearance - Well Developed] : well developed [General Appearance - Well Nourished] : well nourished [General Appearance - In No Acute Distress] : no acute distress [No Oral Cyanosis] : no oral cyanosis [Respiration, Rhythm And Depth] : normal respiratory rhythm and effort [Auscultation Breath Sounds / Voice Sounds] : lungs were clear to auscultation bilaterally [Heart Rate And Rhythm] : heart rate and rhythm were normal [Heart Sounds] : normal S1 and S2 [Murmurs] : no murmurs present [Edema] : no peripheral edema present [Abnormal Walk] : normal gait [Nail Clubbing] : no clubbing of the fingernails [Cyanosis, Localized] : no localized cyanosis [Oriented To Time, Place, And Person] : oriented to person, place, and time [Impaired Insight] : insight and judgment were intact [Affect] : the affect was normal [Mood] : the mood was normal [FreeTextEntry1] : Appears well [Eyelids - No Xanthelasma] : the eyelids demonstrated no xanthelasmas Need for prophylactic measure

## 2020-12-21 ENCOUNTER — OUTPATIENT (OUTPATIENT)
Dept: OUTPATIENT SERVICES | Facility: HOSPITAL | Age: 75
LOS: 1 days | End: 2020-12-21
Payer: MEDICARE

## 2020-12-21 DIAGNOSIS — Z98.890 OTHER SPECIFIED POSTPROCEDURAL STATES: Chronic | ICD-10-CM

## 2020-12-21 DIAGNOSIS — Z90.49 ACQUIRED ABSENCE OF OTHER SPECIFIED PARTS OF DIGESTIVE TRACT: Chronic | ICD-10-CM

## 2020-12-21 DIAGNOSIS — Z11.59 ENCOUNTER FOR SCREENING FOR OTHER VIRAL DISEASES: ICD-10-CM

## 2020-12-21 DIAGNOSIS — Z90.5 ACQUIRED ABSENCE OF KIDNEY: Chronic | ICD-10-CM

## 2020-12-21 DIAGNOSIS — Z90.89 ACQUIRED ABSENCE OF OTHER ORGANS: Chronic | ICD-10-CM

## 2020-12-21 LAB — SARS-COV-2 RNA SPEC QL NAA+PROBE: SIGNIFICANT CHANGE UP

## 2020-12-21 PROCEDURE — U0003: CPT

## 2020-12-22 DIAGNOSIS — Z11.59 ENCOUNTER FOR SCREENING FOR OTHER VIRAL DISEASES: ICD-10-CM

## 2020-12-24 NOTE — PROCEDURAL SAFETY CHECKLIST WITH OR WITHOUT SEDATION - NSPREPROCFT_GEN_ALL_CORE
Explant of existing implantable cardiac monitor and implant of new implantable cardiac monitor. Self

## 2020-12-28 DIAGNOSIS — Z20.828 CONTACT WITH AND (SUSPECTED) EXPOSURE TO OTHER VIRAL COMMUNICABLE DISEASES: ICD-10-CM

## 2021-01-08 ENCOUNTER — APPOINTMENT (OUTPATIENT)
Dept: ELECTROPHYSIOLOGY | Facility: CLINIC | Age: 76
End: 2021-01-08
Payer: MEDICARE

## 2021-01-08 VITALS
DIASTOLIC BLOOD PRESSURE: 75 MMHG | RESPIRATION RATE: 16 BRPM | HEIGHT: 70 IN | HEART RATE: 55 BPM | OXYGEN SATURATION: 100 % | SYSTOLIC BLOOD PRESSURE: 150 MMHG

## 2021-01-08 PROCEDURE — 93285 PRGRMG DEV EVAL SCRMS IP: CPT

## 2021-01-08 PROCEDURE — 99214 OFFICE O/P EST MOD 30 MIN: CPT

## 2021-01-08 NOTE — ASSESSMENT
[FreeTextEntry1] : Atrial Fibrillation: No arrhythmia detected in Linq interrogation. \par On multaq need to monitor ECG and LFTs. Last in November 2020\par

## 2021-01-08 NOTE — HISTORY OF PRESENT ILLNESS
[FreeTextEntry1] : Charlene Tam is a 75-year-old man with  paroxysmal atrial fibrillation, hypertension, and recently diagnosed renal cell carcinoma status post right laparoscopic radical nephrectomy (12/2019) Postoperatively he experienced marked bradycardia and diltiazem was discontinued. He had a hernia repair from the surgical incision recently. But no significant symptoms. \par \par  CHARLENE TAM  denies chest pain, chest pressure, shortness of breath, lightheadedness, dizziness, palpitations, syncope, presyncope, orthopnea, PND, or edema. He has noted on his home BP monitor irregular heart rates.

## 2021-01-08 NOTE — PHYSICAL EXAM
[Normal Appearance] : normal appearance [Well Groomed] : well groomed [General Appearance - In No Acute Distress] : no acute distress [Normal Conjunctiva] : the conjunctiva exhibited no abnormalities [Eyelids - No Xanthelasma] : the eyelids demonstrated no xanthelasmas [No Oral Cyanosis] : no oral cyanosis [FreeTextEntry1] : No JVD [Respiration, Rhythm And Depth] : normal respiratory rhythm and effort [Exaggerated Use Of Accessory Muscles For Inspiration] : no accessory muscle use [Auscultation Breath Sounds / Voice Sounds] : lungs were clear to auscultation bilaterally [Heart Rate And Rhythm] : heart rate and rhythm were normal [Heart Sounds] : normal S1 and S2 [Murmurs] : no murmurs present [Edema] : no peripheral edema present [Abdomen Soft] : soft [Abnormal Walk] : normal gait [Nail Clubbing] : no clubbing of the fingernails [Cyanosis, Localized] : no localized cyanosis [Skin Color & Pigmentation] : normal skin color and pigmentation [] : no rash [No Venous Stasis] : no venous stasis [Skin Lesions] : no skin lesions [Oriented To Time, Place, And Person] : oriented to person, place, and time [Impaired Insight] : insight and judgment were intact [Affect] : the affect was normal [Mood] : the mood was normal

## 2021-01-08 NOTE — REVIEW OF SYSTEMS
[Fever] : no fever [Chills] : no chills [Eyeglasses] : currently wearing eyeglasses [Loss Of Hearing] : no hearing loss [Shortness Of Breath] : no shortness of breath [Dyspnea on exertion] : not dyspnea during exertion [Chest  Pressure] : no chest pressure [Chest Pain] : no chest pain [Lower Ext Edema] : no extremity edema [Palpitations] : no palpitations [Abdominal Pain] : no abdominal pain [see HPI] : see HPI [Impotence] : impotence [Negative] : Heme/Lymph [FreeTextEntry5] : Erectile dysfunction

## 2021-01-15 ENCOUNTER — APPOINTMENT (OUTPATIENT)
Dept: ELECTROPHYSIOLOGY | Facility: CLINIC | Age: 76
End: 2021-01-15
Payer: MEDICARE

## 2021-01-15 PROCEDURE — 93298 REM INTERROG DEV EVAL SCRMS: CPT

## 2021-01-15 PROCEDURE — G2066: CPT

## 2021-01-20 ENCOUNTER — RX RENEWAL (OUTPATIENT)
Age: 76
End: 2021-01-20

## 2021-01-22 ENCOUNTER — RX RENEWAL (OUTPATIENT)
Age: 76
End: 2021-01-22

## 2021-02-08 ENCOUNTER — NON-APPOINTMENT (OUTPATIENT)
Age: 76
End: 2021-02-08

## 2021-02-18 ENCOUNTER — APPOINTMENT (OUTPATIENT)
Dept: ELECTROPHYSIOLOGY | Facility: CLINIC | Age: 76
End: 2021-02-18
Payer: MEDICARE

## 2021-02-19 ENCOUNTER — RX RENEWAL (OUTPATIENT)
Age: 76
End: 2021-02-19

## 2021-02-19 PROCEDURE — G2066: CPT

## 2021-02-19 PROCEDURE — 93298 REM INTERROG DEV EVAL SCRMS: CPT

## 2021-02-22 ENCOUNTER — NON-APPOINTMENT (OUTPATIENT)
Age: 76
End: 2021-02-22

## 2021-03-12 ENCOUNTER — OUTPATIENT (OUTPATIENT)
Dept: OUTPATIENT SERVICES | Facility: HOSPITAL | Age: 76
LOS: 1 days | End: 2021-03-12
Payer: MEDICARE

## 2021-03-12 DIAGNOSIS — Z20.828 CONTACT WITH AND (SUSPECTED) EXPOSURE TO OTHER VIRAL COMMUNICABLE DISEASES: ICD-10-CM

## 2021-03-12 DIAGNOSIS — Z98.890 OTHER SPECIFIED POSTPROCEDURAL STATES: Chronic | ICD-10-CM

## 2021-03-12 DIAGNOSIS — Z90.5 ACQUIRED ABSENCE OF KIDNEY: Chronic | ICD-10-CM

## 2021-03-12 DIAGNOSIS — Z90.49 ACQUIRED ABSENCE OF OTHER SPECIFIED PARTS OF DIGESTIVE TRACT: Chronic | ICD-10-CM

## 2021-03-12 DIAGNOSIS — Z90.89 ACQUIRED ABSENCE OF OTHER ORGANS: Chronic | ICD-10-CM

## 2021-03-12 LAB — SARS-COV-2 RNA SPEC QL NAA+PROBE: SIGNIFICANT CHANGE UP

## 2021-03-12 PROCEDURE — U0003: CPT

## 2021-03-12 PROCEDURE — U0005: CPT

## 2021-03-12 PROCEDURE — C9803: CPT

## 2021-03-13 DIAGNOSIS — Z20.828 CONTACT WITH AND (SUSPECTED) EXPOSURE TO OTHER VIRAL COMMUNICABLE DISEASES: ICD-10-CM

## 2021-03-19 ENCOUNTER — NON-APPOINTMENT (OUTPATIENT)
Age: 76
End: 2021-03-19

## 2021-03-19 ENCOUNTER — APPOINTMENT (OUTPATIENT)
Dept: ELECTROPHYSIOLOGY | Facility: CLINIC | Age: 76
End: 2021-03-19
Payer: MEDICARE

## 2021-03-19 VITALS
DIASTOLIC BLOOD PRESSURE: 75 MMHG | BODY MASS INDEX: 24.48 KG/M2 | RESPIRATION RATE: 16 BRPM | HEART RATE: 67 BPM | OXYGEN SATURATION: 100 % | SYSTOLIC BLOOD PRESSURE: 150 MMHG | WEIGHT: 171 LBS | HEIGHT: 70 IN

## 2021-03-19 PROCEDURE — 99212 OFFICE O/P EST SF 10 MIN: CPT

## 2021-03-19 PROCEDURE — 93285 PRGRMG DEV EVAL SCRMS IP: CPT

## 2021-03-19 RX ORDER — DRONEDARONE 400 MG/1
400 TABLET, FILM COATED ORAL TWICE DAILY
Qty: 180 | Refills: 3 | Status: DISCONTINUED | COMMUNITY
Start: 2018-12-20 | End: 2021-03-19

## 2021-03-19 NOTE — ASSESSMENT
[FreeTextEntry1] : No events recorded on ILR\par Rhythm is normal sinus\par No PAF seen\par Continue Xarelto\par Continue monthly remote monitoring\par F/U in 6 mo.

## 2021-04-06 ENCOUNTER — NON-APPOINTMENT (OUTPATIENT)
Age: 76
End: 2021-04-06

## 2021-04-06 ENCOUNTER — APPOINTMENT (OUTPATIENT)
Dept: CARDIOLOGY | Facility: CLINIC | Age: 76
End: 2021-04-06
Payer: MEDICARE

## 2021-04-06 VITALS
SYSTOLIC BLOOD PRESSURE: 177 MMHG | BODY MASS INDEX: 24.77 KG/M2 | HEIGHT: 70 IN | DIASTOLIC BLOOD PRESSURE: 76 MMHG | OXYGEN SATURATION: 99 % | HEART RATE: 61 BPM | WEIGHT: 173 LBS

## 2021-04-06 VITALS — DIASTOLIC BLOOD PRESSURE: 60 MMHG | SYSTOLIC BLOOD PRESSURE: 150 MMHG

## 2021-04-06 DIAGNOSIS — Z01.818 ENCOUNTER FOR OTHER PREPROCEDURAL EXAMINATION: ICD-10-CM

## 2021-04-06 DIAGNOSIS — R79.89 OTHER SPECIFIED ABNORMAL FINDINGS OF BLOOD CHEMISTRY: ICD-10-CM

## 2021-04-06 PROCEDURE — 99214 OFFICE O/P EST MOD 30 MIN: CPT

## 2021-04-06 PROCEDURE — 93000 ELECTROCARDIOGRAM COMPLETE: CPT

## 2021-04-06 NOTE — PHYSICAL EXAM
[Normal Appearance] : normal appearance [Well Groomed] : well groomed [General Appearance - In No Acute Distress] : no acute distress [Respiration, Rhythm And Depth] : normal respiratory rhythm and effort [Auscultation Breath Sounds / Voice Sounds] : lungs were clear to auscultation bilaterally [Heart Sounds] : normal S1 and S2 [Edema] : no peripheral edema present [Abnormal Walk] : normal gait [Nail Clubbing] : no clubbing of the fingernails [Cyanosis, Localized] : no localized cyanosis [Oriented To Time, Place, And Person] : oriented to person, place, and time [Impaired Insight] : insight and judgment were intact [Affect] : the affect was normal [Mood] : the mood was normal [FreeTextEntry1] : Bradycardic (mild) [Skin Color & Pigmentation] : normal skin color and pigmentation

## 2021-04-06 NOTE — REVIEW OF SYSTEMS
[Eyeglasses] : currently wearing eyeglasses [see HPI] : see HPI [Impotence] : impotence [Lower Back Pain] : lower back pain [Negative] : Heme/Lymph [Recent Weight Gain (___ Lbs)] : no recent weight gain [Loss Of Hearing] : no hearing loss [Shortness Of Breath] : no shortness of breath [Dyspnea on exertion] : not dyspnea during exertion [Chest  Pressure] : no chest pressure [Lower Ext Edema] : no extremity edema [Chest Pain] : no chest pain [Palpitations] : no palpitations [Under Stress] : under stress [FreeTextEntry5] : Erectile dysfunction

## 2021-04-06 NOTE — HISTORY OF PRESENT ILLNESS
[FreeTextEntry1] : Denny Johnson is a 76-year-old man with a history of paroxysmal atrial fibrillation, implantable loop monitor, hypertension, mild renal insufficiency, renal cell carcinoma status post right laparoscopic radical nephrectomy (12/2019), incisional hernia s/p repair (11/2020) who returns for cardiac examination.  He continues to feel well from a cardiovascular standpoint. Recent implantable loop recorder interrogation revealed no atrial fibrillation (now taking 1/2 tablet of Multaq BID for cost-savings); tolerating anticoagulation.  He describes a continued good baseline functional status - no chest discomfort, dyspnea, or palpitations.  He describes persistent elevation of blood pressure.

## 2021-04-06 NOTE — DISCUSSION/SUMMARY
[___ Month(s)] : [unfilled] month(s) [With Me] : with me [FreeTextEntry1] : \par Atrial fibrillation: Paroxysmal and with low burden; continue Multaq, Xarelto, metoprolol.\par \par Sinus bradycardia: Chronic, mild and asymptomatic bradycardia.\par \par Hypertension: Suboptimally controlled; add amlodipine 2.5 mg daily; blood pressure will be assessed again soon by his primary care physician - if still elevated, would uptitrate to 5 mg daily and monitor for pedal edema.\par \par Renal insufficiency: Mild; stable.

## 2021-04-09 ENCOUNTER — OUTPATIENT (OUTPATIENT)
Dept: OUTPATIENT SERVICES | Facility: HOSPITAL | Age: 76
LOS: 1 days | End: 2021-04-09
Payer: MEDICARE

## 2021-04-09 ENCOUNTER — APPOINTMENT (OUTPATIENT)
Dept: CT IMAGING | Facility: CLINIC | Age: 76
End: 2021-04-09
Payer: MEDICARE

## 2021-04-09 DIAGNOSIS — Z90.89 ACQUIRED ABSENCE OF OTHER ORGANS: Chronic | ICD-10-CM

## 2021-04-09 DIAGNOSIS — Z98.890 OTHER SPECIFIED POSTPROCEDURAL STATES: Chronic | ICD-10-CM

## 2021-04-09 DIAGNOSIS — C64.9 MALIGNANT NEOPLASM OF UNSPECIFIED KIDNEY, EXCEPT RENAL PELVIS: ICD-10-CM

## 2021-04-09 DIAGNOSIS — Z90.5 ACQUIRED ABSENCE OF KIDNEY: Chronic | ICD-10-CM

## 2021-04-09 DIAGNOSIS — Z90.49 ACQUIRED ABSENCE OF OTHER SPECIFIED PARTS OF DIGESTIVE TRACT: Chronic | ICD-10-CM

## 2021-04-09 PROCEDURE — 82565 ASSAY OF CREATININE: CPT

## 2021-04-09 PROCEDURE — 71260 CT THORAX DX C+: CPT

## 2021-04-09 PROCEDURE — 74177 CT ABD & PELVIS W/CONTRAST: CPT

## 2021-04-09 PROCEDURE — 74177 CT ABD & PELVIS W/CONTRAST: CPT | Mod: 26,ME

## 2021-04-09 PROCEDURE — G1004: CPT

## 2021-04-09 PROCEDURE — 71260 CT THORAX DX C+: CPT | Mod: 26,ME

## 2021-04-12 ENCOUNTER — NON-APPOINTMENT (OUTPATIENT)
Age: 76
End: 2021-04-12

## 2021-04-14 ENCOUNTER — NON-APPOINTMENT (OUTPATIENT)
Age: 76
End: 2021-04-14

## 2021-04-14 ENCOUNTER — APPOINTMENT (OUTPATIENT)
Dept: UROLOGY | Facility: CLINIC | Age: 76
End: 2021-04-14
Payer: MEDICARE

## 2021-04-14 VITALS
OXYGEN SATURATION: 99 % | HEIGHT: 70 IN | WEIGHT: 180 LBS | TEMPERATURE: 97.8 F | HEART RATE: 67 BPM | BODY MASS INDEX: 25.77 KG/M2 | DIASTOLIC BLOOD PRESSURE: 72 MMHG | SYSTOLIC BLOOD PRESSURE: 130 MMHG

## 2021-04-14 PROCEDURE — 99214 OFFICE O/P EST MOD 30 MIN: CPT

## 2021-04-14 NOTE — PHYSICAL EXAM
[Normal Appearance] : normal appearance [Well Groomed] : well groomed [Abdomen Soft] : soft [Urethral Meatus] : meatus normal [Skin Color & Pigmentation] : normal skin color and pigmentation [Edema] : no peripheral edema [] : no respiratory distress [Oriented To Time, Place, And Person] : oriented to person, place, and time [Affect] : the affect was normal [Normal Station and Gait] : the gait and station were normal for the patient's age [No Focal Deficits] : no focal deficits [No Palpable Adenopathy] : no palpable adenopathy [No Prostate Nodules] : no prostate nodules [FreeTextEntry1] : YRIS normal. No nodules or asymmetry palpated.

## 2021-04-14 NOTE — ASSESSMENT
[FreeTextEntry1] : New 5 mm right upper lobe pulmonary nodule may reflect metastatic disease. \par Reviewed images with patient and advised patient to follow-up with Dr. Ibarra and Dr. Morfin for further evaluation of new lung nodule. 3 mm left lower lobe nodule. \par No evidence of localized recurrence to surrounding kidney area. \par PSA ordered.\par Pt. noted blood-tinged semen during masturbation. PSA for further evaluation. \par Will communicate with Dr. Morfin and Dr. Ibarra.\par PET scan may be needed for further evaluation. \par Instructed patient to schedule a follow-up with us after seeing Dr. Morfin and Dr. Ibarra. \par Approximately 3-4 weeks for follow-up.\par 30 minute discussion regarding kidney  cancer followup and review of imaging labs and writing notes\par

## 2021-04-14 NOTE — HISTORY OF PRESENT ILLNESS
[None] : no symptoms [FreeTextEntry1] : 75 yo presents today for a 6 month follow-up visit for RCC s/p right radical nephrectomy 12.5.19 for 10cm RCC. Pt. is seeing Dr. Mcdaniels from nephrology for follow-up.  BUN 20, creatinine 1.41 (11/2020). Here to  discuss the recent CT chest/abd/pelvis.

## 2021-04-16 ENCOUNTER — OUTPATIENT (OUTPATIENT)
Dept: OUTPATIENT SERVICES | Facility: HOSPITAL | Age: 76
LOS: 1 days | Discharge: HOME | End: 2021-04-16

## 2021-04-16 DIAGNOSIS — Z90.5 ACQUIRED ABSENCE OF KIDNEY: Chronic | ICD-10-CM

## 2021-04-16 DIAGNOSIS — Z98.890 OTHER SPECIFIED POSTPROCEDURAL STATES: Chronic | ICD-10-CM

## 2021-04-16 DIAGNOSIS — Z90.49 ACQUIRED ABSENCE OF OTHER SPECIFIED PARTS OF DIGESTIVE TRACT: Chronic | ICD-10-CM

## 2021-04-16 DIAGNOSIS — R71.8 OTHER ABNORMALITY OF RED BLOOD CELLS: ICD-10-CM

## 2021-04-16 DIAGNOSIS — Z90.89 ACQUIRED ABSENCE OF OTHER ORGANS: Chronic | ICD-10-CM

## 2021-04-16 LAB
ALBUMIN SERPL ELPH-MCNC: 4.5 G/DL
ALP BLD-CCNC: 102 U/L
ALT SERPL-CCNC: 17 U/L
ANION GAP SERPL CALC-SCNC: 11 MMOL/L
AST SERPL-CCNC: 20 U/L
BASOPHILS # BLD AUTO: 0.04 K/UL
BASOPHILS NFR BLD AUTO: 0.8 %
BILIRUB SERPL-MCNC: 1.5 MG/DL
BUN SERPL-MCNC: 27 MG/DL
CALCIUM SERPL-MCNC: 9.6 MG/DL
CHLORIDE SERPL-SCNC: 103 MMOL/L
CO2 SERPL-SCNC: 26 MMOL/L
CREAT SERPL-MCNC: 1.47 MG/DL
EOSINOPHIL # BLD AUTO: 0.16 K/UL
EOSINOPHIL NFR BLD AUTO: 3.1 %
FERRITIN SERPL-MCNC: 78 NG/ML
GLUCOSE SERPL-MCNC: 118 MG/DL
HCT VFR BLD CALC: 51.1 %
HGB BLD-MCNC: 16.9 G/DL
IMM GRANULOCYTES NFR BLD AUTO: 0.4 %
LYMPHOCYTES # BLD AUTO: 0.91 K/UL
LYMPHOCYTES NFR BLD AUTO: 17.8 %
MAN DIFF?: NORMAL
MCHC RBC-ENTMCNC: 30.6 PG
MCHC RBC-ENTMCNC: 33.1 GM/DL
MCV RBC AUTO: 92.6 FL
MONOCYTES # BLD AUTO: 0.39 K/UL
MONOCYTES NFR BLD AUTO: 7.6 %
NEUTROPHILS # BLD AUTO: 3.6 K/UL
NEUTROPHILS NFR BLD AUTO: 70.3 %
PLATELET # BLD AUTO: 163 K/UL
POTASSIUM SERPL-SCNC: 4.5 MMOL/L
PROT SERPL-MCNC: 7 G/DL
PSA FREE FLD-MCNC: 35 %
PSA FREE SERPL-MCNC: 0.84 NG/ML
PSA SERPL-MCNC: 2.37 NG/ML
RBC # BLD: 5.52 M/UL
RBC # FLD: 12.5 %
SODIUM SERPL-SCNC: 140 MMOL/L
WBC # FLD AUTO: 5.12 K/UL

## 2021-04-19 ENCOUNTER — APPOINTMENT (OUTPATIENT)
Age: 76
End: 2021-04-19
Payer: MEDICARE

## 2021-04-19 VITALS
DIASTOLIC BLOOD PRESSURE: 73 MMHG | WEIGHT: 178.13 LBS | HEART RATE: 58 BPM | RESPIRATION RATE: 16 BRPM | TEMPERATURE: 96.3 F | HEIGHT: 68.11 IN | SYSTOLIC BLOOD PRESSURE: 150 MMHG | BODY MASS INDEX: 27 KG/M2 | OXYGEN SATURATION: 97 %

## 2021-04-19 PROCEDURE — 99214 OFFICE O/P EST MOD 30 MIN: CPT

## 2021-04-19 NOTE — PHYSICAL EXAM
[Restricted in physically strenuous activity but ambulatory and able to carry out work of a light or sedentary nature] : Status 1- Restricted in physically strenuous activity but ambulatory and able to carry out work of a light or sedentary nature, e.g., light house work, office work [Normal] : full range of motion and no deformities appreciated [de-identified] : s/p redical nephrectomy - right [de-identified] : right lower quadrant horizontal nephrectomy scar well healed

## 2021-04-19 NOTE — HISTORY OF PRESENT ILLNESS
[de-identified] : 76 M with past medical history of osteoarthritis, paroxysmal atrial fibrillation ( + loop recorder; on anticoagulation with Rivaroxaban), right renal carcinoma s/p nephrectomy 12/5/19, and erythrocytosis.  [FreeTextEntry1] : expectant surveillance\par \par \par  [de-identified] : Mr. TAM is here for hematologic follow-up of erythrocytosis.  Patient was last seen in the office a year ago.  He appears clinically stable and denies new constitutional symptoms.  Recent CT C/A/P done 4/9/2021 consistent with no evidence for residual or recurrent tumor in the right nephrectomy bed. A 5 mm RUL pulmonary nodule new compared to previous CT; metastatic disease cannot be ruled out. Patient was diagnosed with right renal carcinoma (status post nephrectomy 12/5/2019) .No recent hospitalization reported.  Hematologic profile consistent with hemoglobin 16.9 g/dL.in the past year patient did not require therapeutic phlebotomies or cytoreductive therapy.  Medication list reviewed. Reports no overt bleeding tendency. No recent infections, fevers, no use of growth factor support, etc. No changes in weight; appetite preserved.

## 2021-04-20 ENCOUNTER — APPOINTMENT (OUTPATIENT)
Dept: INTERNAL MEDICINE | Facility: CLINIC | Age: 76
End: 2021-04-20
Payer: MEDICARE

## 2021-04-20 VITALS
DIASTOLIC BLOOD PRESSURE: 62 MMHG | RESPIRATION RATE: 16 BRPM | HEIGHT: 68.11 IN | OXYGEN SATURATION: 98 % | TEMPERATURE: 98.6 F | SYSTOLIC BLOOD PRESSURE: 126 MMHG | BODY MASS INDEX: 27.28 KG/M2 | HEART RATE: 56 BPM | WEIGHT: 180 LBS

## 2021-04-20 PROCEDURE — 99214 OFFICE O/P EST MOD 30 MIN: CPT

## 2021-04-20 RX ORDER — FAMOTIDINE 20 MG/1
20 TABLET, FILM COATED ORAL TWICE DAILY
Refills: 0 | Status: ACTIVE | COMMUNITY
Start: 1900-01-01 | End: 1900-01-01

## 2021-04-20 RX ORDER — PSYLLIUM SEED
PACKET (EA) ORAL
Refills: 0 | Status: ACTIVE | COMMUNITY

## 2021-04-20 NOTE — HISTORY OF PRESENT ILLNESS
[FreeTextEntry1] : followup evaluation [de-identified] : Mr. Johnson presents for followup evaluation. History of a renal cell carcinoma status post left nephrectomy in 2019. Patient to followup CT scan of the chest approximately 2 weeks ago which shows a new 5 mm peripheral nodule in the right upper lobe. Patient denies any chest pain, shortness of breath palpitations. He has no nocturnal symptoms of cough or dyspnea.

## 2021-04-20 NOTE — PLAN
[FreeTextEntry1] : Mr. Johnson presents for a followup evaluation. He is history of renal cell carcinoma. CT scan of the chest shows a new 5 mm right upper lobe nodule. This is not seen October 2020. Patient will have a followup CAT scan in 3 months to assess for any change in size of the nodule. He will continue followup with urology and hematology. Patient has a history of polycythemia. At present, no diagnostic intervention is necessary.

## 2021-04-22 ENCOUNTER — NON-APPOINTMENT (OUTPATIENT)
Age: 76
End: 2021-04-22

## 2021-04-22 ENCOUNTER — APPOINTMENT (OUTPATIENT)
Dept: ELECTROPHYSIOLOGY | Facility: CLINIC | Age: 76
End: 2021-04-22
Payer: MEDICARE

## 2021-04-22 PROCEDURE — 93298 REM INTERROG DEV EVAL SCRMS: CPT

## 2021-04-22 PROCEDURE — G2066: CPT

## 2021-05-07 LAB
BASOPHILS # BLD AUTO: 0.04 K/UL
BASOPHILS NFR BLD AUTO: 0.7 %
EOSINOPHIL # BLD AUTO: 0.19 K/UL
EOSINOPHIL NFR BLD AUTO: 3.5 %
HCT VFR BLD CALC: 48.5 %
HGB BLD-MCNC: 16 G/DL
IMM GRANULOCYTES NFR BLD AUTO: 0.4 %
LYMPHOCYTES # BLD AUTO: 1.04 K/UL
LYMPHOCYTES NFR BLD AUTO: 19 %
MAN DIFF?: NORMAL
MCHC RBC-ENTMCNC: 30 PG
MCHC RBC-ENTMCNC: 33 GM/DL
MCV RBC AUTO: 91 FL
MONOCYTES # BLD AUTO: 0.54 K/UL
MONOCYTES NFR BLD AUTO: 9.9 %
NEUTROPHILS # BLD AUTO: 3.63 K/UL
NEUTROPHILS NFR BLD AUTO: 66.5 %
PLATELET # BLD AUTO: 166 K/UL
RBC # BLD: 5.33 M/UL
RBC # FLD: 12.5 %
WBC # FLD AUTO: 5.46 K/UL

## 2021-05-09 LAB — FERRITIN SERPL-MCNC: 106 NG/ML

## 2021-05-12 ENCOUNTER — APPOINTMENT (OUTPATIENT)
Dept: UROLOGY | Facility: CLINIC | Age: 76
End: 2021-05-12
Payer: MEDICARE

## 2021-05-12 VITALS
OXYGEN SATURATION: 98 % | SYSTOLIC BLOOD PRESSURE: 122 MMHG | WEIGHT: 180 LBS | TEMPERATURE: 97 F | DIASTOLIC BLOOD PRESSURE: 66 MMHG | HEART RATE: 58 BPM | HEIGHT: 68 IN | BODY MASS INDEX: 27.28 KG/M2

## 2021-05-12 PROCEDURE — 99213 OFFICE O/P EST LOW 20 MIN: CPT

## 2021-05-12 NOTE — PHYSICAL EXAM
[Normal Appearance] : normal appearance [Well Groomed] : well groomed [Skin Color & Pigmentation] : normal skin color and pigmentation [] : no respiratory distress [Exaggerated Use Of Accessory Muscles For Inspiration] : no accessory muscle use [Oriented To Time, Place, And Person] : oriented to person, place, and time [Affect] : the affect was normal [Normal Station and Gait] : the gait and station were normal for the patient's age [No Focal Deficits] : no focal deficits [Abdomen Soft] : soft [Abdomen Tenderness] : non-tender [Costovertebral Angle Tenderness] : no ~M costovertebral angle tenderness [FreeTextEntry1] : right incisional hernia

## 2021-05-12 NOTE — HISTORY OF PRESENT ILLNESS
[None] : no symptoms [FreeTextEntry1] : 75 yo presents today for a 6 month follow-up visit for RCC s/p right radical nephrectomy 12.5.19 for 10cm RCC. Pt. is seeing Dr. Mcdaniels from nephrology for follow-up.  BUN 20, creatinine 1.41 (11/2020). \par \par CT A&P demonstrated new lung nodule. \par Seen by Dr. Morfin and Dr. Ibarra, recommendation was to repeat Chest CT in 3 months.

## 2021-05-12 NOTE — ASSESSMENT
[FreeTextEntry1] : Repeat Chest CT in 3 months as recommended\par Follow up with Dr. Morfin and Dr. Ibarra \par 20 minute discussion regarding kidney cancer followup, review of consultant notes and assessment ofg small pulmonary nodule\par \par \par

## 2021-05-23 ENCOUNTER — NON-APPOINTMENT (OUTPATIENT)
Age: 76
End: 2021-05-23

## 2021-05-24 ENCOUNTER — APPOINTMENT (OUTPATIENT)
Dept: ELECTROPHYSIOLOGY | Facility: CLINIC | Age: 76
End: 2021-05-24
Payer: MEDICARE

## 2021-05-24 PROCEDURE — 93298 REM INTERROG DEV EVAL SCRMS: CPT

## 2021-05-24 PROCEDURE — G2066: CPT

## 2021-06-25 ENCOUNTER — NON-APPOINTMENT (OUTPATIENT)
Age: 76
End: 2021-06-25

## 2021-06-25 ENCOUNTER — APPOINTMENT (OUTPATIENT)
Dept: ELECTROPHYSIOLOGY | Facility: CLINIC | Age: 76
End: 2021-06-25
Payer: MEDICARE

## 2021-06-25 PROCEDURE — 93298 REM INTERROG DEV EVAL SCRMS: CPT

## 2021-06-25 PROCEDURE — G2066: CPT

## 2021-07-09 ENCOUNTER — APPOINTMENT (OUTPATIENT)
Dept: ELECTROPHYSIOLOGY | Facility: CLINIC | Age: 76
End: 2021-07-09
Payer: MEDICARE

## 2021-07-09 VITALS
BODY MASS INDEX: 27.58 KG/M2 | WEIGHT: 182 LBS | HEART RATE: 59 BPM | SYSTOLIC BLOOD PRESSURE: 149 MMHG | RESPIRATION RATE: 16 BRPM | HEIGHT: 68 IN | OXYGEN SATURATION: 100 % | DIASTOLIC BLOOD PRESSURE: 68 MMHG

## 2021-07-09 PROCEDURE — 99214 OFFICE O/P EST MOD 30 MIN: CPT

## 2021-07-09 PROCEDURE — 93285 PRGRMG DEV EVAL SCRMS IP: CPT

## 2021-07-16 ENCOUNTER — OUTPATIENT (OUTPATIENT)
Dept: OUTPATIENT SERVICES | Facility: HOSPITAL | Age: 76
LOS: 1 days | Discharge: HOME | End: 2021-07-16

## 2021-07-16 DIAGNOSIS — Z90.49 ACQUIRED ABSENCE OF OTHER SPECIFIED PARTS OF DIGESTIVE TRACT: Chronic | ICD-10-CM

## 2021-07-16 DIAGNOSIS — Z98.890 OTHER SPECIFIED POSTPROCEDURAL STATES: Chronic | ICD-10-CM

## 2021-07-16 DIAGNOSIS — Z90.5 ACQUIRED ABSENCE OF KIDNEY: Chronic | ICD-10-CM

## 2021-07-16 DIAGNOSIS — R71.8 OTHER ABNORMALITY OF RED BLOOD CELLS: ICD-10-CM

## 2021-07-16 DIAGNOSIS — Z90.89 ACQUIRED ABSENCE OF OTHER ORGANS: Chronic | ICD-10-CM

## 2021-07-19 ENCOUNTER — APPOINTMENT (OUTPATIENT)
Age: 76
End: 2021-07-19
Payer: MEDICARE

## 2021-07-19 VITALS
BODY MASS INDEX: 26.63 KG/M2 | RESPIRATION RATE: 16 BRPM | SYSTOLIC BLOOD PRESSURE: 139 MMHG | WEIGHT: 181.88 LBS | OXYGEN SATURATION: 97 % | TEMPERATURE: 98.2 F | HEIGHT: 69.37 IN | HEART RATE: 58 BPM | DIASTOLIC BLOOD PRESSURE: 66 MMHG

## 2021-07-19 PROCEDURE — 99213 OFFICE O/P EST LOW 20 MIN: CPT

## 2021-07-19 NOTE — PHYSICAL EXAM
[Restricted in physically strenuous activity but ambulatory and able to carry out work of a light or sedentary nature] : Status 1- Restricted in physically strenuous activity but ambulatory and able to carry out work of a light or sedentary nature, e.g., light house work, office work [Normal] : full range of motion and no deformities appreciated [de-identified] : s/p redical nephrectomy - right [de-identified] : right lower quadrant horizontal nephrectomy scar well healed

## 2021-07-19 NOTE — ASSESSMENT
[FreeTextEntry1] : Mr. TAM 's questions were answered to his satisfaction. He  expressed his  understanding and willingness to comply with the above recommendations, and  will return to the office in 3 months.\par \par \par \par \par \par \par

## 2021-07-19 NOTE — HISTORY OF PRESENT ILLNESS
[de-identified] : 76 M with past medical history of osteoarthritis, paroxysmal atrial fibrillation ( + loop recorder; on anticoagulation with Rivaroxaban), right renal carcinoma s/p nephrectomy 12/5/19, and erythrocytosis.  [FreeTextEntry1] : expectant surveillance\par \par \par  [de-identified] : Short-term follow-up for erythrocytosis; last seen in the office in April 2021.  He is status post right nephrectomy in December 2019, with a recent negative panCT (stable 5 mm RUL pulmonary nodule).  Reports no new constitutional symptoms.  Hematologic profile consistent with normal hemoglobin around 16 g/dL, and a serum ferritin in normal range (see below). Mr. TAM has been clinically stable during this interval, and did not require therapeutic phlebotomies.  Reports no recent infections, fevers, hospitalizations, changes in appetite or weight. Patient's medication list reviewed; amlodipine added. Had recent new  loop recorder inserted/ interrogated.Continues to work.

## 2021-07-21 NOTE — ASSESSMENT
[FreeTextEntry1] : Denny Johnson is a 76-year-old man with paroxysmal atrial fibrillation, hypertension, and  renal cell carcinoma.\par \par No events recorded on ILR\par Rhythm is normal sinus\par No PAF seen\par Continue Xarelto\par Continue monthly remote monitoring\par F/U in 6 mo.

## 2021-07-21 NOTE — CARDIOLOGY SUMMARY
[de-identified] : 4/6/21 : Sinus angela 58 NS QRS widening.  [de-identified] : 7/9/21 : Linq Interrogation No arrhythmia detected.  [de-identified] : 12/14/18, no Ischemia,   [de-identified] : 12/10/18, Normal left ventricular size and systolic function. Mild diastolic dysfunction. Mild aortic regurgitation. Mild tricuspid regurgitation. Minimal pulmonic regurgitation. LVEF 68%.

## 2021-07-30 ENCOUNTER — APPOINTMENT (OUTPATIENT)
Dept: ELECTROPHYSIOLOGY | Facility: CLINIC | Age: 76
End: 2021-07-30
Payer: MEDICARE

## 2021-07-30 ENCOUNTER — NON-APPOINTMENT (OUTPATIENT)
Age: 76
End: 2021-07-30

## 2021-07-30 PROCEDURE — 93298 REM INTERROG DEV EVAL SCRMS: CPT

## 2021-07-30 PROCEDURE — G2066: CPT

## 2021-08-03 ENCOUNTER — RX CHANGE (OUTPATIENT)
Age: 76
End: 2021-08-03

## 2021-08-03 ENCOUNTER — APPOINTMENT (OUTPATIENT)
Dept: CARDIOLOGY | Facility: CLINIC | Age: 76
End: 2021-08-03
Payer: MEDICARE

## 2021-08-03 ENCOUNTER — NON-APPOINTMENT (OUTPATIENT)
Age: 76
End: 2021-08-03

## 2021-08-03 VITALS
OXYGEN SATURATION: 99 % | SYSTOLIC BLOOD PRESSURE: 132 MMHG | WEIGHT: 178 LBS | HEART RATE: 49 BPM | HEIGHT: 69 IN | BODY MASS INDEX: 26.36 KG/M2 | DIASTOLIC BLOOD PRESSURE: 60 MMHG

## 2021-08-03 PROCEDURE — 93000 ELECTROCARDIOGRAM COMPLETE: CPT

## 2021-08-03 PROCEDURE — 99214 OFFICE O/P EST MOD 30 MIN: CPT

## 2021-08-03 NOTE — PHYSICAL EXAM
[Normal Appearance] : normal appearance [Well Groomed] : well groomed [General Appearance - In No Acute Distress] : no acute distress [Respiration, Rhythm And Depth] : normal respiratory rhythm and effort [Auscultation Breath Sounds / Voice Sounds] : lungs were clear to auscultation bilaterally [Heart Sounds] : normal S1 and S2 [Edema] : no peripheral edema present [Abnormal Walk] : normal gait [Oriented To Time, Place, And Person] : oriented to person, place, and time [Impaired Insight] : insight and judgment were intact [Affect] : the affect was normal [Mood] : the mood was normal [FreeTextEntry1] : Bradycardic (mild)

## 2021-08-03 NOTE — HISTORY OF PRESENT ILLNESS
[FreeTextEntry1] : Denny Johnson is a 76-year-old man with a history of paroxysmal atrial fibrillation, implantable loop monitor, hypertension, renal insufficiency, renal cell carcinoma status post right laparoscopic radical nephrectomy (12/2019), incisional hernia s/p repair (11/2020) who returns for cardiac examination - our last encounter was in April 2021; he subsequently met with Nigel Rodríguez, Stephanie, and Fred -- doing well.  He continues to feel well and offers no new complaints - in the process of moving to a new residence.  He denies angina, dyspnea, palpitations; requests a refill for sildenafil.

## 2021-08-03 NOTE — REVIEW OF SYSTEMS
[Weight Gain (___ Lbs)] : no recent weight gain [Weight Loss (___ Lbs)] : no recent weight loss [SOB] : no shortness of breath [Dyspnea on exertion] : not dyspnea during exertion [Palpitations] : no palpitations

## 2021-08-03 NOTE — DISCUSSION/SUMMARY
[With Me] : with me [___ Month(s)] : in [unfilled] month(s) [FreeTextEntry1] : \par Atrial fibrillation: Atrial fibrillation is paroxysmal (presently in sinus rhythm); continue present management with Multaq, Xarelto, and metoprolol.\par \par Sinus bradycardia: Chronic sinus bradycardia; remains asymptomatic; excellent baseline functional status.\par \par Hypertension: Blood pressure has improved and is now satisfactorily control; continue low dose amlodipine.\par \par Erectile dysfunction: Patient requested that I renew his sildenafil - sent to his local pharmacy.\par

## 2021-08-13 ENCOUNTER — OUTPATIENT (OUTPATIENT)
Dept: OUTPATIENT SERVICES | Facility: HOSPITAL | Age: 76
LOS: 1 days | End: 2021-08-13
Payer: MEDICARE

## 2021-08-13 ENCOUNTER — APPOINTMENT (OUTPATIENT)
Dept: CT IMAGING | Facility: CLINIC | Age: 76
End: 2021-08-13
Payer: MEDICARE

## 2021-08-13 DIAGNOSIS — Z98.890 OTHER SPECIFIED POSTPROCEDURAL STATES: Chronic | ICD-10-CM

## 2021-08-13 DIAGNOSIS — Z90.5 ACQUIRED ABSENCE OF KIDNEY: Chronic | ICD-10-CM

## 2021-08-13 DIAGNOSIS — C64.9 MALIGNANT NEOPLASM OF UNSPECIFIED KIDNEY, EXCEPT RENAL PELVIS: ICD-10-CM

## 2021-08-13 DIAGNOSIS — Z90.89 ACQUIRED ABSENCE OF OTHER ORGANS: Chronic | ICD-10-CM

## 2021-08-13 DIAGNOSIS — Z90.49 ACQUIRED ABSENCE OF OTHER SPECIFIED PARTS OF DIGESTIVE TRACT: Chronic | ICD-10-CM

## 2021-08-13 PROCEDURE — G1004: CPT

## 2021-08-13 PROCEDURE — 71260 CT THORAX DX C+: CPT | Mod: ME

## 2021-08-13 PROCEDURE — 82565 ASSAY OF CREATININE: CPT

## 2021-08-13 PROCEDURE — 71260 CT THORAX DX C+: CPT | Mod: 26,ME

## 2021-08-16 LAB
ALBUMIN SERPL ELPH-MCNC: 4.5 G/DL
ALP BLD-CCNC: 100 U/L
ALT SERPL-CCNC: 17 U/L
ANION GAP SERPL CALC-SCNC: 15 MMOL/L
APPEARANCE: CLEAR
AST SERPL-CCNC: 21 U/L
BACTERIA: NEGATIVE
BASOPHILS # BLD AUTO: 0.05 K/UL
BASOPHILS NFR BLD AUTO: 0.8 %
BILIRUB SERPL-MCNC: 1.1 MG/DL
BILIRUBIN URINE: NEGATIVE
BLOOD URINE: NEGATIVE
BUN SERPL-MCNC: 20 MG/DL
CALCIUM SERPL-MCNC: 9.3 MG/DL
CHLORIDE SERPL-SCNC: 103 MMOL/L
CHOLEST SERPL-MCNC: 193 MG/DL
CO2 SERPL-SCNC: 21 MMOL/L
COLOR: NORMAL
CREAT SERPL-MCNC: 1.4 MG/DL
EOSINOPHIL # BLD AUTO: 0.28 K/UL
EOSINOPHIL NFR BLD AUTO: 4.5 %
GLUCOSE QUALITATIVE U: NEGATIVE
GLUCOSE SERPL-MCNC: 95 MG/DL
HCT VFR BLD CALC: 49.5 %
HDLC SERPL-MCNC: 45 MG/DL
HGB BLD-MCNC: 16.4 G/DL
HYALINE CASTS: 0 /LPF
IMM GRANULOCYTES NFR BLD AUTO: 0.3 %
IRON SATN MFR SERPL: 14 %
IRON SERPL-MCNC: 48 UG/DL
KETONES URINE: NEGATIVE
LDLC SERPL CALC-MCNC: 108 MG/DL
LEUKOCYTE ESTERASE URINE: NEGATIVE
LYMPHOCYTES # BLD AUTO: 1.29 K/UL
LYMPHOCYTES NFR BLD AUTO: 20.5 %
MAN DIFF?: NORMAL
MCHC RBC-ENTMCNC: 31.2 PG
MCHC RBC-ENTMCNC: 33.1 GM/DL
MCV RBC AUTO: 94.1 FL
MICROSCOPIC-UA: NORMAL
MONOCYTES # BLD AUTO: 0.56 K/UL
MONOCYTES NFR BLD AUTO: 8.9 %
NEUTROPHILS # BLD AUTO: 4.08 K/UL
NEUTROPHILS NFR BLD AUTO: 65 %
NITRITE URINE: NEGATIVE
NONHDLC SERPL-MCNC: 148 MG/DL
PH URINE: 7
PLATELET # BLD AUTO: 172 K/UL
POTASSIUM SERPL-SCNC: 4.8 MMOL/L
PROT SERPL-MCNC: 6.9 G/DL
PROTEIN URINE: NORMAL
PSA SERPL-MCNC: 2.98 NG/ML
RBC # BLD: 5.26 M/UL
RBC # FLD: 12.8 %
RED BLOOD CELLS URINE: 1 /HPF
SODIUM SERPL-SCNC: 139 MMOL/L
SPECIFIC GRAVITY URINE: 1.01
SQUAMOUS EPITHELIAL CELLS: 0 /HPF
TIBC SERPL-MCNC: 348 UG/DL
TRIGL SERPL-MCNC: 203 MG/DL
TSH SERPL-ACNC: 3.35 UIU/ML
UIBC SERPL-MCNC: 300 UG/DL
UROBILINOGEN URINE: NORMAL
WBC # FLD AUTO: 6.28 K/UL
WHITE BLOOD CELLS URINE: 0 /HPF

## 2021-08-23 ENCOUNTER — APPOINTMENT (OUTPATIENT)
Dept: INTERNAL MEDICINE | Facility: CLINIC | Age: 76
End: 2021-08-23
Payer: MEDICARE

## 2021-08-23 VITALS
WEIGHT: 182 LBS | TEMPERATURE: 98.7 F | OXYGEN SATURATION: 97 % | HEIGHT: 69 IN | BODY MASS INDEX: 26.96 KG/M2 | HEART RATE: 55 BPM | DIASTOLIC BLOOD PRESSURE: 60 MMHG | SYSTOLIC BLOOD PRESSURE: 130 MMHG | RESPIRATION RATE: 16 BRPM

## 2021-08-23 PROCEDURE — 99212 OFFICE O/P EST SF 10 MIN: CPT | Mod: 25

## 2021-08-23 PROCEDURE — G0439: CPT

## 2021-08-23 NOTE — HISTORY OF PRESENT ILLNESS
[FreeTextEntry1] : Physical examination [de-identified] : Mr. Johnson presents for an annual physical examination. He is feeling well. The patient has a history of a right nephrectomy approximately 18 months ago for renal cell carcinoma. He had a CT scan of the chest in April which showed a 5-6 mm right upper lobe, peripheral nodule. A recent CT scan on 8/14 showed increased in size and the nodule to 9.2 mm. Patient denies any chest pain, shortness of breath palpitations or

## 2021-08-23 NOTE — PLAN
[FreeTextEntry1] : Mr. Johnson presents for a annual physical examination. Comprehensive blood profile was reviewed with the patient. Patient had a CT scan of the chest which shows a 9 mm right upper lobe nodule which has increased in size compared to April of this year. Mr. Johnson will be referred to Dr. Schmitt for thoracic surgery evaluation. He will most likely need a CT/PET scan and then possible biopsy or resection. Mr. Johnson will continue to followup with  for urology. Follow up in this office next week with complete pulmonary function tests.

## 2021-08-23 NOTE — HEALTH RISK ASSESSMENT
[Yes] : Yes [2 - 3 times a week (3 pts)] : 2 - 3  times a week (3 points) [1 or 2 (0 pts)] : 1 or 2 (0 points) [Never (0 pts)] : Never (0 points) [No] : In the past 12 months have you used drugs other than those required for medical reasons? No [0] : 2) Feeling down, depressed, or hopeless: Not at all (0) [Patient reported colonoscopy was normal] : Patient reported colonoscopy was normal [Very Good] : ~his/her~  mood as very good [] : No [ColonoscopyDate] : 2018

## 2021-08-27 DIAGNOSIS — Z01.812 ENCOUNTER FOR PREPROCEDURAL LABORATORY EXAMINATION: ICD-10-CM

## 2021-08-27 DIAGNOSIS — Z20.822 ENCOUNTER FOR PREPROCEDURAL LABORATORY EXAMINATION: ICD-10-CM

## 2021-08-30 ENCOUNTER — APPOINTMENT (OUTPATIENT)
Dept: UROLOGY | Facility: CLINIC | Age: 76
End: 2021-08-30
Payer: MEDICARE

## 2021-08-30 VITALS
SYSTOLIC BLOOD PRESSURE: 132 MMHG | HEIGHT: 69 IN | DIASTOLIC BLOOD PRESSURE: 73 MMHG | BODY MASS INDEX: 25.92 KG/M2 | HEART RATE: 55 BPM | WEIGHT: 175 LBS

## 2021-08-30 PROCEDURE — 99213 OFFICE O/P EST LOW 20 MIN: CPT

## 2021-08-30 NOTE — HISTORY OF PRESENT ILLNESS
[FreeTextEntry1] : Here for review of Chest CT\par had radical nephrectomy\par Dec 2019    Feels well \par PSA 2.98 in Aug 2021

## 2021-08-30 NOTE — ASSESSMENT
[FreeTextEntry1] : Personally reviewed CT and the results with patient,   Recommendation would be a 3 month followup\par Dr Ibarra referred to chest surgery\par Having a pulmonary test with Dr Ibarra\par 20 min discussion with kidney cancer followup, imaging review and future followup planning\par

## 2021-08-30 NOTE — PHYSICAL EXAM
[General Appearance - Well Developed] : well developed [General Appearance - Well Nourished] : well nourished [Normal Appearance] : normal appearance [Well Groomed] : well groomed [General Appearance - In No Acute Distress] : no acute distress [Abdomen Soft] : soft [Abdomen Tenderness] : non-tender [Costovertebral Angle Tenderness] : no ~M costovertebral angle tenderness [Urethral Meatus] : meatus normal [No Prostate Nodules] : no prostate nodules [Edema] : no peripheral edema [] : no respiratory distress [Respiration, Rhythm And Depth] : normal respiratory rhythm and effort [Exaggerated Use Of Accessory Muscles For Inspiration] : no accessory muscle use [Oriented To Time, Place, And Person] : oriented to person, place, and time [Affect] : the affect was normal [Mood] : the mood was normal [Not Anxious] : not anxious [Normal Station and Gait] : the gait and station were normal for the patient's age [No Focal Deficits] : no focal deficits [No Palpable Adenopathy] : no palpable adenopathy [FreeTextEntry1] : YRIS normal. No nodules or asymmetry palpated.

## 2021-08-31 LAB — SARS-COV-2 N GENE NPH QL NAA+PROBE: NOT DETECTED

## 2021-09-02 ENCOUNTER — APPOINTMENT (OUTPATIENT)
Dept: INTERNAL MEDICINE | Facility: CLINIC | Age: 76
End: 2021-09-02
Payer: MEDICARE

## 2021-09-02 VITALS
DIASTOLIC BLOOD PRESSURE: 60 MMHG | SYSTOLIC BLOOD PRESSURE: 120 MMHG | HEIGHT: 68 IN | TEMPERATURE: 97.6 F | WEIGHT: 183 LBS | RESPIRATION RATE: 16 BRPM | BODY MASS INDEX: 27.74 KG/M2 | OXYGEN SATURATION: 98 % | HEART RATE: 65 BPM

## 2021-09-02 PROCEDURE — 94729 DIFFUSING CAPACITY: CPT

## 2021-09-02 PROCEDURE — 99214 OFFICE O/P EST MOD 30 MIN: CPT | Mod: 25

## 2021-09-02 PROCEDURE — 94727 GAS DIL/WSHOT DETER LNG VOL: CPT

## 2021-09-02 PROCEDURE — 94010 BREATHING CAPACITY TEST: CPT

## 2021-09-02 NOTE — PLAN
[FreeTextEntry1] : Mr. Johnson presents for a followup evaluation. He has a new nodule on CT scan of chest. He will have a cardiothoracic evaluation by Dr. Schmitt. Patient will also continue to followup with Dr. Brown for urology. Followup in this office in 6 months.

## 2021-09-02 NOTE — HISTORY OF PRESENT ILLNESS
[FreeTextEntry1] : followup letter is [de-identified] : Mr. Johnson presents for a followup evaluation. He has a history of renal cell carcinoma. Recent CT scan of the chest showed a new 8 mm nodule in the right upper lobe. He has appointment with Dr. Schmitt for thoracic evaluation.

## 2021-09-03 ENCOUNTER — NON-APPOINTMENT (OUTPATIENT)
Age: 76
End: 2021-09-03

## 2021-09-03 ENCOUNTER — APPOINTMENT (OUTPATIENT)
Dept: ELECTROPHYSIOLOGY | Facility: CLINIC | Age: 76
End: 2021-09-03
Payer: MEDICARE

## 2021-09-03 PROCEDURE — 93298 REM INTERROG DEV EVAL SCRMS: CPT

## 2021-09-03 PROCEDURE — G2066: CPT

## 2021-09-08 ENCOUNTER — APPOINTMENT (OUTPATIENT)
Dept: THORACIC SURGERY | Facility: CLINIC | Age: 76
End: 2021-09-08
Payer: MEDICARE

## 2021-09-08 VITALS
HEIGHT: 70 IN | SYSTOLIC BLOOD PRESSURE: 144 MMHG | WEIGHT: 175 LBS | OXYGEN SATURATION: 99 % | RESPIRATION RATE: 18 BRPM | TEMPERATURE: 98.1 F | DIASTOLIC BLOOD PRESSURE: 78 MMHG | HEART RATE: 52 BPM | BODY MASS INDEX: 25.05 KG/M2

## 2021-09-08 DIAGNOSIS — Z23 ENCOUNTER FOR IMMUNIZATION: ICD-10-CM

## 2021-09-08 DIAGNOSIS — Z86.79 PERSONAL HISTORY OF OTHER DISEASES OF THE CIRCULATORY SYSTEM: ICD-10-CM

## 2021-09-08 PROCEDURE — 99205 OFFICE O/P NEW HI 60 MIN: CPT

## 2021-09-08 NOTE — CONSULT LETTER
[Dear  ___] : Dear  [unfilled], [Consult Letter:] : I had the pleasure of evaluating your patient, [unfilled]. [Please see my note below.] : Please see my note below. [Consult Closing:] : Thank you very much for allowing me to participate in the care of this patient.  If you have any questions, please do not hesitate to contact me. [Sincerely,] : Sincerely, [DrConstance  ___] : Dr. PARKER [DrConstance ___] : Dr. PARKER [FreeTextEntry2] : Dr. Ibarra  [FreeTextEntry3] : Radha Schmitt MD\par [facility]\par [address] \[place] [address]\par Tel: [phone]\par Fax: [phone]\par \par

## 2021-09-08 NOTE — DATA REVIEWED
[FreeTextEntry1] : \par 9.2.221 PFT from [facility] Reviewed \par FEV1 77% \par \par 8.17.21 CT Chest w IV Contrast from [facility] [place]       \par -New right upper lobe 8 mm peripheral nodule which is indeterminate. Given history of malignancy 3 month follow-up suggested.\par -Remaining tree-in-bud and solid nodules as described above are stable.\par \par 4.9.21 CT Chest with IV Contrast \par - 5 mm right upper lobe pulmonary nodule may reflect metastasis and is new since prior chest CT of 11/8/2019. Close follow-up is recommended.\par -Thickening versus underdistention of the urinary bladder. Correlate for cystitis.\par -No evidence for residual or recurrent tumor in the right nephrectomy bed.\par \par

## 2021-09-08 NOTE — PHYSICAL EXAM
[General Appearance - Alert] : alert [Sclera] : the sclera and conjunctiva were normal [Hearing Threshold Finger Rub Not Cotton] : hearing was normal [Neck Cervical Mass (___cm)] : no neck mass was observed [Exaggerated Use Of Accessory Muscles For Inspiration] : no accessory muscle use [Respiration, Rhythm And Depth] : normal respiratory rhythm and effort [Auscultation Breath Sounds / Voice Sounds] : lungs were clear to auscultation bilaterally [Examination Of The Chest] : the chest was normal in appearance [Chest Visual Inspection Thoracic Asymmetry] : no chest asymmetry [Abnormal Walk] : normal gait [] : no rash [Sensation] : the sensory exam was normal to light touch and pinprick [Motor Exam] : the motor exam was normal [Oriented To Time, Place, And Person] : oriented to person, place, and time [FreeTextEntry1] : bradycardic but regular

## 2021-09-08 NOTE — ASSESSMENT
[FreeTextEntry1] : I had the pleasure of evaluating Mr. Molina.  Today I have  discussed his 8.17.21 CT Chest with  IV contrast that demonstrated a new right upper lobe 8 mm peripheral nodule which is indeterminate. and a remaining tree-in-bud and solid nodules  that is  stable.Today he denies  dizziness, shortness of breath with climbing up/down stairs or with exertion, unintentional weight loss, cough, fever or  chills.Of note he reports history of asbestos exposure and a history of renal cancer (s/p right nephrectomy without chemo/radiation).\par \par I have reviewed the patient's medical records and diagnostic images during the time of this office visit, and I have made the following recommendations to schedule a flex bronchoscopy, right robotic assisted VATs, lung resection, lymph node dissection for the right lung nodules.  I will obtain medical and cardiology clearance prior to procedure. Patient was advised to hold his Xarelto  for 5  days prior to surgery. COVID testing prior to surgery will be required. Pre-operative education and post-operative activity discussed at length. All risks, benefits, and alternatives discussed at length with patient.Expectations reviewed with patient. All questions addressed. Patient  agrees and will adhere to plan. \par \par PLAN: \par -Schedule flex bronchoscopy, right robotic assisted VATs, lung resection, lymph node dissection for the right lung nodules\par - Hold the Xarelto 5 days prior to surgery \par - Obtain medical and cardiology clearance prior to scheduling surgery  (Of note: Hx Atria fibrillation and  loop recorder by  [employer])  prior to scheduling \par \par IAlena NP am scribing for and in the presence of Dr. Schmitt the following sections HISTORY OF PRESENT ILLNESS, PAST MEDICAL/FAMILY/SOCIAL HISTORY; REVIEW OF SYSTEMS; VITAL SIGNS; PHYSICAL EXAM; DISPOSITION.\par \par "I personally performed the services described in the documentation, reviewed the documentation recorded by the scribe in my presence and accurately and completely records my words and actions."\par \par \par \par  \par

## 2021-09-08 NOTE — HISTORY OF PRESENT ILLNESS
[FreeTextEntry1] : Mr. TAM is a 76 year old nonsmoker who was referred by Dr. Ibarra  for a initial consultation on a  abnormal 8.17.21 CT Chest with  IV Contrast from [facility] [place]. This demonstrated a new right upper lobe 8 mm peripheral nodule which is indeterminate and a remaining tree-in-bud and solid nodules that is  stable. This was elicited from  a CT scan  following his history renal cancer (s/p right radical nephrectomy  12/2019). \par \par His past medical history includes a  AIVR, chronic renal insufficiency, essential hypertension,infrapatellar bursitis of right knee, inguinal hernia, right, lung nodules, male erectile disorder, paroxysmal atrial fibrillation(Xarelto),polycythemia(followed by Dr. Rollins), mature ventricular complex, presence of electronic cardiac device ([employer]tr[employer]), renal cell carcinoma(s/p radical nephrectomy),  trochanteric bursitis of left hip, and history of biceps tendinitis of right upper extremity.\par \par Today he denies  dizziness, shortness of breath with climbing up/down stairs or with exertion, unintentional weight loss, cough, fever or  chills.Of note he reports history of asbestos exposure as a lux. 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  He is here to review interventional management. \par \par

## 2021-09-10 ENCOUNTER — NON-APPOINTMENT (OUTPATIENT)
Age: 76
End: 2021-09-10

## 2021-09-14 ENCOUNTER — NON-APPOINTMENT (OUTPATIENT)
Age: 76
End: 2021-09-14

## 2021-09-17 ENCOUNTER — APPOINTMENT (OUTPATIENT)
Dept: ELECTROPHYSIOLOGY | Facility: CLINIC | Age: 76
End: 2021-09-17
Payer: MEDICARE

## 2021-09-17 VITALS
HEART RATE: 59 BPM | WEIGHT: 181 LBS | DIASTOLIC BLOOD PRESSURE: 68 MMHG | BODY MASS INDEX: 25.91 KG/M2 | RESPIRATION RATE: 16 BRPM | OXYGEN SATURATION: 100 % | SYSTOLIC BLOOD PRESSURE: 127 MMHG | HEIGHT: 70 IN

## 2021-09-17 PROCEDURE — 93279 PRGRMG DEV EVAL PM/LDLS PM: CPT

## 2021-10-07 ENCOUNTER — NON-APPOINTMENT (OUTPATIENT)
Age: 76
End: 2021-10-07

## 2021-10-08 ENCOUNTER — APPOINTMENT (OUTPATIENT)
Dept: ELECTROPHYSIOLOGY | Facility: CLINIC | Age: 76
End: 2021-10-08
Payer: MEDICARE

## 2021-10-08 PROCEDURE — G2066: CPT

## 2021-10-08 PROCEDURE — 93298 REM INTERROG DEV EVAL SCRMS: CPT

## 2021-10-21 ENCOUNTER — OUTPATIENT (OUTPATIENT)
Dept: OUTPATIENT SERVICES | Facility: HOSPITAL | Age: 76
LOS: 1 days | End: 2021-10-21
Payer: MEDICARE

## 2021-10-21 VITALS
HEIGHT: 70 IN | DIASTOLIC BLOOD PRESSURE: 52 MMHG | SYSTOLIC BLOOD PRESSURE: 112 MMHG | RESPIRATION RATE: 16 BRPM | WEIGHT: 179.9 LBS | HEART RATE: 59 BPM | OXYGEN SATURATION: 99 % | TEMPERATURE: 98 F

## 2021-10-21 DIAGNOSIS — Z29.9 ENCOUNTER FOR PROPHYLACTIC MEASURES, UNSPECIFIED: ICD-10-CM

## 2021-10-21 DIAGNOSIS — Z98.890 OTHER SPECIFIED POSTPROCEDURAL STATES: Chronic | ICD-10-CM

## 2021-10-21 DIAGNOSIS — Z90.89 ACQUIRED ABSENCE OF OTHER ORGANS: Chronic | ICD-10-CM

## 2021-10-21 DIAGNOSIS — R91.1 SOLITARY PULMONARY NODULE: ICD-10-CM

## 2021-10-21 DIAGNOSIS — Z01.818 ENCOUNTER FOR OTHER PREPROCEDURAL EXAMINATION: ICD-10-CM

## 2021-10-21 DIAGNOSIS — Z90.5 ACQUIRED ABSENCE OF KIDNEY: Chronic | ICD-10-CM

## 2021-10-21 DIAGNOSIS — Z90.49 ACQUIRED ABSENCE OF OTHER SPECIFIED PARTS OF DIGESTIVE TRACT: Chronic | ICD-10-CM

## 2021-10-21 LAB
ADD ON TEST-SPECIMEN IN LAB: SIGNIFICANT CHANGE UP
ANION GAP SERPL CALC-SCNC: 6 MMOL/L — SIGNIFICANT CHANGE UP (ref 5–17)
APTT BLD: 41.7 SEC — HIGH (ref 27.5–35.5)
BASOPHILS # BLD AUTO: 0.05 K/UL — SIGNIFICANT CHANGE UP (ref 0–0.2)
BASOPHILS NFR BLD AUTO: 0.6 % — SIGNIFICANT CHANGE UP (ref 0–2)
BUN SERPL-MCNC: 28 MG/DL — HIGH (ref 7–23)
CALCIUM SERPL-MCNC: 9.3 MG/DL — SIGNIFICANT CHANGE UP (ref 8.5–10.1)
CHLORIDE SERPL-SCNC: 109 MMOL/L — HIGH (ref 96–108)
CO2 SERPL-SCNC: 26 MMOL/L — SIGNIFICANT CHANGE UP (ref 22–31)
CREAT SERPL-MCNC: 1.77 MG/DL — HIGH (ref 0.5–1.3)
EOSINOPHIL # BLD AUTO: 0.17 K/UL — SIGNIFICANT CHANGE UP (ref 0–0.5)
EOSINOPHIL NFR BLD AUTO: 2 % — SIGNIFICANT CHANGE UP (ref 0–6)
GLUCOSE SERPL-MCNC: 84 MG/DL — SIGNIFICANT CHANGE UP (ref 70–99)
HCT VFR BLD CALC: 49.5 % — SIGNIFICANT CHANGE UP (ref 39–50)
HGB BLD-MCNC: 16.3 G/DL — SIGNIFICANT CHANGE UP (ref 13–17)
IMM GRANULOCYTES NFR BLD AUTO: 0.5 % — SIGNIFICANT CHANGE UP (ref 0–1.5)
INR BLD: 1.99 RATIO — HIGH (ref 0.88–1.16)
LYMPHOCYTES # BLD AUTO: 1.23 K/UL — SIGNIFICANT CHANGE UP (ref 1–3.3)
LYMPHOCYTES # BLD AUTO: 14.1 % — SIGNIFICANT CHANGE UP (ref 13–44)
MCHC RBC-ENTMCNC: 30.6 PG — SIGNIFICANT CHANGE UP (ref 27–34)
MCHC RBC-ENTMCNC: 32.9 GM/DL — SIGNIFICANT CHANGE UP (ref 32–36)
MCV RBC AUTO: 93 FL — SIGNIFICANT CHANGE UP (ref 80–100)
MONOCYTES # BLD AUTO: 0.74 K/UL — SIGNIFICANT CHANGE UP (ref 0–0.9)
MONOCYTES NFR BLD AUTO: 8.5 % — SIGNIFICANT CHANGE UP (ref 2–14)
NEUTROPHILS # BLD AUTO: 6.47 K/UL — SIGNIFICANT CHANGE UP (ref 1.8–7.4)
NEUTROPHILS NFR BLD AUTO: 74.3 % — SIGNIFICANT CHANGE UP (ref 43–77)
PLATELET # BLD AUTO: 181 K/UL — SIGNIFICANT CHANGE UP (ref 150–400)
POTASSIUM SERPL-MCNC: 4.5 MMOL/L — SIGNIFICANT CHANGE UP (ref 3.5–5.3)
POTASSIUM SERPL-SCNC: 4.5 MMOL/L — SIGNIFICANT CHANGE UP (ref 3.5–5.3)
PROTHROM AB SERPL-ACNC: 22.5 SEC — HIGH (ref 10.6–13.6)
RBC # BLD: 5.32 M/UL — SIGNIFICANT CHANGE UP (ref 4.2–5.8)
RBC # FLD: 12.9 % — SIGNIFICANT CHANGE UP (ref 10.3–14.5)
SODIUM SERPL-SCNC: 141 MMOL/L — SIGNIFICANT CHANGE UP (ref 135–145)
WBC # BLD: 8.7 K/UL — SIGNIFICANT CHANGE UP (ref 3.8–10.5)
WBC # FLD AUTO: 8.7 K/UL — SIGNIFICANT CHANGE UP (ref 3.8–10.5)

## 2021-10-21 PROCEDURE — 86923 COMPATIBILITY TEST ELECTRIC: CPT

## 2021-10-21 PROCEDURE — 85025 COMPLETE CBC W/AUTO DIFF WBC: CPT

## 2021-10-21 PROCEDURE — 85610 PROTHROMBIN TIME: CPT

## 2021-10-21 PROCEDURE — 80076 HEPATIC FUNCTION PANEL: CPT

## 2021-10-21 PROCEDURE — 86900 BLOOD TYPING SEROLOGIC ABO: CPT

## 2021-10-21 PROCEDURE — 85730 THROMBOPLASTIN TIME PARTIAL: CPT

## 2021-10-21 PROCEDURE — 80048 BASIC METABOLIC PNL TOTAL CA: CPT

## 2021-10-21 PROCEDURE — 86901 BLOOD TYPING SEROLOGIC RH(D): CPT

## 2021-10-21 PROCEDURE — G0463: CPT | Mod: 25

## 2021-10-21 PROCEDURE — 93005 ELECTROCARDIOGRAM TRACING: CPT

## 2021-10-21 PROCEDURE — 93010 ELECTROCARDIOGRAM REPORT: CPT

## 2021-10-21 PROCEDURE — 86850 RBC ANTIBODY SCREEN: CPT

## 2021-10-21 PROCEDURE — 36415 COLL VENOUS BLD VENIPUNCTURE: CPT

## 2021-10-21 RX ORDER — METOPROLOL TARTRATE 50 MG
0.5 TABLET ORAL
Qty: 0 | Refills: 0 | DISCHARGE

## 2021-10-21 NOTE — H&P PST ADULT - HEALTH CARE MAINTENANCE
Pt denies travel out of [facility] for the past 14 days Pt denies  travel internationally for the past 21 days

## 2021-10-21 NOTE — H&P PST ADULT - NSICDXPASTSURGICALHX_GEN_ALL_CORE_FT
PAST SURGICAL HISTORY:  H/O colonoscopy and upper endoscopy 2019    H/O hemorrhoidectomy     H/O right nephrectomy 2019    History of incisional hernia repair 11/2020 with mesh    History of other surgery Excision of skin cancer    History of right nephrectomy 12/5/2019    S/P cholecystectomy     S/P tonsillectomy

## 2021-10-21 NOTE — H&P PST ADULT - HISTORY OF PRESENT ILLNESS
[facility]  Endocrinology Progress Note    Sangeeta Addis Patient Status:  Inpatient    1942 MRN [id number]   [facility] 4SW-A Attending Rodolfo Hussein MD   Hosp Day # 16 PCP Mony Carlson MD     CC: Patient presents with:  H 148*  80*  80*  80*  80*       A/P  64year old man with chronic long term steroid use for RA admitted with sepsis and hypotension  1.  Hypotension  - on chronic prednisone 2.5 mg daily for RA at home  - Random cortisol was 9.6 but this is difficult to 75 y/o male with right lung nodule  77 y/o male with right lung nodule; Incidental finding during routine surveillance for renal cancer; denies SOB cough; he presents to PST for planned flex bronchoscopy right robotic assisted VATS lung resection lymph node dissection of right lung nodule

## 2021-10-21 NOTE — H&P PST ADULT - ASSESSMENT
77 y/o male with right lung nodule; Incidental finding during routine surveillance for renal cancer; denies SOB cough; he presents to PST for planned flex bronchoscopy right robotic assisted VATS lung resection lymph node dissection of right lung nodule     Plan:  1. PST instructions given ; NPO status instructions to be given by ASU   2. Pt instructed to take following meds with sip of water : lisinopril multaq amlodipine   3. Pt instructed to take routine evening medications unless indicated   4. Stop NSAIDS ( Aspirin Alev Motrin Mobic Diclofenac), herbal supplements , MVI , Vitamin fish oil 7 days prior to surgery  unless   directed by surgeon or cardiologist;   5. Medical Optimization  with Dr Ibarra and Cardio Dr Lopes    6. EZ wash instructions given   7. Labs EKG CXR as per surgeon request   8. Pt instructed to self quarantine after Covid test   9. Covid Testing scheduled Pt notified and aware  10. Pt denies covid symptoms shortness of breath fever cough          CAPRINI SCORE [CLOT]    AGE RELATED RISK FACTORS                                                       MOBILITY RELATED FACTORS  [ ] Age 41-60 years                                            (1 Point)                  [ ] Bed rest                                                        (1 Point)  [ ] Age: 61-74 years                                           (2 Points)                 [ ] Plaster cast                                                   (2 Points)  [x ] Age= 75 years                                              (3 Points)                 [ ] Bed bound for more than 72 hours                 (2 Points)    DISEASE RELATED RISK FACTORS                                               GENDER SPECIFIC FACTORS  [ ] Edema in the lower extremities                       (1 Point)                  [ ] Pregnancy                                                     (1 Point)  [ ] Varicose veins                                               (1 Point)                  [ ] Post-partum < 6 weeks                                   (1 Point)             [ x] BMI > 25 Kg/m2                                            (1 Point)                  [ ] Hormonal therapy  or oral contraception          (1 Point)                 [ ] Sepsis (in the previous month)                        (1 Point)                  [ ] History of pregnancy complications                 (1 point)  [ ] Pneumonia or serious lung disease                                               [ ] Unexplained or recurrent                     (1 Point)           (in the previous month)                               (1 Point)  [ ] Abnormal pulmonary function test                     (1 Point)                 SURGERY RELATED RISK FACTORS  [ ] Acute myocardial infarction                              (1 Point)                 [ ]  Section                                             (1 Point)  [ ] Congestive heart failure (in the previous month)  (1 Point)               [ ] Minor surgery                                                  (1 Point)   [ ] Inflammatory bowel disease                             (1 Point)                 [ ] Arthroscopic surgery                                        (2 Points)  [ ] Central venous access                                      (2 Points)                [ x] General surgery lasting more than 45 minutes   (2 Points)       [ ] Stroke (in the previous month)                          (5 Points)               [ ] Elective arthroplasty                                         (5 Points)            (x ) past and present malignancy                             ( 2 points)                                                                                                                                    HEMATOLOGY RELATED FACTORS                                                 TRAUMA RELATED RISK FACTORS  [ ] Prior episodes of VTE                                     (3 Points)                 [ ] Fracture of the hip, pelvis, or leg                       (5 Points)  [ ] Positive family history for VTE                         (3 Points)                 [ ] Acute spinal cord injury (in the previous month)  (5 Points)  [ ] Prothrombin 26600 A                                     (3 Points)                 [ ] Paralysis  (less than 1 month)                             (5 Points)  [ ] Factor V Leiden                                             (3 Points)                  [ ] Multiple Trauma within 1 month                        (5 Points)  [ ] Lupus anticoagulants                                     (3 Points)                                                           [ ] Anticardiolipin antibodies                               (3 Points)                                                       [ ] High homocysteine in the blood                      (3 Points)                                             [ ] Other congenital or acquired thrombophilia      (3 Points)                                                [ ] Heparin induced thrombocytopenia                  (3 Points)                                          Total Score [    8      ]

## 2021-10-21 NOTE — H&P PST ADULT - NSICDXPASTMEDICALHX_GEN_ALL_CORE_FT
PAST MEDICAL HISTORY:  Atrial fibrillation has loop recorder    BPH (benign prostatic hyperplasia)     Colon polyp benign    COVID-19 vaccine series completed Pfizer second dose 4/2021    Deviated septum     Erectile dysfunction     GERD (gastroesophageal reflux disease)     Hematuria     Hemorrhoids     History of gallstones     History of kidney cancer Right - kidney removed    History of skin cancer left hand fourth digit    HTN (hypertension)     Incisional hernia     Nodule of right lung     Renal cancer

## 2021-10-22 ENCOUNTER — APPOINTMENT (OUTPATIENT)
Dept: CARDIOLOGY | Facility: CLINIC | Age: 76
End: 2021-10-22
Payer: MEDICARE

## 2021-10-22 ENCOUNTER — NON-APPOINTMENT (OUTPATIENT)
Age: 76
End: 2021-10-22

## 2021-10-22 VITALS
BODY MASS INDEX: 26.34 KG/M2 | HEIGHT: 70 IN | HEART RATE: 55 BPM | WEIGHT: 184 LBS | SYSTOLIC BLOOD PRESSURE: 130 MMHG | DIASTOLIC BLOOD PRESSURE: 70 MMHG | OXYGEN SATURATION: 100 %

## 2021-10-22 DIAGNOSIS — R91.1 SOLITARY PULMONARY NODULE: ICD-10-CM

## 2021-10-22 DIAGNOSIS — Z01.818 ENCOUNTER FOR OTHER PREPROCEDURAL EXAMINATION: ICD-10-CM

## 2021-10-22 PROCEDURE — 99214 OFFICE O/P EST MOD 30 MIN: CPT

## 2021-10-22 PROCEDURE — 93000 ELECTROCARDIOGRAM COMPLETE: CPT | Mod: NC

## 2021-10-22 NOTE — HISTORY OF PRESENT ILLNESS
[FreeTextEntry1] : Denny Johnson is a 76-year-old man with a history of paroxysmal atrial fibrillation, implantable loop monitor, hypertension, chronic renal insufficiency, renal cell carcinoma status post right laparoscopic radical nephrectomy (12/2019), incisional hernia s/p repair (11/2020) who returns for cardiac examination.  CT imaging has revealed a new right upper lobe nodule and she is scheduled for flexible bronchoscopy, right robotic-assisted VAT, long resection, and lymph node dissection.  His thoracic surgeon recommended prolonged discontinuation of Xarelto (5 days prior to surgery).  He continues to feel well from a cardiovascular standpoint and describes a continued good baseline functional status (> 4 METS).  He denies angina, dyspnea, palpitations.

## 2021-10-22 NOTE — DISCUSSION/SUMMARY
[With Me] : with me [___ Month(s)] : in [unfilled] month(s) [FreeTextEntry1] : \par Preoperative cardiac examination: Mr. Johnson appears stable from a cardiac standpoint to proceed with upcoming thoracic surgery. I recommend continued perioperative use of metoprolol and Multaq; it is reasonable to hold Xarelto for 5 days (during a previous surgical procedure several years ago he had prolonged PT after holding Xarelto for standard 3 days).\par \par Atrial fibrillation: Atrial fibrillation is paroxysmal (presently in sinus rhythm); continue Multaq, Xarelto, and metoprolol.\par \par Sinus bradycardia: Chronic sinus bradycardia; asymptomatic.\par \par Hypertension: Controlled; continue amlodipine.Erectile dysfunction: Patient requested that I renew his sildenafil - sent to his local pharmacy.\par

## 2021-10-22 NOTE — PHYSICAL EXAM
[Normal Appearance] : normal appearance [Well Groomed] : well groomed [General Appearance - In No Acute Distress] : no acute distress [Respiration, Rhythm And Depth] : normal respiratory rhythm and effort [Auscultation Breath Sounds / Voice Sounds] : lungs were clear to auscultation bilaterally [Heart Sounds] : normal S1 and S2 [Edema] : no peripheral edema present [FreeTextEntry1] : Bradycardic [Abnormal Walk] : normal gait [Oriented To Time, Place, And Person] : oriented to person, place, and time [Impaired Insight] : insight and judgment were intact [Affect] : the affect was normal [Mood] : the mood was normal

## 2021-10-22 NOTE — REVIEW OF SYSTEMS
[Weight Gain (___ Lbs)] : [unfilled] ~Ulb weight gain [SOB] : no shortness of breath [Dyspnea on exertion] : not dyspnea during exertion [Chest Discomfort] : no chest discomfort [Palpitations] : no palpitations [Under Stress] : under stress

## 2021-10-25 ENCOUNTER — APPOINTMENT (OUTPATIENT)
Dept: INTERNAL MEDICINE | Facility: CLINIC | Age: 76
End: 2021-10-25
Payer: MEDICARE

## 2021-10-25 ENCOUNTER — APPOINTMENT (OUTPATIENT)
Dept: UROLOGY | Facility: CLINIC | Age: 76
End: 2021-10-25
Payer: MEDICARE

## 2021-10-25 VITALS
BODY MASS INDEX: 25.2 KG/M2 | OXYGEN SATURATION: 97 % | SYSTOLIC BLOOD PRESSURE: 134 MMHG | HEART RATE: 53 BPM | TEMPERATURE: 98.6 F | HEIGHT: 70 IN | WEIGHT: 176 LBS | RESPIRATION RATE: 16 BRPM | DIASTOLIC BLOOD PRESSURE: 72 MMHG

## 2021-10-25 VITALS
HEIGHT: 70 IN | DIASTOLIC BLOOD PRESSURE: 61 MMHG | WEIGHT: 184 LBS | HEART RATE: 59 BPM | BODY MASS INDEX: 26.34 KG/M2 | SYSTOLIC BLOOD PRESSURE: 149 MMHG

## 2021-10-25 PROBLEM — C64.9 MALIGNANT NEOPLASM OF UNSPECIFIED KIDNEY, EXCEPT RENAL PELVIS: Chronic | Status: ACTIVE | Noted: 2021-10-21

## 2021-10-25 PROBLEM — R91.1 SOLITARY PULMONARY NODULE: Chronic | Status: ACTIVE | Noted: 2021-10-21

## 2021-10-25 PROBLEM — J34.2 DEVIATED NASAL SEPTUM: Chronic | Status: ACTIVE | Noted: 2021-10-21

## 2021-10-25 PROBLEM — Z92.29 PERSONAL HISTORY OF OTHER DRUG THERAPY: Chronic | Status: ACTIVE | Noted: 2021-10-21

## 2021-10-25 LAB
BILIRUB UR QL STRIP: NORMAL
CLARITY UR: CLEAR
COLLECTION METHOD: NORMAL
GLUCOSE UR-MCNC: NORMAL
HCG UR QL: 0.2 EU/DL
HGB UR QL STRIP.AUTO: NORMAL
KETONES UR-MCNC: NORMAL
LEUKOCYTE ESTERASE UR QL STRIP: NORMAL
NITRITE UR QL STRIP: NORMAL
PH UR STRIP: 7
PROT UR STRIP-MCNC: NORMAL
SP GR UR STRIP: 1.02

## 2021-10-25 PROCEDURE — 99213 OFFICE O/P EST LOW 20 MIN: CPT

## 2021-10-25 PROCEDURE — 99215 OFFICE O/P EST HI 40 MIN: CPT | Mod: 25

## 2021-10-25 PROCEDURE — 81003 URINALYSIS AUTO W/O SCOPE: CPT | Mod: QW

## 2021-10-25 NOTE — HISTORY OF PRESENT ILLNESS
[FreeTextEntry1] : 76 m s/p radical nephrectomy Dec 2019  here for microscopic hematuria.  pt never noticed hematuria, on xarelto.  pt otherwise feeling well no dysuria\par \par ct aug shows lung nodule pt to have lung procedure friday\par \par \par PSA 2.98 in Aug 2021

## 2021-10-25 NOTE — ASSESSMENT
[FreeTextEntry1] : previous CT shows lung nodule, pt to undergo procedure friday.  Microscopic hematuria likely related to xarelto \par \par -UA  today\par - f/u 3 months\par 20 min discussion with kidney cancer followup, imaging review and future followup planning\par \par

## 2021-10-25 NOTE — ASSESSMENT
[Patient Optimized for Surgery] : Patient optimized for surgery [No Further Testing Recommended] : no further testing recommended [Modify anticoagulant treatment prior to procedure] : Modify anticoagulant treatment prior to procedure [As per surgery] : as per surgery [FreeTextEntry4] : Mrs. Chery is a 76-year-old male who presents for preoperative medical pulmonary evaluation. He has a new 8 mm right upper lobe nodule. Patient is scheduled for robotic assisted right upper lobe resection. He has received cardiology clearance. Patient will discontinue anticoagulation as per cardiology. Complete pulmonary function tests show an FEV1 of 2.11 L which is 77% predicted and a diffusing capacity of 83%. Mr. Johnson has chronic kidney disease and should have renal function monitored closely postoperatively. There is no contraindication to planned right upper lobe resection with sedation/anesthesia. The patient will benefit from DVT prophylaxis and incentive spirometry postoperatively. [FreeTextEntry5] : discontinued 5 days prior to surgery

## 2021-10-25 NOTE — HISTORY OF PRESENT ILLNESS
[Atrial Fibrillation] : atrial fibrillation [No Pertinent Pulmonary History] : no history of asthma, COPD, sleep apnea, or smoking [No Adverse Anesthesia Reaction] : no adverse anesthesia reaction in self or family member [Chronic Anticoagulation] : chronic anticoagulation [Chronic Kidney Disease] : chronic kidney disease [Diabetes] : no diabetes [FreeTextEntry4] : Mr. Johnson is a 68-year-old male who presents for preoperative evaluation for scheduled right upper lobe resection. Patient has a new 8 mm right upper lobe peripheral nodule as well as scattered stable nodules. He does have a previous history of renal cell carcinoma. Currently, the patient denies any chest pain, shortness of breath or palpitations. He is on xarelto therapy for a history of atrial fibrillation. He has received cardiology clearance from Dr. Rudolph Lopes. Patient will undergo flexible bronchoscopy with right robotic assisted right upper lobe resection with lymph node dissection.

## 2021-10-26 ENCOUNTER — APPOINTMENT (OUTPATIENT)
Dept: DISASTER EMERGENCY | Facility: CLINIC | Age: 76
End: 2021-10-26

## 2021-10-27 LAB — SARS-COV-2 N GENE NPH QL NAA+PROBE: NOT DETECTED

## 2021-10-28 RX ORDER — SODIUM CHLORIDE 9 MG/ML
3 INJECTION INTRAMUSCULAR; INTRAVENOUS; SUBCUTANEOUS EVERY 8 HOURS
Refills: 0 | Status: DISCONTINUED | OUTPATIENT
Start: 2021-10-29 | End: 2021-10-30

## 2021-10-29 ENCOUNTER — INPATIENT (INPATIENT)
Facility: HOSPITAL | Age: 76
LOS: 0 days | Discharge: HOME | DRG: 164 | End: 2021-10-30
Attending: THORACIC SURGERY (CARDIOTHORACIC VASCULAR SURGERY) | Admitting: THORACIC SURGERY (CARDIOTHORACIC VASCULAR SURGERY)
Payer: MEDICARE

## 2021-10-29 ENCOUNTER — APPOINTMENT (OUTPATIENT)
Dept: THORACIC SURGERY | Facility: HOSPITAL | Age: 76
End: 2021-10-29
Payer: MEDICARE

## 2021-10-29 ENCOUNTER — TRANSCRIPTION ENCOUNTER (OUTPATIENT)
Age: 76
End: 2021-10-29

## 2021-10-29 ENCOUNTER — RESULT REVIEW (OUTPATIENT)
Age: 76
End: 2021-10-29

## 2021-10-29 VITALS
HEIGHT: 60 IN | HEART RATE: 58 BPM | OXYGEN SATURATION: 100 % | RESPIRATION RATE: 18 BRPM | TEMPERATURE: 97 F | SYSTOLIC BLOOD PRESSURE: 134 MMHG | WEIGHT: 179.9 LBS | DIASTOLIC BLOOD PRESSURE: 96 MMHG

## 2021-10-29 DIAGNOSIS — Z90.89 ACQUIRED ABSENCE OF OTHER ORGANS: Chronic | ICD-10-CM

## 2021-10-29 DIAGNOSIS — Z90.5 ACQUIRED ABSENCE OF KIDNEY: Chronic | ICD-10-CM

## 2021-10-29 DIAGNOSIS — I10 ESSENTIAL (PRIMARY) HYPERTENSION: ICD-10-CM

## 2021-10-29 DIAGNOSIS — N52.9 MALE ERECTILE DYSFUNCTION, UNSPECIFIED: ICD-10-CM

## 2021-10-29 DIAGNOSIS — Z90.49 ACQUIRED ABSENCE OF OTHER SPECIFIED PARTS OF DIGESTIVE TRACT: Chronic | ICD-10-CM

## 2021-10-29 DIAGNOSIS — Z85.828 PERSONAL HISTORY OF OTHER MALIGNANT NEOPLASM OF SKIN: ICD-10-CM

## 2021-10-29 DIAGNOSIS — Z98.890 OTHER SPECIFIED POSTPROCEDURAL STATES: Chronic | ICD-10-CM

## 2021-10-29 DIAGNOSIS — R91.1 SOLITARY PULMONARY NODULE: ICD-10-CM

## 2021-10-29 DIAGNOSIS — Z85.528 PERSONAL HISTORY OF OTHER MALIGNANT NEOPLASM OF KIDNEY: ICD-10-CM

## 2021-10-29 DIAGNOSIS — C34.11 MALIGNANT NEOPLASM OF UPPER LOBE, RIGHT BRONCHUS OR LUNG: ICD-10-CM

## 2021-10-29 DIAGNOSIS — K21.9 GASTRO-ESOPHAGEAL REFLUX DISEASE WITHOUT ESOPHAGITIS: ICD-10-CM

## 2021-10-29 DIAGNOSIS — N40.0 BENIGN PROSTATIC HYPERPLASIA WITHOUT LOWER URINARY TRACT SYMPTOMS: ICD-10-CM

## 2021-10-29 DIAGNOSIS — I48.20 CHRONIC ATRIAL FIBRILLATION, UNSPECIFIED: ICD-10-CM

## 2021-10-29 LAB
ALBUMIN SERPL ELPH-MCNC: 4 G/DL — SIGNIFICANT CHANGE UP (ref 3.3–5)
ALP SERPL-CCNC: 100 U/L — SIGNIFICANT CHANGE UP (ref 40–120)
ALT FLD-CCNC: 24 U/L — SIGNIFICANT CHANGE UP (ref 12–78)
ANION GAP SERPL CALC-SCNC: 4 MMOL/L — LOW (ref 5–17)
AST SERPL-CCNC: 24 U/L — SIGNIFICANT CHANGE UP (ref 15–37)
BASOPHILS # BLD AUTO: 0.03 K/UL — SIGNIFICANT CHANGE UP (ref 0–0.2)
BASOPHILS NFR BLD AUTO: 0.3 % — SIGNIFICANT CHANGE UP (ref 0–2)
BILIRUB SERPL-MCNC: 1.4 MG/DL — HIGH (ref 0.2–1.2)
BUN SERPL-MCNC: 20 MG/DL — SIGNIFICANT CHANGE UP (ref 7–23)
CALCIUM SERPL-MCNC: 8.1 MG/DL — LOW (ref 8.5–10.1)
CHLORIDE SERPL-SCNC: 106 MMOL/L — SIGNIFICANT CHANGE UP (ref 96–108)
CO2 SERPL-SCNC: 27 MMOL/L — SIGNIFICANT CHANGE UP (ref 22–31)
CREAT SERPL-MCNC: 1.4 MG/DL — HIGH (ref 0.5–1.3)
EOSINOPHIL # BLD AUTO: 0.03 K/UL — SIGNIFICANT CHANGE UP (ref 0–0.5)
EOSINOPHIL NFR BLD AUTO: 0.3 % — SIGNIFICANT CHANGE UP (ref 0–6)
GLUCOSE SERPL-MCNC: 113 MG/DL — HIGH (ref 70–99)
HCT VFR BLD CALC: 49.5 % — SIGNIFICANT CHANGE UP (ref 39–50)
HGB BLD-MCNC: 16.3 G/DL — SIGNIFICANT CHANGE UP (ref 13–17)
IMM GRANULOCYTES NFR BLD AUTO: 0.6 % — SIGNIFICANT CHANGE UP (ref 0–1.5)
LYMPHOCYTES # BLD AUTO: 0.5 K/UL — LOW (ref 1–3.3)
LYMPHOCYTES # BLD AUTO: 5.2 % — LOW (ref 13–44)
MCHC RBC-ENTMCNC: 30.8 PG — SIGNIFICANT CHANGE UP (ref 27–34)
MCHC RBC-ENTMCNC: 32.9 GM/DL — SIGNIFICANT CHANGE UP (ref 32–36)
MCV RBC AUTO: 93.6 FL — SIGNIFICANT CHANGE UP (ref 80–100)
MONOCYTES # BLD AUTO: 0.13 K/UL — SIGNIFICANT CHANGE UP (ref 0–0.9)
MONOCYTES NFR BLD AUTO: 1.4 % — LOW (ref 2–14)
NEUTROPHILS # BLD AUTO: 8.8 K/UL — HIGH (ref 1.8–7.4)
NEUTROPHILS NFR BLD AUTO: 92.2 % — HIGH (ref 43–77)
PLATELET # BLD AUTO: 145 K/UL — LOW (ref 150–400)
POTASSIUM SERPL-MCNC: 4.2 MMOL/L — SIGNIFICANT CHANGE UP (ref 3.5–5.3)
POTASSIUM SERPL-SCNC: 4.2 MMOL/L — SIGNIFICANT CHANGE UP (ref 3.5–5.3)
PROT SERPL-MCNC: 7.2 GM/DL — SIGNIFICANT CHANGE UP (ref 6–8.3)
RBC # BLD: 5.29 M/UL — SIGNIFICANT CHANGE UP (ref 4.2–5.8)
RBC # FLD: 12.8 % — SIGNIFICANT CHANGE UP (ref 10.3–14.5)
SODIUM SERPL-SCNC: 137 MMOL/L — SIGNIFICANT CHANGE UP (ref 135–145)
WBC # BLD: 9.55 K/UL — SIGNIFICANT CHANGE UP (ref 3.8–10.5)
WBC # FLD AUTO: 9.55 K/UL — SIGNIFICANT CHANGE UP (ref 3.8–10.5)

## 2021-10-29 PROCEDURE — 88331 PATH CONSLTJ SURG 1 BLK 1SPC: CPT | Mod: 26

## 2021-10-29 PROCEDURE — 80048 BASIC METABOLIC PNL TOTAL CA: CPT

## 2021-10-29 PROCEDURE — S2900 ROBOTIC SURGICAL SYSTEM: CPT | Mod: NC

## 2021-10-29 PROCEDURE — 86769 SARS-COV-2 COVID-19 ANTIBODY: CPT

## 2021-10-29 PROCEDURE — S2900: CPT

## 2021-10-29 PROCEDURE — 71045 X-RAY EXAM CHEST 1 VIEW: CPT | Mod: 26

## 2021-10-29 PROCEDURE — 90662 IIV NO PRSV INCREASED AG IM: CPT

## 2021-10-29 PROCEDURE — 88333 PATH CONSLTJ SURG CYTO XM 1: CPT

## 2021-10-29 PROCEDURE — 32666 THORACOSCOPY W/WEDGE RESECT: CPT | Mod: RT

## 2021-10-29 PROCEDURE — C1729: CPT

## 2021-10-29 PROCEDURE — 71045 X-RAY EXAM CHEST 1 VIEW: CPT

## 2021-10-29 PROCEDURE — 32666 THORACOSCOPY W/WEDGE RESECT: CPT | Mod: AS,RT

## 2021-10-29 PROCEDURE — C1889: CPT

## 2021-10-29 PROCEDURE — 32674 THORACOSCOPY LYMPH NODE EXC: CPT | Mod: AS

## 2021-10-29 PROCEDURE — 88305 TISSUE EXAM BY PATHOLOGIST: CPT | Mod: 26

## 2021-10-29 PROCEDURE — 85027 COMPLETE CBC AUTOMATED: CPT

## 2021-10-29 PROCEDURE — 85025 COMPLETE CBC W/AUTO DIFF WBC: CPT

## 2021-10-29 PROCEDURE — 80053 COMPREHEN METABOLIC PANEL: CPT

## 2021-10-29 PROCEDURE — 88305 TISSUE EXAM BY PATHOLOGIST: CPT

## 2021-10-29 PROCEDURE — 88312 SPECIAL STAINS GROUP 1: CPT | Mod: 26

## 2021-10-29 PROCEDURE — C9399: CPT

## 2021-10-29 PROCEDURE — 36415 COLL VENOUS BLD VENIPUNCTURE: CPT

## 2021-10-29 PROCEDURE — 88312 SPECIAL STAINS GROUP 1: CPT

## 2021-10-29 PROCEDURE — 88331 PATH CONSLTJ SURG 1 BLK 1SPC: CPT

## 2021-10-29 PROCEDURE — 88334 PATH CONSLTJ SURG CYTO XM EA: CPT | Mod: 26,59

## 2021-10-29 PROCEDURE — 88307 TISSUE EXAM BY PATHOLOGIST: CPT | Mod: 26

## 2021-10-29 PROCEDURE — 88307 TISSUE EXAM BY PATHOLOGIST: CPT

## 2021-10-29 PROCEDURE — 32674 THORACOSCOPY LYMPH NODE EXC: CPT

## 2021-10-29 RX ORDER — INFLUENZA VIRUS VACCINE 15; 15; 15; 15 UG/.5ML; UG/.5ML; UG/.5ML; UG/.5ML
0.7 SUSPENSION INTRAMUSCULAR ONCE
Refills: 0 | Status: COMPLETED | OUTPATIENT
Start: 2021-10-29 | End: 2021-10-30

## 2021-10-29 RX ORDER — DRONEDARONE 400 MG/1
400 TABLET, FILM COATED ORAL
Refills: 0 | Status: DISCONTINUED | OUTPATIENT
Start: 2021-10-29 | End: 2021-10-30

## 2021-10-29 RX ORDER — SODIUM CHLORIDE 9 MG/ML
3 INJECTION INTRAMUSCULAR; INTRAVENOUS; SUBCUTANEOUS EVERY 8 HOURS
Refills: 0 | Status: DISCONTINUED | OUTPATIENT
Start: 2021-10-29 | End: 2021-10-30

## 2021-10-29 RX ORDER — DRONEDARONE 400 MG/1
0.5 TABLET, FILM COATED ORAL
Qty: 0 | Refills: 0 | DISCHARGE

## 2021-10-29 RX ORDER — PANTOPRAZOLE SODIUM 20 MG/1
40 TABLET, DELAYED RELEASE ORAL
Refills: 0 | Status: DISCONTINUED | OUTPATIENT
Start: 2021-10-29 | End: 2021-10-30

## 2021-10-29 RX ORDER — LISINOPRIL 2.5 MG/1
40 TABLET ORAL DAILY
Refills: 0 | Status: DISCONTINUED | OUTPATIENT
Start: 2021-10-29 | End: 2021-10-30

## 2021-10-29 RX ORDER — OXYCODONE HYDROCHLORIDE 5 MG/1
5 TABLET ORAL EVERY 4 HOURS
Refills: 0 | Status: DISCONTINUED | OUTPATIENT
Start: 2021-10-29 | End: 2021-10-30

## 2021-10-29 RX ORDER — ENOXAPARIN SODIUM 100 MG/ML
40 INJECTION SUBCUTANEOUS DAILY
Refills: 0 | Status: DISCONTINUED | OUTPATIENT
Start: 2021-10-30 | End: 2021-10-30

## 2021-10-29 RX ORDER — OXYCODONE HYDROCHLORIDE 5 MG/1
5 TABLET ORAL ONCE
Refills: 0 | Status: DISCONTINUED | OUTPATIENT
Start: 2021-10-29 | End: 2021-10-29

## 2021-10-29 RX ORDER — ACETAMINOPHEN 500 MG
1000 TABLET ORAL ONCE
Refills: 0 | Status: COMPLETED | OUTPATIENT
Start: 2021-10-29 | End: 2021-10-29

## 2021-10-29 RX ORDER — SODIUM CHLORIDE 9 MG/ML
1000 INJECTION, SOLUTION INTRAVENOUS
Refills: 0 | Status: DISCONTINUED | OUTPATIENT
Start: 2021-10-29 | End: 2021-10-29

## 2021-10-29 RX ORDER — FAMOTIDINE 10 MG/ML
20 INJECTION INTRAVENOUS ONCE
Refills: 0 | Status: COMPLETED | OUTPATIENT
Start: 2021-10-29 | End: 2021-10-29

## 2021-10-29 RX ORDER — ONDANSETRON 8 MG/1
4 TABLET, FILM COATED ORAL ONCE
Refills: 0 | Status: DISCONTINUED | OUTPATIENT
Start: 2021-10-29 | End: 2021-10-29

## 2021-10-29 RX ORDER — FENTANYL CITRATE 50 UG/ML
50 INJECTION INTRAVENOUS
Refills: 0 | Status: DISCONTINUED | OUTPATIENT
Start: 2021-10-29 | End: 2021-10-29

## 2021-10-29 RX ORDER — ACETAMINOPHEN 500 MG
975 TABLET ORAL ONCE
Refills: 0 | Status: COMPLETED | OUTPATIENT
Start: 2021-10-29 | End: 2021-10-29

## 2021-10-29 RX ORDER — OXYCODONE HYDROCHLORIDE 5 MG/1
10 TABLET ORAL EVERY 4 HOURS
Refills: 0 | Status: DISCONTINUED | OUTPATIENT
Start: 2021-10-29 | End: 2021-10-30

## 2021-10-29 RX ADMIN — Medication 1000 MILLIGRAM(S): at 17:00

## 2021-10-29 RX ADMIN — DRONEDARONE 400 MILLIGRAM(S): 400 TABLET, FILM COATED ORAL at 22:44

## 2021-10-29 RX ADMIN — Medication 975 MILLIGRAM(S): at 09:45

## 2021-10-29 RX ADMIN — OXYCODONE HYDROCHLORIDE 5 MILLIGRAM(S): 5 TABLET ORAL at 20:39

## 2021-10-29 RX ADMIN — FAMOTIDINE 20 MILLIGRAM(S): 10 INJECTION INTRAVENOUS at 09:45

## 2021-10-29 RX ADMIN — Medication 975 MILLIGRAM(S): at 09:46

## 2021-10-29 RX ADMIN — SODIUM CHLORIDE 3 MILLILITER(S): 9 INJECTION INTRAMUSCULAR; INTRAVENOUS; SUBCUTANEOUS at 21:55

## 2021-10-29 RX ADMIN — OXYCODONE HYDROCHLORIDE 5 MILLIGRAM(S): 5 TABLET ORAL at 17:25

## 2021-10-29 RX ADMIN — OXYCODONE HYDROCHLORIDE 5 MILLIGRAM(S): 5 TABLET ORAL at 17:15

## 2021-10-29 RX ADMIN — Medication 400 MILLIGRAM(S): at 16:18

## 2021-10-29 NOTE — DISCHARGE NOTE PROVIDER - NSDCPNSUBOBJ_GEN_ALL_CORE
Subjective:  Pt in bed NAD no issues overnight     T(C): 36.4 (10-30-21 @ 08:15), Max: 36.8 (10-29-21 @ 20:00)  HR: 60 (10-30-21 @ 08:00) (51 - 73)  BP: 143/62 (10-30-21 @ 08:00) (119/83 - 156/46)  ABP: --  ABP(mean): --  RR: 18 (10-30-21 @ 08:00) (15 - 22)  SpO2: 97% (10-30-21 @ 08:00) (97% - 100%) RA  Wt(kg): --  CVP(mm Hg): --  CO: --  CI: --  PA: --                                              Tele: SR     CHEST TUBE:    12cc                           OUTPUT:     per 24 hours    AIR LEAKS:  [ ] YES [ x] NO          10-30    137  |  108  |  17  ----------------------------<  143<H>  4.3   |  24  |  1.36<H>    Ca    8.0<L>      30 Oct 2021 07:06    TPro  7.2  /  Alb  4.0  /  TBili  1.4<H>  /  DBili  x   /  AST  24  /  ALT  24  /  AlkPhos  100  10-29                               15.3   11.36 )-----------( 146      ( 30 Oct 2021 07:06 )             46.1                 CAPILLARY BLOOD GLUCOSE               CXR: Clear , no PTX         Exam   Neuro:  Alert Awake NAD  Pulm: decreased Rt base, clear b/l + Rt CT   CV: RRR S1 S1  Abd: Soft NT ND  Extremities: warm  Inc Rt Thoracic C/D/I          Assessment:  76yMale    with PAST MEDICAL & SURGICAL HISTORY:  Atrial fibrillation  has loop recorder    HTN (hypertension)    GERD (gastroesophageal reflux disease)    Hematuria    BPH (benign prostatic hyperplasia)    Colon polyp  benign    Incisional hernia    Hemorrhoids    History of gallstones    History of kidney cancer  Right - kidney removed    History of skin cancer  left hand fourth digit    Erectile dysfunction    Renal cancer    Deviated septum    Nodule of right lung    COVID-19 vaccine series completed  Pfizer second dose 4/2021    S/P cholecystectomy    H/O hemorrhoidectomy    S/P tonsillectomy    H/O colonoscopy  and upper endoscopy 2019    History of right nephrectomy  12/5/2019    History of other surgery  Excision of skin cancer    H/O right nephrectomy  2019    History of incisional hernia repair  11/2020 with mesh          Plan   d/c CT today  pain mgmt   cont multiq and lisinopril   plan to d/c today  Dr Ashby to see patient before he leaves   f/u with Dr Schmitt in 1 week   DW Dr Schmitt this am     Subjective:  Pt in bed NAD no issues overnight     T(C): 36.4 (10-30-21 @ 08:15), Max: 36.8 (10-29-21 @ 20:00)  HR: 60 (10-30-21 @ 08:00) (51 - 73)  BP: 143/62 (10-30-21 @ 08:00) (119/83 - 156/46)  ABP: --  ABP(mean): --  RR: 18 (10-30-21 @ 08:00) (15 - 22)  SpO2: 97% (10-30-21 @ 08:00) (97% - 100%) RA  Wt(kg): --  CVP(mm Hg): --  CO: --  CI: --  PA: --                                              Tele: Afib  10-30    137  |  108  |  17  ----------------------------<  143<H>  4.3   |  24  |  1.36<H>    Ca    8.0<L>      30 Oct 2021 07:06    TPro  7.2  /  Alb  4.0  /  TBili  1.4<H>  /  DBili  x   /  AST  24  /  ALT  24  /  AlkPhos  100  10-29                               15.3   11.36 )-----------( 146      ( 30 Oct 2021 07:06 )             46.1                 CAPILLARY BLOOD GLUCOSE               CXR: Clear , no PTX         Exam   Neuro:  Alert Awake NAD  Pulm: decreased Rt base, clear b/l + Rt CT   CV: Irrg S1 S2  Abd: Soft NT ND  Extremities: warm  Inc Rt Thoracic C/D/I          Assessment:  76yMale    with PAST MEDICAL & SURGICAL HISTORY:  Atrial fibrillation  has loop recorder    HTN (hypertension)    GERD (gastroesophageal reflux disease)    Hematuria    BPH (benign prostatic hyperplasia)    Colon polyp  benign    Incisional hernia    Hemorrhoids    History of gallstones    History of kidney cancer  Right - kidney removed    History of skin cancer  left hand fourth digit    Erectile dysfunction    Renal cancer    Deviated septum    Nodule of right lung    COVID-19 vaccine series completed  Pfizer second dose 4/2021    S/P cholecystectomy    H/O hemorrhoidectomy    S/P tonsillectomy    H/O colonoscopy  and upper endoscopy 2019    History of right nephrectomy  12/5/2019    History of other surgery  Excision of skin cancer    H/O right nephrectomy  2019    History of incisional hernia repair  11/2020 with mesh          Plan   d/c CT today  pain mgmt   cont multiq and lisinopril   plan to d/c today  Dr Ashby to see patient before he leaves   f/u with Dr Schmitt in 1 week   DW Dr Schmitt this am

## 2021-10-29 NOTE — DISCHARGE NOTE PROVIDER - CARE PROVIDER_API CALL
Oskra Schmitt  [address] [place] [address]  Phone: [phone]  Fax: (   )    -  Follow Up Time:

## 2021-10-29 NOTE — DISCHARGE NOTE PROVIDER - PROVIDER TOKENS
FREE:[LAST:[Schmitt],FIRST:[Oskar],PHONE:[phone]],FAX:[(   [phone]    -],ADDRESS:[[address] [place] [address]]

## 2021-10-29 NOTE — ASU PREOP CHECKLIST - HEIGHT IN CM
Call to Dr. Chand, anesthesiologist - per pts med list she takes phentermine.  She is scheduled for an EGD with MAC Wed. 3/22/2017.  If pt is taking it currently (I have not reached her yet), is she ok to have her EGD with MAC if she stops the Phentermine today?  Per Dr. Chand, pt should stop her Phentermine immediately (if she is taking it) and she is okay to proceed with her EGD with MAC 3/22/2017.  I did reach Deisi after speaking with Dr. Chand and she is taking phentermine.  Her last dose was this morning.  She was instructed to stop taking it immediately and stay off of it until her EGD was finished.  She verbalizes understanding.   10

## 2021-10-29 NOTE — DISCHARGE NOTE PROVIDER - NSDCFUADDAPPT_GEN_ALL_CORE_FT
Leave dressing intact until tomorrow evening, reinforcing with tape if necessary (about 36 hours from chest tube removal). At that time you may remove the dressing and take a shower. Place clean gauze over wound if continual drainage. Call Dr. Schmitt's office at [phone] tomorrow or the next business day to make a followup appointment. Call the office if you experience any fevers, shortness of breath, chest pain or excessive drainage from the incision, day or night. Go to the emergency room if any of these symptoms are severe. Take your medications as ordered and take a stool softener if needed with the narcotic medications.

## 2021-10-29 NOTE — CHART NOTE - NSCHARTNOTEFT_GEN_A_CORE
Thoracic note     77 y/o male with right lung nodule; Incidental finding during routine surveillance for renal cancer; denies SOB cough; he presents to PST  Now s/p  flex bronchoscopy RUL robotic assisted VATS Wedge resection & MSLND.      Plan   Transfer to SDU when bed available  Pain mgmt   CT To WS   CXR in am   pre op medication ordered   Signed out to Surgical PA - they are aware of patient

## 2021-10-29 NOTE — DISCHARGE NOTE PROVIDER - NSDCFUSCHEDAPPT_GEN_ALL_CORE_FT
CHARLENE TAM ; 11/11/2021 ; [facility] [address] CHARLENE Bhatia ; 11/15/2021 ; [facility]  CHARLENE TAM ; 12/03/2021 ; [facility] [address]   CHARLENE TAM ; 01/14/2022 ; [facility] [address]

## 2021-10-29 NOTE — PATIENT PROFILE ADULT - NSPROPOAURINARYCATHETER_GEN_A_NUR
- suspect ischemic ATN from hypotension and volume depletion. Oliguric. Failed aggressive diuresis with high doses of lasix and diuril. JEREMIAS worsened on 10/20 with significant metabolic abnormalities  - CRRT initiated in ICU on 10/21  - Nephrology following   no

## 2021-10-29 NOTE — DISCHARGE NOTE PROVIDER - HOSPITAL COURSE
77 y/o male with right lung nodule; Incidental finding during routine surveillance for renal cancer; denies SOB cough; he presents to PST for planned flex bronchoscopy right robotic assisted VATS lung resection lymph node dissection of right lung nodule

## 2021-10-29 NOTE — BRIEF OPERATIVE NOTE - NSICDXBRIEFPROCEDURE_GEN_ALL_CORE_FT
PROCEDURES:  Flexible bronchoscopy 29-Oct-2021 15:06:46  Billy Lagos  Lung wedge resection 29-Oct-2021 15:07:05  Billy Lagos  Thoraco robotic ast proc 29-Oct-2021 15:07:36  Billy Lagos

## 2021-10-29 NOTE — DISCHARGE NOTE PROVIDER - NSDCMRMEDTOKEN_GEN_ALL_CORE_FT
amLODIPine 2.5 mg oral tablet: 1 tab(s) orally once a day  Calcium 500+D oral tablet, chewable: 0.5 tab(s) orally once a day  famotidine 20 mg oral tablet: 1 tab(s) orally 2 times a day  lisinopril 40 mg oral tablet: 1 tab(s) orally once a day (in the morning)  magnesium: 1  orally once a day  Metamucil: 1 tbsp orally once a day with OJ  Metoprolol Succinate ER 25 mg oral tablet, extended release: 1 tab(s) orally once a day-PM  Multaq 400 mg oral tablet: 1 tab(s) orally 2 times a day  multivitamin: 1 tab(s) orally once a day  sildenafil 100 mg oral tablet: 1 tab(s) orally once a day, As Needed  Xarelto 20 mg oral tablet: 1 tab(s) orally once a day (in the evening)  pt to follow preop instructions by surgeon and cardiologist   Calcium 500+D oral tablet, chewable: 0.5 tab(s) orally once a day  lisinopril 40 mg oral tablet: 1 tab(s) orally once a day (in the morning)  magnesium: 1  orally once a day  Multaq 400 mg oral tablet: 1 tab(s) orally 2 times a day  multivitamin: 1 tab(s) orally once a day  oxyCODONE 5 mg oral tablet: 1 tab(s) orally every 4 hours, As needed, Moderate Pain (4 - 6)  sildenafil 100 mg oral tablet: 1 tab(s) orally once a day, As Needed  Xarelto 20 mg oral tablet: 1 tab(s) orally once a day (in the evening)  ***Start Monday 11/1

## 2021-10-29 NOTE — DISCHARGE NOTE PROVIDER - NSDCFUADDINST_GEN_ALL_CORE_FT
Please come to ED or Call Cardio thoracic office at [phone] if Chest pain, Shortness of Breath,  Nausea & vomiting, oozing from wounds

## 2021-10-30 ENCOUNTER — TRANSCRIPTION ENCOUNTER (OUTPATIENT)
Age: 76
End: 2021-10-30

## 2021-10-30 VITALS — HEART RATE: 57 BPM | OXYGEN SATURATION: 98 % | DIASTOLIC BLOOD PRESSURE: 57 MMHG | SYSTOLIC BLOOD PRESSURE: 149 MMHG

## 2021-10-30 LAB
ANION GAP SERPL CALC-SCNC: 5 MMOL/L — SIGNIFICANT CHANGE UP (ref 5–17)
BUN SERPL-MCNC: 17 MG/DL — SIGNIFICANT CHANGE UP (ref 7–23)
CALCIUM SERPL-MCNC: 8 MG/DL — LOW (ref 8.5–10.1)
CHLORIDE SERPL-SCNC: 108 MMOL/L — SIGNIFICANT CHANGE UP (ref 96–108)
CO2 SERPL-SCNC: 24 MMOL/L — SIGNIFICANT CHANGE UP (ref 22–31)
COVID-19 SPIKE DOMAIN AB INTERP: POSITIVE
COVID-19 SPIKE DOMAIN ANTIBODY RESULT: 92.2 U/ML — HIGH
CREAT SERPL-MCNC: 1.36 MG/DL — HIGH (ref 0.5–1.3)
GLUCOSE SERPL-MCNC: 143 MG/DL — HIGH (ref 70–99)
HCT VFR BLD CALC: 46.1 % — SIGNIFICANT CHANGE UP (ref 39–50)
HGB BLD-MCNC: 15.3 G/DL — SIGNIFICANT CHANGE UP (ref 13–17)
MCHC RBC-ENTMCNC: 30.7 PG — SIGNIFICANT CHANGE UP (ref 27–34)
MCHC RBC-ENTMCNC: 33.2 GM/DL — SIGNIFICANT CHANGE UP (ref 32–36)
MCV RBC AUTO: 92.6 FL — SIGNIFICANT CHANGE UP (ref 80–100)
PLATELET # BLD AUTO: 146 K/UL — LOW (ref 150–400)
POTASSIUM SERPL-MCNC: 4.3 MMOL/L — SIGNIFICANT CHANGE UP (ref 3.5–5.3)
POTASSIUM SERPL-SCNC: 4.3 MMOL/L — SIGNIFICANT CHANGE UP (ref 3.5–5.3)
RBC # BLD: 4.98 M/UL — SIGNIFICANT CHANGE UP (ref 4.2–5.8)
RBC # FLD: 12.7 % — SIGNIFICANT CHANGE UP (ref 10.3–14.5)
SARS-COV-2 IGG+IGM SERPL QL IA: 92.2 U/ML — HIGH
SARS-COV-2 IGG+IGM SERPL QL IA: POSITIVE
SODIUM SERPL-SCNC: 137 MMOL/L — SIGNIFICANT CHANGE UP (ref 135–145)
WBC # BLD: 11.36 K/UL — HIGH (ref 3.8–10.5)
WBC # FLD AUTO: 11.36 K/UL — HIGH (ref 3.8–10.5)

## 2021-10-30 PROCEDURE — 71045 X-RAY EXAM CHEST 1 VIEW: CPT | Mod: 26

## 2021-10-30 PROCEDURE — 99238 HOSP IP/OBS DSCHRG MGMT 30/<: CPT

## 2021-10-30 RX ORDER — FAMOTIDINE 10 MG/ML
1 INJECTION INTRAVENOUS
Qty: 0 | Refills: 0 | DISCHARGE

## 2021-10-30 RX ORDER — METOPROLOL TARTRATE 50 MG
1 TABLET ORAL
Qty: 0 | Refills: 0 | DISCHARGE

## 2021-10-30 RX ORDER — AMLODIPINE BESYLATE 2.5 MG/1
1 TABLET ORAL
Qty: 0 | Refills: 0 | DISCHARGE

## 2021-10-30 RX ORDER — OXYCODONE HYDROCHLORIDE 5 MG/1
1 TABLET ORAL
Qty: 0 | Refills: 0 | DISCHARGE
Start: 2021-10-30

## 2021-10-30 RX ORDER — PSYLLIUM SEED (WITH DEXTROSE)
1 POWDER (GRAM) ORAL
Qty: 0 | Refills: 0 | DISCHARGE

## 2021-10-30 RX ORDER — RIVAROXABAN 15 MG-20MG
1 KIT ORAL
Qty: 0 | Refills: 0 | DISCHARGE

## 2021-10-30 RX ADMIN — DRONEDARONE 400 MILLIGRAM(S): 400 TABLET, FILM COATED ORAL at 09:55

## 2021-10-30 RX ADMIN — PANTOPRAZOLE SODIUM 40 MILLIGRAM(S): 20 TABLET, DELAYED RELEASE ORAL at 06:08

## 2021-10-30 RX ADMIN — SODIUM CHLORIDE 3 MILLILITER(S): 9 INJECTION INTRAMUSCULAR; INTRAVENOUS; SUBCUTANEOUS at 05:47

## 2021-10-30 RX ADMIN — SODIUM CHLORIDE 3 MILLILITER(S): 9 INJECTION INTRAMUSCULAR; INTRAVENOUS; SUBCUTANEOUS at 12:01

## 2021-10-30 RX ADMIN — INFLUENZA VIRUS VACCINE 0.7 MILLILITER(S): 15; 15; 15; 15 SUSPENSION INTRAMUSCULAR at 11:09

## 2021-10-30 RX ADMIN — LISINOPRIL 40 MILLIGRAM(S): 2.5 TABLET ORAL at 09:55

## 2021-10-30 RX ADMIN — ENOXAPARIN SODIUM 40 MILLIGRAM(S): 100 INJECTION SUBCUTANEOUS at 09:55

## 2021-10-30 NOTE — DISCHARGE NOTE NURSING/CASE MANAGEMENT/SOCIAL WORK - NSDCPEXARELTOCOMP_GEN_ALL_CORE
Clear bilaterally, pupils equal, round and reactive to light.
Rivaroxaban/Xarelto is used to treat and prevent blood clots.  If you are not able to swallow the tablets whole, you may crush the tablets and mix them with a small amount of applesauce and promptly take within four hours. Eat some food right after you swallow the mixture. Take 2.5mg or 10mg tablets of Rivaroxaban/Xarelto with or without food. Take 15mg or 20mg tablets of Rivaroxaban/Xarelto with food. Never skip a dose of Rivaroxaban/Xarelto. If you take Rivaroxaban/Xarelto once a day and you forget to take your dose, take a dose as soon as you remember on the same day. If you take Rivaroxaban/Xarelto 2.5 mg twice a day and you forget to take your dose, skip the missed dose and take your next dose at your usual time. DO NOT take an extra pill to ‘catch up’. If you take Rivaroxaban/Xarelto 15 mg twice a day and you forget to take your dose, take a dose as soon as you remember on the same day. You may take 2 doses at the same time to make up for missed dose ONLY if you take a total of 30mg per day. Notify your doctor that you missed a dose. Take Rivaroxaban/Xarelto at the same time each morning and evening. Rivaroxaban/Xarelto may be taken with other medication or food.

## 2021-10-30 NOTE — DISCHARGE NOTE NURSING/CASE MANAGEMENT/SOCIAL WORK - PATIENT PORTAL LINK FT
You can access the FollowMyHealth Patient Portal offered by [facility] by registering at the following website: [email or web address]. By joining [employer]’s FollowMyHealth portal, you will also be able to view your health information using other applications (apps) compatible with our system.

## 2021-11-01 ENCOUNTER — NON-APPOINTMENT (OUTPATIENT)
Age: 76
End: 2021-11-01

## 2021-11-05 ENCOUNTER — RESULT REVIEW (OUTPATIENT)
Age: 76
End: 2021-11-05

## 2021-11-05 ENCOUNTER — OUTPATIENT (OUTPATIENT)
Dept: OUTPATIENT SERVICES | Facility: HOSPITAL | Age: 76
LOS: 1 days | End: 2021-11-05
Payer: MEDICARE

## 2021-11-05 ENCOUNTER — APPOINTMENT (OUTPATIENT)
Dept: RADIOLOGY | Facility: CLINIC | Age: 76
End: 2021-11-05
Payer: MEDICARE

## 2021-11-05 DIAGNOSIS — Z98.890 OTHER SPECIFIED POSTPROCEDURAL STATES: Chronic | ICD-10-CM

## 2021-11-05 DIAGNOSIS — Z90.5 ACQUIRED ABSENCE OF KIDNEY: Chronic | ICD-10-CM

## 2021-11-05 DIAGNOSIS — Z90.49 ACQUIRED ABSENCE OF OTHER SPECIFIED PARTS OF DIGESTIVE TRACT: Chronic | ICD-10-CM

## 2021-11-05 DIAGNOSIS — Z90.89 ACQUIRED ABSENCE OF OTHER ORGANS: Chronic | ICD-10-CM

## 2021-11-05 DIAGNOSIS — Z00.8 ENCOUNTER FOR OTHER GENERAL EXAMINATION: ICD-10-CM

## 2021-11-05 PROCEDURE — 71046 X-RAY EXAM CHEST 2 VIEWS: CPT

## 2021-11-05 PROCEDURE — 71046 X-RAY EXAM CHEST 2 VIEWS: CPT | Mod: 26

## 2021-11-10 ENCOUNTER — APPOINTMENT (OUTPATIENT)
Dept: THORACIC SURGERY | Facility: CLINIC | Age: 76
End: 2021-11-10
Payer: MEDICARE

## 2021-11-10 VITALS
DIASTOLIC BLOOD PRESSURE: 72 MMHG | RESPIRATION RATE: 16 BRPM | HEART RATE: 55 BPM | SYSTOLIC BLOOD PRESSURE: 133 MMHG | OXYGEN SATURATION: 97 %

## 2021-11-10 LAB
BASOPHILS # BLD AUTO: 0.04 K/UL
BASOPHILS NFR BLD AUTO: 0.7 %
EOSINOPHIL # BLD AUTO: 0.26 K/UL
EOSINOPHIL NFR BLD AUTO: 4.6 %
FERRITIN SERPL-MCNC: 82 NG/ML
HCT VFR BLD CALC: 51.6 %
HGB BLD-MCNC: 16.5 G/DL
IMM GRANULOCYTES NFR BLD AUTO: 0.5 %
LYMPHOCYTES # BLD AUTO: 1.12 K/UL
LYMPHOCYTES NFR BLD AUTO: 20 %
MAN DIFF?: NORMAL
MCHC RBC-ENTMCNC: 30.5 PG
MCHC RBC-ENTMCNC: 32 GM/DL
MCV RBC AUTO: 95.4 FL
MONOCYTES # BLD AUTO: 0.46 K/UL
MONOCYTES NFR BLD AUTO: 8.2 %
NEUTROPHILS # BLD AUTO: 3.69 K/UL
NEUTROPHILS NFR BLD AUTO: 66 %
PLATELET # BLD AUTO: 202 K/UL
RBC # BLD: 5.41 M/UL
RBC # FLD: 12.6 %
WBC # FLD AUTO: 5.6 K/UL

## 2021-11-10 PROCEDURE — 99024 POSTOP FOLLOW-UP VISIT: CPT

## 2021-11-11 ENCOUNTER — APPOINTMENT (OUTPATIENT)
Dept: ELECTROPHYSIOLOGY | Facility: CLINIC | Age: 76
End: 2021-11-11
Payer: MEDICARE

## 2021-11-11 ENCOUNTER — NON-APPOINTMENT (OUTPATIENT)
Age: 76
End: 2021-11-11

## 2021-11-11 PROCEDURE — 93298 REM INTERROG DEV EVAL SCRMS: CPT | Mod: NC

## 2021-11-11 PROCEDURE — G2066: CPT

## 2021-11-12 ENCOUNTER — OUTPATIENT (OUTPATIENT)
Dept: OUTPATIENT SERVICES | Facility: HOSPITAL | Age: 76
LOS: 1 days | Discharge: HOME | End: 2021-11-12

## 2021-11-12 DIAGNOSIS — Z98.890 OTHER SPECIFIED POSTPROCEDURAL STATES: Chronic | ICD-10-CM

## 2021-11-12 DIAGNOSIS — Z90.89 ACQUIRED ABSENCE OF OTHER ORGANS: Chronic | ICD-10-CM

## 2021-11-12 DIAGNOSIS — Z90.5 ACQUIRED ABSENCE OF KIDNEY: Chronic | ICD-10-CM

## 2021-11-12 DIAGNOSIS — R71.8 OTHER ABNORMALITY OF RED BLOOD CELLS: ICD-10-CM

## 2021-11-12 DIAGNOSIS — Z90.49 ACQUIRED ABSENCE OF OTHER SPECIFIED PARTS OF DIGESTIVE TRACT: Chronic | ICD-10-CM

## 2021-11-15 ENCOUNTER — RESULT REVIEW (OUTPATIENT)
Age: 76
End: 2021-11-15

## 2021-11-15 ENCOUNTER — APPOINTMENT (OUTPATIENT)
Age: 76
End: 2021-11-15
Payer: MEDICARE

## 2021-11-15 VITALS
WEIGHT: 177.19 LBS | BODY MASS INDEX: 25.42 KG/M2 | OXYGEN SATURATION: 98 % | HEART RATE: 51 BPM | TEMPERATURE: 97 F | RESPIRATION RATE: 15 BRPM | DIASTOLIC BLOOD PRESSURE: 69 MMHG | SYSTOLIC BLOOD PRESSURE: 145 MMHG

## 2021-11-15 LAB
ALBUMIN SERPL ELPH-MCNC: 4.5 G/DL
ALP BLD-CCNC: 102 U/L
ALT SERPL-CCNC: 16 U/L
ANION GAP SERPL CALC-SCNC: 13 MMOL/L
AST SERPL-CCNC: 19 U/L
BASOPHILS # BLD AUTO: 0.05 K/UL — SIGNIFICANT CHANGE UP (ref 0–0.2)
BASOPHILS NFR BLD AUTO: 0.9 % — SIGNIFICANT CHANGE UP (ref 0–2)
BILIRUB SERPL-MCNC: 1 MG/DL
BUN SERPL-MCNC: 18 MG/DL
CALCIUM SERPL-MCNC: 9.4 MG/DL
CHLORIDE SERPL-SCNC: 106 MMOL/L
CO2 SERPL-SCNC: 22 MMOL/L
CREAT SERPL-MCNC: 1.26 MG/DL
EOSINOPHIL # BLD AUTO: 0.26 K/UL — SIGNIFICANT CHANGE UP (ref 0–0.5)
EOSINOPHIL NFR BLD AUTO: 4.7 % — SIGNIFICANT CHANGE UP (ref 0–6)
GLUCOSE SERPL-MCNC: 81 MG/DL
HCT VFR BLD CALC: 50.3 % — HIGH (ref 39–50)
HGB BLD-MCNC: 16.4 G/DL — SIGNIFICANT CHANGE UP (ref 13–17)
IMM GRANULOCYTES NFR BLD AUTO: 0.5 % — SIGNIFICANT CHANGE UP (ref 0–1.5)
LDH SERPL-CCNC: 192 U/L
LYMPHOCYTES # BLD AUTO: 1.1 K/UL — SIGNIFICANT CHANGE UP (ref 1–3.3)
LYMPHOCYTES # BLD AUTO: 20.1 % — SIGNIFICANT CHANGE UP (ref 13–44)
MCHC RBC-ENTMCNC: 30.7 PG — SIGNIFICANT CHANGE UP (ref 27–34)
MCHC RBC-ENTMCNC: 32.6 G/DL — SIGNIFICANT CHANGE UP (ref 32–36)
MCV RBC AUTO: 94 FL — SIGNIFICANT CHANGE UP (ref 80–100)
MONOCYTES # BLD AUTO: 0.63 K/UL — SIGNIFICANT CHANGE UP (ref 0–0.9)
MONOCYTES NFR BLD AUTO: 11.5 % — SIGNIFICANT CHANGE UP (ref 2–14)
NEUTROPHILS # BLD AUTO: 3.41 K/UL — SIGNIFICANT CHANGE UP (ref 1.8–7.4)
NEUTROPHILS NFR BLD AUTO: 62.3 % — SIGNIFICANT CHANGE UP (ref 43–77)
NRBC # BLD: 0 /100 WBCS — SIGNIFICANT CHANGE UP (ref 0–0)
PLATELET # BLD AUTO: 185 K/UL — SIGNIFICANT CHANGE UP (ref 150–400)
POTASSIUM SERPL-SCNC: 4.7 MMOL/L
PROT SERPL-MCNC: 6.7 G/DL
RBC # BLD: 5.35 M/UL — SIGNIFICANT CHANGE UP (ref 4.2–5.8)
RBC # FLD: 12.8 % — SIGNIFICANT CHANGE UP (ref 10.3–14.5)
SODIUM SERPL-SCNC: 141 MMOL/L
WBC # BLD: 5.48 K/UL — SIGNIFICANT CHANGE UP (ref 3.8–10.5)
WBC # FLD AUTO: 5.48 K/UL — SIGNIFICANT CHANGE UP (ref 3.8–10.5)

## 2021-11-15 PROCEDURE — 99205 OFFICE O/P NEW HI 60 MIN: CPT

## 2021-11-15 PROCEDURE — 99215 OFFICE O/P EST HI 40 MIN: CPT

## 2021-11-15 NOTE — REASON FOR VISIT
[Initial Consultation] : an initial consultation [FreeTextEntry2] : metastatic renal clear cell carcinoma

## 2021-11-15 NOTE — HISTORY OF PRESENT ILLNESS
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 \par \par CASE SYNOPSIS:\par September 2019–reports gross hematuria, spontaneously resolved.  No other associated  symptoms.\par \par 11/1/2019: CT A/P -9.7 x 10 x 7.5 cm partially heterogeneous mass extending off the lower pole of the right kidney with invasion of the lower pole renal collecting system, highly concerning for renal cell carcinoma.  No gross urothelial lesion.  Few scattered pulmonary nodules measuring up to 4 mm, nonspecific, possibly representing postinflammatory benign nodules, but cannot exclude metastasis.  Further evaluation with CT scan of the chest recommended.  1.2 x 1.5 cm low-attenuation lesion of the spleen representing hemangioma.\par \par 11/8/2019: CT chest–tree in bud opacities noted within the right lung likely representing mucoid impacted distal small airways.\par \par 12/5/2019: Radical nephrectomy (right kidney and proximal ureter) performed at [facility] (); surgical pathology: clear cell renal carcinoma, 10 cm, with extensive necrosis, grade 4 with focal rhabdoid features, no definitive LVI, vascular and ureteral resection margins free of tumor, perinephric adipose tissue negative for malignancy.  Renal parenchyma showed mild nephrosclerosis and patchy interstitial chronic inflammation (pT2, pN0).\par \par 3/23/2020: CT A/P–interval right nephrectomy.  No evidence of locally recurrent or metastatic disease.\par \par 4/9/2021: CT C/A/P–5 mm RUL pulmonary nodule may reflect metastasis and is new since prior chest CT of 11/8/2019.  Close follow-up recommended.  No evidence for residual or recurrent tumor in the right nephrectomy bed.\par \par 8/13/2021: CT chest–new right upper lobe 8 mm peripheral nodule indeterminate; previously measuring 5 mm.  Interval growth suggest metastatic disease.\par \par 9/8/2021:referred to Dr. Schmitt (thoracic surgery) for evaluation.\par \par 10/29/2021: Right upper lobe VATS/ wedge resection ([facility], Dr. Schmitt) -metastatic clear-cell renal cell carcinoma, R4 lymph node excision negative for malignancy, level 7 lymph node excision negative for malignancy, acid-fast bacilli and fungi.  Both lymph nodes with noncaseating granulomatous inflammation.\par \par Patient was last seen by me in July 2021 when his hemoglobin was stable at 16 g/dL and he denied new constitutional symptoms.  He subsequently presented in mid September 2021 with dizziness, shortness of breath with exertion and unintentional weight loss.  He was referred to Dr. Schmitt (thoracic surgery) for evaluation and subsequently had a right upper lobe wedge resection as described above.  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 [FreeTextEntry1] : expectant surveillance\par \par  No

## 2021-11-15 NOTE — ASSESSMENT
[FreeTextEntry1] : Mr. TAM 's questions were answered to his satisfaction. \par He  expressed his  understanding and willingness to comply with the above recommendations, and  will return to the office in 1 month.\par \par \par \par \par \par \par

## 2021-11-15 NOTE — END OF VISIT
[FreeTextEntry3] : This is a new oncology case. [Time Spent: ___ minutes] : I have spent [unfilled] minutes of time on the encounter.

## 2021-11-15 NOTE — REVIEW OF SYSTEMS
[Patient Intake Form Reviewed] : Patient intake form was reviewed [Fatigue] : fatigue [Recent Change In Weight] : ~T recent weight change [Shortness Of Breath] : shortness of breath [SOB on Exertion] : shortness of breath during exertion [Negative] : Allergic/Immunologic [FreeTextEntry2] : reports weight loss [FreeTextEntry6] : s/p RUL VATS/wedge resection for lung metastasis [FreeTextEntry8] : History of right clear-cell kidney cancer, status post right nephrectomy

## 2021-11-15 NOTE — PHYSICAL EXAM
[Ambulatory and capable of all self care but unable to carry out any work activities] : Status 2- Ambulatory and capable of all self care but unable to carry out any work activities. Up and about more than 50% of waking hours [Normal] : affect appropriate [de-identified] : scars s/p VATS right chest wall - healed

## 2021-11-15 NOTE — CONSULT LETTER
[Dear  ___] : Dear ~HAMIDA, [Consult Letter:] : I had the pleasure of evaluating your patient, [unfilled]. [Please see my note below.] : Please see my note below. [Consult Closing:] : Thank you very much for allowing me to participate in the care of this patient.  If you have any questions, please do not hesitate to contact me. [Sincerely,] : Sincerely, [DrConstance  ___] : Dr. PARKER [DrConstance ___] : Dr. PARKER [___] : [unfilled] [FreeTextEntry2] : Radha Schmitt M.D. [FreeTextEntry3] : GEMMA DIAMOND M.D.\par Medical Oncology\par [facility] \par [facility]\par [address]\par [place], [place] [address]\par Telephone: [phone]\par Fax: [phone]\par \par \par \par \par \par

## 2021-11-16 LAB
HBV CORE IGG+IGM SER QL: NONREACTIVE
HBV SURFACE AB SER QL: NONREACTIVE
HBV SURFACE AG SER QL: NONREACTIVE

## 2021-11-19 ENCOUNTER — APPOINTMENT (OUTPATIENT)
Dept: INFUSION THERAPY | Facility: HOSPITAL | Age: 76
End: 2021-11-19

## 2021-11-22 ENCOUNTER — NON-APPOINTMENT (OUTPATIENT)
Age: 76
End: 2021-11-22

## 2021-11-22 DIAGNOSIS — Z51.11 ENCOUNTER FOR ANTINEOPLASTIC CHEMOTHERAPY: ICD-10-CM

## 2021-11-22 DIAGNOSIS — R11.2 NAUSEA WITH VOMITING, UNSPECIFIED: ICD-10-CM

## 2021-11-22 DIAGNOSIS — C64.9 MALIGNANT NEOPLASM OF UNSPECIFIED KIDNEY, EXCEPT RENAL PELVIS: ICD-10-CM

## 2021-11-30 ENCOUNTER — NON-APPOINTMENT (OUTPATIENT)
Age: 76
End: 2021-11-30

## 2021-12-03 ENCOUNTER — APPOINTMENT (OUTPATIENT)
Dept: INTERNAL MEDICINE | Facility: CLINIC | Age: 76
End: 2021-12-03
Payer: MEDICARE

## 2021-12-03 VITALS
HEIGHT: 70 IN | HEART RATE: 62 BPM | OXYGEN SATURATION: 98 % | BODY MASS INDEX: 24.62 KG/M2 | RESPIRATION RATE: 15 BRPM | WEIGHT: 172 LBS | DIASTOLIC BLOOD PRESSURE: 70 MMHG | TEMPERATURE: 97.9 F | SYSTOLIC BLOOD PRESSURE: 120 MMHG

## 2021-12-03 PROCEDURE — 99214 OFFICE O/P EST MOD 30 MIN: CPT

## 2021-12-03 RX ORDER — DRONEDARONE 400 MG/1
400 TABLET, FILM COATED ORAL TWICE DAILY
Refills: 0 | Status: DISCONTINUED | COMMUNITY
Start: 2020-07-07 | End: 2021-12-03

## 2021-12-03 NOTE — HISTORY OF PRESENT ILLNESS
[FreeTextEntry1] : Followup [de-identified] : Mr. Johnson presents for followup evaluation. As a history of renal cell carcinoma. He is status post right upper lobe wedge resection and mediastinal lymph node dissection for an 8 mm right upper lobe nodule. Pathology was positive for metastatic renal cell carcinoma. He has been seen by oncology and will start on Axitinib treatment. He was also supposed to start on multaq for treatment of atrial fibrillation, however, there is cross reaction and he will not be taking multaq. Patient denies any shortness of breath, chest pain or palpitations.

## 2021-12-03 NOTE — PLAN
[FreeTextEntry1] : Mr. Johnson presents for followup evaluation. He is status post right upper lobe wedge resection for an 8 mm nodule which is metastatic renal cell carcinoma. He will start immunotherapy as per oncology. The patient will not require treatment for atrial fibrillation and will continue on anticoagulation therapy. Followup in 3 months as scheduled with complete pulmonary function tests.

## 2021-12-10 ENCOUNTER — APPOINTMENT (OUTPATIENT)
Age: 76
End: 2021-12-10
Payer: MEDICARE

## 2021-12-10 ENCOUNTER — APPOINTMENT (OUTPATIENT)
Dept: HEMATOLOGY ONCOLOGY | Facility: CLINIC | Age: 76
End: 2021-12-10

## 2021-12-10 ENCOUNTER — APPOINTMENT (OUTPATIENT)
Dept: INFUSION THERAPY | Facility: HOSPITAL | Age: 76
End: 2021-12-10

## 2021-12-10 ENCOUNTER — APPOINTMENT (OUTPATIENT)
Dept: ELECTROPHYSIOLOGY | Facility: CLINIC | Age: 76
End: 2021-12-10
Payer: MEDICARE

## 2021-12-10 VITALS
WEIGHT: 175 LBS | DIASTOLIC BLOOD PRESSURE: 64 MMHG | HEIGHT: 70 IN | SYSTOLIC BLOOD PRESSURE: 112 MMHG | OXYGEN SATURATION: 100 % | BODY MASS INDEX: 25.05 KG/M2 | HEART RATE: 63 BPM

## 2021-12-10 VITALS
TEMPERATURE: 97.5 F | RESPIRATION RATE: 16 BRPM | SYSTOLIC BLOOD PRESSURE: 112 MMHG | WEIGHT: 178.77 LBS | DIASTOLIC BLOOD PRESSURE: 70 MMHG | BODY MASS INDEX: 25.65 KG/M2 | OXYGEN SATURATION: 99 % | HEART RATE: 64 BPM

## 2021-12-10 PROCEDURE — 99214 OFFICE O/P EST MOD 30 MIN: CPT

## 2021-12-10 PROCEDURE — 93285 PRGRMG DEV EVAL SCRMS IP: CPT

## 2021-12-13 NOTE — HISTORY OF PRESENT ILLNESS
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 \par \par CASE SYNOPSIS:\par September 2019–reports gross hematuria, spontaneously resolved.  No other associated  symptoms.\par \par 11/1/2019: CT A/P -9.7 x 10 x 7.5 cm partially heterogeneous mass extending off the lower pole of the right kidney with invasion of the lower pole renal collecting system, highly concerning for renal cell carcinoma.  No gross urothelial lesion.  Few scattered pulmonary nodules measuring up to 4 mm, nonspecific, possibly representing postinflammatory benign nodules, but cannot exclude metastasis.  Further evaluation with CT scan of the chest recommended.  1.2 x 1.5 cm low-attenuation lesion of the spleen representing hemangioma.\par \par 11/8/2019: CT chest–tree in bud opacities noted within the right lung likely representing mucoid impacted distal small airways.\par \par 12/5/2019: Radical nephrectomy (right kidney and proximal ureter) performed at [facility] (); surgical pathology: clear cell renal carcinoma, 10 cm, with extensive necrosis, grade 4 with focal rhabdoid features, no definitive LVI, vascular and ureteral resection margins free of tumor, perinephric adipose tissue negative for malignancy.  Renal parenchyma showed mild nephrosclerosis and patchy interstitial chronic inflammation (pT2, pN0).\par \par 3/23/2020: CT A/P–interval right nephrectomy.  No evidence of locally recurrent or metastatic disease.\par \par 4/9/2021: CT C/A/P–5 mm RUL pulmonary nodule may reflect metastasis and is new since prior chest CT of 11/8/2019.  Close follow-up recommended.  No evidence for residual or recurrent tumor in the right nephrectomy bed.\par \par 8/13/2021: CT chest–new right upper lobe 8 mm peripheral nodule indeterminate; previously measuring 5 mm.  Interval growth suggest metastatic disease.\par \par 9/8/2021:referred to Dr. Schmitt (thoracic surgery) for evaluation.\par \par 10/29/2021: Right upper lobe VATS/ wedge resection ([facility], Dr. Schmitt) -metastatic clear-cell renal cell carcinoma, R4 lymph node excision negative for malignancy, level 7 lymph node excision negative for malignancy, acid-fast bacilli and fungi.  Both lymph nodes with noncaseating granulomatous inflammation.\par \par 11/19/21- cycle # 1 Pembrolizumab\par \par 11/30/21- discontinues Multaq (antiarrhythmic) due to interactions with axitinib\par \par 12/4/21- started axitinb\par  [FreeTextEntry1] : \par \par  [de-identified] : Patient is here for treatment with 2nd cycle Pembrolizumab; he started axitinib on 12/4/21 after Multaq discontinued per 's recommendations.  Seems to tolerate treatment well; the only new constitutional symptom is loose bowel movement.  In the past patient used to be constipated, and was taking Metamucil.  Since the start of treatment he has discontinued Metamucil.  Also on 12/3/2021 patient had 2 wisdom teeth removed.  Feeling stable otherwise.  Physical examination unremarkable.  Denies skin rashes, hypotension, changes in appetite or weight.  Maintains normal urinary output.  Laboratory evaluation consistent with normal CBC, slightly decreased in eGFR (at 54 mL/min), otherwise normal\par \par \par

## 2021-12-13 NOTE — END OF VISIT
[Time Spent: ___ minutes] : I have spent [unfilled] minutes of time on the encounter. [FreeTextEntry3] : This is a new oncology case.

## 2021-12-13 NOTE — PHYSICAL EXAM
[Ambulatory and capable of all self care but unable to carry out any work activities] : Status 2- Ambulatory and capable of all self care but unable to carry out any work activities. Up and about more than 50% of waking hours [Normal] : affect appropriate [de-identified] : scars s/p VATS right chest wall - healed

## 2021-12-13 NOTE — ASSESSMENT
[FreeTextEntry1] : Mr. TAM 's questions were answered to his satisfaction. \par He  expressed his  understanding and willingness to comply with the above recommendations, and  will return to the office in 6 weeks.\par \par \par \par \par \par \par

## 2021-12-17 ENCOUNTER — APPOINTMENT (OUTPATIENT)
Dept: ELECTROPHYSIOLOGY | Facility: CLINIC | Age: 76
End: 2021-12-17
Payer: MEDICARE

## 2021-12-17 ENCOUNTER — NON-APPOINTMENT (OUTPATIENT)
Age: 76
End: 2021-12-17

## 2021-12-17 PROCEDURE — 93298 REM INTERROG DEV EVAL SCRMS: CPT

## 2021-12-17 PROCEDURE — G2066: CPT

## 2021-12-22 ENCOUNTER — NON-APPOINTMENT (OUTPATIENT)
Age: 76
End: 2021-12-22

## 2021-12-27 ENCOUNTER — OUTPATIENT (OUTPATIENT)
Dept: OUTPATIENT SERVICES | Facility: HOSPITAL | Age: 76
LOS: 1 days | Discharge: HOME | End: 2021-12-27

## 2021-12-27 DIAGNOSIS — Z98.890 OTHER SPECIFIED POSTPROCEDURAL STATES: Chronic | ICD-10-CM

## 2021-12-27 DIAGNOSIS — R71.8 OTHER ABNORMALITY OF RED BLOOD CELLS: ICD-10-CM

## 2021-12-27 DIAGNOSIS — Z90.5 ACQUIRED ABSENCE OF KIDNEY: Chronic | ICD-10-CM

## 2021-12-27 DIAGNOSIS — Z90.49 ACQUIRED ABSENCE OF OTHER SPECIFIED PARTS OF DIGESTIVE TRACT: Chronic | ICD-10-CM

## 2021-12-27 DIAGNOSIS — Z90.89 ACQUIRED ABSENCE OF OTHER ORGANS: Chronic | ICD-10-CM

## 2021-12-28 ENCOUNTER — EMERGENCY (EMERGENCY)
Facility: HOSPITAL | Age: 76
LOS: 0 days | Discharge: HOME | End: 2021-12-28
Attending: STUDENT IN AN ORGANIZED HEALTH CARE EDUCATION/TRAINING PROGRAM
Payer: MEDICARE

## 2021-12-28 VITALS
SYSTOLIC BLOOD PRESSURE: 172 MMHG | WEIGHT: 176.37 LBS | OXYGEN SATURATION: 99 % | RESPIRATION RATE: 18 BRPM | TEMPERATURE: 98 F | DIASTOLIC BLOOD PRESSURE: 84 MMHG | HEART RATE: 86 BPM | HEIGHT: 70 IN

## 2021-12-28 VITALS
OXYGEN SATURATION: 96 % | SYSTOLIC BLOOD PRESSURE: 131 MMHG | HEART RATE: 91 BPM | RESPIRATION RATE: 18 BRPM | DIASTOLIC BLOOD PRESSURE: 91 MMHG | TEMPERATURE: 98 F

## 2021-12-28 DIAGNOSIS — Z90.5 ACQUIRED ABSENCE OF KIDNEY: Chronic | ICD-10-CM

## 2021-12-28 DIAGNOSIS — Z79.01 LONG TERM (CURRENT) USE OF ANTICOAGULANTS: ICD-10-CM

## 2021-12-28 DIAGNOSIS — Z90.89 ACQUIRED ABSENCE OF OTHER ORGANS: Chronic | ICD-10-CM

## 2021-12-28 DIAGNOSIS — R42 DIZZINESS AND GIDDINESS: ICD-10-CM

## 2021-12-28 DIAGNOSIS — Z90.49 ACQUIRED ABSENCE OF OTHER SPECIFIED PARTS OF DIGESTIVE TRACT: Chronic | ICD-10-CM

## 2021-12-28 DIAGNOSIS — Z20.822 CONTACT WITH AND (SUSPECTED) EXPOSURE TO COVID-19: ICD-10-CM

## 2021-12-28 DIAGNOSIS — Z98.890 OTHER SPECIFIED POSTPROCEDURAL STATES: Chronic | ICD-10-CM

## 2021-12-28 DIAGNOSIS — I10 ESSENTIAL (PRIMARY) HYPERTENSION: ICD-10-CM

## 2021-12-28 DIAGNOSIS — R07.9 CHEST PAIN, UNSPECIFIED: ICD-10-CM

## 2021-12-28 DIAGNOSIS — R51.9 HEADACHE, UNSPECIFIED: ICD-10-CM

## 2021-12-28 DIAGNOSIS — R11.0 NAUSEA: ICD-10-CM

## 2021-12-28 DIAGNOSIS — I48.91 UNSPECIFIED ATRIAL FIBRILLATION: ICD-10-CM

## 2021-12-28 LAB
ALBUMIN SERPL ELPH-MCNC: 3.4 G/DL — SIGNIFICANT CHANGE UP (ref 3.3–5)
ALP SERPL-CCNC: 121 U/L — HIGH (ref 40–120)
ALT FLD-CCNC: 37 U/L — SIGNIFICANT CHANGE UP (ref 12–78)
ANION GAP SERPL CALC-SCNC: 5 MMOL/L — SIGNIFICANT CHANGE UP (ref 5–17)
APPEARANCE UR: CLEAR — SIGNIFICANT CHANGE UP
APTT BLD: 42.6 SEC — HIGH (ref 27.5–35.5)
AST SERPL-CCNC: 20 U/L — SIGNIFICANT CHANGE UP (ref 15–37)
BACTERIA # UR AUTO: NEGATIVE — SIGNIFICANT CHANGE UP
BASOPHILS # BLD AUTO: 0.03 K/UL — SIGNIFICANT CHANGE UP (ref 0–0.2)
BASOPHILS NFR BLD AUTO: 0.4 % — SIGNIFICANT CHANGE UP (ref 0–2)
BILIRUB SERPL-MCNC: 1.5 MG/DL — HIGH (ref 0.2–1.2)
BILIRUB UR-MCNC: NEGATIVE — SIGNIFICANT CHANGE UP
BUN SERPL-MCNC: 18 MG/DL — SIGNIFICANT CHANGE UP (ref 7–23)
CALCIUM SERPL-MCNC: 9.2 MG/DL — SIGNIFICANT CHANGE UP (ref 8.5–10.1)
CHLORIDE SERPL-SCNC: 105 MMOL/L — SIGNIFICANT CHANGE UP (ref 96–108)
CO2 SERPL-SCNC: 27 MMOL/L — SIGNIFICANT CHANGE UP (ref 22–31)
COLOR SPEC: YELLOW — SIGNIFICANT CHANGE UP
CREAT SERPL-MCNC: 1.39 MG/DL — HIGH (ref 0.5–1.3)
DIFF PNL FLD: ABNORMAL
EOSINOPHIL # BLD AUTO: 0.15 K/UL — SIGNIFICANT CHANGE UP (ref 0–0.5)
EOSINOPHIL NFR BLD AUTO: 2.2 % — SIGNIFICANT CHANGE UP (ref 0–6)
EPI CELLS # UR: SIGNIFICANT CHANGE UP
GLUCOSE SERPL-MCNC: 132 MG/DL — HIGH (ref 70–99)
GLUCOSE UR QL: NEGATIVE MG/DL — SIGNIFICANT CHANGE UP
HCT VFR BLD CALC: 51.4 % — HIGH (ref 39–50)
HGB BLD-MCNC: 17.6 G/DL — HIGH (ref 13–17)
IMM GRANULOCYTES NFR BLD AUTO: 0.1 % — SIGNIFICANT CHANGE UP (ref 0–1.5)
INR BLD: 2 RATIO — HIGH (ref 0.88–1.16)
KETONES UR-MCNC: NEGATIVE — SIGNIFICANT CHANGE UP
LEUKOCYTE ESTERASE UR-ACNC: NEGATIVE — SIGNIFICANT CHANGE UP
LYMPHOCYTES # BLD AUTO: 0.99 K/UL — LOW (ref 1–3.3)
LYMPHOCYTES # BLD AUTO: 14.2 % — SIGNIFICANT CHANGE UP (ref 13–44)
MAGNESIUM SERPL-MCNC: 2 MG/DL — SIGNIFICANT CHANGE UP (ref 1.6–2.6)
MCHC RBC-ENTMCNC: 29.9 PG — SIGNIFICANT CHANGE UP (ref 27–34)
MCHC RBC-ENTMCNC: 34.2 GM/DL — SIGNIFICANT CHANGE UP (ref 32–36)
MCV RBC AUTO: 87.3 FL — SIGNIFICANT CHANGE UP (ref 80–100)
MONOCYTES # BLD AUTO: 0.56 K/UL — SIGNIFICANT CHANGE UP (ref 0–0.9)
MONOCYTES NFR BLD AUTO: 8 % — SIGNIFICANT CHANGE UP (ref 2–14)
NEUTROPHILS # BLD AUTO: 5.22 K/UL — SIGNIFICANT CHANGE UP (ref 1.8–7.4)
NEUTROPHILS NFR BLD AUTO: 75.1 % — SIGNIFICANT CHANGE UP (ref 43–77)
NITRITE UR-MCNC: NEGATIVE — SIGNIFICANT CHANGE UP
NT-PROBNP SERPL-SCNC: 1240 PG/ML — HIGH (ref 0–450)
PH UR: 7 — SIGNIFICANT CHANGE UP (ref 5–8)
PLATELET # BLD AUTO: 177 K/UL — SIGNIFICANT CHANGE UP (ref 150–400)
POTASSIUM SERPL-MCNC: 4.1 MMOL/L — SIGNIFICANT CHANGE UP (ref 3.5–5.3)
POTASSIUM SERPL-SCNC: 4.1 MMOL/L — SIGNIFICANT CHANGE UP (ref 3.5–5.3)
PROT SERPL-MCNC: 7.1 GM/DL — SIGNIFICANT CHANGE UP (ref 6–8.3)
PROT UR-MCNC: 30 MG/DL
PROTHROM AB SERPL-ACNC: 22.5 SEC — HIGH (ref 10.6–13.6)
RBC # BLD: 5.89 M/UL — HIGH (ref 4.2–5.8)
RBC # FLD: 11.5 % — SIGNIFICANT CHANGE UP (ref 10.3–14.5)
RBC CASTS # UR COMP ASSIST: ABNORMAL /HPF (ref 0–4)
SARS-COV-2 RNA SPEC QL NAA+PROBE: SIGNIFICANT CHANGE UP
SODIUM SERPL-SCNC: 137 MMOL/L — SIGNIFICANT CHANGE UP (ref 135–145)
SP GR SPEC: 1 — LOW (ref 1.01–1.02)
TROPONIN I, HIGH SENSITIVITY RESULT: 16.75 NG/L — SIGNIFICANT CHANGE UP
TROPONIN I, HIGH SENSITIVITY RESULT: 19.7 NG/L — SIGNIFICANT CHANGE UP
UROBILINOGEN FLD QL: NEGATIVE MG/DL — SIGNIFICANT CHANGE UP
WBC # BLD: 6.96 K/UL — SIGNIFICANT CHANGE UP (ref 3.8–10.5)
WBC # FLD AUTO: 6.96 K/UL — SIGNIFICANT CHANGE UP (ref 3.8–10.5)
WBC UR QL: SIGNIFICANT CHANGE UP

## 2021-12-28 PROCEDURE — 81001 URINALYSIS AUTO W/SCOPE: CPT

## 2021-12-28 PROCEDURE — 84484 ASSAY OF TROPONIN QUANT: CPT | Mod: 59

## 2021-12-28 PROCEDURE — U0003: CPT

## 2021-12-28 PROCEDURE — 93010 ELECTROCARDIOGRAM REPORT: CPT

## 2021-12-28 PROCEDURE — 96374 THER/PROPH/DIAG INJ IV PUSH: CPT | Mod: XU

## 2021-12-28 PROCEDURE — 85730 THROMBOPLASTIN TIME PARTIAL: CPT

## 2021-12-28 PROCEDURE — U0005: CPT

## 2021-12-28 PROCEDURE — 83735 ASSAY OF MAGNESIUM: CPT

## 2021-12-28 PROCEDURE — 36415 COLL VENOUS BLD VENIPUNCTURE: CPT

## 2021-12-28 PROCEDURE — 70496 CT ANGIOGRAPHY HEAD: CPT | Mod: MA

## 2021-12-28 PROCEDURE — 70498 CT ANGIOGRAPHY NECK: CPT | Mod: MA

## 2021-12-28 PROCEDURE — 70496 CT ANGIOGRAPHY HEAD: CPT | Mod: 26,MA

## 2021-12-28 PROCEDURE — 71045 X-RAY EXAM CHEST 1 VIEW: CPT | Mod: 26

## 2021-12-28 PROCEDURE — 93290 INTERROG DEV EVAL ICPMS IP: CPT | Mod: 26

## 2021-12-28 PROCEDURE — 70498 CT ANGIOGRAPHY NECK: CPT | Mod: 26,MA

## 2021-12-28 PROCEDURE — 80053 COMPREHEN METABOLIC PANEL: CPT

## 2021-12-28 PROCEDURE — 99284 EMERGENCY DEPT VISIT MOD MDM: CPT | Mod: 25

## 2021-12-28 PROCEDURE — 85025 COMPLETE CBC W/AUTO DIFF WBC: CPT

## 2021-12-28 PROCEDURE — 83880 ASSAY OF NATRIURETIC PEPTIDE: CPT

## 2021-12-28 PROCEDURE — 85610 PROTHROMBIN TIME: CPT

## 2021-12-28 PROCEDURE — 99285 EMERGENCY DEPT VISIT HI MDM: CPT | Mod: CS

## 2021-12-28 PROCEDURE — 71045 X-RAY EXAM CHEST 1 VIEW: CPT

## 2021-12-28 PROCEDURE — 93005 ELECTROCARDIOGRAM TRACING: CPT

## 2021-12-28 RX ORDER — MECLIZINE HCL 12.5 MG
25 TABLET ORAL ONCE
Refills: 0 | Status: COMPLETED | OUTPATIENT
Start: 2021-12-28 | End: 2021-12-28

## 2021-12-28 RX ORDER — SODIUM CHLORIDE 9 MG/ML
1000 INJECTION INTRAMUSCULAR; INTRAVENOUS; SUBCUTANEOUS ONCE
Refills: 0 | Status: COMPLETED | OUTPATIENT
Start: 2021-12-28 | End: 2021-12-28

## 2021-12-28 RX ORDER — MECLIZINE HCL 12.5 MG
1 TABLET ORAL
Qty: 15 | Refills: 0
Start: 2021-12-28 | End: 2022-01-01

## 2021-12-28 RX ORDER — ONDANSETRON 8 MG/1
4 TABLET, FILM COATED ORAL ONCE
Refills: 0 | Status: COMPLETED | OUTPATIENT
Start: 2021-12-28 | End: 2021-12-28

## 2021-12-28 RX ADMIN — SODIUM CHLORIDE 1000 MILLILITER(S): 9 INJECTION INTRAMUSCULAR; INTRAVENOUS; SUBCUTANEOUS at 10:42

## 2021-12-28 RX ADMIN — Medication 25 MILLIGRAM(S): at 10:42

## 2021-12-28 RX ADMIN — ONDANSETRON 4 MILLIGRAM(S): 8 TABLET, FILM COATED ORAL at 10:42

## 2021-12-28 NOTE — ED PROVIDER NOTE - PATIENT PORTAL LINK FT
You can access the FollowMyHealth Patient Portal offered by [facility] by registering at the following website: [email or web address]. By joining [employer]’s FollowMyHealth portal, you will also be able to view your health information using other applications (apps) compatible with our system.

## 2021-12-28 NOTE — ED PROVIDER NOTE - PHYSICAL EXAMINATION
Vital signs as available reviewed.  General:  Comfortable, no acute distress.  Head:  Normocephalic, atraumatic.  Eyes:  Conjunctiva pink, no icterus.  Cardiovascular:  irregularly irregular, no obvious murmur.  Respiratory:  Clear to auscultation, good air entry bilaterally.  Abdomen:  Soft, non-tender.  Musculoskeletal:  No deformity or calf tenderness.  Neurologic: Alert and oriented, follows commands, extra-occular movements intact, no visual fields defect, no facial asymmetry, no upper extremity drift, no lower extremity drift, rapid alternating movements performed without difficulty, no sensory deficit, no aphasia, no dysarthria, no inattention.   Skin:  Warm and dry. Vital signs as available reviewed.  General:  Comfortable, no acute distress.  Head:  Normocephalic, atraumatic.  Eyes:  Conjunctiva pink, no icterus.  Cardiovascular:  irregularly irregular, no obvious murmur.  Respiratory:  Clear to auscultation, good air entry bilaterally.  Abdomen:  Soft, non-tender.  Musculoskeletal:  No deformity or calf tenderness.  Neurologic: Alert and oriented, follows commands, extra-occular movements intact, no visual fields defect, no facial asymmetry, no upper extremity drift, no lower extremity drift, rapid alternating movements performed without difficulty, no sensory deficit, no aphasia, no dysarthria, no inattention.  NIH=0  Skin:  Warm and dry.

## 2021-12-28 NOTE — PROCEDURE NOTE - INTERROGATION NOTE: UNDERLYING RHYTHM
Persistent AT [facility] since december 16, 2021. Ventricular rates averaging 100 but are recorded as high as 150 BPM

## 2021-12-28 NOTE — ED PROVIDER NOTE - NSFOLLOWUPINSTRUCTIONS_ED_ALL_ED_FT
Please follow up with your Primary Care Physician and any specialists as discussed.  Please take your medications as prescribed.  If your symptoms persist or worsen, please seek care. Either return to the Emergency Department, go to urgent care or see your primary care doctor.  See refer to general information and follow up instructions below:   Dizziness    WHAT YOU NEED TO KNOW:    Dizziness is a feeling of being off balance or unsteady. Common causes of dizziness are an inner ear fluid imbalance or a lack of oxygen in your blood. Dizziness may be acute (lasts 3 days or less) or chronic (lasts longer than 3 days). You may have dizzy spells that last from seconds to a few hours.     DISCHARGE INSTRUCTIONS:    Return to the emergency department if:     You have a headache and a stiff neck.      You have shaking chills and a fever.       You vomit over and over with no relief.       Your vomit or bowel movements are red or black.       You have pain in your chest, back, or abdomen.       You have numbness, especially in your face, arms, or legs.       You have trouble moving your arms or legs.       You are confused.     Contact your healthcare provider if:     You have a fever.       Your symptoms do not get better with treatment.       You have questions or concerns about your condition or care.     Manage your symptoms:     Do not drive or operate heavy machinery when you are dizzy.       Get up slowly from sitting or lying down.       Drink plenty of liquids. Liquids help prevent dehydration. Ask how much liquid to drink each day and which liquids are best for you.    Follow up with your healthcare provider as directed: Write down your questions so you remember to ask them during your visits.

## 2021-12-28 NOTE — ED ADULT TRIAGE NOTE - CHIEF COMPLAINT QUOTE
Pt BIBA for multiple medical complaints. Pt states history of afib. Symptoms began last night which include dizziness, palpations, nausea and generalized weakness. Currently on Xarelto. No acute distress noted at this time.

## 2021-12-28 NOTE — PROCEDURE NOTE - INTERROGATION NOTE: COMMENTS
Past hx of being on Multaq that was d/c due interaction with infusion that pt was on after having Lung Ca

## 2021-12-28 NOTE — ED PROVIDER NOTE - CARE PROVIDER_API CALL
Dylan Manning (MD)  Cardiac Electrophysiology; Cardiovascular Disease  [address] [place] [address]  Phone: [phone]  Fax: [phone]  Established Patient  Follow Up Time: Urgent

## 2021-12-28 NOTE — ED ADULT NURSE NOTE - OBJECTIVE STATEMENT
pt. c/o dizziness last night, pt. states no syncope, no head injury, pt. states hx of kidney CA on chemo. pt. steady gait and has no s/s in ED.

## 2021-12-28 NOTE — ED PROVIDER NOTE - PROGRESS NOTE DETAILS
kv: patient feels better walked by staff, no dizziness or chest pain. loop with afib, no current recommendations re: meds per EP. patient to follow up in office.

## 2021-12-28 NOTE — ED PROVIDER NOTE - OBJECTIVE STATEMENT
76 F history of renal CA on infusions, afib, HTN here complaining of dizziness. patient woke up in the night, and felt dizzy, it was worse on his back and better when he rolled over. associated with nausea. also with electrical shocking sensation in left chest which lasted a few seconds. dizziness resolved but nausea is persistent. patient reports mild recent headache. per patient, afib is worsening- Multac was stopped as it interferes with his infusion.

## 2021-12-30 ENCOUNTER — APPOINTMENT (OUTPATIENT)
Dept: INFUSION THERAPY | Facility: HOSPITAL | Age: 76
End: 2021-12-30

## 2022-01-02 DIAGNOSIS — Z51.11 ENCOUNTER FOR ANTINEOPLASTIC CHEMOTHERAPY: ICD-10-CM

## 2022-01-02 DIAGNOSIS — R11.2 NAUSEA WITH VOMITING, UNSPECIFIED: ICD-10-CM

## 2022-01-02 DIAGNOSIS — C64.9 MALIGNANT NEOPLASM OF UNSPECIFIED KIDNEY, EXCEPT RENAL PELVIS: ICD-10-CM

## 2022-01-03 ENCOUNTER — RX RENEWAL (OUTPATIENT)
Age: 77
End: 2022-01-03

## 2022-01-03 NOTE — ASSESSMENT
[FreeTextEntry1] : Denny Johnson is a 76-year-old man with paroxysmal atrial fibrillation, hypertension, and  metastatic renal cell carcinoma.\par \par Interrogation of his implantable cardiac monitor loop recorder reveals no recent atrial fibrillation.  Discussed the length at length treatment options for atrial fibrillation given the drug drug interaction between Multaq is chemotherapy it is reasonable to consider discontinuation of the Multaq.  Would only consider antiarrhythmic drugs if he should become symptomatic.  Would continue anticoagulation.

## 2022-01-03 NOTE — HISTORY OF PRESENT ILLNESS
[FreeTextEntry1] : Charlene Tam is a 76-year-old man with paroxysmal atrial fibrillation, hypertension, and  renal cell carcinoma status post right laparoscopic radical nephrectomy (12/2019) Postoperatively he experienced marked bradycardia and diltiazem was discontinued. He had a hernia repair from the surgical incision recently. But no significant cardiac symptoms. CHARLENE TAM  denies chest pain, chest pressure, shortness of breath, lightheadedness, dizziness, palpitations, syncope, presyncope, orthopnea, PND, or edema.  He was diagnosed with metastatic renal cell ca and presents to disciuss discontinuation of multaq given drug drug interaction.

## 2022-01-03 NOTE — CARDIOLOGY SUMMARY
[de-identified] : 4/6/21 : Sinus angela 58 NS QRS widening.  [de-identified] : 7/9/21 : Linq Interrogation No arrhythmia detected.  [de-identified] : 12/14/18, no Ischemia,   [de-identified] : 12/10/18, Normal left ventricular size and systolic function. Mild diastolic dysfunction. Mild aortic regurgitation. Mild tricuspid regurgitation. Minimal pulmonic regurgitation. LVEF 68%.

## 2022-01-04 ENCOUNTER — NON-APPOINTMENT (OUTPATIENT)
Age: 77
End: 2022-01-04

## 2022-01-05 ENCOUNTER — RX RENEWAL (OUTPATIENT)
Age: 77
End: 2022-01-05

## 2022-01-06 ENCOUNTER — NON-APPOINTMENT (OUTPATIENT)
Age: 77
End: 2022-01-06

## 2022-01-14 ENCOUNTER — APPOINTMENT (OUTPATIENT)
Dept: ELECTROPHYSIOLOGY | Facility: CLINIC | Age: 77
End: 2022-01-14
Payer: MEDICARE

## 2022-01-14 VITALS
BODY MASS INDEX: 25.05 KG/M2 | SYSTOLIC BLOOD PRESSURE: 152 MMHG | WEIGHT: 175 LBS | OXYGEN SATURATION: 99 % | HEART RATE: 67 BPM | HEIGHT: 70 IN | DIASTOLIC BLOOD PRESSURE: 95 MMHG

## 2022-01-14 PROCEDURE — 99214 OFFICE O/P EST MOD 30 MIN: CPT

## 2022-01-14 PROCEDURE — 93285 PRGRMG DEV EVAL SCRMS IP: CPT

## 2022-01-14 RX ORDER — METOPROLOL SUCCINATE 50 MG/1
50 TABLET, EXTENDED RELEASE ORAL
Qty: 90 | Refills: 3 | Status: COMPLETED | COMMUNITY
Start: 2022-01-05 | End: 2022-01-14

## 2022-01-14 NOTE — CARDIOLOGY SUMMARY
[de-identified] : 4/6/21 : Sinus angela 58 NS QRS widening.  [de-identified] : 7/9/21 : Linq Interrogation No arrhythmia detected.  [de-identified] : 12/14/18, no Ischemia,   [de-identified] : 12/10/18, Normal left ventricular size and systolic function. Mild diastolic dysfunction. Mild aortic regurgitation. Mild tricuspid regurgitation. Minimal pulmonic regurgitation. LVEF 68%.

## 2022-01-14 NOTE — HISTORY OF PRESENT ILLNESS
[FreeTextEntry1] : Charlene Tam is a 76-year-old man with paroxysmal atrial fibrillation, hypertension, and  renal cell carcinoma status post right laparoscopic radical nephrectomy (12/2019) Postoperatively he experienced marked bradycardia and diltiazem was discontinued. He had a hernia repair from the surgical incision recently. But no significant cardiac symptoms. CHARLENE TAM  denies chest pain, chest pressure, shortness of breath, lightheadedness, dizziness, palpitations, syncope, presyncope, orthopnea, PND, or edema.  He was diagnosed with metastatic renal cell ca  and  multaq was discontinued given drug drug interaction.  He is asymptomatic.

## 2022-01-14 NOTE — ASSESSMENT
[FreeTextEntry1] : Denny Johnson is a 76-year-old man with paroxysmal atrial fibrillation, hypertension, and  metastatic renal cell carcinoma.\par \par Interrogation of his implantable cardiac monitor loop recorder reveals persistent atrial fibrillation. He is asymptomatic..  Discussed  at length treatment options for atrial fibrillation. He is asymptomatic and tolerating anticoagulation. Would only consider antiarrhythmic drugs if he should become symptomatic.  Would continue arelto 20 mg daily.

## 2022-01-20 ENCOUNTER — APPOINTMENT (OUTPATIENT)
Dept: ELECTROPHYSIOLOGY | Facility: CLINIC | Age: 77
End: 2022-01-20
Payer: MEDICARE

## 2022-01-21 ENCOUNTER — NON-APPOINTMENT (OUTPATIENT)
Age: 77
End: 2022-01-21

## 2022-01-21 ENCOUNTER — APPOINTMENT (OUTPATIENT)
Dept: INFUSION THERAPY | Facility: HOSPITAL | Age: 77
End: 2022-01-21

## 2022-01-21 PROCEDURE — 93298 REM INTERROG DEV EVAL SCRMS: CPT

## 2022-01-21 PROCEDURE — G2066: CPT

## 2022-02-02 ENCOUNTER — NON-APPOINTMENT (OUTPATIENT)
Age: 77
End: 2022-02-02

## 2022-02-02 ENCOUNTER — APPOINTMENT (OUTPATIENT)
Dept: CARDIOLOGY | Facility: CLINIC | Age: 77
End: 2022-02-02
Payer: MEDICARE

## 2022-02-02 VITALS
BODY MASS INDEX: 24.62 KG/M2 | SYSTOLIC BLOOD PRESSURE: 122 MMHG | HEIGHT: 70 IN | OXYGEN SATURATION: 100 % | DIASTOLIC BLOOD PRESSURE: 78 MMHG | WEIGHT: 172 LBS | HEART RATE: 62 BPM

## 2022-02-02 DIAGNOSIS — Z01.810 ENCOUNTER FOR PREPROCEDURAL CARDIOVASCULAR EXAMINATION: ICD-10-CM

## 2022-02-02 DIAGNOSIS — Z01.818 ENCOUNTER FOR OTHER PREPROCEDURAL EXAMINATION: ICD-10-CM

## 2022-02-02 PROCEDURE — 99214 OFFICE O/P EST MOD 30 MIN: CPT

## 2022-02-02 PROCEDURE — 93000 ELECTROCARDIOGRAM COMPLETE: CPT

## 2022-02-02 NOTE — PHYSICAL EXAM
[No Acute Distress] : no acute distress [Clear Lung Fields] : clear lung fields [Normal Gait] : normal gait [No Edema] : no edema [Alert and Oriented] : alert and oriented [de-identified] : Extraocular muscles intact [de-identified] : Wearing 2 face masks [de-identified] : Irregularly irregular

## 2022-02-02 NOTE — DISCUSSION/SUMMARY
[With Me] : with me [___ Month(s)] : in [unfilled] month(s) [FreeTextEntry1] : \par Atrial fibrillation: He had a recurrence of atrial fibrillation since our last encounter and arrhythmia is now persistent -- however, asymptomatic; I reviewed Dr. Manning's recommendations and discussed them with Mr. Johnson; continue Xarelto and metoprolol (dose recently increased) -- rate is controlled.\par \par Hypertension: Controlled; continue amlodipine.\par \par

## 2022-02-02 NOTE — HISTORY OF PRESENT ILLNESS
[FreeTextEntry1] : Denny Johnson is a 77-year-old man with a history of paroxysmal atrial fibrillation, implantable loop monitor, hypertension, chronic renal insufficiency, renal cell carcinoma status post right laparoscopic radical nephrectomy (12/2019), incisional hernia s/p repair (11/2020) who returns for cardiac examination. Since our last encounter he underwent right upper lobe wedge resection with lymph node dissection (Dr. 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 Morfin  with Keytruda and Inlyta.  Multaq was discontinued due to concern for drug interaction; subsequently had a persistent recurrence of atrial fibrillation.  However, he was asymptomatic and during a recent encounter with Dr. Manning (January 14, 2022) a decision was made to pursue a rate control and anticoagulation strategy with metoprolol and Xarelto.\par \par He describes some fatigue but otherwise doing okay - no new cardiovascular complaints; tolerating anticoagulation.

## 2022-02-02 NOTE — REVIEW OF SYSTEMS
[Feeling Fatigued] : feeling fatigued [SOB] : no shortness of breath [Dyspnea on exertion] : not dyspnea during exertion [Chest Discomfort] : no chest discomfort

## 2022-02-08 ENCOUNTER — OUTPATIENT (OUTPATIENT)
Dept: OUTPATIENT SERVICES | Facility: HOSPITAL | Age: 77
LOS: 1 days | Discharge: HOME | End: 2022-02-08

## 2022-02-08 DIAGNOSIS — Z98.890 OTHER SPECIFIED POSTPROCEDURAL STATES: Chronic | ICD-10-CM

## 2022-02-08 DIAGNOSIS — Z90.49 ACQUIRED ABSENCE OF OTHER SPECIFIED PARTS OF DIGESTIVE TRACT: Chronic | ICD-10-CM

## 2022-02-08 DIAGNOSIS — Z90.89 ACQUIRED ABSENCE OF OTHER ORGANS: Chronic | ICD-10-CM

## 2022-02-08 DIAGNOSIS — Z90.5 ACQUIRED ABSENCE OF KIDNEY: Chronic | ICD-10-CM

## 2022-02-08 DIAGNOSIS — C64.9 MALIGNANT NEOPLASM OF UNSPECIFIED KIDNEY, EXCEPT RENAL PELVIS: ICD-10-CM

## 2022-02-10 ENCOUNTER — RESULT REVIEW (OUTPATIENT)
Age: 77
End: 2022-02-10

## 2022-02-10 ENCOUNTER — APPOINTMENT (OUTPATIENT)
Age: 77
End: 2022-02-10
Payer: MEDICARE

## 2022-02-10 VITALS
TEMPERATURE: 98.2 F | HEART RATE: 67 BPM | SYSTOLIC BLOOD PRESSURE: 131 MMHG | BODY MASS INDEX: 24.68 KG/M2 | OXYGEN SATURATION: 99 % | WEIGHT: 171.98 LBS | DIASTOLIC BLOOD PRESSURE: 76 MMHG | RESPIRATION RATE: 16 BRPM

## 2022-02-10 LAB
ALBUMIN SERPL ELPH-MCNC: 4.3 G/DL
ALP BLD-CCNC: 77 U/L
ALT SERPL-CCNC: 19 U/L
ANION GAP SERPL CALC-SCNC: 11 MMOL/L
AST SERPL-CCNC: 25 U/L
BASOPHILS # BLD AUTO: 0.07 K/UL — SIGNIFICANT CHANGE UP (ref 0–0.2)
BASOPHILS NFR BLD AUTO: 1.1 % — SIGNIFICANT CHANGE UP (ref 0–2)
BILIRUB SERPL-MCNC: 1.2 MG/DL
BUN SERPL-MCNC: 19 MG/DL
CALCIUM SERPL-MCNC: 9.1 MG/DL
CHLORIDE SERPL-SCNC: 105 MMOL/L
CO2 SERPL-SCNC: 24 MMOL/L
CREAT SERPL-MCNC: 1.57 MG/DL
EOSINOPHIL # BLD AUTO: 0.29 K/UL — SIGNIFICANT CHANGE UP (ref 0–0.5)
EOSINOPHIL NFR BLD AUTO: 4.6 % — SIGNIFICANT CHANGE UP (ref 0–6)
GLUCOSE SERPL-MCNC: 104 MG/DL
HCT VFR BLD CALC: 50.7 % — HIGH (ref 39–50)
HGB BLD-MCNC: 16.7 G/DL — SIGNIFICANT CHANGE UP (ref 13–17)
IMM GRANULOCYTES NFR BLD AUTO: 0.6 % — SIGNIFICANT CHANGE UP (ref 0–1.5)
LYMPHOCYTES # BLD AUTO: 1.28 K/UL — SIGNIFICANT CHANGE UP (ref 1–3.3)
LYMPHOCYTES # BLD AUTO: 20.3 % — SIGNIFICANT CHANGE UP (ref 13–44)
MCHC RBC-ENTMCNC: 30.5 PG — SIGNIFICANT CHANGE UP (ref 27–34)
MCHC RBC-ENTMCNC: 32.9 G/DL — SIGNIFICANT CHANGE UP (ref 32–36)
MCV RBC AUTO: 92.7 FL — SIGNIFICANT CHANGE UP (ref 80–100)
MONOCYTES # BLD AUTO: 0.46 K/UL — SIGNIFICANT CHANGE UP (ref 0–0.9)
MONOCYTES NFR BLD AUTO: 7.3 % — SIGNIFICANT CHANGE UP (ref 2–14)
NEUTROPHILS # BLD AUTO: 4.15 K/UL — SIGNIFICANT CHANGE UP (ref 1.8–7.4)
NEUTROPHILS NFR BLD AUTO: 66.1 % — SIGNIFICANT CHANGE UP (ref 43–77)
NRBC # BLD: 0 /100 WBCS — SIGNIFICANT CHANGE UP (ref 0–0)
PLATELET # BLD AUTO: 145 K/UL — LOW (ref 150–400)
POTASSIUM SERPL-SCNC: 4.4 MMOL/L
PROT SERPL-MCNC: 6.3 G/DL
RBC # BLD: 5.47 M/UL — SIGNIFICANT CHANGE UP (ref 4.2–5.8)
RBC # FLD: 14.5 % — SIGNIFICANT CHANGE UP (ref 10.3–14.5)
SODIUM SERPL-SCNC: 140 MMOL/L
TSH SERPL-ACNC: 90.7 UIU/ML
WBC # BLD: 6.29 K/UL — SIGNIFICANT CHANGE UP (ref 3.8–10.5)
WBC # FLD AUTO: 6.29 K/UL — SIGNIFICANT CHANGE UP (ref 3.8–10.5)

## 2022-02-10 PROCEDURE — 99214 OFFICE O/P EST MOD 30 MIN: CPT

## 2022-02-11 ENCOUNTER — APPOINTMENT (OUTPATIENT)
Dept: INFUSION THERAPY | Facility: HOSPITAL | Age: 77
End: 2022-02-11

## 2022-02-13 NOTE — REVIEW OF SYSTEMS
[Fatigue] : fatigue [Recent Change In Weight] : ~T recent weight change [Shortness Of Breath] : shortness of breath [SOB on Exertion] : shortness of breath during exertion [Negative] : Allergic/Immunologic [Skin Rash] : skin rash [FreeTextEntry6] : s/p RUL VATS/wedge resection for lung metastasis [FreeTextEntry8] : History of right clear-cell kidney cancer, status post right nephrectomy

## 2022-02-13 NOTE — HISTORY OF PRESENT ILLNESS
[de-identified] : 77 M, ex- heavy smoker, with PMHx of essential hypertension, AI VR, chronic renal insufficiency, osteoarthritis, paroxysmal atrial fibrillation ( + loop recorder; on anticoagulation with Rivaroxaban), right renal carcinoma s/p nephrectomy 12/5/19, and erythrocytosis here for initial medical oncology consultation of renal cell carcinoma metastatic to lung. \par \par CASE SYNOPSIS:\par September 2019–reports gross hematuria, spontaneously resolved.  No other associated  symptoms.\par \par 11/1/2019: CT A/P -9.7 x 10 x 7.5 cm partially heterogeneous mass extending off the lower pole of the right kidney with invasion of the lower pole renal collecting system, highly concerning for renal cell carcinoma.  No gross urothelial lesion.  Few scattered pulmonary nodules measuring up to 4 mm, nonspecific, possibly representing postinflammatory benign nodules, but cannot exclude metastasis.  Further evaluation with CT scan of the chest recommended.  1.2 x 1.5 cm low-attenuation lesion of the spleen representing hemangioma.\par \par 11/8/2019: CT chest–tree in bud opacities noted within the right lung likely representing mucoid impacted distal small airways.\par \par 12/5/2019: Radical nephrectomy (right kidney and proximal ureter) performed at [facility] (); surgical pathology: clear cell renal carcinoma, 10 cm, with extensive necrosis, grade 4 with focal rhabdoid features, no definitive LVI, vascular and ureteral resection margins free of tumor, perinephric adipose tissue negative for malignancy.  Renal parenchyma showed mild nephrosclerosis and patchy interstitial chronic inflammation (pT2, pN0).\par \par 3/23/2020: CT A/P–interval right nephrectomy.  No evidence of locally recurrent or metastatic disease.\par \par 4/9/2021: CT C/A/P–5 mm RUL pulmonary nodule may reflect metastasis and is new since prior chest CT of 11/8/2019.  Close follow-up recommended.  No evidence for residual or recurrent tumor in the right nephrectomy bed.\par \par 8/13/2021: CT chest–new right upper lobe 8 mm peripheral nodule indeterminate; previously measuring 5 mm.  Interval growth suggest metastatic disease.\par \par 9/8/2021:referred to Dr. Schmitt (thoracic surgery) for evaluation.\par \par 10/29/2021: Right upper lobe VATS/ wedge resection ([facility], Dr. Schmitt) -metastatic clear-cell renal cell carcinoma, R4 lymph node excision negative for malignancy, level 7 lymph node excision negative for malignancy, acid-fast bacilli and fungi.  Both lymph nodes with noncaseating granulomatous inflammation.\par \par 11/19/21- cycle # 1 Pembrolizumab\par \par 11/30/21- discontinues Multaq (antiarrhythmic) due to interactions with axitinib\par \par 12/4/21- started axitinb\par \par 1/15/2022–discontinued axitinib temporarily due to mucosal sensitivity\par \par 2/9/22: Develops macular rash on both forearms; TSH 90.7. \par \par 2/11/22–tot resumes axitinib\par \par  [FreeTextEntry1] : \par \par  [de-identified] : Mr. Johnson is returning for oncologic follow-up; since last visit 6 weeks ago, patient has experienced occasional lightheadedness, loose stools, and mucosal sensitivity (cannot tolerate carbonated beverages, and also experiencing anal sensitivity when wiping.  He is due to resume pembrolizumab in a.m.  During this interval he has followed up with cardiology after discontinuing Multaq for treatment of atrial fibrillation.  Denies frequent palpitations.  Complaints of skin dryness, likely relating to current treatment.  He has also noticed a rash on both forearms which appeared 24 hours ago, does not involve any other part of his body.  Denies fevers, hospitalization.  His diarrhea has improved lately, and is now ready to start treatment with axitinib.  Appears nontoxic.  Appetite and weight preserved\par \par \par

## 2022-02-13 NOTE — PHYSICAL EXAM
[Ambulatory and capable of all self care but unable to carry out any work activities] : Status 2- Ambulatory and capable of all self care but unable to carry out any work activities. Up and about more than 50% of waking hours [Normal] : affect appropriate [de-identified] : scars s/p VATS right chest wall - healed [de-identified] : Macular, nonpruriginous rash on both forearms, developed in the past 24 hours.

## 2022-02-14 DIAGNOSIS — R11.2 NAUSEA WITH VOMITING, UNSPECIFIED: ICD-10-CM

## 2022-02-14 DIAGNOSIS — Z51.11 ENCOUNTER FOR ANTINEOPLASTIC CHEMOTHERAPY: ICD-10-CM

## 2022-02-15 ENCOUNTER — APPOINTMENT (OUTPATIENT)
Dept: ENDOCRINOLOGY | Facility: CLINIC | Age: 77
End: 2022-02-15
Payer: MEDICARE

## 2022-02-15 VITALS
DIASTOLIC BLOOD PRESSURE: 80 MMHG | TEMPERATURE: 98 F | HEIGHT: 70 IN | OXYGEN SATURATION: 98 % | SYSTOLIC BLOOD PRESSURE: 132 MMHG | HEART RATE: 72 BPM | WEIGHT: 186 LBS | RESPIRATION RATE: 16 BRPM | BODY MASS INDEX: 26.63 KG/M2

## 2022-02-15 PROCEDURE — 99203 OFFICE O/P NEW LOW 30 MIN: CPT

## 2022-02-15 RX ORDER — LEVOTHYROXINE SODIUM 0.05 MG/1
50 TABLET ORAL
Qty: 30 | Refills: 3 | Status: DISCONTINUED | COMMUNITY
Start: 2022-02-11 | End: 2022-02-15

## 2022-02-15 NOTE — REVIEW OF SYSTEMS
[Recent Weight Gain (___ Lbs)] : recent weight gain: [unfilled] lbs [Palpitations] : palpitations [Negative] : Heme/Lymph [Fatigue] : no fatigue [Decreased Appetite] : appetite not decreased [Recent Weight Loss (___ Lbs)] : no recent weight loss [Constipation] : no constipation [Muscle Weakness] : no muscle weakness

## 2022-02-15 NOTE — ASSESSMENT
[Levothyroxine] : The patient was instructed to take Levothyroxine on an empty stomach, separate from vitamins, and wait at least 30 minutes before eating [FreeTextEntry1] : We discussed the DX of Hypothyroidism- the Chemo tx is the most likely cause og Hypothyroidisn\par \par I will increase his dose to 50 mcg and repeat TFT in 6 wks\par Ret 3 mo

## 2022-02-15 NOTE — PHYSICAL EXAM
[Alert] : alert [No Acute Distress] : no acute distress [PERRL] : pupils equal, round and reactive to light [No Proptosis] : no proptosis [Normal TMs] : both tympanic membranes were normal [No Neck Mass] : no neck mass was observed [Thyroid Not Enlarged] : the thyroid was not enlarged [No Thyroid Nodules] : no palpable thyroid nodules [Clear to Auscultation] : lungs were clear to auscultation bilaterally [Normal to Percussion] : lungs were normal to percussion [Normal Rate] : heart rate was normal [Regular Rhythm] : with a regular rhythm [Soft] : abdomen soft [No Joint Swelling] : no joint swelling seen [No Rash] : no rash [No Tremors] : no tremors [Oriented x3] : oriented to person, place, and time [Normal Insight/Judgement] : insight and judgment were intact [Kyphosis] : no kyphosis present

## 2022-02-15 NOTE — HISTORY OF PRESENT ILLNESS
[FreeTextEntry1] : Recent finding of Hypothyroidism\par No PMH of hypothyroid\par No FH of thyroid disease\par No hx of radiation\par Started on T4 1 wk ago

## 2022-02-21 ENCOUNTER — TRANSCRIPTION ENCOUNTER (OUTPATIENT)
Age: 77
End: 2022-02-21

## 2022-02-25 ENCOUNTER — APPOINTMENT (OUTPATIENT)
Dept: ELECTROPHYSIOLOGY | Facility: CLINIC | Age: 77
End: 2022-02-25
Payer: MEDICARE

## 2022-02-25 ENCOUNTER — NON-APPOINTMENT (OUTPATIENT)
Age: 77
End: 2022-02-25

## 2022-02-25 PROCEDURE — 93298 REM INTERROG DEV EVAL SCRMS: CPT

## 2022-02-25 PROCEDURE — G2066: CPT

## 2022-02-28 ENCOUNTER — NON-APPOINTMENT (OUTPATIENT)
Age: 77
End: 2022-02-28

## 2022-03-01 RX ORDER — LEVOTHYROXINE SODIUM 0.05 MG/1
50 TABLET ORAL
Qty: 90 | Refills: 3 | Status: DISCONTINUED | COMMUNITY
Start: 2022-02-15 | End: 2022-03-01

## 2022-03-02 LAB — SARS-COV-2 N GENE NPH QL NAA+PROBE: NOT DETECTED

## 2022-03-03 ENCOUNTER — APPOINTMENT (OUTPATIENT)
Dept: INTERNAL MEDICINE | Facility: CLINIC | Age: 77
End: 2022-03-03
Payer: MEDICARE

## 2022-03-03 VITALS
OXYGEN SATURATION: 99 % | HEART RATE: 75 BPM | WEIGHT: 183 LBS | BODY MASS INDEX: 27.74 KG/M2 | RESPIRATION RATE: 18 BRPM | TEMPERATURE: 97.6 F | SYSTOLIC BLOOD PRESSURE: 138 MMHG | HEIGHT: 68 IN | DIASTOLIC BLOOD PRESSURE: 85 MMHG

## 2022-03-03 DIAGNOSIS — J45.909 UNSPECIFIED ASTHMA, UNCOMPLICATED: ICD-10-CM

## 2022-03-03 PROCEDURE — ZZZZZ: CPT

## 2022-03-03 PROCEDURE — 99214 OFFICE O/P EST MOD 30 MIN: CPT | Mod: 25

## 2022-03-03 PROCEDURE — 94729 DIFFUSING CAPACITY: CPT

## 2022-03-03 PROCEDURE — 94010 BREATHING CAPACITY TEST: CPT

## 2022-03-03 PROCEDURE — 94727 GAS DIL/WSHOT DETER LNG VOL: CPT

## 2022-03-03 NOTE — PLAN
[FreeTextEntry1] : Mr. Johnson presents for followup evaluation.\par \par #1. Patient has a history of metastatic renal cell carcinoma. He will continue on keytruda therapy. 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 Patient is being given a prescription for CBC, CMP plus hemoglobin A1c, iron level, lipid profile, PSA level, TSH and urinalysis.\par \par #3. Patient has developed significant hypothyroidism as a result of chemotherapy. His TSH level was 90. Mr. Johnson did have an endocrinology evaluation by Dr. Mercedes and is currently on levothyroxine 50 mcg daily.\par \par #4. Patient will continue remainder of medications which have been reviewed and revised.\par \par #5. Patient will followup in 6 months.

## 2022-03-03 NOTE — PHYSICAL EXAM
[No Acute Distress] : no acute distress [Well Nourished] : well nourished [Well Developed] : well developed [Well-Appearing] : well-appearing [Normal Sclera/Conjunctiva] : normal sclera/conjunctiva [PERRL] : pupils equal round and reactive to light [EOMI] : extraocular movements intact [Normal Outer Ear/Nose] : the outer ears and nose were normal in appearance [Normal Oropharynx] : the oropharynx was normal [No JVD] : no jugular venous distention [No Lymphadenopathy] : no lymphadenopathy [Supple] : supple [Thyroid Normal, No Nodules] : the thyroid was normal and there were no nodules present [No Respiratory Distress] : no respiratory distress  [No Accessory Muscle Use] : no accessory muscle use [Clear to Auscultation] : lungs were clear to auscultation bilaterally [Normal Rate] : normal rate  [Regular Rhythm] : with a regular rhythm [Normal S1, S2] : normal S1 and S2 [No Murmur] : no murmur heard [No Carotid Bruits] : no carotid bruits [No Abdominal Bruit] : a ~M bruit was not heard ~T in the abdomen [No Varicosities] : no varicosities [Pedal Pulses Present] : the pedal pulses are present [No Edema] : there was no peripheral edema [No Palpable Aorta] : no palpable aorta [No Extremity Clubbing/Cyanosis] : no extremity clubbing/cyanosis [Soft] : abdomen soft [Non Tender] : non-tender [Non-distended] : non-distended [No Masses] : no abdominal mass palpated [No HSM] : no HSM [Normal Bowel Sounds] : normal bowel sounds [Normal Posterior Cervical Nodes] : no posterior cervical lymphadenopathy [Normal Anterior Cervical Nodes] : no anterior cervical lymphadenopathy [No CVA Tenderness] : no CVA  tenderness [No Spinal Tenderness] : no spinal tenderness [No Joint Swelling] : no joint swelling [Grossly Normal Strength/Tone] : grossly normal strength/tone [No Rash] : no rash [Coordination Grossly Intact] : coordination grossly intact [No Focal Deficits] : no focal deficits [Normal Gait] : normal gait [Deep Tendon Reflexes (DTR)] : deep tendon reflexes were 2+ and symmetric [Normal Affect] : the affect was normal [Normal Insight/Judgement] : insight and judgment were intact [FreeTextEntry1] : anal fistula

## 2022-03-03 NOTE — DATA REVIEWED
[FreeTextEntry1] : Complete pulmonary function tests show moderate obstructive lung disease. FEV1 is 1.88 L which is 69% predicted. FVC is 2.75 L which is 75% predicted. FEV1 over FVC ratio is 68%. Total lung capacity is 5.05 L which is 75% predicted. Diffusing capacity is 79%.

## 2022-03-03 NOTE — HISTORY OF PRESENT ILLNESS
[FreeTextEntry1] : followup evaluation [de-identified] : Mr. Johnson presents for followup evaluation. He has a history of metastatic renal cell carcinoma. He underwent a robotic-assisted right upper lobe wedge resection , mediastinal lymph node dissection and multilevel intercostal nerve block on 10/29/21. At that time he had a 8 mm right upper lobe nodule. Pathology was positive for metastatic renal cell carcinoma. Lymph nodes  showed them noncaseating granulomas. Mr. Johnson was treated with keytruda and inlyta. The inlyta has been discontinued  Secondary to the presence of an anal fistula. Mr. Johnson denies any shortness of breath, chest pain or palpitations. He has no nocturnal symptoms of cough or dyspnea.\par

## 2022-03-04 ENCOUNTER — LABORATORY RESULT (OUTPATIENT)
Age: 77
End: 2022-03-04

## 2022-03-05 ENCOUNTER — APPOINTMENT (OUTPATIENT)
Age: 77
End: 2022-03-05

## 2022-03-07 LAB
ALBUMIN SERPL ELPH-MCNC: 4.2 G/DL
ALP BLD-CCNC: 101 U/L
ALT SERPL-CCNC: 20 U/L
ANION GAP SERPL CALC-SCNC: 13 MMOL/L
APPEARANCE: CLEAR
AST SERPL-CCNC: 22 U/L
BACTERIA: NEGATIVE
BASOPHILS # BLD AUTO: 0.04 K/UL
BASOPHILS NFR BLD AUTO: 0.6 %
BILIRUB SERPL-MCNC: 1.6 MG/DL
BILIRUBIN URINE: NEGATIVE
BLOOD URINE: ABNORMAL
BUN SERPL-MCNC: 20 MG/DL
CALCIUM SERPL-MCNC: 8.9 MG/DL
CHLORIDE SERPL-SCNC: 103 MMOL/L
CHOLEST SERPL-MCNC: 237 MG/DL
CO2 SERPL-SCNC: 25 MMOL/L
COLOR: YELLOW
CREAT SERPL-MCNC: 1.44 MG/DL
EGFR: 49 ML/MIN/1.73M2
EOSINOPHIL # BLD AUTO: 0.17 K/UL
EOSINOPHIL NFR BLD AUTO: 2.4 %
ESTIMATED AVERAGE GLUCOSE: 114 MG/DL
GLUCOSE QUALITATIVE U: NEGATIVE
GLUCOSE SERPL-MCNC: 95 MG/DL
HBA1C MFR BLD HPLC: 5.6 %
HCT VFR BLD CALC: 51.5 %
HDLC SERPL-MCNC: 49 MG/DL
HGB BLD-MCNC: 16.7 G/DL
HYALINE CASTS: 5 /LPF
IMM GRANULOCYTES NFR BLD AUTO: 0.4 %
IRON SERPL-MCNC: 86 UG/DL
KETONES URINE: NEGATIVE
LDLC SERPL CALC-MCNC: 133 MG/DL
LEUKOCYTE ESTERASE URINE: NEGATIVE
LYMPHOCYTES # BLD AUTO: 1.05 K/UL
LYMPHOCYTES NFR BLD AUTO: 14.9 %
MAN DIFF?: NORMAL
MCHC RBC-ENTMCNC: 30.4 PG
MCHC RBC-ENTMCNC: 32.4 GM/DL
MCV RBC AUTO: 93.8 FL
MICROSCOPIC-UA: NORMAL
MONOCYTES # BLD AUTO: 0.6 K/UL
MONOCYTES NFR BLD AUTO: 8.5 %
NEUTROPHILS # BLD AUTO: 5.14 K/UL
NEUTROPHILS NFR BLD AUTO: 73.2 %
NITRITE URINE: NEGATIVE
NONHDLC SERPL-MCNC: 188 MG/DL
PH URINE: 6.5
PLATELET # BLD AUTO: 126 K/UL
POTASSIUM SERPL-SCNC: 3.9 MMOL/L
PROT SERPL-MCNC: 6.5 G/DL
PROTEIN URINE: ABNORMAL
PSA SERPL-MCNC: 3.01 NG/ML
RBC # BLD: 5.49 M/UL
RBC # FLD: 15.8 %
RED BLOOD CELLS URINE: 7 /HPF
SODIUM SERPL-SCNC: 142 MMOL/L
SPECIFIC GRAVITY URINE: 1.02
SQUAMOUS EPITHELIAL CELLS: 2 /HPF
TRIGL SERPL-MCNC: 275 MG/DL
TSH SERPL-ACNC: 21.9 UIU/ML
UROBILINOGEN URINE: NORMAL
WBC # FLD AUTO: 7.03 K/UL
WHITE BLOOD CELLS URINE: 1 /HPF

## 2022-03-09 ENCOUNTER — NON-APPOINTMENT (OUTPATIENT)
Age: 77
End: 2022-03-09

## 2022-03-10 NOTE — ED PROVIDER NOTE - SECONDARY DIAGNOSIS.
Tendon Sheath    Date/Time: 3/10/2022 10:40 AM  Performed by: Les Schneider MD  Authorized by: Les Schneider MD     Indications:  Pain  Site marked: the procedure site was marked    Timeout: prior to procedure the correct patient, procedure, and site was verified    Local anesthetic:  Lidocaine 1% without epinephrine  Location:  Thumb  Site:  R thumb MCP  Medications:  20 mg triamcinolone acetonide 40 mg/mL      
Chest pain

## 2022-03-18 ENCOUNTER — APPOINTMENT (OUTPATIENT)
Dept: ELECTROPHYSIOLOGY | Facility: CLINIC | Age: 77
End: 2022-03-18
Payer: MEDICARE

## 2022-03-18 ENCOUNTER — NON-APPOINTMENT (OUTPATIENT)
Age: 77
End: 2022-03-18

## 2022-03-18 VITALS
WEIGHT: 185 LBS | RESPIRATION RATE: 16 BRPM | HEIGHT: 68 IN | OXYGEN SATURATION: 100 % | BODY MASS INDEX: 28.04 KG/M2 | HEART RATE: 62 BPM | SYSTOLIC BLOOD PRESSURE: 137 MMHG | DIASTOLIC BLOOD PRESSURE: 78 MMHG

## 2022-03-18 PROCEDURE — 93285 PRGRMG DEV EVAL SCRMS IP: CPT

## 2022-03-24 ENCOUNTER — OUTPATIENT (OUTPATIENT)
Dept: OUTPATIENT SERVICES | Facility: HOSPITAL | Age: 77
LOS: 1 days | Discharge: HOME | End: 2022-03-24

## 2022-03-24 DIAGNOSIS — Z90.49 ACQUIRED ABSENCE OF OTHER SPECIFIED PARTS OF DIGESTIVE TRACT: Chronic | ICD-10-CM

## 2022-03-24 DIAGNOSIS — C64.9 MALIGNANT NEOPLASM OF UNSPECIFIED KIDNEY, EXCEPT RENAL PELVIS: ICD-10-CM

## 2022-03-24 DIAGNOSIS — Z98.890 OTHER SPECIFIED POSTPROCEDURAL STATES: Chronic | ICD-10-CM

## 2022-03-24 DIAGNOSIS — Z90.5 ACQUIRED ABSENCE OF KIDNEY: Chronic | ICD-10-CM

## 2022-03-24 DIAGNOSIS — Z90.89 ACQUIRED ABSENCE OF OTHER ORGANS: Chronic | ICD-10-CM

## 2022-03-28 ENCOUNTER — APPOINTMENT (OUTPATIENT)
Age: 77
End: 2022-03-28

## 2022-03-28 ENCOUNTER — APPOINTMENT (OUTPATIENT)
Age: 77
End: 2022-03-28
Payer: MEDICARE

## 2022-03-28 DIAGNOSIS — R11.2 NAUSEA WITH VOMITING, UNSPECIFIED: ICD-10-CM

## 2022-03-28 DIAGNOSIS — Z51.11 ENCOUNTER FOR ANTINEOPLASTIC CHEMOTHERAPY: ICD-10-CM

## 2022-03-28 DIAGNOSIS — R21 RASH AND OTHER NONSPECIFIC SKIN ERUPTION: ICD-10-CM

## 2022-03-28 PROCEDURE — 99213 OFFICE O/P EST LOW 20 MIN: CPT

## 2022-03-28 NOTE — HISTORY OF PRESENT ILLNESS
[de-identified] : 77 M, ex- heavy smoker, with PMHx of essential hypertension, AI VR, chronic renal insufficiency, osteoarthritis, paroxysmal atrial fibrillation ( + loop recorder; on anticoagulation with Rivaroxaban), right renal carcinoma s/p nephrectomy 12/5/19, and erythrocytosis here for initial medical oncology consultation of renal cell carcinoma metastatic to lung. \par \par CASE SYNOPSIS:\par September 2019–reports gross hematuria, spontaneously resolved.  No other associated  symptoms.\par \par 11/1/2019: CT A/P -9.7 x 10 x 7.5 cm partially heterogeneous mass extending off the lower pole of the right kidney with invasion of the lower pole renal collecting system, highly concerning for renal cell carcinoma.  No gross urothelial lesion.  Few scattered pulmonary nodules measuring up to 4 mm, nonspecific, possibly representing postinflammatory benign nodules, but cannot exclude metastasis.  Further evaluation with CT scan of the chest recommended.  1.2 x 1.5 cm low-attenuation lesion of the spleen representing hemangioma.\par \par 11/8/2019: CT chest–tree in bud opacities noted within the right lung likely representing mucoid impacted distal small airways.\par \par 12/5/2019: Radical nephrectomy (right kidney and proximal ureter) performed at [facility] (); surgical pathology: clear cell renal carcinoma, 10 cm, with extensive necrosis, grade 4 with focal rhabdoid features, no definitive LVI, vascular and ureteral resection margins free of tumor, perinephric adipose tissue negative for malignancy.  Renal parenchyma showed mild nephrosclerosis and patchy interstitial chronic inflammation (pT2, pN0).\par \par 3/23/2020: CT A/P–interval right nephrectomy.  No evidence of locally recurrent or metastatic disease.\par \par 4/9/2021: CT C/A/P–5 mm RUL pulmonary nodule may reflect metastasis and is new since prior chest CT of 11/8/2019.  Close follow-up recommended.  No evidence for residual or recurrent tumor in the right nephrectomy bed.\par \par 8/13/2021: CT chest–new right upper lobe 8 mm peripheral nodule indeterminate; previously measuring 5 mm.  Interval growth suggest metastatic disease.\par \par 9/8/2021:referred to Dr. Schmitt (thoracic surgery) for evaluation.\par \par 10/29/2021: Right upper lobe VATS/ wedge resection ([facility], Dr. Schmitt) -metastatic clear-cell renal cell carcinoma, R4 lymph node excision negative for malignancy, level 7 lymph node excision negative for malignancy, acid-fast bacilli and fungi.  Both lymph nodes with noncaseating granulomatous inflammation.\par \par 11/19/21- cycle # 1 Pembrolizumab\par \par 11/30/21- discontinues Multaq (antiarrhythmic) due to interactions with axitinib\par \par 12/4/21- started axitinb\par \par 1/15/2022–discontinued axitinib temporarily due to mucosal sensitivity\par \par 2/9/22: Develops macular rash on both forearms; TSH 90.7. \par \par 2/28/22–discontinued Inlyta at the dermatologist and GI's recommendation due to anal fistula\par \par 3/29/22 - to resume Inlyta\par \par  [FreeTextEntry1] : \par \par  [de-identified] : Returning for ongoing treatment with pembrolizumab.  For the past month patient has been under GI surveillance for an anal fistula which has resolved; during this interval patient was off Inlyta, and was now cleared to resume the medication for renal cancer treatment.  Patient is following up with cardiology (denies palpitations, did not have any change in cardiac medication and continuous loop recorder monitoring).  His rash is mostly on upper extremities, which exacerbates with hot temperatures.  Takes Claritin with good control of the rash.  Follows up with endocrine (Dr. Mercedes) for treatment-induced hypothyroidism; compliant with Synthroid.  Denies recent hospitalization.\par \par

## 2022-03-28 NOTE — REVIEW OF SYSTEMS
[Fatigue] : fatigue [Recent Change In Weight] : ~T recent weight change [Shortness Of Breath] : shortness of breath [SOB on Exertion] : shortness of breath during exertion [Skin Rash] : skin rash [Negative] : Allergic/Immunologic [FreeTextEntry6] : s/p RUL VATS/wedge resection for lung metastases [FreeTextEntry8] :  right clear-cell kidney cancer, s/p right nephrectomy [de-identified] : upper extremities, triggered by high temperatures

## 2022-03-28 NOTE — PHYSICAL EXAM
Name band; [Ambulatory and capable of all self care but unable to carry out any work activities] : Status 2- Ambulatory and capable of all self care but unable to carry out any work activities. Up and about more than 50% of waking hours [Normal] : affect appropriate [de-identified] : scars s/p VATS right chest wall - healed [de-identified] : Macular, nonpruriginous rash on both forearms, developed in the past 24 hours.

## 2022-04-04 ENCOUNTER — NON-APPOINTMENT (OUTPATIENT)
Age: 77
End: 2022-04-04

## 2022-04-15 ENCOUNTER — APPOINTMENT (OUTPATIENT)
Dept: INFUSION THERAPY | Facility: HOSPITAL | Age: 77
End: 2022-04-15

## 2022-04-19 ENCOUNTER — APPOINTMENT (OUTPATIENT)
Dept: ELECTROPHYSIOLOGY | Facility: CLINIC | Age: 77
End: 2022-04-19
Payer: MEDICARE

## 2022-04-19 VITALS
DIASTOLIC BLOOD PRESSURE: 91 MMHG | HEIGHT: 68 IN | RESPIRATION RATE: 16 BRPM | SYSTOLIC BLOOD PRESSURE: 138 MMHG | WEIGHT: 178 LBS | HEART RATE: 77 BPM | BODY MASS INDEX: 26.98 KG/M2 | OXYGEN SATURATION: 98 %

## 2022-04-19 DIAGNOSIS — I44.2 ATRIOVENTRICULAR BLOCK, COMPLETE: ICD-10-CM

## 2022-04-19 PROCEDURE — 93285 PRGRMG DEV EVAL SCRMS IP: CPT

## 2022-04-19 PROCEDURE — 99213 OFFICE O/P EST LOW 20 MIN: CPT

## 2022-04-22 ENCOUNTER — NON-APPOINTMENT (OUTPATIENT)
Age: 77
End: 2022-04-22

## 2022-04-22 ENCOUNTER — APPOINTMENT (OUTPATIENT)
Dept: ELECTROPHYSIOLOGY | Facility: CLINIC | Age: 77
End: 2022-04-22
Payer: MEDICARE

## 2022-04-22 PROCEDURE — G2066: CPT

## 2022-04-22 PROCEDURE — 93298 REM INTERROG DEV EVAL SCRMS: CPT

## 2022-05-02 ENCOUNTER — OUTPATIENT (OUTPATIENT)
Dept: OUTPATIENT SERVICES | Facility: HOSPITAL | Age: 77
LOS: 1 days | Discharge: HOME | End: 2022-05-02

## 2022-05-02 DIAGNOSIS — Z90.5 ACQUIRED ABSENCE OF KIDNEY: Chronic | ICD-10-CM

## 2022-05-02 DIAGNOSIS — Z98.890 OTHER SPECIFIED POSTPROCEDURAL STATES: Chronic | ICD-10-CM

## 2022-05-02 DIAGNOSIS — Z90.49 ACQUIRED ABSENCE OF OTHER SPECIFIED PARTS OF DIGESTIVE TRACT: Chronic | ICD-10-CM

## 2022-05-02 DIAGNOSIS — Z90.89 ACQUIRED ABSENCE OF OTHER ORGANS: Chronic | ICD-10-CM

## 2022-05-02 DIAGNOSIS — C64.9 MALIGNANT NEOPLASM OF UNSPECIFIED KIDNEY, EXCEPT RENAL PELVIS: ICD-10-CM

## 2022-05-06 ENCOUNTER — APPOINTMENT (OUTPATIENT)
Age: 77
End: 2022-05-06

## 2022-05-06 ENCOUNTER — APPOINTMENT (OUTPATIENT)
Age: 77
End: 2022-05-06
Payer: MEDICARE

## 2022-05-06 PROCEDURE — 99213 OFFICE O/P EST LOW 20 MIN: CPT

## 2022-05-08 NOTE — PHYSICAL EXAM
[Ambulatory and capable of all self care but unable to carry out any work activities] : Status 2- Ambulatory and capable of all self care but unable to carry out any work activities. Up and about more than 50% of waking hours [Normal] : affect appropriate [de-identified] : scars s/p VATS right chest wall - healed [de-identified] : Macular, nonpruriginous rash on both forearms, developed in the past 24 hours.

## 2022-05-08 NOTE — HISTORY OF PRESENT ILLNESS
[de-identified] : 77 M, ex- heavy smoker, with PMHx of essential hypertension, AI VR, chronic renal insufficiency, osteoarthritis, paroxysmal atrial fibrillation ( + loop recorder; on anticoagulation with Rivaroxaban), right renal carcinoma s/p nephrectomy 12/5/19, and erythrocytosis here for initial medical oncology consultation of renal cell carcinoma metastatic to lung. \par \par CASE SYNOPSIS:\par September 2019–reports gross hematuria, spontaneously resolved.  No other associated  symptoms.\par \par 11/1/2019: CT A/P -9.7 x 10 x 7.5 cm partially heterogeneous mass extending off the lower pole of the right kidney with invasion of the lower pole renal collecting system, highly concerning for renal cell carcinoma.  No gross urothelial lesion.  Few scattered pulmonary nodules measuring up to 4 mm, nonspecific, possibly representing postinflammatory benign nodules, but cannot exclude metastasis.  Further evaluation with CT scan of the chest recommended.  1.2 x 1.5 cm low-attenuation lesion of the spleen representing hemangioma.\par \par 11/8/2019: CT chest–tree in bud opacities noted within the right lung likely representing mucoid impacted distal small airways.\par \par 12/5/2019: Radical nephrectomy (right kidney and proximal ureter) performed at [facility] (); surgical pathology: clear cell renal carcinoma, 10 cm, with extensive necrosis, grade 4 with focal rhabdoid features, no definitive LVI, vascular and ureteral resection margins free of tumor, perinephric adipose tissue negative for malignancy.  Renal parenchyma showed mild nephrosclerosis and patchy interstitial chronic inflammation (pT2, pN0).\par \par 3/23/2020: CT A/P–interval right nephrectomy.  No evidence of locally recurrent or metastatic disease.\par \par 4/9/2021: CT C/A/P–5 mm RUL pulmonary nodule may reflect metastasis and is new since prior chest CT of 11/8/2019.  Close follow-up recommended.  No evidence for residual or recurrent tumor in the right nephrectomy bed.\par \par 8/13/2021: CT chest–new right upper lobe 8 mm peripheral nodule indeterminate; previously measuring 5 mm.  Interval growth suggest metastatic disease.\par \par 9/8/2021:referred to Dr. Schmitt (thoracic surgery) for evaluation.\par \par 10/29/2021: Right upper lobe VATS/ wedge resection ([facility], Dr. Schmitt) -metastatic clear-cell renal cell carcinoma, R4 lymph node excision negative for malignancy, level 7 lymph node excision negative for malignancy, acid-fast bacilli and fungi.  Both lymph nodes with noncaseating granulomatous inflammation.\par \par 11/19/21- cycle # 1 Pembrolizumab\par \par 11/30/21- discontinues Multaq (antiarrhythmic) due to interactions with axitinib\par \par 12/4/21- started axitinb\par \par 1/15/2022–discontinued axitinib temporarily due to mucosal sensitivity\par \par 2/9/22: Develops macular rash on both forearms; TSH 90.7. \par \par 2/28/22–discontinued Inlyta at the dermatologist and GI's recommendation due to anal fistula\par \par 3/29/22 - resumed Inlyta\par \par 4/1/22 -diagnosed with coronavirus at [facility]; minimally symptomatic, no monoclonal antibodies recommended.\par \par  [FreeTextEntry1] : \par \par  [de-identified] : Ongoing immunotherapy with pembrolizumab.  On 4/1/2022 patient tested positive for coronavirus work-up being prompted due to mild nasal congestion.  Patient evaluated by Dr. Ibarra, not recommended for monoclonal antibodies.  Continues cardiac medication; last visit with Dr. Manning on 4/19/2022.  He is occasionally complaining of palpitations falls.  Experiencing mild dysphagia for solids.  He also denies new rash on torso or upper extremities; previously taking Claritin for skin rash.  No venous thrombotic events or falls recorded.  Medication list reviewed, unchanged.\par \par \par \par \par

## 2022-05-08 NOTE — REVIEW OF SYSTEMS
[Fatigue] : fatigue [Recent Change In Weight] : ~T recent weight change [Shortness Of Breath] : shortness of breath [SOB on Exertion] : shortness of breath during exertion [Skin Rash] : skin rash [Negative] : Allergic/Immunologic [FreeTextEntry6] : s/p RUL VATS/wedge resection for lung metastases [FreeTextEntry8] :  right clear-cell kidney cancer, s/p right nephrectomy [de-identified] : upper extremities, triggered by high temperatures

## 2022-05-09 DIAGNOSIS — R11.2 NAUSEA WITH VOMITING, UNSPECIFIED: ICD-10-CM

## 2022-05-09 DIAGNOSIS — Z51.11 ENCOUNTER FOR ANTINEOPLASTIC CHEMOTHERAPY: ICD-10-CM

## 2022-05-10 ENCOUNTER — APPOINTMENT (OUTPATIENT)
Dept: ENDOCRINOLOGY | Facility: CLINIC | Age: 77
End: 2022-05-10
Payer: MEDICARE

## 2022-05-10 VITALS
DIASTOLIC BLOOD PRESSURE: 82 MMHG | OXYGEN SATURATION: 98 % | WEIGHT: 175 LBS | TEMPERATURE: 98 F | SYSTOLIC BLOOD PRESSURE: 124 MMHG | HEART RATE: 82 BPM | RESPIRATION RATE: 16 BRPM | HEIGHT: 68 IN | BODY MASS INDEX: 26.52 KG/M2

## 2022-05-10 PROCEDURE — 99213 OFFICE O/P EST LOW 20 MIN: CPT

## 2022-05-10 NOTE — ASSESSMENT
[Levothyroxine] : The patient was instructed to take Levothyroxine on an empty stomach, separate from vitamins, and wait at least 30 minutes before eating [FreeTextEntry1] : We discussed the DX of Hypothyroidism- the Chemo tx is the most likely cause og Hypothyroidisn\par \par I will increase his dose to  137 mcg\par Ret 3 mo

## 2022-05-10 NOTE — HISTORY OF PRESENT ILLNESS
[FreeTextEntry1] : Recent finding of Hypothyroidism\par No PMH of hypothyroid\par No FH of thyroid disease\par No hx of radiation\par Started on T4 100 mcg qd

## 2022-05-10 NOTE — REVIEW OF SYSTEMS
[Recent Weight Gain (___ Lbs)] : recent weight gain: [unfilled] lbs [Palpitations] : palpitations [Negative] : Heme/Lymph [Fatigue] : no fatigue [Decreased Appetite] : appetite not decreased [Recent Weight Loss (___ Lbs)] : no recent weight loss [Constipation] : no constipation [Muscle Weakness] : no muscle weakness [FreeTextEntry5] : has chronic A fib

## 2022-05-20 ENCOUNTER — RX CHANGE (OUTPATIENT)
Age: 77
End: 2022-05-20

## 2022-05-27 ENCOUNTER — NON-APPOINTMENT (OUTPATIENT)
Age: 77
End: 2022-05-27

## 2022-05-27 ENCOUNTER — APPOINTMENT (OUTPATIENT)
Age: 77
End: 2022-05-27

## 2022-05-27 ENCOUNTER — RESULT REVIEW (OUTPATIENT)
Age: 77
End: 2022-05-27

## 2022-05-27 ENCOUNTER — APPOINTMENT (OUTPATIENT)
Dept: ELECTROPHYSIOLOGY | Facility: CLINIC | Age: 77
End: 2022-05-27
Payer: MEDICARE

## 2022-05-27 LAB — TSH SERPL-MCNC: 11.2 UIU/ML — HIGH (ref 0.27–4.2)

## 2022-05-27 PROCEDURE — G2066: CPT

## 2022-05-27 PROCEDURE — 93298 REM INTERROG DEV EVAL SCRMS: CPT

## 2022-06-02 ENCOUNTER — NON-APPOINTMENT (OUTPATIENT)
Age: 77
End: 2022-06-02

## 2022-06-03 ENCOUNTER — NON-APPOINTMENT (OUTPATIENT)
Age: 77
End: 2022-06-03

## 2022-06-06 NOTE — PROGRESS NOTE ADULT - PROBLEM SELECTOR PLAN 3
controlled , continue medication Cyclophosphamide Counseling:  I discussed with the patient the risks of cyclophosphamide including but not limited to hair loss, hormonal abnormalities, decreased fertility, abdominal pain, diarrhea, nausea and vomiting, bone marrow suppression and infection. The patient understands that monitoring is required while taking this medication.

## 2022-06-07 ENCOUNTER — APPOINTMENT (OUTPATIENT)
Dept: INTERNAL MEDICINE | Facility: CLINIC | Age: 77
End: 2022-06-07
Payer: MEDICARE

## 2022-06-07 VITALS
DIASTOLIC BLOOD PRESSURE: 84 MMHG | TEMPERATURE: 97.7 F | OXYGEN SATURATION: 98 % | SYSTOLIC BLOOD PRESSURE: 134 MMHG | BODY MASS INDEX: 24.25 KG/M2 | WEIGHT: 160 LBS | HEIGHT: 68 IN | HEART RATE: 55 BPM

## 2022-06-07 DIAGNOSIS — Z00.00 ENCOUNTER FOR GENERAL ADULT MEDICAL EXAMINATION W/OUT ABNORMAL FINDINGS: ICD-10-CM

## 2022-06-07 DIAGNOSIS — Z20.822 CONTACT WITH AND (SUSPECTED) EXPOSURE TO COVID-19: ICD-10-CM

## 2022-06-07 DIAGNOSIS — R13.10 DYSPHAGIA, UNSPECIFIED: ICD-10-CM

## 2022-06-07 PROCEDURE — 99214 OFFICE O/P EST MOD 30 MIN: CPT

## 2022-06-07 RX ORDER — MULTIVITAMIN/IRON/FOLIC ACID 18MG-0.4MG
TABLET ORAL
Refills: 0 | Status: COMPLETED | COMMUNITY
End: 2022-06-07

## 2022-06-07 NOTE — PHYSICAL EXAM
[No Acute Distress] : no acute distress [Well Nourished] : well nourished [Well Developed] : well developed [No Lymphadenopathy] : no lymphadenopathy [Supple] : supple [No Respiratory Distress] : no respiratory distress  [No Accessory Muscle Use] : no accessory muscle use [Clear to Auscultation] : lungs were clear to auscultation bilaterally [Normal Rate] : normal rate  [Regular Rhythm] : with a regular rhythm [Normal S1, S2] : normal S1 and S2 [Pedal Pulses Present] : the pedal pulses are present [No Extremity Clubbing/Cyanosis] : no extremity clubbing/cyanosis [Normal Posterior Cervical Nodes] : no posterior cervical lymphadenopathy [Normal Anterior Cervical Nodes] : no anterior cervical lymphadenopathy [Coordination Grossly Intact] : coordination grossly intact [No Focal Deficits] : no focal deficits [Normal Gait] : normal gait [Normal Affect] : the affect was normal [Alert and Oriented x3] : oriented to person, place, and time [Normal Insight/Judgement] : insight and judgment were intact

## 2022-06-07 NOTE — ASSESSMENT
[FreeTextEntry1] : 1. difficulty swallowing / hoarseness\par Respiratory viral panel w/ COVID obtained \par  throat culture sent \par Consult w/ / [facility] set up tomorrow at 145 pm, pt will likely need endoscope\par f/up with results    \par

## 2022-06-07 NOTE — REVIEW OF SYSTEMS
[Fever] : no fever [Chills] : no chills [Fatigue] : no fatigue [Earache] : no earache [Postnasal Drip] : no postnasal drip [Nasal Discharge] : no nasal discharge [Sore Throat] : sore throat [Hoarseness] : hoarseness [Negative] : Psychiatric

## 2022-06-07 NOTE — HISTORY OF PRESENT ILLNESS
[FreeTextEntry8] : Pt presents  to the office today with complaints of hoarseness and  painful swallowing x 3 weeks . He is  a 77 yr. 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 He is s/p nephrectomy 2019 .On 10/29/21 pt had VATS tight upper lobe / wedge resection at [facility] with Dr. Schmitt.  \par States he went to urgent care yesterday , was told to see ENT  MD , which he has not as yet. He reports is on Amoxicillin BID for upcoming root canal. He denies feeling ill, COVID home test negative 2 days ago. \par Pt is seeing Dr. Gunter , Hem/ Onc and receiving immunotherapy. \par

## 2022-06-08 ENCOUNTER — NON-APPOINTMENT (OUTPATIENT)
Age: 77
End: 2022-06-08

## 2022-06-08 ENCOUNTER — APPOINTMENT (OUTPATIENT)
Dept: OTOLARYNGOLOGY | Facility: CLINIC | Age: 77
End: 2022-06-08
Payer: MEDICARE

## 2022-06-08 VITALS — BODY MASS INDEX: 24.34 KG/M2 | HEIGHT: 70 IN | TEMPERATURE: 96 F | WEIGHT: 170 LBS

## 2022-06-08 DIAGNOSIS — B37.89 OTHER SITES OF CANDIDIASIS: ICD-10-CM

## 2022-06-08 DIAGNOSIS — R13.10 DYSPHAGIA, UNSPECIFIED: ICD-10-CM

## 2022-06-08 DIAGNOSIS — C64.9 MALIGNANT NEOPLASM OF UNSPECIFIED KIDNEY, EXCEPT RENAL PELVIS: ICD-10-CM

## 2022-06-08 LAB
RAPID RVP RESULT: NOT DETECTED
SARS-COV-2 RNA PNL RESP NAA+PROBE: NOT DETECTED

## 2022-06-08 PROCEDURE — 99203 OFFICE O/P NEW LOW 30 MIN: CPT | Mod: 25

## 2022-06-08 PROCEDURE — 31575 DIAGNOSTIC LARYNGOSCOPY: CPT

## 2022-06-08 NOTE — PROCEDURE
[de-identified] : Indication for procedure:  Unable to examine laryngeal structures with mirror exam, persistent hoarseness\par The patient has weak voice oral pain\par \par Scope # 1\par Topical anesthesia with viscous xylocaine 2% is applied to the anterior nares.\par A flexible fiberoptic laryngoscope is than introduced through the nares.\par The nasopharynx is clear without mass or inflammation.\par The posterior pharyngeal wall is unremarkable.\par The tongue base and vallecula are unremarkable.  The hypopharynx id clear and unobstructed.\par The supraglottic larynx is within normal limits. mild diffuse erythema\par Both vocal cords are fully mobile with diffuse bilateral vocal cord edema.  \par No Nodules or other lesions are noted.\par There is no edema or erythema overlying the arytenoid cartilages or the inter-arytenoid space.\par The subglottic space is clear.\par The voice has a raspy quality.\par No airway impingement

## 2022-06-08 NOTE — REVIEW OF SYSTEMS
[Throat Pain] : throat pain [Patient Intake Form Reviewed] : Patient intake form was reviewed [Negative] : Mouth and Throat

## 2022-06-08 NOTE — CONSULT LETTER
[Consult Letter:] : I had the pleasure of evaluating your patient, [unfilled]. [Please see my note below.] : Please see my note below. [Consult Closing:] : Thank you very much for allowing me to participate in the care of this patient.  If you have any questions, please do not hesitate to contact me. [Sincerely,] : Sincerely, [FreeTextEntry1] : Dear Dr. GILL PEREZ,\par \par Thank you for your kind referral. Please refer to my enclosed office notes for CHARLENE TAM . If there are any questions free to contact me.\par  [FreeTextEntry3] : Marcio Lindo MD, FACS\par

## 2022-06-08 NOTE — ASSESSMENT
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

## 2022-06-08 NOTE — HISTORY OF PRESENT ILLNESS
[de-identified] : co weak voice past 3-4 weeks\par no uri no injury, sore mouth\par no dysphagia just pain, neg tob, no temp\par renal cancer and lung met and lung lobectomy 8 mo ago

## 2022-06-09 LAB — BACTERIA THROAT CULT: NORMAL

## 2022-06-10 ENCOUNTER — OUTPATIENT (OUTPATIENT)
Dept: OUTPATIENT SERVICES | Facility: HOSPITAL | Age: 77
LOS: 1 days | Discharge: HOME | End: 2022-06-10

## 2022-06-10 ENCOUNTER — NON-APPOINTMENT (OUTPATIENT)
Age: 77
End: 2022-06-10

## 2022-06-10 DIAGNOSIS — Z90.5 ACQUIRED ABSENCE OF KIDNEY: Chronic | ICD-10-CM

## 2022-06-10 DIAGNOSIS — Z98.890 OTHER SPECIFIED POSTPROCEDURAL STATES: Chronic | ICD-10-CM

## 2022-06-10 DIAGNOSIS — C64.9 MALIGNANT NEOPLASM OF UNSPECIFIED KIDNEY, EXCEPT RENAL PELVIS: ICD-10-CM

## 2022-06-10 DIAGNOSIS — Z90.49 ACQUIRED ABSENCE OF OTHER SPECIFIED PARTS OF DIGESTIVE TRACT: Chronic | ICD-10-CM

## 2022-06-10 DIAGNOSIS — Z90.89 ACQUIRED ABSENCE OF OTHER ORGANS: Chronic | ICD-10-CM

## 2022-06-11 ENCOUNTER — APPOINTMENT (OUTPATIENT)
Dept: CT IMAGING | Facility: CLINIC | Age: 77
End: 2022-06-11
Payer: MEDICARE

## 2022-06-11 ENCOUNTER — OUTPATIENT (OUTPATIENT)
Dept: OUTPATIENT SERVICES | Facility: HOSPITAL | Age: 77
LOS: 1 days | End: 2022-06-11
Payer: MEDICARE

## 2022-06-11 DIAGNOSIS — Z90.5 ACQUIRED ABSENCE OF KIDNEY: Chronic | ICD-10-CM

## 2022-06-11 DIAGNOSIS — C64.9 MALIGNANT NEOPLASM OF UNSPECIFIED KIDNEY, EXCEPT RENAL PELVIS: ICD-10-CM

## 2022-06-11 DIAGNOSIS — Z98.890 OTHER SPECIFIED POSTPROCEDURAL STATES: Chronic | ICD-10-CM

## 2022-06-11 DIAGNOSIS — Z90.89 ACQUIRED ABSENCE OF OTHER ORGANS: Chronic | ICD-10-CM

## 2022-06-11 DIAGNOSIS — Z90.49 ACQUIRED ABSENCE OF OTHER SPECIFIED PARTS OF DIGESTIVE TRACT: Chronic | ICD-10-CM

## 2022-06-11 PROCEDURE — 74177 CT ABD & PELVIS W/CONTRAST: CPT | Mod: ME

## 2022-06-11 PROCEDURE — 71260 CT THORAX DX C+: CPT | Mod: 26,ME

## 2022-06-11 PROCEDURE — G1004: CPT

## 2022-06-11 PROCEDURE — 74177 CT ABD & PELVIS W/CONTRAST: CPT | Mod: 26,ME

## 2022-06-11 PROCEDURE — 82565 ASSAY OF CREATININE: CPT

## 2022-06-11 PROCEDURE — 71260 CT THORAX DX C+: CPT | Mod: ME

## 2022-06-16 ENCOUNTER — APPOINTMENT (OUTPATIENT)
Dept: OTOLARYNGOLOGY | Facility: CLINIC | Age: 77
End: 2022-06-16
Payer: MEDICARE

## 2022-06-16 VITALS — TEMPERATURE: 97.8 F | WEIGHT: 152 LBS | BODY MASS INDEX: 21.76 KG/M2 | HEIGHT: 70 IN

## 2022-06-16 DIAGNOSIS — R49.0 DYSPHONIA: ICD-10-CM

## 2022-06-16 DIAGNOSIS — R13.12 DYSPHAGIA, OROPHARYNGEAL PHASE: ICD-10-CM

## 2022-06-16 DIAGNOSIS — J37.0 CHRONIC LARYNGITIS: ICD-10-CM

## 2022-06-16 PROCEDURE — 99213 OFFICE O/P EST LOW 20 MIN: CPT | Mod: 25

## 2022-06-16 PROCEDURE — 31575 DIAGNOSTIC LARYNGOSCOPY: CPT

## 2022-06-16 NOTE — HISTORY OF PRESENT ILLNESS
[de-identified] : co weak voice\par completed difucan  no improvement\par some sob\par pain throat w swallowing\par chemo for renal ca w lung met

## 2022-06-16 NOTE — ASSESSMENT
[FreeTextEntry1] : reviewed ct chest and abdomen w no sig reucrrence\par weak voice\par mild vocal cors swelling w no airway obstruction\par dysphagia persists\par ct neck

## 2022-06-16 NOTE — REASON FOR VISIT
[Subsequent Evaluation] : a subsequent evaluation for [FreeTextEntry2] : throat  pain  when swallowing

## 2022-06-16 NOTE — PROCEDURE
[de-identified] : Indication for procedure:  Unable to examine laryngeal structures with mirror exam, persistent hoarseness\par The patient has\par \par Scope # \par Topical anesthesia with viscous xylocaine 2% is applied to the anterior nares.\par A flexible fiberoptic laryngoscope is than introduced through the nares.\par The nasopharynx is clear without mass or inflammation.\par The posterior pharyngeal wall is unremarkable.\par The tongue base and vallecula are unremarkable.  The hypopharynx id clear and unobstructed.\par The supraglottic larynx is within normal limits.\par Both vocal cords are fully mobile with milddiffuse bilateral vocal cord edema.  \par No Nodules or other lesions are noted.\par There is no edema or erythema overlying the arytenoid cartilages or the inter-arytenoid space.\par The subglottic space is clear.\par The voice has a weak raspy quality.\par

## 2022-06-17 ENCOUNTER — APPOINTMENT (OUTPATIENT)
Age: 77
End: 2022-06-17
Payer: MEDICARE

## 2022-06-17 ENCOUNTER — APPOINTMENT (OUTPATIENT)
Age: 77
End: 2022-06-17

## 2022-06-17 VITALS
DIASTOLIC BLOOD PRESSURE: 89 MMHG | HEART RATE: 92 BPM | RESPIRATION RATE: 18 BRPM | SYSTOLIC BLOOD PRESSURE: 139 MMHG | TEMPERATURE: 96.3 F | WEIGHT: 156.53 LBS | BODY MASS INDEX: 22.46 KG/M2 | OXYGEN SATURATION: 94 %

## 2022-06-17 PROCEDURE — 99214 OFFICE O/P EST MOD 30 MIN: CPT

## 2022-06-19 NOTE — HISTORY OF PRESENT ILLNESS
[de-identified] : 77 M, ex- heavy smoker, with PMHx of essential hypertension, AI VR, chronic renal insufficiency, osteoarthritis, paroxysmal atrial fibrillation ( + loop recorder; on anticoagulation with Rivaroxaban), right renal carcinoma s/p nephrectomy 12/5/19, and erythrocytosis here for initial medical oncology consultation of renal cell carcinoma metastatic to lung. \par \par CASE SYNOPSIS:\par September 2019–reports gross hematuria, spontaneously resolved.  No other associated  symptoms.\par \par 11/1/2019: CT A/P -9.7 x 10 x 7.5 cm partially heterogeneous mass extending off the lower pole of the right kidney with invasion of the lower pole renal collecting system, highly concerning for renal cell carcinoma.  No gross urothelial lesion.  Few scattered pulmonary nodules measuring up to 4 mm, nonspecific, possibly representing postinflammatory benign nodules, but cannot exclude metastasis.  Further evaluation with CT scan of the chest recommended.  1.2 x 1.5 cm low-attenuation lesion of the spleen representing hemangioma.\par \par 11/8/2019: CT chest–tree in bud opacities noted within the right lung likely representing mucoid impacted distal small airways.\par \par 12/5/2019: Radical nephrectomy (right kidney and proximal ureter) performed at [facility] (); surgical pathology: clear cell renal carcinoma, 10 cm, with extensive necrosis, grade 4 with focal rhabdoid features, no definitive LVI, vascular and ureteral resection margins free of tumor, perinephric adipose tissue negative for malignancy.  Renal parenchyma showed mild nephrosclerosis and patchy interstitial chronic inflammation (pT2, pN0).\par \par 3/23/2020: CT A/P–interval right nephrectomy.  No evidence of locally recurrent or metastatic disease.\par \par 4/9/2021: CT C/A/P–5 mm RUL pulmonary nodule may reflect metastasis and is new since prior chest CT of 11/8/2019.  Close follow-up recommended.  No evidence for residual or recurrent tumor in the right nephrectomy bed.\par \par 8/13/2021: CT chest–new right upper lobe 8 mm peripheral nodule indeterminate; previously measuring 5 mm.  Interval growth suggest metastatic disease.\par \par 9/8/2021:referred to Dr. Schmitt (thoracic surgery) for evaluation.\par \par 10/29/2021: Right upper lobe VATS/ wedge resection ([facility], Dr. Schmitt) -metastatic clear-cell renal cell carcinoma, R4 lymph node excision negative for malignancy, level 7 lymph node excision negative for malignancy, acid-fast bacilli and fungi.  Both lymph nodes with noncaseating granulomatous inflammation.\par \par 11/19/21- cycle # 1 Pembrolizumab\par \par 11/30/21- discontinues Multaq (antiarrhythmic) due to interactions with axitinib\par \par 12/4/21- started axitinb\par \par 1/15/2022–discontinued axitinib temporarily due to mucosal sensitivity\par \par 2/9/22: Develops macular rash on both forearms; TSH 90.7. 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  No evidence of metastatic disease\par \par  [FreeTextEntry1] : \par \par  [de-identified] : Here to discuss restaging CT C/A/P from 6/11/2022 consistent with no residual or recurrent disease in the nephrectomy bed and no evidence of metastatic disease.  Ever since patient started on axitinib he has noticed a weaker, raspier voice.  Completed a course of Diflucan with no improvement, in fact he is dysphonia has aggravated.  Evaluated by Dr. 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  Laryngoscopy showed unremarkable nasopharynx, posterior pharyngeal wall, supraglottic larynx, vocal cords or subglottic space.  CT neck ordered, pending.  Patient denies further rash.  He has also lost approximately 30 pounds in the past 3-month.  Here accompanied by his daughter.\par \par

## 2022-06-19 NOTE — REASON FOR VISIT
[Follow-Up Visit] : a follow-up [Family Member] : family member [FreeTextEntry2] : metastatic renal clear cell carcinoma

## 2022-06-19 NOTE — REVIEW OF SYSTEMS
[Fatigue] : fatigue [Recent Change In Weight] : ~T recent weight change [Shortness Of Breath] : shortness of breath [SOB on Exertion] : shortness of breath during exertion [Skin Rash] : skin rash [Negative] : Neurological [Hoarseness] : hoarseness [FreeTextEntry2] : Lost 30 pounds in the past 3-month [FreeTextEntry4] : Weaker voice [FreeTextEntry6] : s/p RUL VATS/wedge resection for lung metastases [FreeTextEntry8] :  right clear-cell kidney cancer, s/p right nephrectomy [de-identified] : upper extremities, triggered by high temperatures

## 2022-06-19 NOTE — PHYSICAL EXAM
[Ambulatory and capable of all self care but unable to carry out any work activities] : Status 2- Ambulatory and capable of all self care but unable to carry out any work activities. Up and about more than 50% of waking hours [Normal] : full range of motion and no deformities appreciated [Thin] : thin [de-identified] : scars s/p VATS right chest wall - healed [de-identified] : Dry, no rash

## 2022-06-20 PROBLEM — I44.2 AIVR (ACCELERATED IDIOVENTRICULAR RHYTHM): Status: ACTIVE | Noted: 2018-11-29

## 2022-06-20 NOTE — CARDIOLOGY SUMMARY
[de-identified] : 4/6/21 : Sinus angela 58 NS QRS widening.  [de-identified] : 7/9/21 : Linq Interrogation No arrhythmia detected.  [de-identified] : 12/14/18, no Ischemia,   [de-identified] : 12/10/18, Normal left ventricular size and systolic function. Mild diastolic dysfunction. Mild aortic regurgitation. Mild tricuspid regurgitation. Minimal pulmonic regurgitation. LVEF 68%.

## 2022-06-20 NOTE — HISTORY OF PRESENT ILLNESS
[FreeTextEntry1] : Charlene Tam is a 77-year-old man with paroxysmal atrial fibrillation, hypertension, and  renal cell carcinoma status post right laparoscopic radical nephrectomy (12/2019) Postoperatively he experienced marked bradycardia and diltiazem was discontinued. He had a hernia repair from the surgical incision recently. But no significant cardiac symptoms. CHARLENE TAM  denies chest pain, chest pressure, shortness of breath, lightheadedness, dizziness, palpitations, syncope, presyncope, orthopnea, PND, or edema.  He was diagnosed with metastatic renal cell ca  and  multaq was discontinued given drug drug interaction.  He is asymptomatic.

## 2022-06-20 NOTE — ASSESSMENT
[FreeTextEntry1] : Denny Johnson is a 76-year-old man with paroxysmal atrial fibrillation, hypertension, and  metastatic renal cell carcinoma.\par \par Interrogation of his implantable cardiac monitor loop recorder reveals persistent atrial fibrillation. He is asymptomatic..  Discussed  at length treatment options for atrial fibrillation. He is asymptomatic and tolerating anticoagulation. Would only consider antiarrhythmic drugs if he should become symptomatic.  Would continue xarelto 20 mg daily.

## 2022-06-22 ENCOUNTER — NON-APPOINTMENT (OUTPATIENT)
Age: 77
End: 2022-06-22

## 2022-06-30 ENCOUNTER — APPOINTMENT (OUTPATIENT)
Dept: ELECTROPHYSIOLOGY | Facility: CLINIC | Age: 77
End: 2022-06-30

## 2022-07-01 ENCOUNTER — NON-APPOINTMENT (OUTPATIENT)
Age: 77
End: 2022-07-01

## 2022-07-01 PROCEDURE — 93298 REM INTERROG DEV EVAL SCRMS: CPT

## 2022-07-01 PROCEDURE — G2066: CPT

## 2022-07-08 ENCOUNTER — APPOINTMENT (OUTPATIENT)
Age: 77
End: 2022-07-08

## 2022-07-17 ENCOUNTER — RX RENEWAL (OUTPATIENT)
Age: 77
End: 2022-07-17

## 2022-07-18 LAB
T4 FREE SERPL-MCNC: 1.7 NG/DL
TSH SERPL-ACNC: 0.02 UIU/ML

## 2022-07-19 ENCOUNTER — APPOINTMENT (OUTPATIENT)
Dept: INTERNAL MEDICINE | Facility: CLINIC | Age: 77
End: 2022-07-19

## 2022-07-19 ENCOUNTER — LABORATORY RESULT (OUTPATIENT)
Age: 77
End: 2022-07-19

## 2022-07-19 VITALS
BODY MASS INDEX: 23.62 KG/M2 | HEART RATE: 74 BPM | TEMPERATURE: 97.8 F | WEIGHT: 165 LBS | HEIGHT: 70 IN | OXYGEN SATURATION: 96 % | RESPIRATION RATE: 16 BRPM | DIASTOLIC BLOOD PRESSURE: 78 MMHG | SYSTOLIC BLOOD PRESSURE: 132 MMHG

## 2022-07-19 PROCEDURE — 99214 OFFICE O/P EST MOD 30 MIN: CPT

## 2022-07-19 RX ORDER — FLUOCINOLONE ACETONIDE 0.25 MG/G
0.03 CREAM TOPICAL
Qty: 60 | Refills: 0 | Status: DISCONTINUED | COMMUNITY
Start: 2022-03-04 | End: 2022-07-19

## 2022-07-19 RX ORDER — PEMBROLIZUMAB 25 MG/ML
INJECTION, SOLUTION INTRAVENOUS
Refills: 0 | Status: DISCONTINUED | COMMUNITY
End: 2022-07-19

## 2022-07-19 RX ORDER — LEVOTHYROXINE SODIUM 0.1 MG/1
100 TABLET ORAL
Qty: 90 | Refills: 1 | Status: DISCONTINUED | COMMUNITY
Start: 2022-02-28 | End: 2022-07-19

## 2022-07-19 RX ORDER — LEVOTHYROXINE SODIUM 0.12 MG/1
125 TABLET ORAL DAILY
Qty: 90 | Refills: 0 | Status: DISCONTINUED | COMMUNITY
Start: 2022-05-10 | End: 2022-07-19

## 2022-07-19 RX ORDER — CLOTRIMAZOLE 10 MG/1
10 LOZENGE ORAL DAILY
Qty: 50 | Refills: 0 | Status: DISCONTINUED | COMMUNITY
Start: 2022-05-06 | End: 2022-07-19

## 2022-07-19 NOTE — PLAN
[FreeTextEntry1] : 1. I will send him for blood work today, including a CBC, CMP, ESR, CRP, and lyme titer.  I will follow up with the results in several days. \par \par 2. I suggested he try Tylenol 1000 mg every 6 hours to resolve his wrist and knee aches. If this does not help, he may need to follow up with a Rheumatologist. \par \par 3. Follow up with Dr. Morfin and his primary care physician, Dr. Ibarra, for further evaluation.

## 2022-07-19 NOTE — HISTORY OF PRESENT ILLNESS
[FreeTextEntry8] : The patient  had a kidney removed due to cancer and he had a portion of his right lung removed, due to a nodule. He was placed on Inlyta and Keytruda. His oncologist is Dr. Morfin. . About three weeks ago, he completed a CT scan, which was unremarkable and he was taken off his cancer treatment. \par \par For the past 3 weeks, he has been having bilateral wrist and knee pain in the morning, that progressively gets better throughout the day. His wrists and knees do not appear to be hot or swollen. He has not been bitten by any ticks and he has not been wondering through the woods. He has not been taking anything for his achy knees and wrists.  There have been no swelling of the joints, they are only achy.  \par \par He has also been struggling with laryngitis for about 3 weeks and he has lost 23 pounds because his mouth was sore and sensitive to eating. He has began to gain the weight back.  He recently had thyroid blood work on Friday due to the cancer treatments effecting his thyroid levels. \par \par Denies fever, rash or chills. No other complaints at this time.

## 2022-07-19 NOTE — REVIEW OF SYSTEMS
[Recent Change In Weight] : ~T recent weight change [Joint Stiffness] : joint stiffness [Negative] : Neurological [Fever] : no fever [Chills] : no chills [FreeTextEntry9] : bilateral wrist and knee aches.

## 2022-07-19 NOTE — PHYSICAL EXAM
[No Acute Distress] : no acute distress [Well Nourished] : well nourished [No JVD] : no jugular venous distention [Supple] : supple [No Respiratory Distress] : no respiratory distress  [No Accessory Muscle Use] : no accessory muscle use [Clear to Auscultation] : lungs were clear to auscultation bilaterally [Regular Rhythm] : with a regular rhythm [Normal S1, S2] : normal S1 and S2 [No Edema] : there was no peripheral edema [No Extremity Clubbing/Cyanosis] : no extremity clubbing/cyanosis [No Joint Swelling] : no joint swelling [No Rash] : no rash [de-identified] : There is slight tenderness with extension and flexion of both wrists.  There is no erythema or warmth noted.

## 2022-07-21 LAB
ALBUMIN SERPL ELPH-MCNC: 3.9 G/DL
ALP BLD-CCNC: 109 U/L
ALT SERPL-CCNC: 20 U/L
ANION GAP SERPL CALC-SCNC: 9 MMOL/L
AST SERPL-CCNC: 26 U/L
BASOPHILS # BLD AUTO: 0.06 K/UL
BASOPHILS NFR BLD AUTO: 0.9 %
BILIRUB SERPL-MCNC: 1.4 MG/DL
BUN SERPL-MCNC: 15 MG/DL
CALCIUM SERPL-MCNC: 9.4 MG/DL
CHLORIDE SERPL-SCNC: 107 MMOL/L
CO2 SERPL-SCNC: 26 MMOL/L
CREAT SERPL-MCNC: 1.09 MG/DL
CRP SERPL-MCNC: 9 MG/L
EGFR: 69 ML/MIN/1.73M2
EOSINOPHIL # BLD AUTO: 0.17 K/UL
EOSINOPHIL NFR BLD AUTO: 2.6 %
GLUCOSE SERPL-MCNC: 80 MG/DL
HCT VFR BLD CALC: 50.6 %
HGB BLD-MCNC: 15.9 G/DL
IMM GRANULOCYTES NFR BLD AUTO: 0.3 %
LYMPHOCYTES # BLD AUTO: 1.08 K/UL
LYMPHOCYTES NFR BLD AUTO: 16.2 %
MAN DIFF?: NORMAL
MCHC RBC-ENTMCNC: 30.6 PG
MCHC RBC-ENTMCNC: 31.4 GM/DL
MCV RBC AUTO: 97.3 FL
MONOCYTES # BLD AUTO: 0.9 K/UL
MONOCYTES NFR BLD AUTO: 13.5 %
NEUTROPHILS # BLD AUTO: 4.42 K/UL
NEUTROPHILS NFR BLD AUTO: 66.5 %
PLATELET # BLD AUTO: 144 K/UL
POTASSIUM SERPL-SCNC: 4.5 MMOL/L
PROT SERPL-MCNC: 6.2 G/DL
RBC # BLD: 5.2 M/UL
RBC # FLD: 16 %
SODIUM SERPL-SCNC: 142 MMOL/L
WBC # FLD AUTO: 6.65 K/UL

## 2022-07-22 ENCOUNTER — NON-APPOINTMENT (OUTPATIENT)
Age: 77
End: 2022-07-22

## 2022-07-25 ENCOUNTER — NON-APPOINTMENT (OUTPATIENT)
Age: 77
End: 2022-07-25

## 2022-07-26 ENCOUNTER — APPOINTMENT (OUTPATIENT)
Dept: RHEUMATOLOGY | Facility: CLINIC | Age: 77
End: 2022-07-26

## 2022-07-26 PROCEDURE — 99204 OFFICE O/P NEW MOD 45 MIN: CPT

## 2022-07-26 RX ORDER — FLUCONAZOLE 150 MG/1
150 TABLET ORAL
Qty: 7 | Refills: 2 | Status: DISCONTINUED | COMMUNITY
Start: 2022-06-08 | End: 2022-07-26

## 2022-07-26 RX ORDER — AXITINIB 5 MG/1
5 TABLET, FILM COATED ORAL TWICE DAILY
Qty: 60 | Refills: 5 | Status: DISCONTINUED | COMMUNITY
Start: 2021-11-20 | End: 2022-07-26

## 2022-07-26 RX ORDER — CLOTRIMAZOLE 10 MG/G
1 CREAM TOPICAL
Qty: 15 | Refills: 0 | Status: ACTIVE | COMMUNITY
Start: 2022-02-21

## 2022-07-26 NOTE — REASON FOR VISIT
[Initial Evaluation] : an initial evaluation [FreeTextEntry1] : Bilateral wrist and knee pain x 4 weeks

## 2022-07-26 NOTE — HISTORY OF PRESENT ILLNESS
[FreeTextEntry1] : For 7-8 months, pt had been taking axitinib for treatment for metastatic renal cell carcinoma. While he was taking the medication, he experienced throat and oral pain which interfered with his eating, resulting in 23 lb weight loss, as well as a change in his skin texture, with his skin becoming more ronnie. He stopped taking the medication around July 1st because he was told there is no sign of cancer recurrence. However, around the time he discontinued the medication, he developed severe pain in his b/l wrists as well as his knees, which is worst in the morning and improves slowly over a period of several hours. He has not noticed any swelling, not sure about stiffness. He denies fevers, diarrhea, or any eye problems.\par \par Physical exam: GEN: Pleasant, AAO man sitting on exam\par SKIN: + Ecchymosis on L forearm\par PULM: Clear to auscultation b/l\par CV: Irregularly irregular rhythm\par MSK:\par Shoulders: Full ROM b/l\par Elbows: Full ROM b/l, no effusions\par Wrists: + ? Small effusions b/l with warmth, + pain with flexion and extension, limited ROM to approx 90 degrees b/l\par Hands: No synovitis\par Hips: Full ROM b/l\par Knees: no effusions, full ROM b/l\par Ankles: Full ROM b/l, no effusions\par Feet: no effusions, no TTP\par EXT: + mild b/l LE edema to shins\par

## 2022-07-26 NOTE — ASSESSMENT
[FreeTextEntry1] : Wrist and knee pain: Pt's symptoms and exam are concerning for inflammatory arthritis. Unclear if his arthritis could be an adverse effect of his axitinib use? With recent labs demonstrating slightly elevated CRP, otherwise no specific labs for arthritis were checked. Would also evaluate pt for myopathy as he reports having difficulty standing up recently, has to use a wall to help himself up. \par - f/u labs for RA, also CK and labs for infections that can be a/w arthritis\par - Advised pt to try prednisone 15 mg as this would help the fastest with inflammatory arthritis; he would like me to check with his oncologist Dr. Morfin first. Left message with Dr. Morfin to discuss treatment.

## 2022-07-29 ENCOUNTER — NON-APPOINTMENT (OUTPATIENT)
Age: 77
End: 2022-07-29

## 2022-07-29 ENCOUNTER — APPOINTMENT (OUTPATIENT)
Dept: ENDOCRINOLOGY | Facility: CLINIC | Age: 77
End: 2022-07-29

## 2022-07-31 PROBLEM — G52.1 GLOSSOPHARYNGEAL NEURALGIA: Status: ACTIVE | Noted: 2022-06-08

## 2022-08-01 ENCOUNTER — APPOINTMENT (OUTPATIENT)
Age: 77
End: 2022-08-01

## 2022-08-01 VITALS
DIASTOLIC BLOOD PRESSURE: 77 MMHG | RESPIRATION RATE: 16 BRPM | OXYGEN SATURATION: 99 % | HEART RATE: 78 BPM | SYSTOLIC BLOOD PRESSURE: 131 MMHG | TEMPERATURE: 96.8 F | WEIGHT: 178.77 LBS | BODY MASS INDEX: 25.65 KG/M2

## 2022-08-01 DIAGNOSIS — G52.1 DISORDERS OF GLOSSOPHARYNGEAL NERVE: ICD-10-CM

## 2022-08-01 PROCEDURE — 99215 OFFICE O/P EST HI 40 MIN: CPT

## 2022-08-01 NOTE — REVIEW OF SYSTEMS
[Fatigue] : fatigue [Recent Change In Weight] : ~T recent weight change [Hoarseness] : hoarseness [Shortness Of Breath] : shortness of breath [SOB on Exertion] : shortness of breath during exertion [Skin Rash] : skin rash [Negative] : Neurological [FreeTextEntry2] : Lost 30 pounds in the past 3-month [FreeTextEntry4] : Weaker voice [FreeTextEntry6] : s/p RUL VATS/wedge resection for lung metastases [FreeTextEntry8] :  right clear-cell kidney cancer, s/p right nephrectomy [de-identified] : upper extremities, triggered by high temperatures

## 2022-08-01 NOTE — PHYSICAL EXAM
[Ambulatory and capable of all self care but unable to carry out any work activities] : Status 2- Ambulatory and capable of all self care but unable to carry out any work activities. Up and about more than 50% of waking hours [Thin] : thin [Normal] : full range of motion and no deformities appreciated [de-identified] : scars s/p VATS right chest wall - healed [de-identified] : Dry, no rash

## 2022-08-01 NOTE — HISTORY OF PRESENT ILLNESS
[de-identified] : 77 M, ex- heavy smoker, with PMHx of essential hypertension, AI VR, chronic renal insufficiency, osteoarthritis, paroxysmal atrial fibrillation ( + loop recorder; on anticoagulation with Rivaroxaban), right renal carcinoma s/p nephrectomy 12/5/19, and erythrocytosis here for initial medical oncology consultation of renal cell carcinoma metastatic to lung. \par \par CASE SYNOPSIS:\par September 2019–reports gross hematuria, spontaneously resolved.  No other associated  symptoms.\par \par 11/1/2019: CT A/P -9.7 x 10 x 7.5 cm partially heterogeneous mass extending off the lower pole of the right kidney with invasion of the lower pole renal collecting system, highly concerning for renal cell carcinoma.  No gross urothelial lesion.  Few scattered pulmonary nodules measuring up to 4 mm, nonspecific, possibly representing postinflammatory benign nodules, but cannot exclude metastasis.  Further evaluation with CT scan of the chest recommended.  1.2 x 1.5 cm low-attenuation lesion of the spleen representing hemangioma.\par \par 11/8/2019: CT chest–tree in bud opacities noted within the right lung likely representing mucoid impacted distal small airways.\par \par 12/5/2019: Radical nephrectomy (right kidney and proximal ureter) performed at [facility] (); surgical pathology: clear cell renal carcinoma, 10 cm, with extensive necrosis, grade 4 with focal rhabdoid features, no definitive LVI, vascular and ureteral resection margins free of tumor, perinephric adipose tissue negative for malignancy.  Renal parenchyma showed mild nephrosclerosis and patchy interstitial chronic inflammation (pT2, pN0).\par \par 3/23/2020: CT A/P–interval right nephrectomy.  No evidence of locally recurrent or metastatic disease.\par \par 4/9/2021: CT C/A/P–5 mm RUL pulmonary nodule may reflect metastasis and is new since prior chest CT of 11/8/2019.  Close follow-up recommended.  No evidence for residual or recurrent tumor in the right nephrectomy bed.\par \par 8/13/2021: CT chest–new right upper lobe 8 mm peripheral nodule indeterminate; previously measuring 5 mm.  Interval growth suggest metastatic disease.\par \par 9/8/2021:referred to Dr. Schmitt (thoracic surgery) for evaluation.\par \par 10/29/2021: Right upper lobe VATS/ wedge resection ([facility], Dr. Schmitt) -metastatic clear-cell renal cell carcinoma, R4 lymph node excision negative for malignancy, level 7 lymph node excision negative for malignancy, acid-fast bacilli and fungi.  Both lymph nodes with noncaseating granulomatous inflammation.\par \par 11/19/21- cycle # 1 Pembrolizumab\par \par 11/30/21- discontinues Multaq (antiarrhythmic) due to interactions with axitinib\par \par 12/4/21- started axitinb\par \par 1/15/2022–discontinued axitinib temporarily due to mucosal sensitivity\par \par 2/9/22: Develops macular rash on both forearms; TSH 90.7. 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  No evidence of metastatic disease\par \par 6/17/2022–discontinued axitinib due to glossopharyngeal neuralgia\par  [FreeTextEntry1] : \par \par  [de-identified] : Returning after 6 weeks discontinuation of treatment with axitinib (Inlyta) due to glossopharyngeal neuralgia.  Patient has experienced bilateral knee and wrist discomfort/pain, with associated swelling.  Weight loss of more than 20 pounds since diagnosis of glossopharyngeal neuralgia; gained all the weight back in the past 6 weeks, as his appetite and dysphagia have improved.  Denies rash or fevers.\par Lyme serology negative, CBC normal range, TSH dropping from 90.7 in February 2022 to 0.02 in July 2022.\par No new images of neck area.\par Patient evaluated by Dr. Evans (rheumatology) for atypical arthritis.  Plan is to start prednisone daily and following slow taper.  Presently patient taking Tylenol as needed\par \par \par

## 2022-08-01 NOTE — ASSESSMENT
[FreeTextEntry1] : Mr. TAM 's questions were answered to his satisfaction. \par He  expressed his  understanding and willingness to comply with the above recommendations, and  will return to the office in 6 weeks.\par \par \par \par \par \par \par \par

## 2022-08-02 LAB
B19V IGG SER QL IA: 0.59 INDEX
B19V IGG+IGM SER-IMP: NEGATIVE
B19V IGG+IGM SER-IMP: NORMAL
B19V IGM FLD-ACNC: 0.06 INDEX
B19V IGM SER-ACNC: NEGATIVE
CCP AB SER IA-ACNC: <8 UNITS
CK SERPL-CCNC: 36 U/L
ENA SS-A AB SER IA-ACNC: <0.2 AL
ENA SS-B AB SER IA-ACNC: <0.2 AL
HBV CORE IGG+IGM SER QL: NONREACTIVE
HBV SURFACE AG SER QL: NONREACTIVE
HCV AB SER QL: NONREACTIVE
HCV S/CO RATIO: 0.08 S/CO
RF+CCP IGG SER-IMP: NEGATIVE
RHEUMATOID FACT SER QL: <10 IU/ML
URATE SERPL-MCNC: 5.4 MG/DL

## 2022-08-03 ENCOUNTER — APPOINTMENT (OUTPATIENT)
Dept: CARDIOLOGY | Facility: CLINIC | Age: 77
End: 2022-08-03

## 2022-08-03 ENCOUNTER — NON-APPOINTMENT (OUTPATIENT)
Age: 77
End: 2022-08-03

## 2022-08-03 VITALS
BODY MASS INDEX: 25.34 KG/M2 | HEIGHT: 70 IN | WEIGHT: 177 LBS | SYSTOLIC BLOOD PRESSURE: 132 MMHG | OXYGEN SATURATION: 99 % | HEART RATE: 91 BPM | DIASTOLIC BLOOD PRESSURE: 80 MMHG

## 2022-08-03 PROCEDURE — 99214 OFFICE O/P EST MOD 30 MIN: CPT

## 2022-08-03 PROCEDURE — 93000 ELECTROCARDIOGRAM COMPLETE: CPT

## 2022-08-04 ENCOUNTER — APPOINTMENT (OUTPATIENT)
Dept: ELECTROPHYSIOLOGY | Facility: CLINIC | Age: 77
End: 2022-08-04

## 2022-08-05 ENCOUNTER — NON-APPOINTMENT (OUTPATIENT)
Age: 77
End: 2022-08-05

## 2022-08-05 LAB — HLA-B27 RELATED AG QL: NEGATIVE

## 2022-08-05 PROCEDURE — G2066: CPT

## 2022-08-05 PROCEDURE — 93298 REM INTERROG DEV EVAL SCRMS: CPT

## 2022-08-09 ENCOUNTER — OUTPATIENT (OUTPATIENT)
Dept: OUTPATIENT SERVICES | Facility: HOSPITAL | Age: 77
LOS: 1 days | Discharge: HOME | End: 2022-08-09

## 2022-08-09 DIAGNOSIS — Z90.49 ACQUIRED ABSENCE OF OTHER SPECIFIED PARTS OF DIGESTIVE TRACT: Chronic | ICD-10-CM

## 2022-08-09 DIAGNOSIS — Z98.890 OTHER SPECIFIED POSTPROCEDURAL STATES: Chronic | ICD-10-CM

## 2022-08-09 DIAGNOSIS — Z90.89 ACQUIRED ABSENCE OF OTHER ORGANS: Chronic | ICD-10-CM

## 2022-08-09 DIAGNOSIS — Z90.5 ACQUIRED ABSENCE OF KIDNEY: Chronic | ICD-10-CM

## 2022-08-09 DIAGNOSIS — C64.9 MALIGNANT NEOPLASM OF UNSPECIFIED KIDNEY, EXCEPT RENAL PELVIS: ICD-10-CM

## 2022-08-10 ENCOUNTER — APPOINTMENT (OUTPATIENT)
Dept: CARDIOLOGY | Facility: CLINIC | Age: 77
End: 2022-08-10

## 2022-08-10 PROCEDURE — 93306 TTE W/DOPPLER COMPLETE: CPT

## 2022-08-12 ENCOUNTER — NON-APPOINTMENT (OUTPATIENT)
Age: 77
End: 2022-08-12

## 2022-08-12 ENCOUNTER — APPOINTMENT (OUTPATIENT)
Age: 77
End: 2022-08-12

## 2022-08-17 ENCOUNTER — RX RENEWAL (OUTPATIENT)
Age: 77
End: 2022-08-17

## 2022-08-22 ENCOUNTER — APPOINTMENT (OUTPATIENT)
Dept: ENDOCRINOLOGY | Facility: CLINIC | Age: 77
End: 2022-08-22

## 2022-08-22 VITALS
WEIGHT: 177 LBS | DIASTOLIC BLOOD PRESSURE: 74 MMHG | HEART RATE: 87 BPM | OXYGEN SATURATION: 98 % | SYSTOLIC BLOOD PRESSURE: 128 MMHG | BODY MASS INDEX: 25.34 KG/M2 | HEIGHT: 70 IN | TEMPERATURE: 98.3 F

## 2022-08-22 PROCEDURE — 99204 OFFICE O/P NEW MOD 45 MIN: CPT

## 2022-08-22 NOTE — CONSULT LETTER
[Dear  ___] : Dear  [unfilled], [Consult Letter:] : I had the pleasure of evaluating your patient, [unfilled]. [Consult Closing:] : Thank you very much for allowing me to participate in the care of this patient.  If you have any questions, please do not hesitate to contact me. [FreeTextEntry3] : Nuzhat Merlos MD\par Endocrinologist - [facility] \par

## 2022-08-22 NOTE — PHYSICAL EXAM
[de-identified] : General: No distress, well nourished\par Eyes: Normal Sclera, EOMI, PERRL\par ENT: Normal appearance of the nose, normal oropharynx\par Neck/Thyroid: No cervical lymphadenopathy, thyroid gland 20 g in size, no thyroid nodules, non-tender\par Respiratory: No use of accessory muscles of respiration, vesicular breath sounds heard bilaterally, no crepitations or ronchi\par Cardiovascular: S1 and S2 heard and normal, no S3 or S4, no murmurs, radial pulse normal bilaterally\par Abdomen: soft, non-tender, no masses, normal bowel sounds\par Musculoskeletal: No swelling or deformities of joints of hands, no pedal edema\par Neurological: Normal range of motion in the hands, Normal brachioradialis reflexes bilaterally\par Psychiatry: Patient converses normally, good judgement and insight\par Skin: No rashes in hands, no nodules palpated in hands

## 2022-08-22 NOTE — ASSESSMENT
[FreeTextEntry1] : Goal TSH in patient's 65 years and older is 4 to 6 but I will also accept a TSH in the normal range.\par \par Last TSH was below goal and his pembrolizumab/Axitinib treatment was held.\par I decreased the dose of levothyroxine to 112 micrograms po daily.\par \par THE PATIENT MAY PROCEED WITH pembrolizumab/Axitinib TREATMENT FROM THE ENDOCRINE VIEWPOINT. \par \par Plan:\par 1. Decrease levothyroxine (generic) 112 micrograms po daily. \par 2. Labs to be done in 2 months - TSH\par 3. Follow up in 2 months to review results\par

## 2022-08-22 NOTE — HISTORY OF PRESENT ILLNESS
[FreeTextEntry1] : Problems:\par 1. Primary hypothyroidism - due to Axitinb use (a tyrosine kinase inhibitor being used to treat metastatic renal cell carcinoma)\par \par Note - the patient has atrial fibrillation - he used multaq (dronedarone) in the past\par \par Primary hypothyroidism\par 1. Diagnosed in Feb 2022.\par In November 2021, the patient was started on keytruda (Pembrolizumab) for treatment of metastatic renal cell carcinoma. On Dec 2021, he also receive Axitinb and the the patient became hypothyroid in Feb 2022 - TSH 90.7 (0.27 to 4.2). Per the chart, he is on a combination of pembrolizumab/Axitinib. \par 2. No family history of thyroid disorders or thyroid cancer, no personal history of radiation therapy.\par 3. Meds:\par Levothyroxine (generic) 125 micrograms po daily - fully compliant and the patient was advised on the appropriate use of levothyroxine. \par PATIENT STARTED THIS DOSE THREE WEEKS AGO.

## 2022-08-26 ENCOUNTER — APPOINTMENT (OUTPATIENT)
Age: 77
End: 2022-08-26

## 2022-08-26 ENCOUNTER — RESULT REVIEW (OUTPATIENT)
Age: 77
End: 2022-08-26

## 2022-08-26 LAB — TSH SERPL-MCNC: 0.02 UIU/ML — LOW (ref 0.27–4.2)

## 2022-08-29 DIAGNOSIS — Z51.11 ENCOUNTER FOR ANTINEOPLASTIC CHEMOTHERAPY: ICD-10-CM

## 2022-09-06 ENCOUNTER — APPOINTMENT (OUTPATIENT)
Dept: INTERNAL MEDICINE | Facility: CLINIC | Age: 77
End: 2022-09-06

## 2022-09-06 VITALS
BODY MASS INDEX: 25.77 KG/M2 | DIASTOLIC BLOOD PRESSURE: 72 MMHG | WEIGHT: 180 LBS | TEMPERATURE: 98 F | HEIGHT: 70 IN | SYSTOLIC BLOOD PRESSURE: 104 MMHG | RESPIRATION RATE: 16 BRPM | OXYGEN SATURATION: 98 % | HEART RATE: 82 BPM

## 2022-09-06 PROCEDURE — 99214 OFFICE O/P EST MOD 30 MIN: CPT

## 2022-09-06 RX ORDER — LEVOTHYROXINE SODIUM 0.11 MG/1
112 TABLET ORAL DAILY
Qty: 90 | Refills: 3 | Status: DISCONTINUED | COMMUNITY
Start: 2022-08-22 | End: 2022-09-06

## 2022-09-06 RX ORDER — MULTIVITAMIN
TABLET ORAL
Refills: 0 | Status: ACTIVE | COMMUNITY

## 2022-09-06 NOTE — HISTORY OF PRESENT ILLNESS
[FreeTextEntry1] : Follow-up evaluation [de-identified] : Mr. Johnson presents for follow-up evaluation.  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  He had a right upper lobe wedge resection on 10/29/2021 for an 8 mm nodule.  Pathology was positive for metastatic renal carcinoma.  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 Johnson is currently back on Keytruda.  He is being followed by Dr. Gunter.  He had a CT scan of the chest in June.  There is no significant change.  Patient denies any chest pain, shortness of breath or palpitations.  He has no nocturnal symptoms of cough or dyspnea.  Follow-up evaluation for follow-up evaluation\par Mr. Johnson is currently on prednisone 15 mg daily as per rheumatology for significant wrist pain.

## 2022-09-06 NOTE — PLAN
[FreeTextEntry1] : Mr. Johnson presents for follow-up evaluation.\par \par 1.  He will continue on current medication regimen which has been reviewed and revised.\par \par 2.  Patient is now on prednisone 15 mg daily as per rheumatology for treatment of his joint pain.  He will follow-up with rheumatology regarding tapering to the lowest dose that provides relief of his joint pain.\par \par 3.  Patient has a history of metastatic current renal cell carcinoma to the lung.  He is status post right upper lobe wedge resection.  Surveillance CT in June showed no evidence of recurrent metastatic disease.  Mr. Johnson is currently on Keytruda therapy.  He may be starting Imtala therapy as well.\par \par 4.  Prescription for CBC, CMP, lipid profile and urinalysis.\par \par 5.  Follow-up with endocrinology regarding hypothyroidism.  Patient is currently on levothyroxine 125 mcg daily.\par \par 6.  Mr. Johnson will continue on Xarelto 20 mg daily for his chronic atrial fibrillation.

## 2022-09-06 NOTE — PHYSICAL EXAM
[No Acute Distress] : no acute distress [Well Nourished] : well nourished [Well Developed] : well developed [Well-Appearing] : well-appearing [Normal Sclera/Conjunctiva] : normal sclera/conjunctiva [PERRL] : pupils equal round and reactive to light [EOMI] : extraocular movements intact [Normal Outer Ear/Nose] : the outer ears and nose were normal in appearance [Normal Oropharynx] : the oropharynx was normal [No JVD] : no jugular venous distention [No Lymphadenopathy] : no lymphadenopathy [Supple] : supple [Thyroid Normal, No Nodules] : the thyroid was normal and there were no nodules present [No Respiratory Distress] : no respiratory distress  [No Accessory Muscle Use] : no accessory muscle use [Clear to Auscultation] : lungs were clear to auscultation bilaterally [Normal Rate] : normal rate  [Normal S1, S2] : normal S1 and S2 [No Murmur] : no murmur heard [No Carotid Bruits] : no carotid bruits [No Abdominal Bruit] : a ~M bruit was not heard ~T in the abdomen [No Varicosities] : no varicosities [Pedal Pulses Present] : the pedal pulses are present [No Edema] : there was no peripheral edema [No Palpable Aorta] : no palpable aorta [No Extremity Clubbing/Cyanosis] : no extremity clubbing/cyanosis [Soft] : abdomen soft [Non Tender] : non-tender [Non-distended] : non-distended [No Masses] : no abdominal mass palpated [No HSM] : no HSM [Normal Bowel Sounds] : normal bowel sounds [Normal Posterior Cervical Nodes] : no posterior cervical lymphadenopathy [Normal Anterior Cervical Nodes] : no anterior cervical lymphadenopathy [No CVA Tenderness] : no CVA  tenderness [No Spinal Tenderness] : no spinal tenderness [No Joint Swelling] : no joint swelling [Grossly Normal Strength/Tone] : grossly normal strength/tone [No Rash] : no rash [Coordination Grossly Intact] : coordination grossly intact [No Focal Deficits] : no focal deficits [Normal Gait] : normal gait [Deep Tendon Reflexes (DTR)] : deep tendon reflexes were 2+ and symmetric [Normal Affect] : the affect was normal [Normal Insight/Judgement] : insight and judgment were intact [de-identified] : Irregular rhythm

## 2022-09-08 ENCOUNTER — APPOINTMENT (OUTPATIENT)
Dept: ELECTROPHYSIOLOGY | Facility: CLINIC | Age: 77
End: 2022-09-08

## 2022-09-08 LAB
ALBUMIN SERPL ELPH-MCNC: 4.3 G/DL
ALP BLD-CCNC: 83 U/L
ALT SERPL-CCNC: 12 U/L
ANION GAP SERPL CALC-SCNC: 10 MMOL/L
APPEARANCE: CLEAR
AST SERPL-CCNC: 12 U/L
BACTERIA: NEGATIVE
BASOPHILS # BLD AUTO: 0.06 K/UL
BASOPHILS NFR BLD AUTO: 0.8 %
BILIRUB SERPL-MCNC: 1.4 MG/DL
BILIRUBIN URINE: NEGATIVE
BLOOD URINE: NEGATIVE
BUN SERPL-MCNC: 22 MG/DL
CALCIUM SERPL-MCNC: 9.3 MG/DL
CHLORIDE SERPL-SCNC: 103 MMOL/L
CHOLEST SERPL-MCNC: 223 MG/DL
CO2 SERPL-SCNC: 28 MMOL/L
COLOR: NORMAL
CREAT SERPL-MCNC: 1.3 MG/DL
EGFR: 56 ML/MIN/1.73M2
EOSINOPHIL # BLD AUTO: 0.22 K/UL
EOSINOPHIL NFR BLD AUTO: 2.9 %
ESTIMATED AVERAGE GLUCOSE: 117 MG/DL
GLUCOSE QUALITATIVE U: NEGATIVE
GLUCOSE SERPL-MCNC: 100 MG/DL
HBA1C MFR BLD HPLC: 5.7 %
HCT VFR BLD CALC: 55 %
HDLC SERPL-MCNC: 67 MG/DL
HGB BLD-MCNC: 17.6 G/DL
HYALINE CASTS: 0 /LPF
IMM GRANULOCYTES NFR BLD AUTO: 0.8 %
KETONES URINE: NEGATIVE
LDLC SERPL CALC-MCNC: 128 MG/DL
LEUKOCYTE ESTERASE URINE: NEGATIVE
LYMPHOCYTES # BLD AUTO: 1.31 K/UL
LYMPHOCYTES NFR BLD AUTO: 17.3 %
MAN DIFF?: NORMAL
MCHC RBC-ENTMCNC: 30.7 PG
MCHC RBC-ENTMCNC: 32 GM/DL
MCV RBC AUTO: 95.8 FL
MICROSCOPIC-UA: NORMAL
MONOCYTES # BLD AUTO: 0.75 K/UL
MONOCYTES NFR BLD AUTO: 9.9 %
NEUTROPHILS # BLD AUTO: 5.18 K/UL
NEUTROPHILS NFR BLD AUTO: 68.3 %
NITRITE URINE: NEGATIVE
NONHDLC SERPL-MCNC: 156 MG/DL
PH URINE: 6.5
PLATELET # BLD AUTO: 163 K/UL
POTASSIUM SERPL-SCNC: 5.1 MMOL/L
PROT SERPL-MCNC: 6.8 G/DL
PROTEIN URINE: NORMAL
RBC # BLD: 5.74 M/UL
RBC # FLD: 14.3 %
RED BLOOD CELLS URINE: 0 /HPF
SODIUM SERPL-SCNC: 140 MMOL/L
SPECIFIC GRAVITY URINE: 1.01
SQUAMOUS EPITHELIAL CELLS: 0 /HPF
TRIGL SERPL-MCNC: 136 MG/DL
UROBILINOGEN URINE: NORMAL
WBC # FLD AUTO: 7.58 K/UL
WHITE BLOOD CELLS URINE: 1 /HPF

## 2022-09-09 ENCOUNTER — NON-APPOINTMENT (OUTPATIENT)
Age: 77
End: 2022-09-09

## 2022-09-09 PROCEDURE — G2066: CPT

## 2022-09-09 PROCEDURE — 93298 REM INTERROG DEV EVAL SCRMS: CPT

## 2022-09-12 ENCOUNTER — NON-APPOINTMENT (OUTPATIENT)
Age: 77
End: 2022-09-12

## 2022-09-16 ENCOUNTER — APPOINTMENT (OUTPATIENT)
Dept: ELECTROPHYSIOLOGY | Facility: CLINIC | Age: 77
End: 2022-09-16

## 2022-09-16 ENCOUNTER — APPOINTMENT (OUTPATIENT)
Age: 77
End: 2022-09-16

## 2022-09-16 ENCOUNTER — RESULT REVIEW (OUTPATIENT)
Age: 77
End: 2022-09-16

## 2022-09-16 VITALS
HEIGHT: 70 IN | SYSTOLIC BLOOD PRESSURE: 98 MMHG | WEIGHT: 182 LBS | HEART RATE: 77 BPM | BODY MASS INDEX: 26.05 KG/M2 | DIASTOLIC BLOOD PRESSURE: 61 MMHG | OXYGEN SATURATION: 99 %

## 2022-09-16 DIAGNOSIS — I48.0 PAROXYSMAL ATRIAL FIBRILLATION: ICD-10-CM

## 2022-09-16 DIAGNOSIS — I49.3 VENTRICULAR PREMATURE DEPOLARIZATION: ICD-10-CM

## 2022-09-16 LAB — TSH SERPL-MCNC: 0.07 UIU/ML — LOW (ref 0.27–4.2)

## 2022-09-16 PROCEDURE — 93285 PRGRMG DEV EVAL SCRMS IP: CPT

## 2022-09-16 PROCEDURE — 99211 OFF/OP EST MAY X REQ PHY/QHP: CPT

## 2022-09-16 NOTE — HISTORY OF PRESENT ILLNESS
[FreeTextEntry1] : 77 year old male with Afib on Metoprolol and Xarelto, unable to take AAD due to current cancer treatment with ILR in place.  Patient presents for routine ILR follow up.  Denies any CP, palpitations, SOB, dizziness, lightheadedness, near syncope or syncope.

## 2022-09-16 NOTE — DISCUSSION/SUMMARY
[FreeTextEntry1] : Continue Metoprolol and Xarelto, given low BP today will not increase dose for tachy episodes\par If patient has increasing tachycardia or symptoms can consider increasing Metoprolol\par Continue monthly remote monitoring\par In office follow up in six months

## 2022-09-16 NOTE — ASSESSMENT
[FreeTextEntry1] : 77year old male with persistent AF on Metoprolol and Xarelto with ILR in place.  ILR interrogation reveals good battery status, 100% AF burden, no bradycardia or pauses.  PAtient noted to have multiple episodes of tachycardia all asymptomatic with longest episode lasting approximately 9 minutes.

## 2022-09-22 ENCOUNTER — RX RENEWAL (OUTPATIENT)
Age: 77
End: 2022-09-22

## 2022-09-27 ENCOUNTER — APPOINTMENT (OUTPATIENT)
Age: 77
End: 2022-09-27

## 2022-09-27 ENCOUNTER — NON-APPOINTMENT (OUTPATIENT)
Age: 77
End: 2022-09-27

## 2022-09-27 VITALS
HEART RATE: 78 BPM | TEMPERATURE: 97.2 F | DIASTOLIC BLOOD PRESSURE: 75 MMHG | WEIGHT: 181.39 LBS | BODY MASS INDEX: 26.03 KG/M2 | OXYGEN SATURATION: 100 % | SYSTOLIC BLOOD PRESSURE: 153 MMHG | RESPIRATION RATE: 16 BRPM

## 2022-09-27 PROCEDURE — 99214 OFFICE O/P EST MOD 30 MIN: CPT

## 2022-09-27 RX ORDER — LEVOTHYROXINE SODIUM 125 UG/1
125 CAPSULE ORAL DAILY
Qty: 90 | Refills: 0 | Status: DISCONTINUED | COMMUNITY
End: 2022-09-27

## 2022-09-27 NOTE — REVIEW OF SYSTEMS
[Shortness Of Breath] : shortness of breath [SOB on Exertion] : shortness of breath during exertion [Skin Rash] : skin rash [Negative] : Neurological [Fatigue] : no fatigue [Recent Change In Weight] : ~T no recent weight change [Hoarseness] : no hoarseness [FreeTextEntry2] : Regained back his weight [FreeTextEntry4] : Hoarseness resolved [FreeTextEntry6] : s/p RUL VATS/wedge resection for lung metastases [FreeTextEntry8] :  right clear-cell kidney cancer, s/p right nephrectomy [de-identified] : upper extremities, triggered by high temperatures

## 2022-09-27 NOTE — HISTORY OF PRESENT ILLNESS
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 \par \par CASE SYNOPSIS:\par September 2019–reports gross hematuria, spontaneously resolved.  No other associated  symptoms.\par \par 11/1/2019: CT A/P -9.7 x 10 x 7.5 cm partially heterogeneous mass extending off the lower pole of the right kidney with invasion of the lower pole renal collecting system, highly concerning for renal cell carcinoma.  No gross urothelial lesion.  Few scattered pulmonary nodules measuring up to 4 mm, nonspecific, possibly representing postinflammatory benign nodules, but cannot exclude metastasis.  Further evaluation with CT scan of the chest recommended.  1.2 x 1.5 cm low-attenuation lesion of the spleen representing hemangioma.\par \par 11/8/2019: CT chest–tree in bud opacities noted within the right lung likely representing mucoid impacted distal small airways.\par \par 12/5/2019: Radical nephrectomy (right kidney and proximal ureter) performed at [facility] (); surgical pathology: clear cell renal carcinoma, 10 cm, with extensive necrosis, grade 4 with focal rhabdoid features, no definitive LVI, vascular and ureteral resection margins free of tumor, perinephric adipose tissue negative for malignancy.  Renal parenchyma showed mild nephrosclerosis and patchy interstitial chronic inflammation (pT2, pN0).\par \par 3/23/2020: CT A/P–interval right nephrectomy.  No evidence of locally recurrent or metastatic disease.\par \par 4/9/2021: CT C/A/P–5 mm RUL pulmonary nodule may reflect metastasis and is new since prior chest CT of 11/8/2019.  Close follow-up recommended.  No evidence for residual or recurrent tumor in the right nephrectomy bed.\par \par 8/13/2021: CT chest–new right upper lobe 8 mm peripheral nodule indeterminate; previously measuring 5 mm.  Interval growth suggest metastatic disease.\par \par 9/8/2021:referred to Dr. Schmitt (thoracic surgery) for evaluation.\par \par 10/29/2021: Right upper lobe VATS/ wedge resection ([facility], Dr. Schmitt) -metastatic clear-cell renal cell carcinoma, R4 lymph node excision negative for malignancy, level 7 lymph node excision negative for malignancy, acid-fast bacilli and fungi.  Both lymph nodes with noncaseating granulomatous inflammation.\par \par 11/19/21- cycle # 1 Pembrolizumab\par \par 11/30/21- discontinues Multaq (antiarrhythmic) due to interactions with axitinib\par \par 12/4/21- started axitinb\par \par 1/15/2022–discontinued axitinib temporarily due to mucosal sensitivity\par \par 2/9/22: Develops macular rash on both forearms; TSH 90.7. 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 Merlos (endocrinology) will decrease Synthroid dose to 112 MCG daily.  States his skin is dry and has chronic skin changes on skin exposed to the sun (forearms).  Denies B symptoms.  Last CT C/A/P from 6/16/2022 showed no residual or recurrent disease in the nephrectomy bed and no evidence of metastatic disease.\par \par

## 2022-09-27 NOTE — PHYSICAL EXAM
[Thin] : thin [Normal] : full range of motion and no deformities appreciated [Restricted in physically strenuous activity but ambulatory and able to carry out work of a light or sedentary nature] : Status 1- Restricted in physically strenuous activity but ambulatory and able to carry out work of a light or sedentary nature, e.g., light house work, office work [de-identified] : scars s/p VATS right chest wall - healed [de-identified] : Dry, no rash

## 2022-09-27 NOTE — ASSESSMENT
[FreeTextEntry1] : Mr. TAM 's questions were answered to his satisfaction. \par He  expressed his  understanding and willingness to comply with the above recommendations, and  will return to the office in 3 months.\par \par \par \par \par \par \par \par

## 2022-09-29 ENCOUNTER — OUTPATIENT (OUTPATIENT)
Dept: OUTPATIENT SERVICES | Facility: HOSPITAL | Age: 77
LOS: 1 days | End: 2022-09-29
Payer: MEDICARE

## 2022-09-29 ENCOUNTER — APPOINTMENT (OUTPATIENT)
Dept: ENDOCRINOLOGY | Facility: CLINIC | Age: 77
End: 2022-09-29

## 2022-09-29 ENCOUNTER — APPOINTMENT (OUTPATIENT)
Dept: CT IMAGING | Facility: CLINIC | Age: 77
End: 2022-09-29

## 2022-09-29 DIAGNOSIS — Z98.890 OTHER SPECIFIED POSTPROCEDURAL STATES: Chronic | ICD-10-CM

## 2022-09-29 DIAGNOSIS — C78.00 SECONDARY MALIGNANT NEOPLASM OF UNSPECIFIED LUNG: ICD-10-CM

## 2022-09-29 DIAGNOSIS — Z90.5 ACQUIRED ABSENCE OF KIDNEY: Chronic | ICD-10-CM

## 2022-09-29 DIAGNOSIS — Z90.49 ACQUIRED ABSENCE OF OTHER SPECIFIED PARTS OF DIGESTIVE TRACT: Chronic | ICD-10-CM

## 2022-09-29 DIAGNOSIS — Z90.89 ACQUIRED ABSENCE OF OTHER ORGANS: Chronic | ICD-10-CM

## 2022-09-29 PROCEDURE — 71260 CT THORAX DX C+: CPT

## 2022-09-29 PROCEDURE — 71260 CT THORAX DX C+: CPT | Mod: 26,MH

## 2022-09-29 PROCEDURE — 74160 CT ABDOMEN W/CONTRAST: CPT

## 2022-09-29 PROCEDURE — 74160 CT ABDOMEN W/CONTRAST: CPT | Mod: 26,MH

## 2022-10-03 ENCOUNTER — APPOINTMENT (OUTPATIENT)
Dept: RHEUMATOLOGY | Facility: CLINIC | Age: 77
End: 2022-10-03

## 2022-10-03 DIAGNOSIS — M25.50 PAIN IN UNSPECIFIED JOINT: ICD-10-CM

## 2022-10-03 PROCEDURE — 99214 OFFICE O/P EST MOD 30 MIN: CPT

## 2022-10-03 RX ORDER — ACETAMINOPHEN AND CODEINE 300; 30 MG/1; MG/1
300-30 TABLET ORAL
Qty: 12 | Refills: 0 | Status: DISCONTINUED | COMMUNITY
Start: 2022-05-28 | End: 2022-10-03

## 2022-10-03 NOTE — HISTORY OF PRESENT ILLNESS
[FreeTextEntry1] : Pt reports significant improvement of his joint symptoms since his last appointment in July 2022. He took prednisone 15 mg for approx 2 weeks, and then self-decreased the dose to 5 mg daily, and has been taking 5 mg daily since August. He reports intermittent R wrist 3/10 soreness in the morning when he gets up, but his knee symptoms have resolved, and generally his current symptoms are tolerable.\par \par Previous HPI: Around 12/2021, pt started taking axitinib for treatment for metastatic renal cell carcinoma. While he was taking the medication, he experienced throat and oral pain which interfered with his eating, resulting in 23 lb weight loss, as well as a change in his skin texture, with his skin becoming more ronnie. He stopped taking the medication around July 1st because he was told there is no sign of cancer recurrence. However, around the time he discontinued the medication, he developed severe pain in his b/l wrists as well as his knees, which is worst in the morning and improves slowly over a period of several hours. He has not noticed any swelling, not sure about stiffness. He denies fevers, diarrhea, or any eye problems.\par \par Physical exam: GEN: Pleasant, AAO man sitting on exam\par SKIN: No rashes\par PULM: Clear to auscultation b/l\par CV: Irregularly irregular rhythm\par MSK:\par Shoulders: Full ROM b/l\par Elbows: Full ROM b/l, no effusions\par Wrists Interval improvement of small b/l effusions and warmth, + mild pain with full R wrist flexion but full ROM b/l\par Hands: No synovitis\par Hips: Full ROM b/l\par Knees: no effusions, full ROM b/l\par Ankles: Full ROM b/l, no effusions\par Feet: no effusions, no TTP\par EXT: No LE edema b/l\par

## 2022-10-03 NOTE — ASSESSMENT
[FreeTextEntry1] : Wrist and knee pain: Pt's symptoms and exam are concerning for inflammatory arthritis. Unclear if his arthritis could be an adverse effect of his axitinib use? With labs negative for RA or other underlying autoimmune or infectious causes for pt's arthritis.\par - Decrease prednisone to 2.5 mg q day\par - If pt's symptoms come back with decreasing prednisone, would consider possibly starting methotrexate if it is okay with pt's oncologist Dr. Morfin. Can also consider steroid injection if pt has focal symptoms in 1 or 2 joints (such as worse R wrist symptoms)\par \par f/u in 1 month

## 2022-10-04 ENCOUNTER — NON-APPOINTMENT (OUTPATIENT)
Age: 77
End: 2022-10-04

## 2022-10-07 ENCOUNTER — APPOINTMENT (OUTPATIENT)
Age: 77
End: 2022-10-07

## 2022-10-07 NOTE — REVIEW OF SYSTEMS
[Feeling Fatigued] : feeling fatigued [Weight Loss (___ Lbs)] : [unfilled] ~Ulb weight loss [SOB] : no shortness of breath [Dyspnea on exertion] : not dyspnea during exertion [Chest Discomfort] : no chest discomfort [FreeTextEntry4] : resolved hoarseness

## 2022-10-07 NOTE — PHYSICAL EXAM
[Clear Lung Fields] : clear lung fields [Normal Gait] : normal gait [No Edema] : no edema [Alert and Oriented] : alert and oriented [de-identified] :   Appears his stated age and well [de-identified] : Wearing  a facemask [de-identified] : Irregularly irregular,  normal rate

## 2022-10-07 NOTE — ADDENDUM
[FreeTextEntry1] : ADDENDUM (10/7/22):\par \par Echocardiogram (8/10/22) revealed normal LV size and function (EF 64%).  Mild/moderate mitral and aortic insufficiency, mild pulmonic valve insufficiency.  Borderline pulmonary hypertension.\par \par Mr Johnson has been stable from a cardiac standpoint and is optimized for upcoming EGD/colonoscopy.  Given the presence of chronic atrial fibrillation, I recommend minimizing interruption of anticoagulation -- hold Xarelto for 2 days prior to procedure.\par

## 2022-10-07 NOTE — HISTORY OF PRESENT ILLNESS
[FreeTextEntry1] : Denny Johnson is a 77-year-old man with a history of atrial fibrillation, implantable loop monitor, hypertension, chronic renal insufficiency, renal cell carcinoma status post right laparoscopic radical nephrectomy (12/2019) with metastasis to lung (s/p RUL wedge resection in October 2021) who returns for cardiac examination.  Recent CT describes the absence of recurrent disease/metastasis.  He continues to do well from a cardiac standpoint and although he remains in atrial fibrillation he has not been experiencing palpitations, dyspnea, or chest discomfort;   he has been tolerating anticoagulation.

## 2022-10-07 NOTE — DISCUSSION/SUMMARY
[With Me] : with me [___ Month(s)] : in [unfilled] month(s) [FreeTextEntry1] : \par Atrial fibrillation:  Persistent but controlled (rate ok;  tolerating anticoagulation); I recommend continuing current management with Xarelto + metoprolol.;  return for echocardiography.\par \par Hypertension: Controlled; continue amlodipine.\par \par

## 2022-10-11 ENCOUNTER — NON-APPOINTMENT (OUTPATIENT)
Age: 77
End: 2022-10-11

## 2022-10-11 LAB — TSH SERPL-ACNC: 0.19 UIU/ML

## 2022-10-14 ENCOUNTER — NON-APPOINTMENT (OUTPATIENT)
Age: 77
End: 2022-10-14

## 2022-10-14 ENCOUNTER — APPOINTMENT (OUTPATIENT)
Dept: ELECTROPHYSIOLOGY | Facility: CLINIC | Age: 77
End: 2022-10-14

## 2022-10-14 PROCEDURE — G2066: CPT

## 2022-10-14 PROCEDURE — 93298 REM INTERROG DEV EVAL SCRMS: CPT

## 2022-10-20 ENCOUNTER — APPOINTMENT (OUTPATIENT)
Dept: ELECTROPHYSIOLOGY | Facility: CLINIC | Age: 77
End: 2022-10-20

## 2022-10-21 ENCOUNTER — OUTPATIENT (OUTPATIENT)
Dept: OUTPATIENT SERVICES | Facility: HOSPITAL | Age: 77
LOS: 1 days | Discharge: HOME | End: 2022-10-21

## 2022-10-21 DIAGNOSIS — C64.9 MALIGNANT NEOPLASM OF UNSPECIFIED KIDNEY, EXCEPT RENAL PELVIS: ICD-10-CM

## 2022-10-21 DIAGNOSIS — Z90.5 ACQUIRED ABSENCE OF KIDNEY: Chronic | ICD-10-CM

## 2022-10-21 DIAGNOSIS — Z98.890 OTHER SPECIFIED POSTPROCEDURAL STATES: Chronic | ICD-10-CM

## 2022-10-21 DIAGNOSIS — Z90.49 ACQUIRED ABSENCE OF OTHER SPECIFIED PARTS OF DIGESTIVE TRACT: Chronic | ICD-10-CM

## 2022-10-21 DIAGNOSIS — Z90.89 ACQUIRED ABSENCE OF OTHER ORGANS: Chronic | ICD-10-CM

## 2022-10-27 ENCOUNTER — APPOINTMENT (OUTPATIENT)
Dept: ELECTROPHYSIOLOGY | Facility: CLINIC | Age: 77
End: 2022-10-27

## 2022-10-27 VITALS
SYSTOLIC BLOOD PRESSURE: 120 MMHG | RESPIRATION RATE: 16 BRPM | DIASTOLIC BLOOD PRESSURE: 60 MMHG | OXYGEN SATURATION: 98 % | HEIGHT: 70 IN | HEART RATE: 83 BPM | BODY MASS INDEX: 25.77 KG/M2 | WEIGHT: 180 LBS

## 2022-10-27 PROCEDURE — 99214 OFFICE O/P EST MOD 30 MIN: CPT

## 2022-10-27 PROCEDURE — 93285 PRGRMG DEV EVAL SCRMS IP: CPT

## 2022-10-27 RX ORDER — AXITINIB 5 MG/1
5 TABLET, FILM COATED ORAL DAILY
Refills: 0 | Status: DISCONTINUED | COMMUNITY
End: 2022-10-27

## 2022-10-27 RX ORDER — PREDNISONE 5 MG/1
5 TABLET ORAL DAILY
Qty: 90 | Refills: 1 | Status: DISCONTINUED | COMMUNITY
Start: 2022-08-02 | End: 2022-10-27

## 2022-10-27 RX ORDER — PEMBROLIZUMAB 25 MG/ML
INJECTION, SOLUTION INTRAVENOUS
Refills: 0 | Status: DISCONTINUED | COMMUNITY
End: 2022-10-27

## 2022-10-28 ENCOUNTER — APPOINTMENT (OUTPATIENT)
Age: 77
End: 2022-10-28

## 2022-10-31 ENCOUNTER — APPOINTMENT (OUTPATIENT)
Dept: RHEUMATOLOGY | Facility: CLINIC | Age: 77
End: 2022-10-31

## 2022-10-31 VITALS
SYSTOLIC BLOOD PRESSURE: 122 MMHG | BODY MASS INDEX: 25.91 KG/M2 | OXYGEN SATURATION: 98 % | WEIGHT: 181 LBS | TEMPERATURE: 98 F | HEART RATE: 74 BPM | HEIGHT: 70 IN | DIASTOLIC BLOOD PRESSURE: 80 MMHG

## 2022-10-31 DIAGNOSIS — Z51.11 ENCOUNTER FOR ANTINEOPLASTIC CHEMOTHERAPY: ICD-10-CM

## 2022-10-31 DIAGNOSIS — M25.532 PAIN IN RIGHT WRIST: ICD-10-CM

## 2022-10-31 DIAGNOSIS — M25.531 PAIN IN RIGHT WRIST: ICD-10-CM

## 2022-10-31 PROCEDURE — 99214 OFFICE O/P EST MOD 30 MIN: CPT

## 2022-10-31 NOTE — ASSESSMENT
[FreeTextEntry1] : Wrist and knee pain: Pt's symptoms and exam are concerning for inflammatory arthritis. Unclear if his arthritis could be an adverse effect of his axitinib use? With labs negative for RA or other underlying autoimmune or infectious causes for pt's arthritis. With improvement on prednisone, now down to 2.5 mg with tolerable symptoms.\par - Stop prednisone\par - If pt's symptoms come back with stopping prednisone, would restart 2.5-5 mg prednisone initially, and consider possibly starting methotrexate if it is okay with pt's oncologist Dr. Morfin. Can also consider steroid injection if pt has focal symptoms in 1 or 2 joints (such as worse R wrist symptoms)\par \par f/u prn recurrence of joint symptoms

## 2022-10-31 NOTE — HISTORY OF PRESENT ILLNESS
[FreeTextEntry1] : Pt decreased the prednisone dose to 2.5 mg since his last appointment, and he feels overall that his wrist symptoms have been stable on this prednisone dose. He continues to experience 2/10 stiffness in his R>L wrists in the morning, especially with prolonged driving, that improves later in the day. + Blurry vision when he reads the newspaper recently, he is not sure if it may be related to the prednisone.\par \par Previous HPI: Around 12/2021, pt started taking axitinib for treatment for metastatic renal cell carcinoma. While he was taking the medication, he experienced throat and oral pain which interfered with his eating, resulting in 23 lb weight loss, as well as a change in his skin texture, with his skin becoming more ronnie. He stopped taking the medication around July 1st because he was told there is no sign of cancer recurrence. However, around the time he discontinued the medication, he developed severe pain in his b/l wrists as well as his knees, which is worst in the morning and improves slowly over a period of several hours. He has not noticed any swelling, not sure about stiffness. He denies fevers, diarrhea, or any eye problems.\par \par Pt's symptoms significantly improved on prednisone 15 mg daily.\par \par Physical exam: GEN: Pleasant, AAO man sitting on exam\par SKIN: No rashes\par PULM: Clear to auscultation b/l\par CV: Irregularly irregular rhythm\par MSK:\par Shoulders: Full ROM b/l\par Elbows: Full ROM b/l, no effusions\par Wrists + Mild warmth b/l and ?minimal effusions, + mild pain with flexion on R, full ROM b/l\par Hands: No synovitis\par Hips: Full ROM b/l\par Knees: no effusions, full ROM b/l\par Ankles: Full ROM b/l, no effusions\par Feet: no effusions, no TTP\par EXT: No LE edema b/l\par

## 2022-11-01 LAB — TSH SERPL-ACNC: 0.2 UIU/ML

## 2022-11-08 ENCOUNTER — APPOINTMENT (OUTPATIENT)
Dept: ENDOCRINOLOGY | Facility: CLINIC | Age: 77
End: 2022-11-08

## 2022-11-08 VITALS
HEIGHT: 70 IN | DIASTOLIC BLOOD PRESSURE: 68 MMHG | BODY MASS INDEX: 25.91 KG/M2 | HEART RATE: 89 BPM | RESPIRATION RATE: 16 BRPM | TEMPERATURE: 97.4 F | WEIGHT: 181 LBS | OXYGEN SATURATION: 98 % | SYSTOLIC BLOOD PRESSURE: 112 MMHG

## 2022-11-08 PROCEDURE — 99213 OFFICE O/P EST LOW 20 MIN: CPT

## 2022-11-08 RX ORDER — LEVOTHYROXINE SODIUM 0.1 MG/1
100 TABLET ORAL DAILY
Qty: 90 | Refills: 3 | Status: DISCONTINUED | COMMUNITY
Start: 2022-09-27 | End: 2022-11-08

## 2022-11-08 NOTE — DATA REVIEWED
[FreeTextEntry1] : 10/31/2022 - on levothyroxine (generic) 100 micrograms po daily\par TSH 0.2 (0.27 to 4.2)\par \par 09/16/2022 - on levothyroxine (generic) 112 micrograms po daily\par TSH 0.07 (0.27 to 4.2)

## 2022-11-08 NOTE — PHYSICAL EXAM
[de-identified] : General: No distress, well nourished\par Eyes: Normal Sclera, EOMI, PERRL\par ENT: Normal appearance of the nose, normal oropharynx\par Neck/Thyroid: No cervical lymphadenopathy, thyroid gland 20 g in size, no thyroid nodules, non-tender\par Respiratory: No use of accessory muscles of respiration, vesicular breath sounds heard bilaterally, no crepitations or ronchi\par Cardiovascular: S1 and S2 heard and normal, no S3 or S4, no murmurs, radial pulse normal bilaterally\par Abdomen: soft, non-tender, no masses, normal bowel sounds\par Musculoskeletal: No swelling or deformities of joints of hands, no pedal edema\par Neurological: Normal range of motion in the hands, Normal brachioradialis reflexes bilaterally\par Psychiatry: Patient converses normally, good judgement and insight\par Skin: No rashes in hands, no nodules palpated in hands

## 2022-11-08 NOTE — HISTORY OF PRESENT ILLNESS
[FreeTextEntry1] : Problems:\par 1. Primary hypothyroidism - due to Axitinb use (a tyrosine kinase inhibitor being used to treat metastatic renal cell carcinoma) - patient also uses Pembrolizumab which is associated with hypothyroidism. \par \par Note - the patient has atrial fibrillation - he used multaq (dronedarone) in the past\par \par Patient was on prednisone for treatment of joint pain but he is no longer on this. \par \par Primary hypothyroidism\par 1. Diagnosed in Feb 2022.\par In November 2021, the patient was started on keytruda (Pembrolizumab) for treatment of metastatic renal cell carcinoma. On Dec 2021, he also receive Axitinb and the the patient became hypothyroid in Feb 2022 - TSH 90.7 (0.27 to 4.2). Per the chart, he was on a combination of pembrolizumab/Axitinib. Patient is no longer on the Axitinib - this was stopped on 07/01/2022 - he is still on Pembrolizumab. \par 2. No family history of thyroid disorders or thyroid cancer, no personal history of radiation therapy.\par 3. Meds:\par Levothyroxine (generic) 100 micrograms po daily - fully compliant and the patient was advised on the appropriate use of levothyroxine. \par

## 2022-11-08 NOTE — ASSESSMENT
[FreeTextEntry1] : Goal TSH in patient's 65 years and older is 4 to 6 but I will also accept a TSH in the normal range.\par \par The patient has primary hypothyroidism - due to Axitinb use (patient also is on pembrolizumab which is associated with hypothyroidism) - he is no longer on Axitinb as of 07/01/2022. Patient is still on pembrolizumab. \par \par Last TSH was below goal so I decreased the dose of levothyroxine - patient is concerned about weight gain so I will decrease the dose of levothyroxine slowly. \par \par \par Plan:\par 1. Decrease levothyroxine (generic) to 88 micrograms po daily. \par 2. Labs to be done in 2 months - TSH\par 3. Follow up in 2 months to review results\par

## 2022-11-17 ENCOUNTER — NON-APPOINTMENT (OUTPATIENT)
Age: 77
End: 2022-11-17

## 2022-11-17 ENCOUNTER — APPOINTMENT (OUTPATIENT)
Dept: ELECTROPHYSIOLOGY | Facility: CLINIC | Age: 77
End: 2022-11-17

## 2022-11-17 PROCEDURE — 93298 REM INTERROG DEV EVAL SCRMS: CPT

## 2022-11-17 PROCEDURE — G2066: CPT

## 2022-11-21 ENCOUNTER — RESULT REVIEW (OUTPATIENT)
Age: 77
End: 2022-11-21

## 2022-11-21 ENCOUNTER — NON-APPOINTMENT (OUTPATIENT)
Age: 77
End: 2022-11-21

## 2022-11-21 ENCOUNTER — APPOINTMENT (OUTPATIENT)
Age: 77
End: 2022-11-21

## 2022-11-21 VITALS
OXYGEN SATURATION: 99 % | TEMPERATURE: 98.1 F | WEIGHT: 185.19 LBS | RESPIRATION RATE: 16 BRPM | HEART RATE: 66 BPM | DIASTOLIC BLOOD PRESSURE: 79 MMHG | SYSTOLIC BLOOD PRESSURE: 139 MMHG

## 2022-11-21 LAB
ALBUMIN SERPL ELPH-MCNC: 3.9 G/DL — SIGNIFICANT CHANGE UP (ref 3.3–5)
ALP SERPL-CCNC: 89 U/L — SIGNIFICANT CHANGE UP (ref 40–120)
ALT FLD-CCNC: 15 U/L — SIGNIFICANT CHANGE UP (ref 10–45)
ANION GAP SERPL CALC-SCNC: 14 MMOL/L — SIGNIFICANT CHANGE UP (ref 5–17)
AST SERPL-CCNC: 30 U/L — SIGNIFICANT CHANGE UP (ref 10–40)
BASOPHILS # BLD AUTO: 0.04 K/UL — SIGNIFICANT CHANGE UP (ref 0–0.2)
BASOPHILS NFR BLD AUTO: 0.6 % — SIGNIFICANT CHANGE UP (ref 0–2)
BILIRUB SERPL-MCNC: 1.4 MG/DL — HIGH (ref 0.2–1.2)
BUN SERPL-MCNC: 20 MG/DL — SIGNIFICANT CHANGE UP (ref 7–23)
CALCIUM SERPL-MCNC: 9.2 MG/DL — SIGNIFICANT CHANGE UP (ref 8.4–10.5)
CHLORIDE SERPL-SCNC: 104 MMOL/L — SIGNIFICANT CHANGE UP (ref 96–108)
CO2 SERPL-SCNC: 22 MMOL/L — SIGNIFICANT CHANGE UP (ref 22–31)
CREAT SERPL-MCNC: 1.32 MG/DL — HIGH (ref 0.5–1.3)
EGFR: 55 ML/MIN/1.73M2 — LOW
EOSINOPHIL # BLD AUTO: 0.15 K/UL — SIGNIFICANT CHANGE UP (ref 0–0.5)
EOSINOPHIL NFR BLD AUTO: 2.2 % — SIGNIFICANT CHANGE UP (ref 0–6)
GLUCOSE SERPL-MCNC: 86 MG/DL — SIGNIFICANT CHANGE UP (ref 70–99)
HCT VFR BLD CALC: 51.1 % — HIGH (ref 39–50)
HGB BLD-MCNC: 16.9 G/DL — SIGNIFICANT CHANGE UP (ref 13–17)
IMM GRANULOCYTES NFR BLD AUTO: 0.4 % — SIGNIFICANT CHANGE UP (ref 0–0.9)
LYMPHOCYTES # BLD AUTO: 1.09 K/UL — SIGNIFICANT CHANGE UP (ref 1–3.3)
LYMPHOCYTES # BLD AUTO: 16 % — SIGNIFICANT CHANGE UP (ref 13–44)
MCHC RBC-ENTMCNC: 30.7 PG — SIGNIFICANT CHANGE UP (ref 27–34)
MCHC RBC-ENTMCNC: 33.1 G/DL — SIGNIFICANT CHANGE UP (ref 32–36)
MCV RBC AUTO: 92.9 FL — SIGNIFICANT CHANGE UP (ref 80–100)
MONOCYTES # BLD AUTO: 0.71 K/UL — SIGNIFICANT CHANGE UP (ref 0–0.9)
MONOCYTES NFR BLD AUTO: 10.4 % — SIGNIFICANT CHANGE UP (ref 2–14)
NEUTROPHILS # BLD AUTO: 4.79 K/UL — SIGNIFICANT CHANGE UP (ref 1.8–7.4)
NEUTROPHILS NFR BLD AUTO: 70.4 % — SIGNIFICANT CHANGE UP (ref 43–77)
NRBC # BLD: 0 /100 WBCS — SIGNIFICANT CHANGE UP (ref 0–0)
PLATELET # BLD AUTO: 157 K/UL — SIGNIFICANT CHANGE UP (ref 150–400)
POTASSIUM SERPL-MCNC: 4.8 MMOL/L — SIGNIFICANT CHANGE UP (ref 3.5–5.3)
POTASSIUM SERPL-SCNC: 4.8 MMOL/L — SIGNIFICANT CHANGE UP (ref 3.5–5.3)
PROT SERPL-MCNC: 6.5 G/DL — SIGNIFICANT CHANGE UP (ref 6–8.3)
RBC # BLD: 5.5 M/UL — SIGNIFICANT CHANGE UP (ref 4.2–5.8)
RBC # FLD: 13 % — SIGNIFICANT CHANGE UP (ref 10.3–14.5)
SODIUM SERPL-SCNC: 140 MMOL/L — SIGNIFICANT CHANGE UP (ref 135–145)
WBC # BLD: 6.81 K/UL — SIGNIFICANT CHANGE UP (ref 3.8–10.5)
WBC # FLD AUTO: 6.81 K/UL — SIGNIFICANT CHANGE UP (ref 3.8–10.5)

## 2022-11-21 PROCEDURE — 99214 OFFICE O/P EST MOD 30 MIN: CPT

## 2022-11-21 RX ORDER — AMOXICILLIN 500 MG/1
500 CAPSULE ORAL
Qty: 21 | Refills: 0 | Status: DISCONTINUED | COMMUNITY
Start: 2022-05-28

## 2022-11-21 RX ORDER — POLYETHYLENE GLYCOL-3350 AND ELECTROLYTES 236; 6.74; 5.86; 2.97; 22.74 G/274.31G; G/274.31G; G/274.31G; G/274.31G; G/274.31G
236 POWDER, FOR SOLUTION ORAL
Qty: 4000 | Refills: 0 | Status: DISCONTINUED | COMMUNITY
Start: 2022-10-12

## 2022-11-21 RX ORDER — BISACODYL 5 MG/1
5 TABLET ORAL
Qty: 2 | Refills: 0 | Status: DISCONTINUED | COMMUNITY
Start: 2022-10-12

## 2022-11-22 LAB — TSH SERPL-MCNC: 0.39 UIU/ML — SIGNIFICANT CHANGE UP (ref 0.27–4.2)

## 2022-11-22 NOTE — HISTORY OF PRESENT ILLNESS
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 \par \par CASE SYNOPSIS:\par September 2019–reports gross hematuria, spontaneously resolved.  No other associated  symptoms.\par \par 11/1/2019: CT A/P -9.7 x 10 x 7.5 cm partially heterogeneous mass extending off the lower pole of the right kidney with invasion of the lower pole renal collecting system, highly concerning for renal cell carcinoma.  No gross urothelial lesion.  Few scattered pulmonary nodules measuring up to 4 mm, nonspecific, possibly representing postinflammatory benign nodules, but cannot exclude metastasis.  Further evaluation with CT scan of the chest recommended.  1.2 x 1.5 cm low-attenuation lesion of the spleen representing hemangioma.\par \par 11/8/2019: CT chest–tree in bud opacities noted within the right lung likely representing mucoid impacted distal small airways.\par \par 12/5/2019: Radical nephrectomy (right kidney and proximal ureter) performed at [facility] (); surgical pathology: clear cell renal carcinoma, 10 cm, with extensive necrosis, grade 4 with focal rhabdoid features, no definitive LVI, vascular and ureteral resection margins free of tumor, perinephric adipose tissue negative for malignancy.  Renal parenchyma showed mild nephrosclerosis and patchy interstitial chronic inflammation (pT2, pN0).\par \par 3/23/2020: CT A/P–interval right nephrectomy.  No evidence of locally recurrent or metastatic disease.\par \par 4/9/2021: CT C/A/P–5 mm RUL pulmonary nodule may reflect metastasis and is new since prior chest CT of 11/8/2019.  Close follow-up recommended.  No evidence for residual or recurrent tumor in the right nephrectomy bed.\par \par 8/13/2021: CT chest–new right upper lobe 8 mm peripheral nodule indeterminate; previously measuring 5 mm.  Interval growth suggest metastatic disease.\par \par 9/8/2021:referred to Dr. Schmitt (thoracic surgery) for evaluation.\par \par 10/29/2021: Right upper lobe VATS/ wedge resection ([facility], Dr. Schmitt) -metastatic clear-cell renal cell carcinoma, R4 lymph node excision negative for malignancy, level 7 lymph node excision negative for malignancy, acid-fast bacilli and fungi.  Both lymph nodes with noncaseating granulomatous inflammation.\par \par 11/19/21- cycle # 1 Pembrolizumab\par \par 11/30/21- discontinues Multaq (antiarrhythmic) due to interactions with axitinib\par \par 12/4/21- started axitinb\par \par 1/15/2022–discontinued axitinib temporarily due to mucosal sensitivity\par \par 2/9/22: Develops macular rash on both forearms; TSH 90.7. \par \par 2/28/22–discontinued Inlyta at the dermatologist and GI's recommendation due to anal fistula\par \par 3/29/22 - resumed Inlyta\par \par 4/1/22 -diagnosed with coronavirus at [facility]; minimally symptomatic, no monoclonal antibodies recommended.\par \par 6/16/2022 CT C/A/P–no residual or recurrent disease in the nephrectomy bed.  No evidence of metastatic disease\par \par 6/17/2022–discontinued axitinib due to glossopharyngeal neuralgia; pembrolizumab held.\par \par 8/12/22 -resumed Pembrolizumab only; axitinib on hold.\par \par 11/21/22- Pembrolizumab last dose before d/c\par  [FreeTextEntry1] : \par \par  [de-identified] : Short interval follow-up; here to continue pembrolizumab treatment.  Axitinib on hold after episode of glossopharyngeal neuralgia.\par Complaining of unwanted weight gain despite remaining physically active(13 pounds since his last visit)–unhappy about it, as he is experiencing knee pain.  \par As a result, he is experiencing more knee pain.\par Follows up with endocrinology (Dr. Nuzhat Merlos) and had Synthroid dose adjusted.\par Also evaluated by cardiology; his arrhythmia is controlled off dronedarone/Multaq.\par No evidence of pneumonitis, skin rash, GI toxicity caused by immunotherapy.  No new scans since June 2022.\par It has been a year (November 19, 2021) since patient has started "adjuvant" immunotherapy with pembrolizumab after achieving CARLYLE post metastatectomy RUL.  Treatment course complicated with glossopharyngeal neuralgia and primary hypothyroidism thought to be due to axitinib.\par Pembrolizumab also discontinued briefly between 6/17 and 8/12/22.\par Last imaging scan from June 16, 2022 showed no evidence of recurrent disease in the nephrectomy bed and no evidence of distant metastasis.\par \par \par

## 2022-11-22 NOTE — PHYSICAL EXAM
[Restricted in physically strenuous activity but ambulatory and able to carry out work of a light or sedentary nature] : Status 1- Restricted in physically strenuous activity but ambulatory and able to carry out work of a light or sedentary nature, e.g., light house work, office work [Thin] : thin [Normal] : full range of motion and no deformities appreciated [de-identified] : scars s/p VATS right chest wall - healed [de-identified] : Dry, no rash

## 2022-11-22 NOTE — REVIEW OF SYSTEMS
[Shortness Of Breath] : shortness of breath [SOB on Exertion] : shortness of breath during exertion [Skin Rash] : skin rash [Negative] : Neurological [Fatigue] : no fatigue [Recent Change In Weight] : ~T no recent weight change [Hoarseness] : no hoarseness [FreeTextEntry2] : gained 13 lbs since last visit [FreeTextEntry4] : Hoarseness resolved [FreeTextEntry6] : s/p RUL VATS/wedge resection for lung metastases [FreeTextEntry8] :  right clear-cell kidney cancer, s/p right nephrectomy [de-identified] : upper extremities, triggered by high temperatures

## 2022-12-06 NOTE — ASSESSMENT
[FreeTextEntry1] : This patient is a 77 year old male with persistent AF on Metoprolol and Xarelto with ILR in place.  ILR interrogation reveals good battery status, 100% AF burden, no bradycardia one pause.  Pattient noted to have multiple episodes of tachycardia all asymptomatic. \par \par Continue Metoprolol and Xarelto.\par If patient has increasing tachycardia or symptoms can consider increasing Metoprolol\par Continue monthly remote monitoring\par In office follow up in six months

## 2022-12-12 ENCOUNTER — RX RENEWAL (OUTPATIENT)
Age: 77
End: 2022-12-12

## 2022-12-16 ENCOUNTER — APPOINTMENT (OUTPATIENT)
Age: 77
End: 2022-12-16

## 2022-12-22 ENCOUNTER — NON-APPOINTMENT (OUTPATIENT)
Age: 77
End: 2022-12-22

## 2022-12-22 ENCOUNTER — APPOINTMENT (OUTPATIENT)
Dept: ELECTROPHYSIOLOGY | Facility: CLINIC | Age: 77
End: 2022-12-22

## 2022-12-22 PROCEDURE — 93298 REM INTERROG DEV EVAL SCRMS: CPT

## 2022-12-22 PROCEDURE — G2066: CPT

## 2022-12-29 RX ORDER — SILDENAFIL 100 MG/1
100 TABLET, FILM COATED ORAL
Qty: 6 | Refills: 5 | Status: DISCONTINUED | COMMUNITY
Start: 2018-09-28 | End: 2022-12-29

## 2023-01-04 LAB — TSH SERPL-ACNC: 1.79 UIU/ML

## 2023-01-06 ENCOUNTER — APPOINTMENT (OUTPATIENT)
Age: 78
End: 2023-01-06

## 2023-01-10 ENCOUNTER — APPOINTMENT (OUTPATIENT)
Dept: ENDOCRINOLOGY | Facility: CLINIC | Age: 78
End: 2023-01-10
Payer: MEDICARE

## 2023-01-10 VITALS
OXYGEN SATURATION: 98 % | SYSTOLIC BLOOD PRESSURE: 122 MMHG | DIASTOLIC BLOOD PRESSURE: 72 MMHG | HEART RATE: 68 BPM | BODY MASS INDEX: 26.63 KG/M2 | TEMPERATURE: 98.2 F | HEIGHT: 70 IN | WEIGHT: 186 LBS

## 2023-01-10 PROCEDURE — 99214 OFFICE O/P EST MOD 30 MIN: CPT

## 2023-01-10 NOTE — PHYSICAL EXAM
[de-identified] : General: No distress, well nourished\par Eyes: Normal Sclera, EOMI, PERRL\par ENT: Normal appearance of the nose, normal oropharynx\par Neck/Thyroid: No cervical lymphadenopathy, thyroid gland 20 g in size, no thyroid nodules, non-tender\par Respiratory: No use of accessory muscles of respiration, vesicular breath sounds heard bilaterally, no crepitations or ronchi\par Cardiovascular: S1 and S2 heard and normal, no S3 or S4, no murmurs, radial pulse normal bilaterally\par Abdomen: soft, non-tender, no masses, normal bowel sounds\par Musculoskeletal: No swelling or deformities of joints of hands, no pedal edema\par Neurological: Normal range of motion in the hands, Normal brachioradialis reflexes bilaterally\par Psychiatry: Patient converses normally, good judgement and insight\par Skin: No rashes in hands, no nodules palpated in hands

## 2023-01-10 NOTE — ASSESSMENT
[FreeTextEntry1] : Goal TSH in patient's 65 years and older is 4 to 6 but I will also accept a TSH in the normal range.\par \par The patient has primary hypothyroidism - due to Axitinb use (patient also is on pembrolizumab which is associated with hypothyroidism) - he is no longer on Axitinb as of 07/01/2022 and as of 11/21/2022, he is no longer on pembrolizumab. \par \par Last TSH was at goal. \par \par Last TSH - Jan 2023 \par \par \par Plan:\par 1. Continue levothyroxine (generic) 88 micrograms po daily. \par 2. Labs to be done in 3 months - TSH\par 3. Follow up in 3 months to review results\par

## 2023-01-10 NOTE — HISTORY OF PRESENT ILLNESS
[FreeTextEntry1] : Problems:\par 1. Primary hypothyroidism - due to Axitinb use (a tyrosine kinase inhibitor being used to treat metastatic renal cell carcinoma) - patient also used  Pembrolizumab which is associated with hypothyroidism. \par \par Note - the patient has atrial fibrillation - he used multaq (dronedarone) in the past and this was discontinued in Dec 2021 due to conflicts with Inlyta. \par \par Patient was on prednisone for treatment of joint pain but he is no longer on this. \par \par \par Primary hypothyroidism\par 1. Diagnosed in Feb 2022.\par In November 2021, the patient was started on keytruda (Pembrolizumab) for treatment of metastatic renal cell carcinoma. On Dec 2021, he also receive Axitinb and the the patient became hypothyroid in Feb 2022 - TSH 90.7 (0.27 to 4.2). Per the chart, he was on a combination of pembrolizumab/Axitinib. Patient is no longer on the Axitinib - this was stopped on 07/01/2022. In November 2022, the Pembrolizumab was discontinued. \par 2. No family history of thyroid disorders or thyroid cancer, no personal history of radiation therapy.\par 3. Meds:\par Levothyroxine (generic) 88 micrograms po daily - fully compliant and the patient was advised on the appropriate use of levothyroxine. \par

## 2023-01-12 ENCOUNTER — RX RENEWAL (OUTPATIENT)
Age: 78
End: 2023-01-12

## 2023-01-25 ENCOUNTER — APPOINTMENT (OUTPATIENT)
Dept: ELECTROPHYSIOLOGY | Facility: CLINIC | Age: 78
End: 2023-01-25
Payer: MEDICARE

## 2023-01-25 ENCOUNTER — NON-APPOINTMENT (OUTPATIENT)
Age: 78
End: 2023-01-25

## 2023-01-25 PROCEDURE — G2066: CPT

## 2023-01-25 PROCEDURE — 93298 REM INTERROG DEV EVAL SCRMS: CPT

## 2023-02-01 ENCOUNTER — NON-APPOINTMENT (OUTPATIENT)
Age: 78
End: 2023-02-01

## 2023-02-01 ENCOUNTER — RX RENEWAL (OUTPATIENT)
Age: 78
End: 2023-02-01

## 2023-02-01 ENCOUNTER — APPOINTMENT (OUTPATIENT)
Dept: CARDIOLOGY | Facility: CLINIC | Age: 78
End: 2023-02-01
Payer: MEDICARE

## 2023-02-01 VITALS
SYSTOLIC BLOOD PRESSURE: 118 MMHG | HEART RATE: 84 BPM | BODY MASS INDEX: 27.69 KG/M2 | OXYGEN SATURATION: 99 % | DIASTOLIC BLOOD PRESSURE: 60 MMHG | WEIGHT: 193 LBS

## 2023-02-01 DIAGNOSIS — Z00.00 ENCOUNTER FOR GENERAL ADULT MEDICAL EXAMINATION W/OUT ABNORMAL FINDINGS: ICD-10-CM

## 2023-02-01 PROCEDURE — 93000 ELECTROCARDIOGRAM COMPLETE: CPT

## 2023-02-01 PROCEDURE — 99214 OFFICE O/P EST MOD 30 MIN: CPT

## 2023-02-01 NOTE — PHYSICAL EXAM
[Clear Lung Fields] : clear lung fields [Normal Gait] : normal gait [No Edema] : no edema [Alert and Oriented] : alert and oriented [No Acute Distress] : no acute distress [de-identified] : Wearing  a facemask [de-identified] : Irregularly irregular,  normal rate

## 2023-02-01 NOTE — HISTORY OF PRESENT ILLNESS
[FreeTextEntry1] : Denny Johnson is a 77-year-old man with a history of atrial fibrillation, implantable loop monitor, hypertension, chronic renal insufficiency, renal cell carcinoma status post right laparoscopic radical nephrectomy (12/2019) with metastasis to lung (s/p RUL wedge resection in October 2021), and hypothyroidism who returns for cardiac examination.  I reviewed notes from other physicians since our last encounter.  He has no new cardiovascular symptoms and tells me that he feels "pretty good." He has not been experiencing dyspnea, chest pain, syncope.

## 2023-02-01 NOTE — DISCUSSION/SUMMARY
[With Me] : with me [___ Month(s)] : in [unfilled] month(s) [FreeTextEntry1] : \par Atrial fibrillation:   Permanent–approximate 3-second pause on today's resting ECG but he has an ILR that is monitored by Dr. Manning and last interrogation did not reveal any significant events; continue   Present doses of metoprolol, Xarelto.\par \par Hypertension: Controlled; continue amlodipine.\par \par

## 2023-02-01 NOTE — REVIEW OF SYSTEMS
[Weight Gain (___ Lbs)] : [unfilled] ~Ulb weight gain [SOB] : no shortness of breath [Dyspnea on exertion] : not dyspnea during exertion [Chest Discomfort] : no chest discomfort [Syncope] : no syncope

## 2023-02-22 ENCOUNTER — OUTPATIENT (OUTPATIENT)
Dept: OUTPATIENT SERVICES | Facility: HOSPITAL | Age: 78
LOS: 1 days | Discharge: HOME | End: 2023-02-22

## 2023-02-22 DIAGNOSIS — Z90.89 ACQUIRED ABSENCE OF OTHER ORGANS: Chronic | ICD-10-CM

## 2023-02-22 DIAGNOSIS — Z90.5 ACQUIRED ABSENCE OF KIDNEY: Chronic | ICD-10-CM

## 2023-02-22 DIAGNOSIS — Z98.890 OTHER SPECIFIED POSTPROCEDURAL STATES: Chronic | ICD-10-CM

## 2023-02-22 DIAGNOSIS — C64.9 MALIGNANT NEOPLASM OF UNSPECIFIED KIDNEY, EXCEPT RENAL PELVIS: ICD-10-CM

## 2023-02-22 DIAGNOSIS — Z90.49 ACQUIRED ABSENCE OF OTHER SPECIFIED PARTS OF DIGESTIVE TRACT: Chronic | ICD-10-CM

## 2023-03-02 ENCOUNTER — APPOINTMENT (OUTPATIENT)
Age: 78
End: 2023-03-02
Payer: MEDICARE

## 2023-03-02 ENCOUNTER — RESULT REVIEW (OUTPATIENT)
Age: 78
End: 2023-03-02

## 2023-03-02 LAB
ALBUMIN SERPL ELPH-MCNC: 4.4 G/DL
ALP BLD-CCNC: 102 U/L
ALT SERPL-CCNC: 17 U/L
ANION GAP SERPL CALC-SCNC: 11 MMOL/L
AST SERPL-CCNC: 24 U/L
BASOPHILS # BLD AUTO: 0.05 K/UL — SIGNIFICANT CHANGE UP (ref 0–0.2)
BASOPHILS NFR BLD AUTO: 0.7 % — SIGNIFICANT CHANGE UP (ref 0–2)
BILIRUB SERPL-MCNC: 1.5 MG/DL
BUN SERPL-MCNC: 20 MG/DL
CALCIUM SERPL-MCNC: 9.5 MG/DL
CHLORIDE SERPL-SCNC: 104 MMOL/L
CO2 SERPL-SCNC: 24 MMOL/L
CREAT SERPL-MCNC: 1.31 MG/DL
EGFR: 55 ML/MIN/1.73M2
EOSINOPHIL # BLD AUTO: 0.18 K/UL — SIGNIFICANT CHANGE UP (ref 0–0.5)
EOSINOPHIL NFR BLD AUTO: 2.5 % — SIGNIFICANT CHANGE UP (ref 0–6)
GLUCOSE SERPL-MCNC: 94 MG/DL
HCT VFR BLD CALC: 52 % — HIGH (ref 39–50)
HGB BLD-MCNC: 17.2 G/DL — HIGH (ref 13–17)
IMM GRANULOCYTES NFR BLD AUTO: 0.4 % — SIGNIFICANT CHANGE UP (ref 0–0.9)
LYMPHOCYTES # BLD AUTO: 0.79 K/UL — LOW (ref 1–3.3)
LYMPHOCYTES # BLD AUTO: 11.2 % — LOW (ref 13–44)
MCHC RBC-ENTMCNC: 30.8 PG — SIGNIFICANT CHANGE UP (ref 27–34)
MCHC RBC-ENTMCNC: 33.1 G/DL — SIGNIFICANT CHANGE UP (ref 32–36)
MCV RBC AUTO: 93.2 FL — SIGNIFICANT CHANGE UP (ref 80–100)
MONOCYTES # BLD AUTO: 0.6 K/UL — SIGNIFICANT CHANGE UP (ref 0–0.9)
MONOCYTES NFR BLD AUTO: 8.5 % — SIGNIFICANT CHANGE UP (ref 2–14)
NEUTROPHILS # BLD AUTO: 5.43 K/UL — SIGNIFICANT CHANGE UP (ref 1.8–7.4)
NEUTROPHILS NFR BLD AUTO: 76.7 % — SIGNIFICANT CHANGE UP (ref 43–77)
NRBC # BLD: 0 /100 WBCS — SIGNIFICANT CHANGE UP (ref 0–0)
PLATELET # BLD AUTO: 144 K/UL — LOW (ref 150–400)
POTASSIUM SERPL-SCNC: 4.3 MMOL/L
PROT SERPL-MCNC: 6.9 G/DL
RBC # BLD: 5.58 M/UL — SIGNIFICANT CHANGE UP (ref 4.2–5.8)
RBC # FLD: 13.2 % — SIGNIFICANT CHANGE UP (ref 10.3–14.5)
SODIUM SERPL-SCNC: 139 MMOL/L
WBC # BLD: 7.08 K/UL — SIGNIFICANT CHANGE UP (ref 3.8–10.5)
WBC # FLD AUTO: 7.08 K/UL — SIGNIFICANT CHANGE UP (ref 3.8–10.5)

## 2023-03-02 PROCEDURE — 99214 OFFICE O/P EST MOD 30 MIN: CPT

## 2023-03-02 NOTE — REVIEW OF SYSTEMS
[Shortness Of Breath] : shortness of breath [SOB on Exertion] : shortness of breath during exertion [Skin Rash] : skin rash [Negative] : Neurological [Fatigue] : no fatigue [Recent Change In Weight] : ~T no recent weight change [Hoarseness] : no hoarseness [FreeTextEntry2] : gained 13 lbs since last visit [FreeTextEntry4] : Hoarseness resolved [FreeTextEntry6] : s/p RUL VATS/wedge resection for lung metastases [FreeTextEntry8] :  right clear-cell kidney cancer, s/p right nephrectomy [de-identified] : upper extremities, triggered by high temperatures

## 2023-03-02 NOTE — ASSESSMENT
[FreeTextEntry1] : Mr. TAM 's questions were answered to his satisfaction. \par He  expressed his  understanding and willingness to comply with the above recommendations, and  will return to the office in 3-4 months.\par \par \par \par \par \par \par \par

## 2023-03-02 NOTE — PHYSICAL EXAM
[Restricted in physically strenuous activity but ambulatory and able to carry out work of a light or sedentary nature] : Status 1- Restricted in physically strenuous activity but ambulatory and able to carry out work of a light or sedentary nature, e.g., light house work, office work [Thin] : thin [Normal] : full range of motion and no deformities appreciated [de-identified] : scars s/p VATS right chest wall - healed [de-identified] : Dry, no rash

## 2023-03-02 NOTE — HISTORY OF PRESENT ILLNESS
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 \par \par CASE SYNOPSIS:\par September 2019–reports gross hematuria, spontaneously resolved.  No other associated  symptoms.\par \par 11/1/2019: CT A/P -9.7 x 10 x 7.5 cm partially heterogeneous mass extending off the lower pole of the right kidney with invasion of the lower pole renal collecting system, highly concerning for renal cell carcinoma.  No gross urothelial lesion.  Few scattered pulmonary nodules measuring up to 4 mm, nonspecific, possibly representing postinflammatory benign nodules, but cannot exclude metastasis.  Further evaluation with CT scan of the chest recommended.  1.2 x 1.5 cm low-attenuation lesion of the spleen representing hemangioma.\par \par 11/8/2019: CT chest–tree in bud opacities noted within the right lung likely representing mucoid impacted distal small airways.\par \par 12/5/2019: Radical nephrectomy (right kidney and proximal ureter) performed at [facility] (); surgical pathology: clear cell renal carcinoma, 10 cm, with extensive necrosis, grade 4 with focal rhabdoid features, no definitive LVI, vascular and ureteral resection margins free of tumor, perinephric adipose tissue negative for malignancy.  Renal parenchyma showed mild nephrosclerosis and patchy interstitial chronic inflammation (pT2, pN0).\par \par 3/23/2020: CT A/P–interval right nephrectomy.  No evidence of locally recurrent or metastatic disease.\par \par 4/9/2021: CT C/A/P–5 mm RUL pulmonary nodule may reflect metastasis and is new since prior chest CT of 11/8/2019.  Close follow-up recommended.  No evidence for residual or recurrent tumor in the right nephrectomy bed.\par \par 8/13/2021: CT chest–new right upper lobe 8 mm peripheral nodule indeterminate; previously measuring 5 mm.  Interval growth suggest metastatic disease.\par \par 9/8/2021:referred to Dr. Shcmitt (thoracic surgery) for evaluation.\par \par 10/29/2021: Right upper lobe VATS/ wedge resection ([facility], Dr. Schmitt) -metastatic clear-cell renal cell carcinoma, R4 lymph node excision negative for malignancy, level 7 lymph node excision negative for malignancy, acid-fast bacilli and fungi.  Both lymph nodes with noncaseating granulomatous inflammation.\par \par 11/19/21- cycle # 1 Pembrolizumab\par \par 11/30/21- discontinues Multaq (antiarrhythmic) due to interactions with axitinib\par \par 12/4/21- started axitinb\par \par 1/15/2022–discontinued axitinib temporarily due to mucosal sensitivity\par \par 2/9/22: Develops macular rash on both forearms; TSH 90.7. \par \par 2/28/22–discontinued Inlyta at the dermatologist and GI's recommendation due to anal fistula\par \par 3/29/22 - resumed axitinib (Inlyta)\par \par 4/1/22 -diagnosed with coronavirus at [facility]; minimally symptomatic, no monoclonal antibodies recommended.\par \par 6/11/2022 CT C/A/P–no residual or recurrent disease in the nephrectomy bed.  No evidence of metastatic disease\par \par 6/17/2022–discontinued axitinib due to glossopharyngeal neuralgia; pembrolizumab held.\par \par 8/12/22 -resumed Pembrolizumab only; axitinib on hold.\par \par 9/29/2022 CT C/A/P: 6 status post right nephrectomy with stable exam as compared to 6/11/2022\par \par 11/21/22- Pembrolizumab last dose before d/c\par  [FreeTextEntry1] : \par \par  [de-identified] : Last seen in November 2022.  Had no recent restaging scans other than 9/29/2022 CT C/A/P which showed no evidence of recurrent disease in the nephrectomy bed and no evidence of distant metastasis.  Axitinib discontinued due to glossopharyngeal neuralgia and patient remains under endocrine evaluation for IO–induced primary hypothyroidism.  Last dose of pembrolizumab was 11/21/2022.  Review of hematologic profile at that time was in normal range.\par Patient continues treatment for hypothyroidism with Synthroid.  Last TSH 1/3/2023 was in normal range.  Total bilirubin remains slightly elevated at 1.4 mg/dL patient reports no jaundice.  Followed up with cardiology a month ago for atrial fibrillation and HTN.  EKG showed persistent atrial fibrillation for which she continues on metoprolol and Xarelto.  Blood pressure control with amlodipine.  Reports no new medications.\par

## 2023-03-16 ENCOUNTER — NON-APPOINTMENT (OUTPATIENT)
Age: 78
End: 2023-03-16

## 2023-03-16 ENCOUNTER — APPOINTMENT (OUTPATIENT)
Dept: ELECTROPHYSIOLOGY | Facility: CLINIC | Age: 78
End: 2023-03-16
Payer: MEDICARE

## 2023-03-16 VITALS
BODY MASS INDEX: 27.63 KG/M2 | SYSTOLIC BLOOD PRESSURE: 140 MMHG | WEIGHT: 193 LBS | DIASTOLIC BLOOD PRESSURE: 78 MMHG | OXYGEN SATURATION: 100 % | HEART RATE: 54 BPM | HEIGHT: 70 IN

## 2023-03-16 PROCEDURE — 93285 PRGRMG DEV EVAL SCRMS IP: CPT

## 2023-03-16 RX ORDER — PREDNISONE 5 MG/1
5 TABLET ORAL
Refills: 0 | Status: DISCONTINUED | COMMUNITY
End: 2023-03-16

## 2023-04-03 LAB — TSH SERPL-ACNC: 2.4 UIU/ML

## 2023-04-07 ENCOUNTER — APPOINTMENT (OUTPATIENT)
Dept: CT IMAGING | Facility: CLINIC | Age: 78
End: 2023-04-07
Payer: MEDICARE

## 2023-04-07 ENCOUNTER — OUTPATIENT (OUTPATIENT)
Dept: OUTPATIENT SERVICES | Facility: HOSPITAL | Age: 78
LOS: 1 days | End: 2023-04-07
Payer: MEDICARE

## 2023-04-07 DIAGNOSIS — Z90.5 ACQUIRED ABSENCE OF KIDNEY: Chronic | ICD-10-CM

## 2023-04-07 DIAGNOSIS — C64.9 MALIGNANT NEOPLASM OF UNSPECIFIED KIDNEY, EXCEPT RENAL PELVIS: ICD-10-CM

## 2023-04-07 DIAGNOSIS — Z98.890 OTHER SPECIFIED POSTPROCEDURAL STATES: Chronic | ICD-10-CM

## 2023-04-07 DIAGNOSIS — Z90.49 ACQUIRED ABSENCE OF OTHER SPECIFIED PARTS OF DIGESTIVE TRACT: Chronic | ICD-10-CM

## 2023-04-07 DIAGNOSIS — Z90.89 ACQUIRED ABSENCE OF OTHER ORGANS: Chronic | ICD-10-CM

## 2023-04-07 PROCEDURE — 82565 ASSAY OF CREATININE: CPT

## 2023-04-07 PROCEDURE — 71260 CT THORAX DX C+: CPT | Mod: 26,MH

## 2023-04-07 PROCEDURE — 74177 CT ABD & PELVIS W/CONTRAST: CPT | Mod: 26,MH

## 2023-04-07 PROCEDURE — 71260 CT THORAX DX C+: CPT

## 2023-04-07 PROCEDURE — 74177 CT ABD & PELVIS W/CONTRAST: CPT

## 2023-04-10 ENCOUNTER — APPOINTMENT (OUTPATIENT)
Dept: ENDOCRINOLOGY | Facility: CLINIC | Age: 78
End: 2023-04-10
Payer: MEDICARE

## 2023-04-10 VITALS
SYSTOLIC BLOOD PRESSURE: 130 MMHG | WEIGHT: 186 LBS | HEIGHT: 70 IN | OXYGEN SATURATION: 98 % | HEART RATE: 64 BPM | TEMPERATURE: 98 F | RESPIRATION RATE: 14 BRPM | BODY MASS INDEX: 26.63 KG/M2 | DIASTOLIC BLOOD PRESSURE: 62 MMHG

## 2023-04-10 PROCEDURE — 99214 OFFICE O/P EST MOD 30 MIN: CPT

## 2023-04-10 NOTE — ASSESSMENT
[FreeTextEntry1] : Goal TSH in patient's 65 years and older is 4 to 6 but I will also accept a TSH in the normal range.\par \par The patient has primary hypothyroidism - due to Axitinb use (patient also is on pembrolizumab which is associated with hypothyroidism) - he is no longer on Axitinb as of 07/01/2022 and as of 11/21/2022, he is no longer on pembrolizumab. \par \par Last TSH was at goal. \par \par Last TSH - April 2023 \par \par Plan:\par 1. Continue levothyroxine (generic) 88 micrograms po daily. \par 2. Labs to be done in 3 months - TSH\par 3. Follow up in 3 months to review results\par

## 2023-04-10 NOTE — PHYSICAL EXAM
[de-identified] : General: No distress, well nourished\par Eyes: Normal Sclera, EOMI, PERRL\par ENT: Normal appearance of the nose, normal oropharynx\par Neck/Thyroid: No cervical lymphadenopathy, thyroid gland 20 g in size, no thyroid nodules, non-tender\par Respiratory: No use of accessory muscles of respiration, vesicular breath sounds heard bilaterally, no crepitations or ronchi\par Cardiovascular: S1 and S2 heard and normal, no S3 or S4, no murmurs, radial pulse normal bilaterally\par Abdomen: soft, non-tender, no masses, normal bowel sounds\par Musculoskeletal: No swelling or deformities of joints of hands, no pedal edema\par Neurological: Normal range of motion in the hands, Normal brachioradialis reflexes bilaterally\par Psychiatry: Patient converses normally, good judgement and insight\par Skin: No rashes in hands, no nodules palpated in hands

## 2023-05-02 ENCOUNTER — RX RENEWAL (OUTPATIENT)
Age: 78
End: 2023-05-02

## 2023-05-04 LAB
ALBUMIN SERPL ELPH-MCNC: 4.3 G/DL
ALP BLD-CCNC: 97 U/L
ALT SERPL-CCNC: 15 U/L
ANION GAP SERPL CALC-SCNC: 12 MMOL/L
AST SERPL-CCNC: 24 U/L
BASOPHILS # BLD AUTO: 0.05 K/UL
BASOPHILS NFR BLD AUTO: 0.8 %
BILIRUB SERPL-MCNC: 1.6 MG/DL
BUN SERPL-MCNC: 18 MG/DL
CALCIUM SERPL-MCNC: 9.3 MG/DL
CHLORIDE SERPL-SCNC: 103 MMOL/L
CHOLEST SERPL-MCNC: 213 MG/DL
CO2 SERPL-SCNC: 26 MMOL/L
CREAT SERPL-MCNC: 1.3 MG/DL
EGFR: 56 ML/MIN/1.73M2
EOSINOPHIL # BLD AUTO: 0.25 K/UL
EOSINOPHIL NFR BLD AUTO: 3.9 %
GLUCOSE SERPL-MCNC: 116 MG/DL
HCT VFR BLD CALC: 55 %
HDLC SERPL-MCNC: 49 MG/DL
HGB BLD-MCNC: 18.3 G/DL
IMM GRANULOCYTES NFR BLD AUTO: 0.6 %
LDLC SERPL CALC-MCNC: 137 MG/DL
LYMPHOCYTES # BLD AUTO: 1 K/UL
LYMPHOCYTES NFR BLD AUTO: 15.6 %
MAN DIFF?: NORMAL
MCHC RBC-ENTMCNC: 31 PG
MCHC RBC-ENTMCNC: 33.3 GM/DL
MCV RBC AUTO: 93.1 FL
MONOCYTES # BLD AUTO: 0.59 K/UL
MONOCYTES NFR BLD AUTO: 9.2 %
NEUTROPHILS # BLD AUTO: 4.46 K/UL
NEUTROPHILS NFR BLD AUTO: 69.9 %
NONHDLC SERPL-MCNC: 164 MG/DL
PLATELET # BLD AUTO: 151 K/UL
POTASSIUM SERPL-SCNC: 4.7 MMOL/L
PROT SERPL-MCNC: 7 G/DL
RBC # BLD: 5.91 M/UL
RBC # FLD: 14.3 %
SODIUM SERPL-SCNC: 142 MMOL/L
TRIGL SERPL-MCNC: 135 MG/DL
WBC # FLD AUTO: 6.39 K/UL

## 2023-05-09 ENCOUNTER — INPATIENT (INPATIENT)
Facility: HOSPITAL | Age: 78
LOS: 1 days | Discharge: HOME | DRG: 65 | End: 2023-05-11
Attending: STUDENT IN AN ORGANIZED HEALTH CARE EDUCATION/TRAINING PROGRAM | Admitting: INTERNAL MEDICINE
Payer: MEDICARE

## 2023-05-09 VITALS
HEART RATE: 61 BPM | SYSTOLIC BLOOD PRESSURE: 188 MMHG | OXYGEN SATURATION: 100 % | TEMPERATURE: 98 F | RESPIRATION RATE: 19 BRPM | DIASTOLIC BLOOD PRESSURE: 116 MMHG

## 2023-05-09 DIAGNOSIS — Z90.89 ACQUIRED ABSENCE OF OTHER ORGANS: Chronic | ICD-10-CM

## 2023-05-09 DIAGNOSIS — Z98.890 OTHER SPECIFIED POSTPROCEDURAL STATES: Chronic | ICD-10-CM

## 2023-05-09 DIAGNOSIS — R29.810 FACIAL WEAKNESS: ICD-10-CM

## 2023-05-09 DIAGNOSIS — Z90.5 ACQUIRED ABSENCE OF KIDNEY: Chronic | ICD-10-CM

## 2023-05-09 DIAGNOSIS — Z90.49 ACQUIRED ABSENCE OF OTHER SPECIFIED PARTS OF DIGESTIVE TRACT: Chronic | ICD-10-CM

## 2023-05-09 LAB
APTT BLD: 38.3 SEC — HIGH (ref 27.5–35.5)
BASOPHILS # BLD AUTO: 0.05 K/UL — SIGNIFICANT CHANGE UP (ref 0–0.2)
BASOPHILS NFR BLD AUTO: 0.7 % — SIGNIFICANT CHANGE UP (ref 0–2)
EOSINOPHIL # BLD AUTO: 0.22 K/UL — SIGNIFICANT CHANGE UP (ref 0–0.5)
EOSINOPHIL NFR BLD AUTO: 3 % — SIGNIFICANT CHANGE UP (ref 0–6)
HCT VFR BLD CALC: 51.2 % — HIGH (ref 39–50)
HGB BLD-MCNC: 17.2 G/DL — HIGH (ref 13–17)
IMM GRANULOCYTES NFR BLD AUTO: 0.4 % — SIGNIFICANT CHANGE UP (ref 0–0.9)
INR BLD: 1.36 RATIO — HIGH (ref 0.88–1.16)
LYMPHOCYTES # BLD AUTO: 1.07 K/UL — SIGNIFICANT CHANGE UP (ref 1–3.3)
LYMPHOCYTES # BLD AUTO: 14.8 % — SIGNIFICANT CHANGE UP (ref 13–44)
MCHC RBC-ENTMCNC: 30.6 PG — SIGNIFICANT CHANGE UP (ref 27–34)
MCHC RBC-ENTMCNC: 33.6 GM/DL — SIGNIFICANT CHANGE UP (ref 32–36)
MCV RBC AUTO: 91.1 FL — SIGNIFICANT CHANGE UP (ref 80–100)
MONOCYTES # BLD AUTO: 0.63 K/UL — SIGNIFICANT CHANGE UP (ref 0–0.9)
MONOCYTES NFR BLD AUTO: 8.7 % — SIGNIFICANT CHANGE UP (ref 2–14)
NEUTROPHILS # BLD AUTO: 5.22 K/UL — SIGNIFICANT CHANGE UP (ref 1.8–7.4)
NEUTROPHILS NFR BLD AUTO: 72.4 % — SIGNIFICANT CHANGE UP (ref 43–77)
PLATELET # BLD AUTO: 156 K/UL — SIGNIFICANT CHANGE UP (ref 150–400)
PROTHROM AB SERPL-ACNC: 15.8 SEC — HIGH (ref 10.5–13.4)
RBC # BLD: 5.62 M/UL — SIGNIFICANT CHANGE UP (ref 4.2–5.8)
RBC # FLD: 14 % — SIGNIFICANT CHANGE UP (ref 10.3–14.5)
WBC # BLD: 7.22 K/UL — SIGNIFICANT CHANGE UP (ref 3.8–10.5)
WBC # FLD AUTO: 7.22 K/UL — SIGNIFICANT CHANGE UP (ref 3.8–10.5)

## 2023-05-09 PROCEDURE — 81001 URINALYSIS AUTO W/SCOPE: CPT

## 2023-05-09 PROCEDURE — 70551 MRI BRAIN STEM W/O DYE: CPT

## 2023-05-09 PROCEDURE — 82962 GLUCOSE BLOOD TEST: CPT

## 2023-05-09 PROCEDURE — 83036 HEMOGLOBIN GLYCOSYLATED A1C: CPT

## 2023-05-09 PROCEDURE — 92610 EVALUATE SWALLOWING FUNCTION: CPT | Mod: GN

## 2023-05-09 PROCEDURE — 97116 GAIT TRAINING THERAPY: CPT | Mod: GP

## 2023-05-09 PROCEDURE — 92523 SPEECH SOUND LANG COMPREHEN: CPT | Mod: GN

## 2023-05-09 PROCEDURE — 99291 CRITICAL CARE FIRST HOUR: CPT

## 2023-05-09 PROCEDURE — 85027 COMPLETE CBC AUTOMATED: CPT

## 2023-05-09 PROCEDURE — 0042T: CPT | Mod: MA

## 2023-05-09 PROCEDURE — 70496 CT ANGIOGRAPHY HEAD: CPT | Mod: 26,MA

## 2023-05-09 PROCEDURE — 97165 OT EVAL LOW COMPLEX 30 MIN: CPT | Mod: GO

## 2023-05-09 PROCEDURE — 80061 LIPID PANEL: CPT

## 2023-05-09 PROCEDURE — 97162 PT EVAL MOD COMPLEX 30 MIN: CPT | Mod: GP

## 2023-05-09 PROCEDURE — 97535 SELF CARE MNGMENT TRAINING: CPT | Mod: GO

## 2023-05-09 PROCEDURE — 93010 ELECTROCARDIOGRAM REPORT: CPT

## 2023-05-09 PROCEDURE — 97530 THERAPEUTIC ACTIVITIES: CPT | Mod: GP

## 2023-05-09 PROCEDURE — 70498 CT ANGIOGRAPHY NECK: CPT | Mod: 26,MA

## 2023-05-09 PROCEDURE — 36415 COLL VENOUS BLD VENIPUNCTURE: CPT

## 2023-05-09 PROCEDURE — 80048 BASIC METABOLIC PNL TOTAL CA: CPT

## 2023-05-09 PROCEDURE — 93306 TTE W/DOPPLER COMPLETE: CPT

## 2023-05-09 RX ORDER — SODIUM CHLORIDE 9 MG/ML
1000 INJECTION INTRAMUSCULAR; INTRAVENOUS; SUBCUTANEOUS ONCE
Refills: 0 | Status: COMPLETED | OUTPATIENT
Start: 2023-05-09 | End: 2023-05-09

## 2023-05-09 RX ORDER — ASPIRIN/CALCIUM CARB/MAGNESIUM 324 MG
324 TABLET ORAL ONCE
Refills: 0 | Status: DISCONTINUED | OUTPATIENT
Start: 2023-05-09 | End: 2023-05-09

## 2023-05-09 NOTE — ED PROVIDER NOTE - PHYSICAL EXAMINATION
Constitutional: NAD AAOx3  Eyes: PERRL, EOMI  Head: Normocephalic atraumatic  Mouth: MMM  Cardiac: regular rate   Resp: Lungs CTAB  GI: Abd s/nt/nd  Neuro: CN2-12 intact. +left facial droop. NIH 1.   Extremities: Intact distal pulses b/l, no calf tenderness, normal ROM b/l UE and LE   Skin: No rashes

## 2023-05-09 NOTE — ED PROVIDER NOTE - NS ED ROS FT
Constitutional: No fever or chills  Eyes: No visual changes  HEENT: No throat pain  CV: No chest pain  Resp: No SOB no cough  GI: No abd pain, nausea or vomiting  : No dysuria  MSK: No musculoskeletal pain  Skin: No rash  Neuro: No headache. +speech changes

## 2023-05-09 NOTE — ED ADULT NURSE NOTE - NSIMPLEMENTINTERV_GEN_ALL_ED
Implemented All Fall Risk Interventions:  [place] to call system. Call bell, personal items and telephone within reach. Instruct patient to call for assistance. Room bathroom lighting operational. Non-slip footwear when patient is off stretcher. Physically safe environment: no spills, clutter or unnecessary equipment. Stretcher in lowest position, wheels locked, appropriate side rails in place. Provide visual cue, wrist band, yellow gown, etc. Monitor gait and stability. Monitor for mental status changes and reorient to person, place, and time. Review medications for side effects contributing to fall risk. Reinforce activity limits and safety measures with patient and family.

## 2023-05-09 NOTE — ED ADULT TRIAGE NOTE - CHIEF COMPLAINT QUOTE
BIB EMS LEFT SIDED FACIAL DROOP , "FEELING WEIRD" SINCE THIS AM. , DIZZINESS. DENIES CP/SOB/N/V/D/FEVER/CHILLS. bgm 83 IN TRIAGE. CODE STROKE  CALLED AT 1843. CT AT 1845

## 2023-05-09 NOTE — ED PROVIDER NOTE - CLINICAL SUMMARY MEDICAL DECISION MAKING FREE TEXT BOX
EKG is atrial fibrillation at a rate of 77 without acute ST segment or T wave changes. EKG is atrial fibrillation at a rate of 77 without acute ST segment or T wave changes. CT head did not reveal any acute process, labs are nonactionable, patient is on Xarelto, I discussed with medicine and will be admitted for further evaluation and care. EKG is atrial fibrillation at a rate of 77 without acute ST segment or T wave changes. CT head did not reveal any acute process, labs are nonactionable, patient is on Xarelto,I had called neurology to discuss if aspirin should be added, spoke with telemetry neuro, Dr. Driver, stated that as he is on Xarelto his INR is 1.36 no indication at this time. I discussed with medicine and will be admitted for further evaluation and care.

## 2023-05-09 NOTE — ED PROVIDER NOTE - OBJECTIVE STATEMENT
77 y/o male with a PMHx of Afib on Xarelto, BPH, colon polyp, deviated septum, erectile dysfunction, GERD, gall stones, hematuria, hemorrhoids, HTN, incisional hernia, kidney cancer, nodule right lung, skin cancer presents to the ED for evaluation. Pt fell earlier today. Pt states he did not feel well and his speech is off. Unknown last known normal. Denies numbness, tingling. No other complaints at this time. 77 y/o male with a PMHx of Afib on Xarelto, BPH, colon polyp, deviated septum, erectile dysfunction, GERD, gall stones, hematuria, hemorrhoids, HTN, incisional hernia, kidney cancer, nodule right lung, skin cancer presents to the ED for evaluation. Pt fell earlier today. Pt states he did not feel well and his speech is off. Unknown last known normal, he states it started in the morning but unknown time. Denies numbness, tingling. No other complaints at this time.

## 2023-05-10 ENCOUNTER — TRANSCRIPTION ENCOUNTER (OUTPATIENT)
Age: 78
End: 2023-05-10

## 2023-05-10 LAB
A1C WITH ESTIMATED AVERAGE GLUCOSE RESULT: 5.6 % — SIGNIFICANT CHANGE UP (ref 4–5.6)
ANION GAP SERPL CALC-SCNC: 6 MMOL/L — SIGNIFICANT CHANGE UP (ref 5–17)
APPEARANCE UR: CLEAR — SIGNIFICANT CHANGE UP
BACTERIA # UR AUTO: NEGATIVE — SIGNIFICANT CHANGE UP
BILIRUB UR-MCNC: NEGATIVE — SIGNIFICANT CHANGE UP
BUN SERPL-MCNC: 15 MG/DL — SIGNIFICANT CHANGE UP (ref 7–23)
CALCIUM SERPL-MCNC: 8.8 MG/DL — SIGNIFICANT CHANGE UP (ref 8.5–10.1)
CHLORIDE SERPL-SCNC: 111 MMOL/L — HIGH (ref 96–108)
CHOLEST SERPL-MCNC: 205 MG/DL — HIGH
CO2 SERPL-SCNC: 26 MMOL/L — SIGNIFICANT CHANGE UP (ref 22–31)
COLOR SPEC: YELLOW — SIGNIFICANT CHANGE UP
CREAT SERPL-MCNC: 1.33 MG/DL — HIGH (ref 0.5–1.3)
DIFF PNL FLD: ABNORMAL
EGFR: 54 ML/MIN/1.73M2 — LOW
EPI CELLS # UR: NEGATIVE — SIGNIFICANT CHANGE UP
ESTIMATED AVERAGE GLUCOSE: 114 MG/DL — SIGNIFICANT CHANGE UP (ref 68–114)
GLUCOSE BLDC GLUCOMTR-MCNC: 106 MG/DL — HIGH (ref 70–99)
GLUCOSE BLDC GLUCOMTR-MCNC: 116 MG/DL — HIGH (ref 70–99)
GLUCOSE BLDC GLUCOMTR-MCNC: 92 MG/DL — SIGNIFICANT CHANGE UP (ref 70–99)
GLUCOSE SERPL-MCNC: 95 MG/DL — SIGNIFICANT CHANGE UP (ref 70–99)
GLUCOSE UR QL: NEGATIVE — SIGNIFICANT CHANGE UP
HCT VFR BLD CALC: 53.8 % — HIGH (ref 39–50)
HDLC SERPL-MCNC: 47 MG/DL — SIGNIFICANT CHANGE UP
HGB BLD-MCNC: 18.2 G/DL — HIGH (ref 13–17)
HYALINE CASTS # UR AUTO: NEGATIVE /LPF — SIGNIFICANT CHANGE UP
KETONES UR-MCNC: NEGATIVE — SIGNIFICANT CHANGE UP
LEUKOCYTE ESTERASE UR-ACNC: NEGATIVE — SIGNIFICANT CHANGE UP
LIPID PNL WITH DIRECT LDL SERPL: 135 MG/DL — HIGH
MCHC RBC-ENTMCNC: 31.1 PG — SIGNIFICANT CHANGE UP (ref 27–34)
MCHC RBC-ENTMCNC: 33.8 GM/DL — SIGNIFICANT CHANGE UP (ref 32–36)
MCV RBC AUTO: 91.8 FL — SIGNIFICANT CHANGE UP (ref 80–100)
NITRITE UR-MCNC: NEGATIVE — SIGNIFICANT CHANGE UP
NON HDL CHOLESTEROL: 158 MG/DL — HIGH
PH UR: 7 — SIGNIFICANT CHANGE UP (ref 5–8)
PLATELET # BLD AUTO: 134 K/UL — LOW (ref 150–400)
POTASSIUM SERPL-MCNC: 4.1 MMOL/L — SIGNIFICANT CHANGE UP (ref 3.5–5.3)
POTASSIUM SERPL-SCNC: 4.1 MMOL/L — SIGNIFICANT CHANGE UP (ref 3.5–5.3)
PROT UR-MCNC: 30 MG/DL
RBC # BLD: 5.86 M/UL — HIGH (ref 4.2–5.8)
RBC # FLD: 14 % — SIGNIFICANT CHANGE UP (ref 10.3–14.5)
RBC CASTS # UR COMP ASSIST: SIGNIFICANT CHANGE UP /HPF (ref 0–4)
SODIUM SERPL-SCNC: 143 MMOL/L — SIGNIFICANT CHANGE UP (ref 135–145)
SP GR SPEC: 1 — LOW (ref 1.01–1.02)
TRIGL SERPL-MCNC: 117 MG/DL — SIGNIFICANT CHANGE UP
UROBILINOGEN FLD QL: NEGATIVE — SIGNIFICANT CHANGE UP
WBC # BLD: 5.96 K/UL — SIGNIFICANT CHANGE UP (ref 3.8–10.5)
WBC # FLD AUTO: 5.96 K/UL — SIGNIFICANT CHANGE UP (ref 3.8–10.5)
WBC UR QL: NEGATIVE /HPF — SIGNIFICANT CHANGE UP (ref 0–5)

## 2023-05-10 PROCEDURE — 99221 1ST HOSP IP/OBS SF/LOW 40: CPT

## 2023-05-10 PROCEDURE — 99223 1ST HOSP IP/OBS HIGH 75: CPT

## 2023-05-10 PROCEDURE — 70551 MRI BRAIN STEM W/O DYE: CPT | Mod: 26

## 2023-05-10 PROCEDURE — 99223 1ST HOSP IP/OBS HIGH 75: CPT | Mod: AI

## 2023-05-10 RX ORDER — ASPIRIN/CALCIUM CARB/MAGNESIUM 324 MG
81 TABLET ORAL DAILY
Refills: 0 | Status: DISCONTINUED | OUTPATIENT
Start: 2023-05-10 | End: 2023-05-11

## 2023-05-10 RX ORDER — FAMOTIDINE 10 MG/ML
20 INJECTION INTRAVENOUS
Refills: 0 | Status: DISCONTINUED | OUTPATIENT
Start: 2023-05-10 | End: 2023-05-11

## 2023-05-10 RX ORDER — RIVAROXABAN 15 MG-20MG
20 KIT ORAL
Refills: 0 | Status: DISCONTINUED | OUTPATIENT
Start: 2023-05-10 | End: 2023-05-11

## 2023-05-10 RX ORDER — HEPARIN SODIUM 5000 [USP'U]/ML
5000 INJECTION INTRAVENOUS; SUBCUTANEOUS EVERY 12 HOURS
Refills: 0 | Status: DISCONTINUED | OUTPATIENT
Start: 2023-05-10 | End: 2023-05-10

## 2023-05-10 RX ORDER — DRONEDARONE 400 MG/1
1 TABLET, FILM COATED ORAL
Qty: 0 | Refills: 0 | DISCHARGE

## 2023-05-10 RX ORDER — METOPROLOL TARTRATE 50 MG
25 TABLET ORAL DAILY
Refills: 0 | Status: DISCONTINUED | OUTPATIENT
Start: 2023-05-10 | End: 2023-05-11

## 2023-05-10 RX ORDER — ATORVASTATIN CALCIUM 80 MG/1
80 TABLET, FILM COATED ORAL AT BEDTIME
Refills: 0 | Status: DISCONTINUED | OUTPATIENT
Start: 2023-05-10 | End: 2023-05-11

## 2023-05-10 RX ORDER — LEVOTHYROXINE SODIUM 125 MCG
88 TABLET ORAL DAILY
Refills: 0 | Status: DISCONTINUED | OUTPATIENT
Start: 2023-05-10 | End: 2023-05-11

## 2023-05-10 RX ORDER — MAGNESIUM OXIDE 400 MG ORAL TABLET 241.3 MG
400 TABLET ORAL DAILY
Refills: 0 | Status: DISCONTINUED | OUTPATIENT
Start: 2023-05-10 | End: 2023-05-11

## 2023-05-10 RX ADMIN — RIVAROXABAN 20 MILLIGRAM(S): KIT at 20:03

## 2023-05-10 RX ADMIN — FAMOTIDINE 20 MILLIGRAM(S): 10 INJECTION INTRAVENOUS at 21:11

## 2023-05-10 RX ADMIN — Medication 81 MILLIGRAM(S): at 10:34

## 2023-05-10 RX ADMIN — SODIUM CHLORIDE 1000 MILLILITER(S): 9 INJECTION INTRAMUSCULAR; INTRAVENOUS; SUBCUTANEOUS at 00:00

## 2023-05-10 RX ADMIN — ATORVASTATIN CALCIUM 80 MILLIGRAM(S): 80 TABLET, FILM COATED ORAL at 21:11

## 2023-05-10 RX ADMIN — Medication 25 MILLIGRAM(S): at 10:34

## 2023-05-10 NOTE — H&P ADULT - NSHPSOCIALHISTORY_GEN_ALL_CORE
Denies smoking history or drug use now or in the past  Occasional alcohol   Lives at home with wife   Owns  company

## 2023-05-10 NOTE — PHYSICAL THERAPY INITIAL EVALUATION ADULT - PERTINENT HX OF CURRENT PROBLEM, REHAB EVAL
77 y/o Male with PMHx of HTN, afib on Xarelto, GERD, kidney ca s/p R nephrectomy (2019), Right lung nodule s/p wedge resection (2021), skin cancer, colon polyp, ED, BPH, hemorrhoids, incisional hernia s/p repair (2020), presented to  ED with c/o left facial droop associated with difficulty speaking. In ED, BP elevated 188/116 -> 152/84, rest of VSS. CODE STROKE called, CT head and CTA H/N negative for acute pathology. EKG with rate controlled afib.     Pt awaiting MRI to r/o CVA.

## 2023-05-10 NOTE — PHYSICAL THERAPY INITIAL EVALUATION ADULT - GENERAL OBSERVATIONS, REHAB EVAL
Pt seen for 45min PT Eval. Pt rec'd semi supine in bed in NAD on 3N, +tele. noted slight Lt facial droop, +dysarthria. no noted strength/ROM deficits, +Lt peripheral vision impaired. pt amb 200ft, slight sway at times, walking close to objects on Lt side. Pt left semi supine no acute care PT needs, may benefit from outpatient PT for gait, RN Aware.

## 2023-05-10 NOTE — CONSULT NOTE ADULT - SUBJECTIVE AND OBJECTIVE BOX
78y old  Male who presents with a chief complaint of left facial droop, difficulty speaking       HPI:  77 y/o Male with PMHx of HTN, A-fib on Xarelto, kidney ca s/p R nephrectomy (2019), Right lung nodule, skin cancer presented to  ED on 5/9/23 at 18.45 hrs with c/o left facial droop associated with difficulty speaking. Pt reports waking up yesterday and "feeling off", was in [facility] the previous night, he baby sat his grand-daughter, later in his daughter noted he had a left facial droop and speech was slurred, shortly after he noted his balance was off which resulted in a mechanical fall - denies LOC, numbness/tingling, vision changes, focal weakness.      In ED, /116 -> 152/84, CODE STROKE called, NIHSS -1, CT head and CTA H/N negative for acute pathology. Dr. Nathan spoke with Tele-neurologist, who advised no TNK as pt on Xarelto and INR 1.36/PTT 38.3. Pt was admitted to tele.    On exam, no new complaints, speech is slow.      PAST MEDICAL & SURGICAL HISTORY:  Atrial fibrillation  has loop recorder  HTN (hypertension)  GERD (gastroesophageal reflux disease)  Hematuria  BPH (benign prostatic hyperplasia)  Incisional hernia  History of skin cancer  Erectile dysfunction  Renal cancer  Deviated septum  Nodule of right lung  S/P cholecystectomy  H/O hemorrhoidectomy  S/P tonsillectomy  Excision of skin cancer  H/O right nephrectomy 2019  History of incisional hernia repair 11/2020 with mesh          FAMILY HISTORY:  Family history of breast cancer in mother      Social Hx:  Nonsmoker, no drug or alcohol use      MEDICATIONS  (STANDING):  aspirin enteric coated 81 milliGRAM(s) Oral daily  atorvastatin 80 milliGRAM(s) Oral at bedtime  famotidine    Tablet 20 milliGRAM(s) Oral two times a day  levothyroxine 88 MICROGram(s) Oral daily  magnesium oxide 400 milliGRAM(s) Oral daily  metoprolol succinate ER 25 milliGRAM(s) Oral daily  rivaroxaban 20 milliGRAM(s) Oral with dinner       Allergies  No Known Allergies      ROS: Pertinent positives in HPI, all other ROS were reviewed and are negative.        Vital Signs Last 24 Hrs  T(C): 36.7 (10 May 2023 08:48), Max: 36.8 (10 May 2023 02:25)  T(F): 98.1 (10 May 2023 08:48), Max: 98.3 (10 May 2023 02:25)  HR: 63 (10 May 2023 08:48) (61 - 84)  BP: 149/76 (10 May 2023 08:48) (131/77 - 188/116)  BP(mean): 95 (10 May 2023 00:53) (95 - 95)  RR: 18 (10 May 2023 08:48) (17 - 19)  SpO2: 97% (10 May 2023 08:48) (96% - 100%)    Parameters below as of 10 May 2023 08:48  Patient On (Oxygen Delivery Method): room air      Gen exam:  Normocephalic, in no distress, awake and alert.  HEENT: PERRLA, EOMI,   Neck: Supple.  Respiratory: Breath sounds are clear bilaterally  Cardiovascular: S1 and S2, regular   Extremities:  no edema  Vascular: Caritid Bruit - no  Musculoskeletal:  no abnormal movements  Skin: No rashes      Neurological exam:  HF: A x O x 3. Appropriately interactive, normal affect. Mild dysarthria, No Aphasia or paraphasic errors. Naming /repetition intact   CN: GARY, EOMI, VFF, facial sensation normal, left NLFD, tongue midline, Palate moves equally, SCM equal bilaterally  Motor: No pronator drift, Strength 5/5 in all 4 ext, normal bulk and tone, no tremor, rigidity.    Sens: Intact to light touch / PP    Reflexes: Symmetric and normal . downgoing toes b/l  Coord:  No FNFA, dysmetria, CAT intact   Gait/Balance: Not tested    NIHSS: 2          Labs:   05-10    143  |  111<H>  |  15  ----------------------------<  95  4.1   |  26  |  1.33<H>    Ca    8.8      10 May 2023 10:56    TPro  7.1  /  Alb  3.5  /  TBili  1.5<H>  /  DBili  x   /  AST  19  /  ALT  22  /  AlkPhos  84  05-09                        18.2   5.96  )-----------( 134      ( 10 May 2023 10:56 )             53.8       Radiology:  < from: CT Angio Neck Stroke Protocol w/ IV Cont (05.09.23 @ 19:12) >  No acute intracranial hemorrhage or acute territorial infarct. If acute   ischemia is a strong clinical concern, MRI exam is recommended for   further evaluation if there is no contraindication. Dr. Nathan notified   5/9/2023 at 7:14 PM    No hemodynamically significant stenosis.    Abnormal CT perfusion findings as described above most compatible with   artifacts. MRI exam recommended

## 2023-05-10 NOTE — H&P ADULT - NS ATTEND AMEND GEN_ALL_CORE FT
Patient with dyasrthria and (L) sided facial droop. Imaging  /  labs and vitals reviewed. Care discussed with NP and Neurology. Pending MRI of the head.     Physical Exam:   GENERAL APPEARANCE:  NAD, hemodynamically stable  T(C): 36.7 (05-10-23 @ 08:48), Max: 36.8 (05-10-23 @ 02:25)  HR: 63 (05-10-23 @ 08:48) (61 - 84)  BP: 149/76 (05-10-23 @ 08:48) (131/77 - 188/116)  RR: 18 (05-10-23 @ 08:48) (17 - 19)  SpO2: 97% (05-10-23 @ 08:48) (96% - 100%)  Wt(kg): --  HEENT:  Head is normocephalic    Skin:  Warm and dry without any rash   NECK:  Supple without lymphadenopathy.   HEART:  Regular rate and rhythm. normal S1 and S2, No M/R/G  LUNGS:  Good ins/exp effort, no W/R/R/C  ABDOMEN:  Soft, nontender, nondistended with good bowel sounds heard  EXTREMITIES:  Without cyanosis, clubbing or edema.   NEUROLOGICAL:  Dysarthria /  (L) facial droop    # r/o CVA  - ASA /  STATIN  - pending MRI  - Neurology eval  - PT /  OT /  Speech therapy

## 2023-05-10 NOTE — CONSULT NOTE ADULT - ASSESSMENT
ASSESSMENT/PLAN  78yMale with functional deficits after  Pain - Tylenol  DVT PPX - SCDs  Rehab -     pending evaluation   Recommend ACUTE inpatient rehabilitation for the functional deficits consisting of 3 hours of therapy/day & 24 hour RN/daily PMR physician for comorbid medical management. Patient will be able to tolerate 3 hours a day.   Recommend IVETH, patient DOES NOT meet acute inpatient rehabilitation criteria. Patient needs a more prolonged stay to achieve transition to community.    Expect patient to achieve functional goals for DC HOME with OUTPATIENT   Expect patient to achieve functional goals for DC HOME with HOME CARE   Follow up with CONCUSSION PROGRAM - Call [phone] for an appointment    Will continue to follow. Functional progress will determine ongoing rehab dispo recommendations, which may change.    Continue bedside therapy as well as OOB throughout the day with mobilization by staff to maintain cardiopulmonary function and prevention of secondary complications related to debility.     Discussed with rehab team.  ASSESSMENT/PLAN  78yMale with functional deficits after  Pain - Tylenol  DVT PPX - SCDs  Rehab -    Expect patient to achieve functional goals for DC HOME with HOME CARE   Will continue to follow. Functional progress will determine ongoing rehab dispo recommendations, which may change.    Continue bedside therapy as well as OOB throughout the day with mobilization by staff to maintain cardiopulmonary function and prevention of secondary complications related to debility.      ASSESSMENT/PLAN  78yMale with functional deficits after  Pain - Tylenol  DVT PPX - SCDs  Rehab -    Expect patient to achieve functional goals for DC HOME with outpatient therapy   Will continue to follow. Functional progress will determine ongoing rehab dispo recommendations, which may change.    Continue bedside therapy as well as OOB throughout the day with mobilization by staff to maintain cardiopulmonary function and prevention of secondary complications related to debility.

## 2023-05-10 NOTE — H&P ADULT - ASSESSMENT
79 y/o Male with PMHx of HTN, afib on Xarelto, GERD, kidney ca s/p R nephrectomy (2019), Right lung nodule s/p wedge resection (2021), skin cancer, colon polyp, ED, BPH, hemorrhoids, incisional hernia s/p repair (2020), presented to  ED with c/o left facial droop associated with difficulty speaking. Pt reports waking up yesterday and "feeling off", was in [facility] night prior. Pt then went to baby sit for his daughter, who noted later in day patient had a left facial droop and having issues with speech. Shortly after patient reports having issue with his balance resulting in a mechanical fall - denies LOC or head strike. Denies numbness/tingling, vision changes, focal weakness. Patient otherwise denies any current chest pain, SOB, cough, f/c/n/v/d, abd pain, LE edema, myalgias, dysuria, HA, dizziness.     In ED, BP elevated 188/116 -> 152/84, rest of VSS. CODE STROKE called, CT head and CTA H/N negative for acute pathology. EKG with rate controlled afib. Received ASA 324mg x1, 1L NS, admitted to telemetry for further management.     #Left facial droop, difficulty speaking r/o CVA vs TIA   #Mechanical fall - no LOC or head strike   #HTN urgency  - Admit to telemetry   - CT head and CTA H/N negative for acute pathology  - F/u MRI head   - F/u TTE  - Neuro consulted Dr. Bishop   - Start ASA 81mg, atorvastatin 80mg   - Resume Xarelto   - Check Lipids, A1C  - Allow for permissive HTN first 24 hrs - will hold amlodipine, lisinopril   - PT/OT  - Speech and swallow eval     #Hx of HTN, afib on Xarelto, GERD, kidney ca s/p R nephrectomy (2019), Right lung nodule s/p wedge resection (2021), skin cancer, colon polyp, ED, BPH, hemorrhoids, incisional hernia s/p repair (2020)  - c/w home medications, outpatient follow up for further management   - c/w metoprolol for rate control, hold amlodipine/lisinopril as above - resume when appropriate    #DVT ppx - on xarelto     #Full code 79 y/o Male with PMHx of HTN, afib on Xarelto, GERD, kidney ca s/p R nephrectomy (2019), Right lung nodule s/p wedge resection (2021), skin cancer, colon polyp, ED, BPH, hemorrhoids, incisional hernia s/p repair (2020), presented to  ED with c/o left facial droop associated with difficulty speaking. Pt reports waking up yesterday and "feeling off", was in [facility] night prior. Pt then went to baby sit for his daughter, who noted later in day patient had a left facial droop and having issues with speech. Shortly after patient reports having issue with his balance resulting in a mechanical fall - denies LOC or head strike. Denies numbness/tingling, vision changes, focal weakness. Patient otherwise denies any current chest pain, SOB, cough, f/c/n/v/d, abd pain, LE edema, myalgias, dysuria, HA, dizziness.     In ED, BP elevated 188/116 -> 152/84, rest of VSS. CODE STROKE called, CT head and CTA H/N negative for acute pathology. EKG with rate controlled afib. Received ASA 324mg x1, 1L NS, admitted to telemetry for further management.     #Left facial droop, difficulty speaking r/o CVA   #Mechanical fall - no LOC or head strike   #HTN urgency  - stroke order set has been utilized /  care discussed with Dr. Rae  - Admit to telemetry   - CT head and CTA H/N negative for acute pathology  - pending MRI of the head  - F/u TTE  - Start ASA 81mg, atorvastatin 80mg   - Resume Xarelto   - Check Lipids, A1C  - Allow for permissive HTN first 24 hrs - will hold amlodipine, lisinopril   - PT/OT  - Speech and swallow eval     #Hx of HTN, afib on Xarelto, GERD, kidney ca s/p R nephrectomy (2019), Right lung nodule s/p wedge resection (2021), skin cancer, colon polyp, ED, BPH, hemorrhoids, incisional hernia s/p repair (2020)  - c/w home medications, outpatient follow up for further management   - c/w metoprolol for rate control, hold amlodipine/lisinopril as above - resume when appropriate    #DVT ppx - on xarelto     #Full code

## 2023-05-10 NOTE — H&P ADULT - HISTORY OF PRESENT ILLNESS
77 y/o Male with PMHx of HTN, afib on Xarelto, GERD, kidney ca s/p R nephrectomy (2019), Right lung nodule s/p wedge resection (2021), skin cancer, colon polyp, ED, BPH, hemorrhoids, incisional hernia s/p repair (2020), presented to  ED with c/o left facial droop associated with difficulty speaking. Pt reports waking up yesterday and "feeling off", was in [facility] night prior. Pt then went to baby sit for his daughter, who noted later in day patient had a left facial droop and having issues with speech. Shortly after patient reports having issue with his balance resulting in a mechanical fall - denies LOC or head strike. Denies numbness/tingling, vision changes, focal weakness. Patient otherwise denies any current chest pain, SOB, cough, f/c/n/v/d, abd pain, LE edema, myalgias, dysuria, HA, dizziness.     In ED, BP elevated 188/116 -> 152/84, rest of VSS. CODE STROKE called, CT head and CTA H/N negative for acute pathology. EKG with rate controlled afib. Received ASA 324mg x1, 1L NS, admitted to telemetry for further management.

## 2023-05-10 NOTE — SWALLOW BEDSIDE ASSESSMENT ADULT - SLP GENERAL OBSERVATIONS
pt seated in bed, awake and alert, verbally interactive: No evidence of dysarthria despite mild left facial droop. Able to maintain a conversation appropriately; verbal output is coherent and to the point.  Voice is somewhat low and soft:  Comprehension for Complex Ideational Material (Yes/No questions) intact: Repetition intact.  Confrontation Nmaing to picutres; Mild increase n latency of response for the higher level items of Less Frequency: More c/w general mild decline than aphasia.

## 2023-05-10 NOTE — SWALLOW BEDSIDE ASSESSMENT ADULT - COMMENTS
79 y/o Male with PMHx of HTN, afib on Xarelto, GERD, kidney ca s/p R nephrectomy (2019), Right lung nodule s/p wedge resection (2021), skin cancer, colon polyp, ED, BPH, hemorrhoids, incisional hernia s/p repair (2020), presented to  ED with c/o left facial droop associated with difficulty speaking. Pt reports waking up yesterday and "feeling off", was in [facility] night prior. Pt then went to baby sit for his daughter, who noted later in day patient had a left facial droop and having issues with speech. Shortly after patient reports having issue with his balance resulting in a mechanical fall - denies LOC or head strike. Denies numbness/tingling, vision changes, focal weakness. Patient otherwise denies any current chest pain, SOB, cough, f/c/n/v/d, abd pain, LE edema, myalgias, dysuria, HA, dizziness.   Service is consulted for evaluation for po intake, and for speech-language function.   Note hx GERD

## 2023-05-10 NOTE — H&P ADULT - NSHPPHYSICALEXAM_GEN_ALL_CORE
Constitutional: NAD, awake and alert  HEENT: PERRLA, EOMI, MMM  Respiratory: Breath sounds are clear bilaterally, No wheezing, rales or rhonchi  Cardiovascular: S1 and S2, irregular irregular, no murmurs, gallops or rubs  Gastrointestinal: +BS, soft, non-tender, non-distended, no CVA tenderness  Extremities: No peripheral edema, +DP pulses b/l  Neurological: A&O x 3, +mild left facial droop, +mild issues with speech  Musculoskeletal: 5/5 strength b/l upper and lower extremities  Skin: Normal, skin warm and dry

## 2023-05-10 NOTE — CONSULT NOTE ADULT - ASSESSMENT
79 y/o M with PMHx of HTN, A-fib on Xarelto, kidney ca s/p R nephrectomy, Right lung nodule, skin CA presented to  ED on 5/9/23 at 18.45 hrs with c/o left facial droop associated with difficulty speaking. Pt reports waking up yesterday and "feeling off", was in [facility] the previous night, he baby sat his grand-daughter, later on his daughter noted he had a left facial droop, speech was slurred, shortly after his balance was off resulted in a fall. In ED, /116 -> 152/84, Code Stroke called, NIHSS -1, CTH and CTA H/N negative for acute hmg, core infarct or LVO. EDP spoke with Tele-neurologist, advised no TNK as pt on Xarelto and INR 1.36/PTT 38.3.     # Acute CVA; hypertensive vs embolic, NIHSS -2    - Continue Xarelto, add BASA  -  F/u MRI head   -  F/u TTE  - atorvastatin 80mg   -  Check Lipids, A1C  -  Allow for permissive HTN first 24 hrs  -  PT/OT  - Speech and swallow eval   - DVT prophylaxis    Above D/W pt and Dr. Ndiaye / KATIE Espinoza

## 2023-05-10 NOTE — H&P ADULT - NSHPLABSRESULTS_GEN_ALL_CORE
05-10-23 @ 10:56  Glucose 95 [70 - 99]  CO2 total 26 [22 - 31]  Chloride 111 [96 - 108]  Sodium 143 [135 - 145]  Potassium 4.1 [3.5 - 5.3]  Calcium 8.8 [8.5 - 10.1]  Creatinine 1.33 [0.50 - 1.30]  BUN 15 [7 - 23]  eGFR 54  Anion gap 6 [5 - 17]  AST --  ALT --  Alk phos --  Albumin --    WBC 5.96 [3.80 - 10.50]  Hemoglobin 18.2 [13.0 - 17.0]  Hematocrit 53.8 [39.0 - 50.0]  Platelets 134 [150 - 400]    Troponin I, High Sensitivity Result: 8.71 ng/L (05-09-23 @ 20:29)    < from: CT Brain Perfusion Maps Stroke (05.09.23 @ 19:12) >    IMPRESSION:    No acute intracranial hemorrhage or acute territorial infarct. If acute   ischemia is a strong clinical concern, MRI exam is recommended for   further evaluation if there is no contraindication. Dr. Nathan notified   5/9/2023 at 7:14 PM    No hemodynamically significant stenosis.    < end of copied text >

## 2023-05-10 NOTE — PATIENT PROFILE ADULT - FALL HARM RISK - HARM RISK INTERVENTIONS
Assistance with ambulation/Assistance OOB with selected safe patient handling equipment/Communicate Risk of Fall with Harm to all staff/Monitor for mental status changes/Reinforce activity limits and safety measures with patient and family/Tailored Fall Risk Interventions/Use of alarms - bed, chair and/or voice tab/Visual Cue: Yellow wristband and red socks/Bed in lowest position, wheels locked, appropriate side rails in place/Call bell, personal items and telephone in reach/Instruct patient to call for assistance before getting out of bed or chair/Non-slip footwear when patient is out of bed/[place] to call system/Physically safe environment - no spills, clutter or unnecessary equipment/Purposeful Proactive Rounding/Room/bathroom lighting operational, light cord in reach

## 2023-05-10 NOTE — CONSULT NOTE ADULT - SUBJECTIVE AND OBJECTIVE BOX
78yM was admitted on     Patient is a 78y old  Male who presents with a chief complaint of left facial weakess.        Imaging performed:    CT ANGIO NECK STROKE PROTCL IC 23  CT BRAIN STROKE [id number]  CT BRAIN PERFUSION MAPS STROKE 23  No acute intracranial hemorrhage or acute territorial infarct. If acute ischemia is a strong clinical concern, MRI exam is recommended for further evaluation if there is no contraindication. Dr. Nathan notified 2023 at 7:14 PM  No hemodynamically significant stenosis.  Abnormal CT perfusion findings as described above most compatible with artifacts. MRI exam recommended    REVIEW OF SYSTEMS  Constitutional - No fever, No weight loss, No fatigue  HEENT - No eye pain, No visual disturbances, No difficulty hearing, No tinnitus, No vertigo, No neck pain  Respiratory - No cough, No wheezing, No shortness of breath  Cardiovascular - No chest pain, No palpitations  Gastrointestinal - No abdominal pain, No nausea, No vomiting, No diarrhea, No constipation  Genitourinary - No dysuria, No frequency, No hematuria, No incontinence  Neurological - No headaches, No memory loss, No loss of strength, No numbness, No tremors  Skin - No itching, No rashes, No lesions   Endocrine - No temperature intolerance  Musculoskeletal - No joint pain, No joint swelling, No muscle pain  Psychiatric - No depression, No anxiety    VITALS  T(C): 36.7 (05-10-23 @ 08:48), Max: 36.8 (05-10-23 @ 02:25)  HR: 63 (05-10-23 @ 08:48) (61 - 84)  BP: 149/76 (05-10-23 @ 08:48) (131/77 - 188/116)  RR: 18 (05-10-23 @ 08:48) (17 - 19)  SpO2: 97% (05-10-23 @ 08:48) (96% - 100%)  Wt(kg): --    PAST MEDICAL & SURGICAL HISTORY  Renal mass    Atrial fibrillation    HTN (hypertension)    GERD (gastroesophageal reflux disease)    Hematuria    BPH (benign prostatic hyperplasia)    Colon polyp    Incisional hernia    Hemorrhoids    History of gallstones    History of kidney cancer    History of skin cancer    Erectile dysfunction    Renal cancer    Deviated septum    Nodule of right lung    COVID-19 vaccine series completed    S/P cholecystectomy    H/O hemorrhoidectomy    S/P tonsillectomy    H/O colonoscopy    History of right nephrectomy    History of other surgery    H/O right nephrectomy    History of incisional hernia repair        SOCIAL HISTORY - as per documentation/history  Smoking - None  EtOH - None  Drugs - None    FUNCTIONAL HISTORY  Lives   Independent    CURRENT FUNCTIONAL STATUS      FAMILY HISTORY   Family history of breast cancer in mother        RECENT LABS - Reviewed  CBC Full  -  ( 09 May 2023 18:55 )  WBC Count : 7.22 K/uL  RBC Count : 5.62 M/uL  Hemoglobin : 17.2 g/dL  Hematocrit : 51.2 %  Platelet Count - Automated : 156 K/uL  Mean Cell Volume : 91.1 fl  Mean Cell Hemoglobin : 30.6 pg  Mean Cell Hemoglobin Concentration : 33.6 gm/dL  Auto Neutrophil # : 5.22 K/uL  Auto Lymphocyte # : 1.07 K/uL  Auto Monocyte # : 0.63 K/uL  Auto Eosinophil # : 0.22 K/uL  Auto Basophil # : 0.05 K/uL  Auto Neutrophil % : 72.4 %  Auto Lymphocyte % : 14.8 %  Auto Monocyte % : 8.7 %  Auto Eosinophil % : 3.0 %  Auto Basophil % : 0.7 %        141  |  109<H>  |  16  ----------------------------<  87  3.8   |  27  |  1.24    Ca    8.7      09 May 2023 20:29    TPro  7.1  /  Alb  3.5  /  TBili  1.5<H>  /  DBili  x   /  AST  19  /  ALT  22  /  AlkPhos  84      Urinalysis Basic - ( 10 May 2023 05:47 )    Color: Yellow / Appearance: Clear / S.005 / pH: x  Gluc: x / Ketone: Negative  / Bili: Negative / Urobili: Negative   Blood: x / Protein: 30 mg/dL / Nitrite: Negative   Leuk Esterase: Negative / RBC: 0-2 /HPF / WBC Negative /HPF   Sq Epi: x / Non Sq Epi: x / Bacteria: Negative        ALLERGIES  No Known Allergies      MEDICATIONS   aspirin enteric coated 81 milliGRAM(s) Oral daily  atorvastatin 80 milliGRAM(s) Oral at bedtime  famotidine    Tablet 20 milliGRAM(s) Oral two times a day  levothyroxine 88 MICROGram(s) Oral daily  magnesium oxide 400 milliGRAM(s) Oral daily  metoprolol succinate ER 25 milliGRAM(s) Oral daily  rivaroxaban 20 milliGRAM(s) Oral with dinner      ----------------------------------------------------------------------------------------  PHYSICAL EXAM  Constitutional - NAD, Comfortable  HEENT - NCAT, EOMI  Neck - Supple, No limited ROM  Chest - Breathing comfortably, No wheezing  Cardiovascular - S1S2   Abdomen - Soft   Extremities - No C/C/E, No calf tenderness   Neurologic Exam -                    Cognitive - AAO to self, place, date, year, situation     Communication - Fluent, No dysarthria     Cranial Nerves - CN 2-12 intact     Motor - No focal deficits                    LEFT    UE - ShAB 5/5, EF 5/5, EE 5/5, WE 5/5,  5/5                    RIGHT UE - ShAB 5/5, EF 5/5, EE 5/5, WE 5/5,  5/5                    LEFT    LE - HF 5/5, KE 5/5, DF 5/5, PF 5/5                    RIGHT LE - HF 5/5, KE 5/5, DF 5/5, PF 5/5        Sensory - Intact to LT     Reflexes - DTR Intact, No primitive reflexive     Coordination - FTN intact     OculoVestibular - No saccades, No nystagmus, VOR         Balance - WNL Static  Psychiatric - Mood stable, Affect WNL  ----------------------------------------------------------------------------------------   78yM was admitted on     Patient is a 78y old  Male who presents with a chief complaint of left facial weakess.  Patient was admitted for evaluation    Imaging performed:    CT ANGIO NECK STROKE PROTCL IC 23  CT BRAIN STROKE [id number]  CT BRAIN PERFUSION MAPS STROKE 23  No acute intracranial hemorrhage or acute territorial infarct. If acute ischemia is a strong clinical concern, MRI exam is recommended for further evaluation if there is no contraindication. Dr. Nathan notified 2023 at 7:14 PM  No hemodynamically significant stenosis.  Abnormal CT perfusion findings as described above most compatible with artifacts. MRI exam recommended    REVIEW OF SYSTEMS  Constitutional - No fever, No weight loss, No fatigue  HEENT - No eye pain, No visual disturbances, No difficulty hearing, No tinnitus, No vertigo, No neck pain  Respiratory - No cough, No wheezing, No shortness of breath  Cardiovascular - No chest pain, No palpitations  Gastrointestinal - No abdominal pain, No nausea, No vomiting, No diarrhea, No constipation  Genitourinary - No dysuria, No frequency, No hematuria, No incontinence  Neurological - No headaches, No memory loss, No loss of strength, No numbness, No tremors  Skin - No itching, No rashes, No lesions   Endocrine - No temperature intolerance  Musculoskeletal - No joint pain, No joint swelling, No muscle pain  Psychiatric - No depression, No anxiety    VITALS  T(C): 36.7 (05-10-23 @ 08:48), Max: 36.8 (05-10-23 @ 02:25)  HR: 63 (05-10-23 @ 08:48) (61 - 84)  BP: 149/76 (05-10-23 @ 08:48) (131/77 - 188/116)  RR: 18 (05-10-23 @ 08:48) (17 - 19)  SpO2: 97% (05-10-23 @ 08:48) (96% - 100%)  Wt(kg): --    PAST MEDICAL & SURGICAL HISTORY  Renal mass    Atrial fibrillation    HTN (hypertension)    GERD (gastroesophageal reflux disease)    Hematuria    BPH (benign prostatic hyperplasia)    Colon polyp    Incisional hernia    Hemorrhoids    History of gallstones    History of kidney cancer    History of skin cancer    Erectile dysfunction    Renal cancer    Deviated septum    Nodule of right lung    COVID-19 vaccine series completed    S/P cholecystectomy    H/O hemorrhoidectomy    S/P tonsillectomy    H/O colonoscopy    History of right nephrectomy    History of other surgery    H/O right nephrectomy    History of incisional hernia repair        SOCIAL HISTORY - as per documentation/history  Smoking - None  EtOH - None  Drugs - None    FUNCTIONAL HISTORY  Lives with wife in a 3rd floor apt. 0STE. elevator    CURRENT FUNCTIONAL STATUS  bed mobility CG  Sit to stand CG    FAMILY HISTORY   Family history of breast cancer in mother    RECENT LABS - Reviewed  CBC Full  -  ( 09 May 2023 18:55 )  WBC Count : 7.22 K/uL  RBC Count : 5.62 M/uL  Hemoglobin : 17.2 g/dL  Hematocrit : 51.2 %  Platelet Count - Automated : 156 K/uL  Mean Cell Volume : 91.1 fl  Mean Cell Hemoglobin : 30.6 pg  Mean Cell Hemoglobin Concentration : 33.6 gm/dL  Auto Neutrophil # : 5.22 K/uL  Auto Lymphocyte # : 1.07 K/uL  Auto Monocyte # : 0.63 K/uL  Auto Eosinophil # : 0.22 K/uL  Auto Basophil # : 0.05 K/uL  Auto Neutrophil % : 72.4 %  Auto Lymphocyte % : 14.8 %  Auto Monocyte % : 8.7 %  Auto Eosinophil % : 3.0 %  Auto Basophil % : 0.7 %        141  |  109<H>  |  16  ----------------------------<  87  3.8   |  27  |  1.24    Ca    8.7      09 May 2023 20:29    TPro  7.1  /  Alb  3.5  /  TBili  1.5<H>  /  DBili  x   /  AST  19  /  ALT  22  /  AlkPhos  84      Urinalysis Basic - ( 10 May 2023 05:47 )    Color: Yellow / Appearance: Clear / S.005 / pH: x  Gluc: x / Ketone: Negative  / Bili: Negative / Urobili: Negative   Blood: x / Protein: 30 mg/dL / Nitrite: Negative   Leuk Esterase: Negative / RBC: 0-2 /HPF / WBC Negative /HPF   Sq Epi: x / Non Sq Epi: x / Bacteria: Negative    ALLERGIES  No Known Allergies    MEDICATIONS   aspirin enteric coated 81 milliGRAM(s) Oral daily  atorvastatin 80 milliGRAM(s) Oral at bedtime  famotidine    Tablet 20 milliGRAM(s) Oral two times a day  levothyroxine 88 MICROGram(s) Oral daily  magnesium oxide 400 milliGRAM(s) Oral daily  metoprolol succinate ER 25 milliGRAM(s) Oral daily  rivaroxaban 20 milliGRAM(s) Oral with dinner      ----------------------------------------------------------------------------------------  PHYSICAL EXAM  Constitutional - NAD, Comfortable  HEENT - NCAT, EOMI  Neck - Supple, No limited ROM  Chest - Breathing comfortably, No wheezing  Cardiovascular - S1S2   Abdomen - Soft   Extremities - No C/C/E, No calf tenderness   Neurologic Exam -                    Cognitive - AAO to self, place, date, year, situation     Communication - Fluent, No dysarthria     Cranial Nerves - CN 2-12 intact     Motor - No focal deficits                    LEFT    UE - ShAB 4/5, EF 4/5, EE 4/5, WE 4/5,  4/5                    RIGHT UE - ShAB 4/5, EF 4/5, EE 4/5, WE 4/5,  4/5                    LEFT    LE - HF 4/5, KE 4/5, DF 4/5, PF 4/5                    RIGHT LE - HF 4/5, KE 4/5, DF 4/5, PF 4/5      Sensory - Intact to LT     Reflexes - DTR Intact, No primitive reflexive     Coordination - FTN intact     OculoVestibular - No saccades, No nystagmus, VOR         Balance - WNL Static  Psychiatric - Mood stable, Affect WNL  ----------------------------------------------------------------------------------------

## 2023-05-10 NOTE — SWALLOW BEDSIDE ASSESSMENT ADULT - SWALLOW EVAL: DIAGNOSIS
1. oral-pharyngeal status within functional limits for REGULAR TEXTURE, depsite mild facial droop: 2. Mild deficits in lexical retrieval for higher level less Frequent items: may well be related to mild cognitive loss, rather than the sequelae of this event. l

## 2023-05-10 NOTE — SWALLOW BEDSIDE ASSESSMENT ADULT - SWALLOW EVAL: RECOMMENDED DIET
regular texture; thin liquids.  meds as tolerated. drink in non-repetitive swallows, one at a tiome, no multiple successive swallows.

## 2023-05-10 NOTE — DISCHARGE NOTE NURSING/CASE MANAGEMENT/SOCIAL WORK - NSDCPEFALRISK_GEN_ALL_CORE
For information on Fall & Injury Prevention, visit: [email or web address] OR  [email or web address] OR  https://www.cdc.gov/steadi/patient.html

## 2023-05-10 NOTE — DISCHARGE NOTE NURSING/CASE MANAGEMENT/SOCIAL WORK - NSDCVIVACCINE_GEN_ALL_CORE_FT
influenza, high-dose, quadrivalent; 30-Oct-2021 11:09; Catina Salazar (MARINA); [employer]; [id number] (Exp. Date: 30-Jun-2022); IntraMuscular; Deltoid Left.; 0.7 milliLiter(s); VIS (VIS Published: 06-Aug-2021, VIS Presented: 30-Oct-2021);

## 2023-05-10 NOTE — DISCHARGE NOTE NURSING/CASE MANAGEMENT/SOCIAL WORK - PATIENT PORTAL LINK FT
You can access the FollowMyHealth Patient Portal offered by [facility] by registering at the following website: [email or web address]. By joining "[employer]’s FollowMyHealth portal, you will also be able to view your health information using other applications (apps) compatible with our system.

## 2023-05-10 NOTE — SWALLOW BEDSIDE ASSESSMENT ADULT - NS SPL SWALLOW CLINIC TRIAL FT
RX; regular texture diet. meds as tolerated.  set up tray as necessary.  Service will not follow. Please re-consult prn.

## 2023-05-11 ENCOUNTER — APPOINTMENT (OUTPATIENT)
Dept: INTERNAL MEDICINE | Facility: CLINIC | Age: 78
End: 2023-05-11

## 2023-05-11 ENCOUNTER — TRANSCRIPTION ENCOUNTER (OUTPATIENT)
Age: 78
End: 2023-05-11

## 2023-05-11 VITALS
HEART RATE: 82 BPM | RESPIRATION RATE: 17 BRPM | SYSTOLIC BLOOD PRESSURE: 138 MMHG | TEMPERATURE: 98 F | OXYGEN SATURATION: 95 % | DIASTOLIC BLOOD PRESSURE: 94 MMHG

## 2023-05-11 LAB
ANION GAP SERPL CALC-SCNC: 7 MMOL/L — SIGNIFICANT CHANGE UP (ref 5–17)
BUN SERPL-MCNC: 21 MG/DL — SIGNIFICANT CHANGE UP (ref 7–23)
CALCIUM SERPL-MCNC: 8.4 MG/DL — LOW (ref 8.5–10.1)
CHLORIDE SERPL-SCNC: 109 MMOL/L — HIGH (ref 96–108)
CO2 SERPL-SCNC: 23 MMOL/L — SIGNIFICANT CHANGE UP (ref 22–31)
CREAT SERPL-MCNC: 1.38 MG/DL — HIGH (ref 0.5–1.3)
EGFR: 52 ML/MIN/1.73M2 — LOW
GLUCOSE BLDC GLUCOMTR-MCNC: 87 MG/DL — SIGNIFICANT CHANGE UP (ref 70–99)
GLUCOSE SERPL-MCNC: 100 MG/DL — HIGH (ref 70–99)
HCT VFR BLD CALC: 53.3 % — HIGH (ref 39–50)
HGB BLD-MCNC: 17.9 G/DL — HIGH (ref 13–17)
MCHC RBC-ENTMCNC: 30.9 PG — SIGNIFICANT CHANGE UP (ref 27–34)
MCHC RBC-ENTMCNC: 33.6 GM/DL — SIGNIFICANT CHANGE UP (ref 32–36)
MCV RBC AUTO: 92.1 FL — SIGNIFICANT CHANGE UP (ref 80–100)
PLATELET # BLD AUTO: 135 K/UL — LOW (ref 150–400)
POTASSIUM SERPL-MCNC: 4.1 MMOL/L — SIGNIFICANT CHANGE UP (ref 3.5–5.3)
POTASSIUM SERPL-SCNC: 4.1 MMOL/L — SIGNIFICANT CHANGE UP (ref 3.5–5.3)
RBC # BLD: 5.79 M/UL — SIGNIFICANT CHANGE UP (ref 4.2–5.8)
RBC # FLD: 13.9 % — SIGNIFICANT CHANGE UP (ref 10.3–14.5)
SODIUM SERPL-SCNC: 139 MMOL/L — SIGNIFICANT CHANGE UP (ref 135–145)
WBC # BLD: 7.01 K/UL — SIGNIFICANT CHANGE UP (ref 3.8–10.5)
WBC # FLD AUTO: 7.01 K/UL — SIGNIFICANT CHANGE UP (ref 3.8–10.5)

## 2023-05-11 PROCEDURE — 93306 TTE W/DOPPLER COMPLETE: CPT | Mod: 26

## 2023-05-11 PROCEDURE — 99239 HOSP IP/OBS DSCHRG MGMT >30: CPT

## 2023-05-11 PROCEDURE — 99232 SBSQ HOSP IP/OBS MODERATE 35: CPT

## 2023-05-11 RX ORDER — ATORVASTATIN CALCIUM 80 MG/1
1 TABLET, FILM COATED ORAL
Qty: 30 | Refills: 0
Start: 2023-05-11 | End: 2023-06-09

## 2023-05-11 RX ORDER — ASPIRIN/CALCIUM CARB/MAGNESIUM 324 MG
1 TABLET ORAL
Qty: 30 | Refills: 0
Start: 2023-05-11 | End: 2023-06-09

## 2023-05-11 RX ADMIN — Medication 25 MILLIGRAM(S): at 09:56

## 2023-05-11 RX ADMIN — Medication 88 MICROGRAM(S): at 05:35

## 2023-05-11 RX ADMIN — Medication 81 MILLIGRAM(S): at 09:56

## 2023-05-11 RX ADMIN — FAMOTIDINE 20 MILLIGRAM(S): 10 INJECTION INTRAVENOUS at 09:56

## 2023-05-11 RX ADMIN — MAGNESIUM OXIDE 400 MG ORAL TABLET 400 MILLIGRAM(S): 241.3 TABLET ORAL at 09:56

## 2023-05-11 NOTE — OCCUPATIONAL THERAPY INITIAL EVALUATION ADULT - VISUAL ASSESSMENT: TRACKING
functional pursuits, saccades, filed testing. Participated in several visual written assessments and appears functional- no filed cuts noted, ambulated without falling into L side/normal

## 2023-05-11 NOTE — DISCHARGE NOTE PROVIDER - CARE PROVIDERS DIRECT ADDRESSES
[email or web address]

## 2023-05-11 NOTE — OCCUPATIONAL THERAPY INITIAL EVALUATION ADULT - GENERAL OBSERVATIONS, REHAB EVAL
Patient received semi-supine in bed, NAD, BP:142/88    HR: 78 o2: 99  . Patient left as received    , VSS, NAD, all lines intact and all items within reach.

## 2023-05-11 NOTE — PROGRESS NOTE ADULT - ASSESSMENT
79 y/o M with PMHx of HTN, A-fib on Xarelto, kidney ca s/p R nephrectomy, Right lung nodule, skin CA presented to  ED on 5/9/23 at 18.45 hrs with c/o left facial droop associated with difficulty speaking. Pt reports waking up yesterday and "feeling off", was in [facility] the previous night, he baby sat his grand-daughter, later on his daughter noted he had a left facial droop, speech was slurred, shortly after his balance was off resulted in a fall. In ED, /116 -> 152/84, Code Stroke called, NIHSS -1, CTH and CTA H/N negative for acute hmg, core infarct or LVO. EDP spoke with Tele-neurologist, advised no TNK as pt on Xarelto and INR 1.36/PTT 38.3.     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

## 2023-05-11 NOTE — DISCHARGE NOTE PROVIDER - NSDCFUSCHEDAPPT_GEN_ALL_CORE_FT
Dylan Manning  [facility]  ELECTROPH [address]  Scheduled Appointment: 05/12/2023    [facility] [address]  Scheduled Appointment: 06/01/2023    Nuzhat Merlos  [facility]  ENDOCRIN [address] [facility]  Scheduled Appointment: 07/10/2023    Rudolph Lopes  [facility]  CARDIOLOGY [address]  Scheduled Appointment: 08/09/2023

## 2023-05-11 NOTE — PROGRESS NOTE ADULT - SUBJECTIVE AND OBJECTIVE BOX
PT has noted improvement of speech, has no other complaints    MRI brain shows acute 2.0 cm infarct extending from right ganglio-capsular region into right corona radiata. No evidence of hemorrhagic conversion. Chronic lacunar infarcts left corona radiata and basal ganglia seen.    Echo done     ROS: As above, other ROS Negative    MEDICATIONS  (STANDING):  aspirin enteric coated 81 milliGRAM(s) Oral daily  atorvastatin 80 milliGRAM(s) Oral at bedtime  famotidine    Tablet 20 milliGRAM(s) Oral two times a day  levothyroxine 88 MICROGram(s) Oral daily  magnesium oxide 400 milliGRAM(s) Oral daily  metoprolol succinate ER 25 milliGRAM(s) Oral daily  rivaroxaban 20 milliGRAM(s) Oral with dinner      Vital Signs Last 24 Hrs  T(C): 36.5 (11 May 2023 08:01), Max: 36.7 (10 May 2023 17:40)  T(F): 97.7 (11 May 2023 08:01), Max: 98.1 (10 May 2023 21:14)  HR: 82 (11 May 2023 08:01) (68 - 82)  BP: 138/94 (11 May 2023 08:01) (119/71 - 138/94)  BP(mean): --  RR: 17 (11 May 2023 08:01) (17 - 18)  SpO2: 95% (11 May 2023 08:01) (95% - 96%)    Parameters below as of 11 May 2023 08:01  Patient On (Oxygen Delivery Method): room air    Gen exam:  Normocephalic, in no distress, awake and alert.  HEENT: PERRLA, EOMI,     Neurological exam:  HF: A x O x 3. Appropriately interactive, normal affect. Mild dysarthria, No Aphasia or paraphasic errors. Naming /repetition intact   CN: GARY, EOMI, VFF, facial sensation normal, left NLFD, tongue midline, Palate moves equally, SCM equal bilaterally  Motor: No pronator drift, Strength 5/5 in all 4 ext, normal bulk and tone, no tremor, rigidity.    Sens: Intact to light touch / PP    Reflexes: Symmetric and normal . downgoing toes b/l  Coord:  No FNFA, dysmetria, CAT intact   Gait/Balance: Not tested    NIHSS: 2                        17.9   7.01  )-----------( 135      ( 11 May 2023 07:21 )             53.3     05-11    139  |  109<H>  |  21  ----------------------------<  100<H>  4.1   |  23  |  1.38<H>    Ca    8.4<L>      11 May 2023 07:21    TPro  7.1  /  Alb  3.5  /  TBili  1.5<H>  /  DBili  x   /  AST  19  /  ALT  22  /  AlkPhos  84  05-09      05-10 Chol 205<H> LDL -- HDL 47 Trig 117    Radiology report:  - < from: MR Head No Cont (05.10.23 @ 16:22) >  IMPRESSION:  1. Approximate 2.0 cm acute infarct extending from the right   ganglio-capsular region into the ipsilateral corona radiata. No evidence   of hemorrhagic conversion.  2. Chronic lacunar infarcts left corona radiata and basal ganglia as well   as findings most concordant with chronic microvascular ischemic change.  3. Additional findings described in detail above.    < from: CT Brain Perfusion Maps Stroke (05.09.23 @ 19:12) >    IMPRESSION:    No acute intracranial hemorrhage or acute territorial infarct. If acute   ischemia is a strong clinical concern, MRI exam is recommended for   further evaluation if there is no contraindication. Dr. Nathan notified   5/9/2023 at 7:14 PM    No hemodynamically significant stenosis.    Abnormal CT perfusion findings as described above most compatible with   artifacts. MRI exam recommended

## 2023-05-11 NOTE — OCCUPATIONAL THERAPY INITIAL EVALUATION ADULT - NSACTIVITYREC_GEN_A_OT
Pt presents close to functional baseline and is discharged from OT services at this time. Please re-order if status changes

## 2023-05-11 NOTE — CDI QUERY NOTE - NSCDIOTHERTXTBX_GEN_ALL_CORE_HH
Clinical documentation indicates that this patient has atrial fibrillation.     Per H+P: " afib on Xarelto, "  "EKG with rate controlled afib"    Please clarify the type of Afib  - Chronic:  - Paroxysmal:   - Permanent:    - Persistent:   - Other, please clarify   - Unknown:

## 2023-05-11 NOTE — DISCHARGE NOTE PROVIDER - NSDCMRMEDTOKEN_GEN_ALL_CORE_FT
amLODIPine 2.5 mg oral tablet: 1 tab(s) orally once a day  aspirin 81 mg oral delayed release tablet: 1 tab(s) orally once a day  atorvastatin 80 mg oral tablet: 1 tab(s) orally once a day (at bedtime)  famotidine 20 mg oral tablet: 1 tab(s) orally 2 times a day  levothyroxine 88 mcg (0.088 mg) oral tablet: 1 tab(s) orally once a day  lisinopril 40 mg oral tablet: 1 tab(s) orally once a day (in the morning)  magnesium oxide 400 mg oral tablet: 1 tab(s) orally once a day  metoprolol succinate 25 mg oral tablet, extended release: 1 tab(s) orally once a day (in the evening)  metoprolol succinate 25 mg oral tablet, extended release: 0.5 tab(s) orally once a day (in the morning)  Multiple Vitamins oral tablet: 1 tab(s) orally once a day  PreserVision AREDS 2 oral capsule: 1 cap(s) orally once a day  sildenafil 100 mg oral tablet: 1 tab(s) orally once a day, As Needed  Xarelto 20 mg oral tablet: 1 tab(s) orally once a day (in the evening)

## 2023-05-11 NOTE — OCCUPATIONAL THERAPY INITIAL EVALUATION ADULT - PERTINENT HX OF CURRENT PROBLEM, REHAB EVAL
79 y/o Male with PMHx of HTN, afib on Xarelto, GERD, kidney ca s/p R nephrectomy (2019), Right lung nodule s/p wedge resection (2021), skin cancer, colon polyp, ED, BPH, hemorrhoids, incisional hernia s/p repair (2020), presented to  ED with c/o left facial droop associated with difficulty speaking. Pt reports waking up yesterday and "feeling off", was in [facility] night prior. Pt then went to baby sit for his daughter, who noted later in day patient had a left facial droop and having issues with speech. Shortly after patient reports having issue with his balance resulting in a mechanical fall - denies LOC or head strike. Denies numbness/tingling, vision changes, focal weakness. Patient otherwise denies any current chest pain, SOB, cough, f/c/n/v/d, abd pain, LE edema, myalgias, dysuria, HA, dizziness.

## 2023-05-11 NOTE — DISCHARGE NOTE PROVIDER - HOSPITAL COURSE
Physical Exam:  Vital Signs Last 24 Hrs  T(C): 36.5 (11 May 2023 08:01), Max: 36.7 (10 May 2023 17:40)  T(F): 97.7 (11 May 2023 08:01), Max: 98.1 (10 May 2023 21:14)  HR: 82 (11 May 2023 08:01) (68 - 82)  BP: 138/94 (11 May 2023 08:01) (119/71 - 138/94)  RR: 17 (11 May 2023 08:01) (17 - 18)  SpO2: 95% (11 May 2023 08:01) (95% - 96%)    Parameters below as of 11 May 2023 08:01  Patient On (Oxygen Delivery Method): room air    Constitutional: NAD, awake and alert  HEENT: PERRLA, EOMI, MMM  Respiratory: Breath sounds are clear bilaterally, No wheezing, rales or rhonchi  Cardiovascular: S1 and S2, irregular irregular, no murmurs, gallops or rubs  Gastrointestinal: +BS, soft, non-tender, non-distended, no CVA tenderness  Extremities: No peripheral edema, +DP pulses b/l  Neurological: A&O x 3, +mild left facial droop, +mild issues with speech - improving  Musculoskeletal: 5/5 strength b/l upper and lower extremities  Skin: Normal, skin warm and dry    Assessment/Plan:  79 y/o Male with PMHx of HTN, chronic afib on Xarelto, GERD, kidney ca s/p R nephrectomy (2019), Right lung nodule s/p wedge resection (2021), skin cancer, colon polyp, ED, BPH, hemorrhoids, incisional hernia s/p repair (2020), presented to  ED with c/o left facial droop associated with difficulty speaking. Pt reports waking up yesterday and "feeling off", was in [facility] night prior. Pt then went to baby sit for his daughter, who noted later in day patient had a left facial droop and having issues with speech. Shortly after patient reports having issue with his balance resulting in a mechanical fall - denies LOC or head strike. Denies numbness/tingling, vision changes, focal weakness. Patient otherwise denies any current chest pain, SOB, cough, f/c/n/v/d, abd pain, LE edema, myalgias, dysuria, HA, dizziness.     In ED, BP elevated 188/116 -> 152/84, rest of VSS. CODE STROKE called, CT head and CTA H/N negative for acute pathology. EKG with rate controlled afib. Received ASA 324mg x1, 1L NS. Admitted for:    #Left facial droop, difficulty speaking due to 2.0 cm Acute infarct extending from right ganglio-capsular region into ipsilateral corona radiata   #Mechanical fall - no LOC or head strike   #HTN urgency (resolved)  - s/p telemetry - rate controlled afib  - CT head and CTA H/N negative for acute pathology  - Neurology consulted Dr. Bishop, follow up outpatient for further management  - MRI head with 2.0 cm acute infarct extending from the right ganglio-capsular region into the ipsilateral corona radiata. No evidence of hemorrhagic conversion. Also notes Chronic lacunar infarcts left corona radiata and basal ganglia  - TTE prelim with preserved EF, mild-moderate valvular disease   - C/w ASA 81mg, atorvastatin 80mg   - C/w Xarelto   - , continue statin. A1C 5.6  - Allowed for permissive HTN first 24 hrs - held amlodipine, lisinopril - resume on discharge  - PT/OT -- outpatient PT, no OT needs  - Speech and swallow eval appreciated    #Elevated H/H  - Hgb 17.9 / HCT 53.3 -> recommend outpatient follow up with pt's hematology/oncology Dr. Morfin     #Hx of HTN, chronic afib on Xarelto, GERD, kidney ca s/p R nephrectomy (2019), Right lung nodule s/p wedge resection (2021), skin cancer, colon polyp, ED, BPH, hemorrhoids, incisional hernia s/p repair (2020)  - c/w home medications, outpatient follow up for further management   - c/w metoprolol for rate control, resume amlodipine/lisinopril upon discharge. Outpatient follow up with Cardiologist Dr. Lopes     Dispo: discharge to home in stable condition    Final diagnosis, treatment plan, and follow-up recommendations were discussed and explained to the patient. The patient was given an opportunity to ask questions concerning the diagnosis and treatment plan. The patient acknowledged understanding of the diagnosis, treatment, and follow-up recommendations. The patient was advised to seek urgent care upon discharge if worsening symptoms develop prior to scheduled follow-up. Time spent on discharge included time with the patient, and also coordinating discharge care as outlined below.    Total time spent: 50 min

## 2023-05-11 NOTE — OCCUPATIONAL THERAPY INITIAL EVALUATION ADULT - LEVEL OF INDEPENDENCE: SIT/STAND, REHAB EVAL
Pt Discharged in stable condition, VSS, no signs of distress, discharge instructions and meds reviewed. Pt verbalizes understanding and states no further questions or concerns unaddressed.        Adrianna Mcduffie RN  08/21/20 4047 supervision

## 2023-05-11 NOTE — DISCHARGE NOTE PROVIDER - CARE PROVIDER_API CALL
Jaank Bishop)  Neurology  [address], [address]  [place] [address]  Phone: [phone]  Fax: [phone]  Established Patient  Follow Up Time: 2 months    Rudolph Lopes)  Cardiovascular Disease; Internal Medicine  [address], [address]  [place] [address]  Phone: [phone]  Fax: [phone]  Established Patient  Follow Up Time: 2 weeks    Catrina Morfin)  Hematology; Medical Oncology  [address] [place] [address]  Phone: [phone]  Fax: [phone]  Established Patient  Follow Up Time: 2 weeks    Román Ibarra)  Internal Medicine; Pulmonary Disease  [address], [address]  [place] [address]  Phone: [phone]  Fax: [phone]  Established Patient  Follow Up Time: 1 month

## 2023-05-11 NOTE — OCCUPATIONAL THERAPY INITIAL EVALUATION ADULT - MD ORDER
MD orders received. Chart reviewed, contents noted, conferred with RN.  Pt tolerated OT evaluation, see initial evaluation for findings.

## 2023-05-11 NOTE — DISCHARGE NOTE PROVIDER - REASON FOR ADMISSION
PATIENT RETURNED YOUR CALL TO DISCUSS HIS RESULTS AND INSTRUCTIONS FROM [facility]   left facial droop, difficulty speaking  Acute CVA

## 2023-05-11 NOTE — DISCHARGE NOTE PROVIDER - NSDCCPCAREPLAN_GEN_ALL_CORE_FT
PRINCIPAL DISCHARGE DIAGNOSIS  Diagnosis: Acute cerebrovascular accident (CVA)  Assessment and Plan of Treatment: WHAT IS A STROKE? A stroke (CVA) occurs when blood flow to brain is interrupted causing lack of oxygen.  THINGS TO DO: (1) Manage your health conditions like diabetes or hypertension (2) Monitor your blood pressure (3) Avoid nicotine products – smoking can cause damage to your blood vessels and increase your risk for a stroke (4) Maintain a healthy weight (5) Stay active with exercise (6) Limit or avoid alcohol intake – alcohol can raise your blood pressure (7) Eat heart healthy foods like fruits, vegetables, and whole grains  MONITOR THESE SIGNS AND SYMPTOMS: (1) Loss of balance or confusion (2) Double vision or vision loss (3) Chest pain or shortness of breath (4) Trouble swallowing. If you experience any of these, DO alert your primary care provider, or return to the Emergency Department if you feel very sick.   MRI scan of head with 2.0 cm acute infarct extending from the right ganglio-capsular region into the ipsilateral corona radiata   Continue Xarelto 20mg once a day  Start Aspirin 81mg once a day  Start Atorvastatin 80mg once a day at bedtime  Recommend outpatient physical therapy 3x per week for 1 month, prescription provided  Follow up with Neurology Dr. Bishop in 2 months outpatient for further management of stroke         SECONDARY DISCHARGE DIAGNOSES  Diagnosis: Hypertension  Assessment and Plan of Treatment: Continue Metoprolol, Lisinopril, Amlodipine as originally prescribed  Recommend outpatient follow up with Cardiology Dr. Lopes for management of blood pressure    Diagnosis: Chronic atrial fibrillation  Assessment and Plan of Treatment: Continue metoprolol, xarelto    Diagnosis: Elevated hemoglobin  Assessment and Plan of Treatment: hemoglobin 17.9 / Hematocrit 53.3 -> recommend outpatient follow up with hematology/oncology Dr. Morfin

## 2023-05-11 NOTE — DISCHARGE NOTE PROVIDER - PROVIDER TOKENS
PROVIDER:[TOKEN:[[id number]],FOLLOWUP:[2 months],ESTABLISHEDPATIENT:[T]],PROVIDER:[TOKEN:[[id number]],FOLLOWUP:[2 weeks],ESTABLISHEDPATIENT:[T]],PROVIDER:[TOKEN:[[id number]],FOLLOWUP:[2 weeks],ESTABLISHEDPATIENT:[T]],PROVIDER:[TOKEN:[[id number]],FOLLOWUP:[1 month],ESTABLISHEDPATIENT:[T]]

## 2023-05-12 ENCOUNTER — APPOINTMENT (OUTPATIENT)
Dept: ELECTROPHYSIOLOGY | Facility: CLINIC | Age: 78
End: 2023-05-12
Payer: MEDICARE

## 2023-05-12 ENCOUNTER — NON-APPOINTMENT (OUTPATIENT)
Age: 78
End: 2023-05-12

## 2023-05-12 VITALS
OXYGEN SATURATION: 95 % | DIASTOLIC BLOOD PRESSURE: 83 MMHG | BODY MASS INDEX: 26.2 KG/M2 | HEIGHT: 70 IN | HEART RATE: 83 BPM | WEIGHT: 183 LBS | RESPIRATION RATE: 14 BRPM | SYSTOLIC BLOOD PRESSURE: 156 MMHG

## 2023-05-12 DIAGNOSIS — I48.19 OTHER PERSISTENT ATRIAL FIBRILLATION: ICD-10-CM

## 2023-05-12 PROCEDURE — 99213 OFFICE O/P EST LOW 20 MIN: CPT

## 2023-05-15 DIAGNOSIS — Z79.890 HORMONE REPLACEMENT THERAPY: ICD-10-CM

## 2023-05-15 DIAGNOSIS — K21.9 GASTRO-ESOPHAGEAL REFLUX DISEASE WITHOUT ESOPHAGITIS: ICD-10-CM

## 2023-05-15 DIAGNOSIS — R29.702 NIHSS SCORE 2: ICD-10-CM

## 2023-05-15 DIAGNOSIS — I63.9 CEREBRAL INFARCTION, UNSPECIFIED: ICD-10-CM

## 2023-05-15 DIAGNOSIS — Z85.528 PERSONAL HISTORY OF OTHER MALIGNANT NEOPLASM OF KIDNEY: ICD-10-CM

## 2023-05-15 DIAGNOSIS — Z79.01 LONG TERM (CURRENT) USE OF ANTICOAGULANTS: ICD-10-CM

## 2023-05-15 DIAGNOSIS — I48.20 CHRONIC ATRIAL FIBRILLATION, UNSPECIFIED: ICD-10-CM

## 2023-05-15 DIAGNOSIS — I16.0 HYPERTENSIVE URGENCY: ICD-10-CM

## 2023-05-15 DIAGNOSIS — Z90.5 ACQUIRED ABSENCE OF KIDNEY: ICD-10-CM

## 2023-05-15 DIAGNOSIS — Z85.828 PERSONAL HISTORY OF OTHER MALIGNANT NEOPLASM OF SKIN: ICD-10-CM

## 2023-05-15 DIAGNOSIS — N40.0 BENIGN PROSTATIC HYPERPLASIA WITHOUT LOWER URINARY TRACT SYMPTOMS: ICD-10-CM

## 2023-05-15 DIAGNOSIS — R29.810 FACIAL WEAKNESS: ICD-10-CM

## 2023-05-15 DIAGNOSIS — R91.1 SOLITARY PULMONARY NODULE: ICD-10-CM

## 2023-05-22 ENCOUNTER — RESULT CHARGE (OUTPATIENT)
Age: 78
End: 2023-05-22

## 2023-05-23 ENCOUNTER — APPOINTMENT (OUTPATIENT)
Dept: INTERNAL MEDICINE | Facility: CLINIC | Age: 78
End: 2023-05-23
Payer: MEDICARE

## 2023-05-23 ENCOUNTER — NON-APPOINTMENT (OUTPATIENT)
Age: 78
End: 2023-05-23

## 2023-05-23 VITALS
OXYGEN SATURATION: 98 % | DIASTOLIC BLOOD PRESSURE: 84 MMHG | BODY MASS INDEX: 28.79 KG/M2 | HEIGHT: 68 IN | HEART RATE: 65 BPM | WEIGHT: 189.99 LBS | SYSTOLIC BLOOD PRESSURE: 132 MMHG | TEMPERATURE: 98 F

## 2023-05-23 PROCEDURE — 99215 OFFICE O/P EST HI 40 MIN: CPT

## 2023-05-23 NOTE — PHYSICAL EXAM
[No Acute Distress] : no acute distress [Well Nourished] : well nourished [Well Developed] : well developed [Well-Appearing] : well-appearing [Normal Sclera/Conjunctiva] : normal sclera/conjunctiva [PERRL] : pupils equal round and reactive to light [EOMI] : extraocular movements intact [Normal Outer Ear/Nose] : the outer ears and nose were normal in appearance [Normal Oropharynx] : the oropharynx was normal [No JVD] : no jugular venous distention [No Lymphadenopathy] : no lymphadenopathy [Supple] : supple [Thyroid Normal, No Nodules] : the thyroid was normal and there were no nodules present [No Respiratory Distress] : no respiratory distress  [No Accessory Muscle Use] : no accessory muscle use [Clear to Auscultation] : lungs were clear to auscultation bilaterally [Normal Rate] : normal rate  [Regular Rhythm] : with a regular rhythm [Normal S1, S2] : normal S1 and S2 [No Murmur] : no murmur heard [No Carotid Bruits] : no carotid bruits [No Abdominal Bruit] : a ~M bruit was not heard ~T in the abdomen [No Varicosities] : no varicosities [Pedal Pulses Present] : the pedal pulses are present [No Edema] : there was no peripheral edema [No Extremity Clubbing/Cyanosis] : no extremity clubbing/cyanosis [No Palpable Aorta] : no palpable aorta [Soft] : abdomen soft [Non Tender] : non-tender [Non-distended] : non-distended [No Masses] : no abdominal mass palpated [No HSM] : no HSM [Normal Bowel Sounds] : normal bowel sounds [Normal Posterior Cervical Nodes] : no posterior cervical lymphadenopathy [Normal Anterior Cervical Nodes] : no anterior cervical lymphadenopathy [No CVA Tenderness] : no CVA  tenderness [No Joint Swelling] : no joint swelling [No Spinal Tenderness] : no spinal tenderness [Grossly Normal Strength/Tone] : grossly normal strength/tone [No Rash] : no rash [Coordination Grossly Intact] : coordination grossly intact [No Focal Deficits] : no focal deficits [Normal Gait] : normal gait [Deep Tendon Reflexes (DTR)] : deep tendon reflexes were 2+ and symmetric [Normal Affect] : the affect was normal [Normal Insight/Judgement] : insight and judgment were intact

## 2023-05-23 NOTE — HISTORY OF PRESENT ILLNESS
[FreeTextEntry1] : Stroke [de-identified] : Mr. Johnson presents for hospital follow-up evaluation.  He was admitted to [facility] from 5/9/2023 through 5/11/2023.  He presented to the emergency room complaining of left facial droop associated with difficulty speaking.  The patient states that he woke the day before feeling off.  He went to [facility] for his daughter who noted later in the day that the patient had a left facial droop and difficulty speaking.  Shortly after the patient began having difficulty with balance resulting in a mechanical fall.  Ambulance was called and he was brought to the emergency room.  Initially blood pressure was 188/116 and then decreased to 152/84.  A code stroke was called.  CT of the head and CTA of the head and neck were negative.  He was seen by neurology.  MRI of the brain showed a 2.0 cm acute infarct extending from the right ganglial capsular region into the ipsilateral mendoza radiata.  Mr. Johnson had an echocardiogram which showed no evidence of PFO.  He was seen by Dr. Bishop for neurology.  Cardiology evaluation Dr. Lopes.\par Mr. Johnson now presents for follow-up evaluation.  He is feeling well.  He has no difficulty speaking.  There is no left-sided weakness.  Mr. Johnson has no difficulty with ambulation.

## 2023-05-23 NOTE — PLAN
[FreeTextEntry1] : Mr. Johnson presents for follow-up evaluation.\par \par 1.  He will continue on current medication regimen which has been reviewed and revised.\par \par 2.  Patient presented to [facility] on 5/9/2023 with left facial droop and dysphagia.  MRI revealed a right-sided stroke.  Radiology results, echocardiogram results, specialist consultations and hospital course reviewed.\par \par 3.  Mr. Johnson will repeat CBC, CMP and lipid profile in 6 to 8 weeks.\par \par 4.  Neurology follow-up with Dr. Bishop.\par \par 5.  Follow-up in this office in 6 months.\par \par 5.  Cardiology follow-up with .

## 2023-05-24 ENCOUNTER — NON-APPOINTMENT (OUTPATIENT)
Age: 78
End: 2023-05-24

## 2023-05-24 ENCOUNTER — APPOINTMENT (OUTPATIENT)
Dept: CARDIOLOGY | Facility: CLINIC | Age: 78
End: 2023-05-24
Payer: MEDICARE

## 2023-05-24 VITALS
HEIGHT: 68 IN | HEART RATE: 40 BPM | BODY MASS INDEX: 29.4 KG/M2 | WEIGHT: 194 LBS | SYSTOLIC BLOOD PRESSURE: 136 MMHG | DIASTOLIC BLOOD PRESSURE: 78 MMHG | OXYGEN SATURATION: 98 %

## 2023-05-24 VITALS — DIASTOLIC BLOOD PRESSURE: 80 MMHG | SYSTOLIC BLOOD PRESSURE: 140 MMHG

## 2023-05-24 PROCEDURE — 99214 OFFICE O/P EST MOD 30 MIN: CPT

## 2023-05-24 PROCEDURE — 93000 ELECTROCARDIOGRAM COMPLETE: CPT

## 2023-05-24 NOTE — PHYSICAL EXAM
[No Acute Distress] : no acute distress [Clear Lung Fields] : clear lung fields [Normal Gait] : normal gait [No Edema] : no edema [Alert and Oriented] : alert and oriented [de-identified] : Wearing  a facemask [Well Developed] : well developed [Well Nourished] : well nourished [Normal Conjunctiva] : normal conjunctiva [Normal Venous Pressure] : normal venous pressure [No Carotid Bruit] : no carotid bruit [Soft] : abdomen soft [No Rash] : no rash [Moves all extremities] : moves all extremities [de-identified] : Irregularly irregular,  normal rate

## 2023-05-24 NOTE — DISCUSSION/SUMMARY
[With Me] : with me [___ Month(s)] : in [unfilled] month(s) [FreeTextEntry1] : Atrial fibrillation:   Permanent, h/o  3-second pause on ECG but he has an ILR that is monitored by Dr. Manning, no significant events; rate controlled.  Continue metoprolol, Xarelto.\par \par Hypertension: Borderline. States he does not add salt to his foods but does go out for dinner frequently.  Recommend increasing amlodipine from 2.5mg daily to 5mg daily.  He will continue all other meds.  Low sodium diet\par \par Acute cerebrovascular accident (CVA):  Pt without residual weakness.  Continue Xarelto 20mg once a day,  Aspirin 81mg once a day,  Atorvastatin 80mg daily.  Labs 6 weeks.  Pt declined out patient physical therapy.  Pt has f/u with Neurology Dr. Bishop.   \par  \par Elevated hemoglobin: follow up with hematology/oncology Dr. Morfin\par \par Follow up 3 months [EKG obtained to assist in diagnosis and management of assessed problem(s)] : EKG obtained to assist in diagnosis and management of assessed problem(s)

## 2023-05-24 NOTE — REVIEW OF SYSTEMS
[Weight Gain (___ Lbs)] : no recent weight gain [SOB] : no shortness of breath [Dyspnea on exertion] : not dyspnea during exertion [Chest Discomfort] : no chest discomfort [Lower Ext Edema] : no extremity edema [Leg Claudication] : no intermittent leg claudication [Palpitations] : no palpitations [Orthopnea] : no orthopnea [PND] : no PND [Syncope] : no syncope [Negative] : Heme/Lymph

## 2023-05-24 NOTE — HISTORY OF PRESENT ILLNESS
[FreeTextEntry1] : Denny Johnson is an 78-year-old man with a history of atrial fibrillation, implantable loop monitor, hypertension, chronic renal insufficiency, renal cell carcinoma status post right laparoscopic radical nephrectomy (12/2019) with metastasis to lung (s/p RUL wedge resection in October 2021), and hypothyroidism who presents today for hospital follow up. He recently presented to  with left facial droop and difficulty speaking.  BP elevated in ED. CODE STROKE called, CT head and CTA H/N negative for acute pathology. EKG with rate controlled afib.  Received ASA 324mg x1.  MRI head with 2.0 cm acute infarct extending from the right ganglio-capsular region into the ipsilateral corona radiata. No evidence of hemorrhagic conversion. Also notes Chronic lacunar infarcts left corona radiata and basal ganglia.  He is feeling well and offers no complaints of chest pain or shortness of breath. He has no difficulty speaking or with ambulation.  No residual weakness.  \par  \par

## 2023-05-24 NOTE — CARDIOLOGY SUMMARY
[No Ischemia] : no Ischemia [___] : [unfilled] [LVEF ___%] : LVEF [unfilled]% [___] : [unfilled] [de-identified] : 5/24/23: afib 71 BPM [de-identified] : 5/2023 NL LVF, EF 55-60%, mild-mod LVH, LA mod dil, mod AI, mild-mod MR/TR

## 2023-05-30 ENCOUNTER — OUTPATIENT (OUTPATIENT)
Dept: OUTPATIENT SERVICES | Facility: HOSPITAL | Age: 78
LOS: 1 days | Discharge: HOME | End: 2023-05-30

## 2023-05-30 DIAGNOSIS — Z98.890 OTHER SPECIFIED POSTPROCEDURAL STATES: Chronic | ICD-10-CM

## 2023-05-30 DIAGNOSIS — C64.9 MALIGNANT NEOPLASM OF UNSPECIFIED KIDNEY, EXCEPT RENAL PELVIS: ICD-10-CM

## 2023-05-30 DIAGNOSIS — Z90.5 ACQUIRED ABSENCE OF KIDNEY: Chronic | ICD-10-CM

## 2023-05-30 DIAGNOSIS — Z90.49 ACQUIRED ABSENCE OF OTHER SPECIFIED PARTS OF DIGESTIVE TRACT: Chronic | ICD-10-CM

## 2023-05-30 DIAGNOSIS — Z90.89 ACQUIRED ABSENCE OF OTHER ORGANS: Chronic | ICD-10-CM

## 2023-06-10 ENCOUNTER — EMERGENCY (EMERGENCY)
Facility: HOSPITAL | Age: 78
LOS: 0 days | Discharge: HOME | End: 2023-06-10
Attending: EMERGENCY MEDICINE
Payer: MEDICARE

## 2023-06-10 VITALS
HEART RATE: 74 BPM | OXYGEN SATURATION: 99 % | DIASTOLIC BLOOD PRESSURE: 70 MMHG | RESPIRATION RATE: 18 BRPM | SYSTOLIC BLOOD PRESSURE: 122 MMHG | TEMPERATURE: 98 F

## 2023-06-10 VITALS
OXYGEN SATURATION: 98 % | WEIGHT: 184.97 LBS | SYSTOLIC BLOOD PRESSURE: 148 MMHG | HEART RATE: 80 BPM | DIASTOLIC BLOOD PRESSURE: 82 MMHG | TEMPERATURE: 98 F | HEIGHT: 68 IN | RESPIRATION RATE: 18 BRPM

## 2023-06-10 DIAGNOSIS — R31.9 HEMATURIA, UNSPECIFIED: ICD-10-CM

## 2023-06-10 DIAGNOSIS — Z98.890 OTHER SPECIFIED POSTPROCEDURAL STATES: Chronic | ICD-10-CM

## 2023-06-10 DIAGNOSIS — I10 ESSENTIAL (PRIMARY) HYPERTENSION: ICD-10-CM

## 2023-06-10 DIAGNOSIS — N52.9 MALE ERECTILE DYSFUNCTION, UNSPECIFIED: ICD-10-CM

## 2023-06-10 DIAGNOSIS — Z90.5 ACQUIRED ABSENCE OF KIDNEY: ICD-10-CM

## 2023-06-10 DIAGNOSIS — Z79.82 LONG TERM (CURRENT) USE OF ASPIRIN: ICD-10-CM

## 2023-06-10 DIAGNOSIS — Z85.528 PERSONAL HISTORY OF OTHER MALIGNANT NEOPLASM OF KIDNEY: ICD-10-CM

## 2023-06-10 DIAGNOSIS — Z90.5 ACQUIRED ABSENCE OF KIDNEY: Chronic | ICD-10-CM

## 2023-06-10 DIAGNOSIS — Z79.01 LONG TERM (CURRENT) USE OF ANTICOAGULANTS: ICD-10-CM

## 2023-06-10 DIAGNOSIS — Z95.818 PRESENCE OF OTHER CARDIAC IMPLANTS AND GRAFTS: ICD-10-CM

## 2023-06-10 DIAGNOSIS — Z86.73 PERSONAL HISTORY OF TRANSIENT ISCHEMIC ATTACK (TIA), AND CEREBRAL INFARCTION WITHOUT RESIDUAL DEFICITS: ICD-10-CM

## 2023-06-10 DIAGNOSIS — Z90.89 ACQUIRED ABSENCE OF OTHER ORGANS: ICD-10-CM

## 2023-06-10 DIAGNOSIS — Z90.49 ACQUIRED ABSENCE OF OTHER SPECIFIED PARTS OF DIGESTIVE TRACT: Chronic | ICD-10-CM

## 2023-06-10 DIAGNOSIS — I48.91 UNSPECIFIED ATRIAL FIBRILLATION: ICD-10-CM

## 2023-06-10 DIAGNOSIS — Z90.89 ACQUIRED ABSENCE OF OTHER ORGANS: Chronic | ICD-10-CM

## 2023-06-10 DIAGNOSIS — R35.0 FREQUENCY OF MICTURITION: ICD-10-CM

## 2023-06-10 DIAGNOSIS — K21.9 GASTRO-ESOPHAGEAL REFLUX DISEASE WITHOUT ESOPHAGITIS: ICD-10-CM

## 2023-06-10 DIAGNOSIS — Z90.49 ACQUIRED ABSENCE OF OTHER SPECIFIED PARTS OF DIGESTIVE TRACT: ICD-10-CM

## 2023-06-10 LAB
ALBUMIN SERPL ELPH-MCNC: 3.7 G/DL — SIGNIFICANT CHANGE UP (ref 3.3–5)
ALP SERPL-CCNC: 97 U/L — SIGNIFICANT CHANGE UP (ref 40–120)
ALT FLD-CCNC: 37 U/L — SIGNIFICANT CHANGE UP (ref 12–78)
ANION GAP SERPL CALC-SCNC: 2 MMOL/L — LOW (ref 5–17)
APPEARANCE UR: ABNORMAL
APTT BLD: 45.5 SEC — HIGH (ref 27.5–35.5)
AST SERPL-CCNC: 27 U/L — SIGNIFICANT CHANGE UP (ref 15–37)
BACTERIA # UR AUTO: NEGATIVE — SIGNIFICANT CHANGE UP
BASOPHILS # BLD AUTO: 0.04 K/UL — SIGNIFICANT CHANGE UP (ref 0–0.2)
BASOPHILS NFR BLD AUTO: 0.6 % — SIGNIFICANT CHANGE UP (ref 0–2)
BILIRUB SERPL-MCNC: 2.4 MG/DL — HIGH (ref 0.2–1.2)
BILIRUB UR-MCNC: NEGATIVE — SIGNIFICANT CHANGE UP
BUN SERPL-MCNC: 21 MG/DL — SIGNIFICANT CHANGE UP (ref 7–23)
CALCIUM SERPL-MCNC: 8.9 MG/DL — SIGNIFICANT CHANGE UP (ref 8.5–10.1)
CHLORIDE SERPL-SCNC: 110 MMOL/L — HIGH (ref 96–108)
CO2 SERPL-SCNC: 28 MMOL/L — SIGNIFICANT CHANGE UP (ref 22–31)
COLOR SPEC: ABNORMAL
CREAT SERPL-MCNC: 1.36 MG/DL — HIGH (ref 0.5–1.3)
DIFF PNL FLD: ABNORMAL
EGFR: 53 ML/MIN/1.73M2 — LOW
EOSINOPHIL # BLD AUTO: 0.16 K/UL — SIGNIFICANT CHANGE UP (ref 0–0.5)
EOSINOPHIL NFR BLD AUTO: 2.5 % — SIGNIFICANT CHANGE UP (ref 0–6)
EPI CELLS # UR: SIGNIFICANT CHANGE UP
GLUCOSE SERPL-MCNC: 131 MG/DL — HIGH (ref 70–99)
GLUCOSE UR QL: NEGATIVE — SIGNIFICANT CHANGE UP
HCT VFR BLD CALC: 51.5 % — HIGH (ref 39–50)
HGB BLD-MCNC: 17.2 G/DL — HIGH (ref 13–17)
IMM GRANULOCYTES NFR BLD AUTO: 0.3 % — SIGNIFICANT CHANGE UP (ref 0–0.9)
INR BLD: 2.22 RATIO — HIGH (ref 0.88–1.16)
KETONES UR-MCNC: NEGATIVE — SIGNIFICANT CHANGE UP
LEUKOCYTE ESTERASE UR-ACNC: ABNORMAL
LYMPHOCYTES # BLD AUTO: 0.87 K/UL — LOW (ref 1–3.3)
LYMPHOCYTES # BLD AUTO: 13.6 % — SIGNIFICANT CHANGE UP (ref 13–44)
MCHC RBC-ENTMCNC: 31.2 PG — SIGNIFICANT CHANGE UP (ref 27–34)
MCHC RBC-ENTMCNC: 33.4 GM/DL — SIGNIFICANT CHANGE UP (ref 32–36)
MCV RBC AUTO: 93.3 FL — SIGNIFICANT CHANGE UP (ref 80–100)
MONOCYTES # BLD AUTO: 0.45 K/UL — SIGNIFICANT CHANGE UP (ref 0–0.9)
MONOCYTES NFR BLD AUTO: 7 % — SIGNIFICANT CHANGE UP (ref 2–14)
NEUTROPHILS # BLD AUTO: 4.88 K/UL — SIGNIFICANT CHANGE UP (ref 1.8–7.4)
NEUTROPHILS NFR BLD AUTO: 76 % — SIGNIFICANT CHANGE UP (ref 43–77)
NITRITE UR-MCNC: NEGATIVE — SIGNIFICANT CHANGE UP
PH UR: 7 — SIGNIFICANT CHANGE UP (ref 5–8)
PLATELET # BLD AUTO: 137 K/UL — LOW (ref 150–400)
POTASSIUM SERPL-MCNC: 3.9 MMOL/L — SIGNIFICANT CHANGE UP (ref 3.5–5.3)
POTASSIUM SERPL-SCNC: 3.9 MMOL/L — SIGNIFICANT CHANGE UP (ref 3.5–5.3)
PROT SERPL-MCNC: 7.2 GM/DL — SIGNIFICANT CHANGE UP (ref 6–8.3)
PROT UR-MCNC: 30 MG/DL
PROTHROM AB SERPL-ACNC: 26 SEC — HIGH (ref 10.5–13.4)
RBC # BLD: 5.52 M/UL — SIGNIFICANT CHANGE UP (ref 4.2–5.8)
RBC # FLD: 13.7 % — SIGNIFICANT CHANGE UP (ref 10.3–14.5)
RBC CASTS # UR COMP ASSIST: >50 /HPF (ref 0–4)
SODIUM SERPL-SCNC: 140 MMOL/L — SIGNIFICANT CHANGE UP (ref 135–145)
SP GR SPEC: 1.01 — SIGNIFICANT CHANGE UP (ref 1.01–1.02)
UROBILINOGEN FLD QL: NEGATIVE — SIGNIFICANT CHANGE UP
WBC # BLD: 6.42 K/UL — SIGNIFICANT CHANGE UP (ref 3.8–10.5)
WBC # FLD AUTO: 6.42 K/UL — SIGNIFICANT CHANGE UP (ref 3.8–10.5)
WBC UR QL: SIGNIFICANT CHANGE UP /HPF (ref 0–5)

## 2023-06-10 PROCEDURE — 74176 CT ABD & PELVIS W/O CONTRAST: CPT | Mod: ME

## 2023-06-10 PROCEDURE — 99284 EMERGENCY DEPT VISIT MOD MDM: CPT | Mod: 25

## 2023-06-10 PROCEDURE — 74176 CT ABD & PELVIS W/O CONTRAST: CPT | Mod: 26,ME

## 2023-06-10 PROCEDURE — 81001 URINALYSIS AUTO W/SCOPE: CPT

## 2023-06-10 PROCEDURE — 87086 URINE CULTURE/COLONY COUNT: CPT

## 2023-06-10 PROCEDURE — 85610 PROTHROMBIN TIME: CPT

## 2023-06-10 PROCEDURE — 80053 COMPREHEN METABOLIC PANEL: CPT

## 2023-06-10 PROCEDURE — 36415 COLL VENOUS BLD VENIPUNCTURE: CPT

## 2023-06-10 PROCEDURE — G1004: CPT

## 2023-06-10 PROCEDURE — 85730 THROMBOPLASTIN TIME PARTIAL: CPT

## 2023-06-10 PROCEDURE — 85025 COMPLETE CBC W/AUTO DIFF WBC: CPT

## 2023-06-10 PROCEDURE — 99285 EMERGENCY DEPT VISIT HI MDM: CPT | Mod: FS

## 2023-06-10 NOTE — ED STATDOCS - PATIENT PORTAL LINK FT
You can access the FollowMyHealth Patient Portal offered by [facility] by registering at the following website: [email or web address]. By joining [facility]’s FollowMyHealth portal, you will also be able to view your health information using other applications (apps) compatible with our system.

## 2023-06-10 NOTE — ED ADULT TRIAGE NOTE - CHIEF COMPLAINT QUOTE
Pt presented to the ER with c/o hematuria. Pt stated that it started last night. Pt denies any inability to urinate. Pt is taking Xarelto and ASA 81mg. Pt denies any dizziness, CP, or fevers.

## 2023-06-10 NOTE — ED ADULT NURSE NOTE - OBJECTIVE STATEMENT
presents to ed with hematuria beginning yesterday, patient states he denies pain, denies retention. is on xarelto for afib and baby asa since his stroke less than one month ago.

## 2023-06-10 NOTE — ED STATDOCS - NSFOLLOWUPINSTRUCTIONS_ED_ALL_ED_FT
Hematuria, Adult    Hematuria is blood in the urine. Blood may be visible in the urine, or it may be identified with a test. This condition can be caused by infections of the bladder, urethra, kidney, or prostate. Other possible causes include:  Kidney stones.  Cancer of the urinary tract.  Too much calcium in the urine.  Conditions that are passed from parent to child (inherited conditions).  Exercise that requires a lot of energy.  Infections can usually be treated with medicine, and a kidney stone usually will pass through your urine. If neither of these is the cause of your hematuria, more tests may be needed to identify the cause of your symptoms.    It is very important to tell your health care provider about any blood in your urine, even if it is painless or the blood stops without treatment. Blood in the urine, when it happens and then stops and then happens again, can be a symptom of a very serious condition, including cancer. There is no pain in the initial stages of many urinary cancers.    Follow these instructions at home:  Medicines    Take over-the-counter and prescription medicines only as told by your health care provider.  If you were prescribed an antibiotic medicine, take it as told by your health care provider. Do not stop taking the antibiotic even if you start to feel better.  Eating and drinking    Drink enough fluid to keep your urine pale yellow. It is recommended that you drink 3–4 quarts (2.8–3.8 L) a day. If you have been diagnosed with an infection, drinking cranberry juice in addition to large amounts of water is recommended.  Avoid caffeine, tea, and carbonated beverages. These tend to irritate the bladder.  Avoid alcohol because it may irritate the prostate (in males).  General instructions    If you have been diagnosed with a kidney stone, follow your health care provider's instructions about straining your urine to catch the stone.  Empty your bladder often. Avoid holding urine for long periods of time.  If you are female:  After a bowel movement, wipe from front to back and use each piece of toilet paper only once.  Empty your bladder before and after sex.  Pay attention to any changes in your symptoms. Tell your health care provider about any changes or any new symptoms.  It is up to you to get the results of any tests. Ask your health care provider, or the department that is doing the test, when your results will be ready.  Keep all follow-up visits. This is important.  Contact a health care provider if:  You develop back pain.  You have a fever or chills.  You have nausea or vomiting.  Your symptoms do not improve after 3 days.  Your symptoms get worse.  Get help right away if:  You develop severe vomiting and are unable to take medicine without vomiting.  You develop severe pain in your back or abdomen even though you are taking medicine.  You pass a large amount of blood in your urine.  You pass blood clots in your urine.  You feel very weak or like you might faint.  You faint.  Summary  Hematuria is blood in the urine. It has many possible causes.  It is very important that you tell your health care provider about any blood in your urine, even if it is painless or the blood stops without treatment.  Take over-the-counter and prescription medicines only as told by your health care provider.  Drink enough fluid to keep your urine pale yellow.

## 2023-06-10 NOTE — ED STATDOCS - PROGRESS NOTE DETAILS
79 yo male with a PMH of a fib on xarelto, CVA (had asa added to his regimen), htn, gerd presents with hematuria since yesterday. Pt states he notices blood every time he urinates since symptoms started and has been going frequently. Denies dysuria, abd pain, fever, back pain. Pt with appointment with Dr. Butler (uro) on tuesday.   No CVAT ttp. ABd soft nontender.   Will check labs, UA, CT to r/o mass and reeval. -Pelon Adames PA-C Labs unremarkable. UA with hematuria but no signs of infection. CT without any acute or concerning symptoms. Will reach to Dr quinn since pt is due to f/u with him tuesday. -Pelon Adames PA-C Spoke with Dr. Butler. States if h/h stable and pt can void then he can f/u in the office for cystoscopy. Discussed with pt who is aware and agrees with plan. -Pelon Adames PA-C

## 2023-06-10 NOTE — ED ADULT NURSE NOTE - NSFALLHARMRISKINTERV_ED_ALL_ED

## 2023-06-10 NOTE — ED STATDOCS - CARE PROVIDER_API CALL
Maco Butler Sandeep  Urology  [address] [place] [address], [address]  [place] [address]  Phone: [phone]  Fax: [phone]  Follow Up Time:

## 2023-06-10 NOTE — ED ADULT NURSE NOTE - SUICIDE SCREENING QUESTION 3
Patient comes to clinic for follow up anticoagulation visit. Last INR on 3/27/23 was 2.0.  Dose maintained.     Today's INR is 1.8 and is below goal range.  No changes reported. Current warfarin total weekly dose of 27.5 mg verified.  Informed the INR result is below therapeutic range and instructed to increase current dose by 2.5mg/wk (9%), per protocol. Discussed dose and return date of 5/12/23 for next INR. Patient  [place] out of the country very often and requested 5/12-per his work schedule. Writer reminded him of importance to call if he is running late or needs to reschedule. See Anticoagulation flowsheet.    Emily Ibarra NP is in the office today supervising the treatment.    Call your physician immediately if you notice any of the following symptoms of a blood clot:   · Sudden weakness in any limb  · Numbness or tingling anywhere  · Visual changes or loss of sight in either eye  · Sudden onset of slurred speech or inability to speak  · Dizziness or faintness  · New pain, swelling, redness or heat in any extremity  · New SOB or chest pain  Symptoms associated with blood clotting/low INR reviewed and verbalizes understanding.    Instructed to contact the clinic with any unusual bleeding or bruising, any changes in medications, diet, health status, lifestyle, or any other changes, questions or concerns. Verbalized understanding of all discussed.     
No

## 2023-06-10 NOTE — ED STATDOCS - PHYSICAL EXAMINATION
Gen: Well appearing in NAD  Head: NC/AT  Neck: trachea midline  Resp:  No distress  Ext: no deformities  Neuro:  A&O appears non focal  Skin:  Warm and dry as visualized  Psych:  Normal affect and mood  GI: soft, nontender, nondistended

## 2023-06-10 NOTE — ED STATDOCS - OBJECTIVE STATEMENT
77 yo male w/PMHx of Afib, HTN, GERD, CVA, kidney cancer not on chemo presents to the ED for hematuria. States that he is on Xarelto and baby aspirin. Pt has been having hematuria since yesterday, bleeding every time he urinates. Pt has had imaging of the bladder years ago with no major findings. Denies dysuria, abd pain, fever. Reports he has increased frequency since yesterday. No other complaints at this time.

## 2023-06-10 NOTE — ED STATDOCS - NSICDXFAMILYHX_GEN_ALL_CORE_FT
2020 50 y/o male presenting for a tele-med appointment regarding constipation and GERD. He has associated abdominal pain, bloating, and intermittent diarrhea or constipation. His symptoms began Friday before . It began with diarrhea. He had minimal cramping but no nausea or emesis. Within 24 hrs he began to have a fever; this eventually passed. The diarrhea eventually passed. Now he either has constipation or diarrhea. He cannot get "regulated" back to normal. Last Wednesday night he had 12 bowel movements with pain and cramping. He took an Imodium to stop it and has not had a BM since. He has also been trying to eat yogurt to "regulate" his good bacteria. He had a Cologard performed at age 48 which was negative. He denies any family hx of GI malignancy. His wife  of pancreatic cancer in 2018. This is a tough time for him and he began to have a cough during that period. It was treated as reflux with Prilosec and eventually went away. Currently he is not on any medication. He was last seen in clinic on May 11, 2020. At that time we recommended a CT scan with by mouth and IV contrast. We also considered a colonoscopy as well as treatment for small intestinal bacterial overgrowth his colonoscopy was performed on Humera 10, 2020. He did have a small 8 mm polyp which was resected. We also took biopsies throughout the colon which demonstrated microscopic colitis. He is here today for follow-up. He states that after the colonoscopy his diarrhea has subsided. He has been on Benefiber daily. He does have some abdominal bloating and still has increased bowel sounds after meals. He denies any abdominal pain. FAMILY HISTORY:  Family history of breast cancer in mother

## 2023-06-11 ENCOUNTER — APPOINTMENT (OUTPATIENT)
Dept: ELECTROPHYSIOLOGY | Facility: CLINIC | Age: 78
End: 2023-06-11
Payer: MEDICARE

## 2023-06-11 LAB
CULTURE RESULTS: SIGNIFICANT CHANGE UP
SPECIMEN SOURCE: SIGNIFICANT CHANGE UP

## 2023-06-12 ENCOUNTER — APPOINTMENT (OUTPATIENT)
Age: 78
End: 2023-06-12
Payer: MEDICARE

## 2023-06-12 ENCOUNTER — NON-APPOINTMENT (OUTPATIENT)
Age: 78
End: 2023-06-12

## 2023-06-12 VITALS
OXYGEN SATURATION: 97 % | WEIGHT: 188.47 LBS | DIASTOLIC BLOOD PRESSURE: 84 MMHG | BODY MASS INDEX: 28.56 KG/M2 | RESPIRATION RATE: 17 BRPM | HEART RATE: 64 BPM | HEIGHT: 67.99 IN | SYSTOLIC BLOOD PRESSURE: 138 MMHG

## 2023-06-12 PROCEDURE — G2066: CPT

## 2023-06-12 PROCEDURE — 99214 OFFICE O/P EST MOD 30 MIN: CPT

## 2023-06-12 PROCEDURE — 93298 REM INTERROG DEV EVAL SCRMS: CPT

## 2023-06-12 NOTE — PHYSICAL EXAM
[Restricted in physically strenuous activity but ambulatory and able to carry out work of a light or sedentary nature] : Status 1- Restricted in physically strenuous activity but ambulatory and able to carry out work of a light or sedentary nature, e.g., light house work, office work [Thin] : thin [Normal] : full range of motion and no deformities appreciated [de-identified] : scars s/p VATS right chest wall - healed [de-identified] : no residual hematuria [de-identified] : Dry, no rash

## 2023-06-12 NOTE — ASSESSMENT
[FreeTextEntry1] : Mr. TAM 's questions were answered to his satisfaction. \par He  expressed his  understanding and willingness to comply with the above recommendations, and  will return to the office in 3-4 months.\par \par \par

## 2023-06-12 NOTE — HISTORY OF PRESENT ILLNESS
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 \par \par CASE SYNOPSIS:\par September 2019–reports gross hematuria, spontaneously resolved.  No other associated  symptoms.\par \par 11/1/2019: CT A/P -9.7 x 10 x 7.5 cm partially heterogeneous mass extending off the lower pole of the right kidney with invasion of the lower pole renal collecting system, highly concerning for renal cell carcinoma.  No gross urothelial lesion.  Few scattered pulmonary nodules measuring up to 4 mm, nonspecific, possibly representing postinflammatory benign nodules, but cannot exclude metastasis.  Further evaluation with CT scan of the chest recommended.  1.2 x 1.5 cm low-attenuation lesion of the spleen representing hemangioma.\par \par 11/8/2019: CT chest–tree in bud opacities noted within the right lung likely representing mucoid impacted distal small airways.\par \par 12/5/2019: Radical nephrectomy (right kidney and proximal ureter) performed at [facility] (); surgical pathology: clear cell renal carcinoma, 10 cm, with extensive necrosis, grade 4 with focal rhabdoid features, no definitive LVI, vascular and ureteral resection margins free of tumor, perinephric adipose tissue negative for malignancy.  Renal parenchyma showed mild nephrosclerosis and patchy interstitial chronic inflammation (pT2, pN0).\par \par 3/23/2020: CT A/P–interval right nephrectomy.  No evidence of locally recurrent or metastatic disease.\par \par 4/9/2021: CT C/A/P–5 mm RUL pulmonary nodule may reflect metastasis and is new since prior chest CT of 11/8/2019.  Close follow-up recommended.  No evidence for residual or recurrent tumor in the right nephrectomy bed.\par \par 8/13/2021: CT chest–new right upper lobe 8 mm peripheral nodule indeterminate; previously measuring 5 mm.  Interval growth suggest metastatic disease.\par \par 9/8/2021:referred to Dr. Schmitt (thoracic surgery) for evaluation.\par \par 10/29/2021: Right upper lobe VATS/ wedge resection ([facility], Dr. Schmitt) -metastatic clear-cell renal cell carcinoma, R4 lymph node excision negative for malignancy, level 7 lymph node excision negative for malignancy, acid-fast bacilli and fungi.  Both lymph nodes with noncaseating granulomatous inflammation.\par \par 11/19/21- cycle # 1 Pembrolizumab\par \par 11/30/21- discontinues Multaq (antiarrhythmic) due to interactions with axitinib\par \par 12/4/21- started axitinb\par \par 1/15/2022–discontinued axitinib temporarily due to mucosal sensitivity\par \par 2/9/22: Develops macular rash on both forearms; TSH 90.7. \par \par 2/28/22–discontinued Inlyta at the dermatologist and GI's recommendation due to anal fistula\par \par 3/29/22 - resumed axitinib (Inlyta)\par \par 4/1/22 -diagnosed with coronavirus at [facility] [place]; minimally symptomatic, no monoclonal antibodies recommended.\par \par 6/11/2022 CT C/A/P–no residual or recurrent disease in the nephrectomy bed.  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  MRI head consistent with an acute 2 cm infarct extending from right ganglial–capsular region into ipsilateral corona radiata.  No evidence of hemorrhagic conversion.  Chronic lacunar infarcts left corona radiata and basal ganglia also noted. Started on Aspirin.\par \par TTE–preserved ejection fraction, mild–moderate valvular disease. \par \par 6/10/2023–presents to ED at [facility] with hematuria; anticoagulation continued, but aspirin d/c. \par CT A/P–allowing for the absence of IV contrast, no significant changes outside of right nephrectomy.  [de-identified] : Recent neurologic and urologic events noted.  Patient has recovered after recent stroke, and for short period of time anticoagulation was discontinued due to hematuria.  Reviewed MRI head from 5/9/2023 and CT A/P from 6/10/2023.  From oncologic perspective there was no evidence of recurrent disease even though CT A/P was done without contrast.  Patient remained off palliative treatment with pembrolizumab since November 2022. Today is the first day without visible blood in the urine.\par Hematologic profile consistent with a hemoglobin of 17.2 g/dL; blood work in the past 3 months consistent with erythrocytosis.  Platelet at 137,000. \par Patient discontinued aspirin.\par \par  [FreeTextEntry1] : \par \par

## 2023-06-12 NOTE — REVIEW OF SYSTEMS
[Shortness Of Breath] : shortness of breath [SOB on Exertion] : shortness of breath during exertion [Skin Rash] : skin rash [Negative] : Neurological [Fatigue] : no fatigue [Recent Change In Weight] : ~T no recent weight change [Hoarseness] : no hoarseness [FreeTextEntry2] : gained 13 lbs since last visit [FreeTextEntry4] : Hoarseness resolved [FreeTextEntry8] :  right clear-cell kidney cancer, s/p right nephrectomy [FreeTextEntry6] : s/p RUL VATS/wedge resection for lung metastases [de-identified] : upper extremities, triggered by high temperatures

## 2023-06-13 ENCOUNTER — APPOINTMENT (OUTPATIENT)
Dept: UROLOGY | Facility: CLINIC | Age: 78
End: 2023-06-13
Payer: MEDICARE

## 2023-06-13 VITALS
SYSTOLIC BLOOD PRESSURE: 128 MMHG | WEIGHT: 190 LBS | OXYGEN SATURATION: 95 % | BODY MASS INDEX: 29.82 KG/M2 | DIASTOLIC BLOOD PRESSURE: 72 MMHG | HEART RATE: 66 BPM | HEIGHT: 67 IN

## 2023-06-13 PROCEDURE — 99213 OFFICE O/P EST LOW 20 MIN: CPT

## 2023-06-13 NOTE — ASSESSMENT
[FreeTextEntry1] : 79 yo M with h/o oligometastatic ccRCC now with gross hematuria. CT neg 4/2023. Will set up for cystoscopy. If neg, will consider CTU.

## 2023-06-13 NOTE — HISTORY OF PRESENT ILLNESS
[FreeTextEntry1] : 78 year old man seen 06/13/2023 with complaint of gross hematuria. He passed pink urine. no clots, no obstructive symptoms. He held his aspirin, but did not hold xarelto, and urine cleared. No other symptoms at the time. Of note, he is s/p RIGHT nephrecomy for 10 cm mass, path ccRCC. RIGHT lung wedge resection for mass with was +ccRCC 10/2021. He was on axitinib but taken off for AE, he thinks was esophagitis. On Keytruda. UCx at  was neg. CT 4/2023 was neg for upper tract lesions. Now without symptoms or complaints.

## 2023-06-15 ENCOUNTER — RESULT REVIEW (OUTPATIENT)
Age: 78
End: 2023-06-15

## 2023-06-15 ENCOUNTER — APPOINTMENT (OUTPATIENT)
Age: 78
End: 2023-06-15

## 2023-06-15 LAB
APPEARANCE: CLEAR
BACTERIA UR CULT: NORMAL
BACTERIA: NEGATIVE /HPF
BASOPHILS # BLD AUTO: 0.06 K/UL — SIGNIFICANT CHANGE UP (ref 0–0.2)
BASOPHILS NFR BLD AUTO: 0.8 % — SIGNIFICANT CHANGE UP (ref 0–2)
BILIRUBIN URINE: NEGATIVE
BLOOD URINE: NEGATIVE
CAST: 0 /LPF
COLOR: YELLOW
EOSINOPHIL # BLD AUTO: 0.23 K/UL — SIGNIFICANT CHANGE UP (ref 0–0.5)
EOSINOPHIL NFR BLD AUTO: 2.9 % — SIGNIFICANT CHANGE UP (ref 0–6)
EPITHELIAL CELLS: 0 /HPF
GLUCOSE QUALITATIVE U: NEGATIVE MG/DL
HCT VFR BLD CALC: 49.9 % — SIGNIFICANT CHANGE UP (ref 39–50)
HGB BLD-MCNC: 16.9 G/DL — SIGNIFICANT CHANGE UP (ref 13–17)
IMM GRANULOCYTES NFR BLD AUTO: 0.5 % — SIGNIFICANT CHANGE UP (ref 0–0.9)
KETONES URINE: NEGATIVE MG/DL
LEUKOCYTE ESTERASE URINE: NEGATIVE
LYMPHOCYTES # BLD AUTO: 1.13 K/UL — SIGNIFICANT CHANGE UP (ref 1–3.3)
LYMPHOCYTES # BLD AUTO: 14.3 % — SIGNIFICANT CHANGE UP (ref 13–44)
MCHC RBC-ENTMCNC: 31.1 PG — SIGNIFICANT CHANGE UP (ref 27–34)
MCHC RBC-ENTMCNC: 33.9 G/DL — SIGNIFICANT CHANGE UP (ref 32–36)
MCV RBC AUTO: 91.9 FL — SIGNIFICANT CHANGE UP (ref 80–100)
MICROSCOPIC-UA: NORMAL
MONOCYTES # BLD AUTO: 0.7 K/UL — SIGNIFICANT CHANGE UP (ref 0–0.9)
MONOCYTES NFR BLD AUTO: 8.9 % — SIGNIFICANT CHANGE UP (ref 2–14)
NEUTROPHILS # BLD AUTO: 5.74 K/UL — SIGNIFICANT CHANGE UP (ref 1.8–7.4)
NEUTROPHILS NFR BLD AUTO: 72.6 % — SIGNIFICANT CHANGE UP (ref 43–77)
NITRITE URINE: NEGATIVE
NRBC # BLD: 0 /100 WBCS — SIGNIFICANT CHANGE UP (ref 0–0)
PH URINE: 6.5
PLATELET # BLD AUTO: 139 K/UL — LOW (ref 150–400)
PROTEIN URINE: 30 MG/DL
RBC # BLD: 5.43 M/UL — SIGNIFICANT CHANGE UP (ref 4.2–5.8)
RBC # FLD: 13.5 % — SIGNIFICANT CHANGE UP (ref 10.3–14.5)
RED BLOOD CELLS URINE: 1 /HPF
SPECIFIC GRAVITY URINE: 1.01
UROBILINOGEN URINE: 0.2 MG/DL
WBC # BLD: 7.9 K/UL — SIGNIFICANT CHANGE UP (ref 3.8–10.5)
WBC # FLD AUTO: 7.9 K/UL — SIGNIFICANT CHANGE UP (ref 3.8–10.5)
WHITE BLOOD CELLS URINE: 0 /HPF

## 2023-06-21 DIAGNOSIS — R71.8 OTHER ABNORMALITY OF RED BLOOD CELLS: ICD-10-CM

## 2023-06-29 ENCOUNTER — APPOINTMENT (OUTPATIENT)
Age: 78
End: 2023-06-29

## 2023-06-29 ENCOUNTER — RESULT REVIEW (OUTPATIENT)
Age: 78
End: 2023-06-29

## 2023-06-29 LAB
BASOPHILS # BLD AUTO: 0.05 K/UL — SIGNIFICANT CHANGE UP (ref 0–0.2)
BASOPHILS NFR BLD AUTO: 0.7 % — SIGNIFICANT CHANGE UP (ref 0–2)
EOSINOPHIL # BLD AUTO: 0.22 K/UL — SIGNIFICANT CHANGE UP (ref 0–0.5)
EOSINOPHIL NFR BLD AUTO: 3.2 % — SIGNIFICANT CHANGE UP (ref 0–6)
HCT VFR BLD CALC: 49.3 % — SIGNIFICANT CHANGE UP (ref 39–50)
HGB BLD-MCNC: 16.7 G/DL — SIGNIFICANT CHANGE UP (ref 13–17)
IMM GRANULOCYTES NFR BLD AUTO: 0.6 % — SIGNIFICANT CHANGE UP (ref 0–0.9)
LYMPHOCYTES # BLD AUTO: 0.85 K/UL — LOW (ref 1–3.3)
LYMPHOCYTES # BLD AUTO: 12.5 % — LOW (ref 13–44)
MCHC RBC-ENTMCNC: 31.2 PG — SIGNIFICANT CHANGE UP (ref 27–34)
MCHC RBC-ENTMCNC: 33.9 G/DL — SIGNIFICANT CHANGE UP (ref 32–36)
MCV RBC AUTO: 92 FL — SIGNIFICANT CHANGE UP (ref 80–100)
MONOCYTES # BLD AUTO: 0.48 K/UL — SIGNIFICANT CHANGE UP (ref 0–0.9)
MONOCYTES NFR BLD AUTO: 7.1 % — SIGNIFICANT CHANGE UP (ref 2–14)
NEUTROPHILS # BLD AUTO: 5.15 K/UL — SIGNIFICANT CHANGE UP (ref 1.8–7.4)
NEUTROPHILS NFR BLD AUTO: 75.9 % — SIGNIFICANT CHANGE UP (ref 43–77)
NRBC # BLD: 0 /100 WBCS — SIGNIFICANT CHANGE UP (ref 0–0)
PLATELET # BLD AUTO: 138 K/UL — LOW (ref 150–400)
RBC # BLD: 5.36 M/UL — SIGNIFICANT CHANGE UP (ref 4.2–5.8)
RBC # FLD: 13.6 % — SIGNIFICANT CHANGE UP (ref 10.3–14.5)
WBC # BLD: 6.79 K/UL — SIGNIFICANT CHANGE UP (ref 3.8–10.5)
WBC # FLD AUTO: 6.79 K/UL — SIGNIFICANT CHANGE UP (ref 3.8–10.5)

## 2023-07-03 LAB — TSH SERPL-ACNC: 15.7 UIU/ML

## 2023-07-05 PROBLEM — I48.19 PERSISTENT ATRIAL FIBRILLATION: Status: ACTIVE | Noted: 2022-09-16

## 2023-07-10 ENCOUNTER — APPOINTMENT (OUTPATIENT)
Dept: ENDOCRINOLOGY | Facility: CLINIC | Age: 78
End: 2023-07-10
Payer: MEDICARE

## 2023-07-10 ENCOUNTER — NON-APPOINTMENT (OUTPATIENT)
Age: 78
End: 2023-07-10

## 2023-07-10 VITALS
BODY MASS INDEX: 29.03 KG/M2 | OXYGEN SATURATION: 96 % | HEIGHT: 67 IN | WEIGHT: 185 LBS | TEMPERATURE: 97.2 F | HEART RATE: 61 BPM | DIASTOLIC BLOOD PRESSURE: 77 MMHG | SYSTOLIC BLOOD PRESSURE: 126 MMHG

## 2023-07-10 LAB
ALBUMIN SERPL ELPH-MCNC: 4 G/DL
ALP BLD-CCNC: 100 U/L
ALT SERPL-CCNC: 34 U/L
ANION GAP SERPL CALC-SCNC: 11 MMOL/L
APPEARANCE: CLEAR
AST SERPL-CCNC: 37 U/L
BACTERIA: NEGATIVE /HPF
BILIRUB SERPL-MCNC: 1.6 MG/DL
BILIRUBIN URINE: NEGATIVE
BLOOD URINE: NEGATIVE
BUN SERPL-MCNC: 17 MG/DL
CALCIUM OXALATE CRYSTALS: PRESENT
CALCIUM SERPL-MCNC: 8.8 MG/DL
CAST: 0 /LPF
CHLORIDE SERPL-SCNC: 105 MMOL/L
CHOLEST SERPL-MCNC: 105 MG/DL
CO2 SERPL-SCNC: 24 MMOL/L
COLOR: YELLOW
CREAT SERPL-MCNC: 1.29 MG/DL
EGFR: 56 ML/MIN/1.73M2
EPITHELIAL CELLS: 0 /HPF
GLUCOSE QUALITATIVE U: NEGATIVE MG/DL
GLUCOSE SERPL-MCNC: 138 MG/DL
HDLC SERPL-MCNC: 43 MG/DL
KETONES URINE: ABNORMAL MG/DL
LDLC SERPL CALC-MCNC: 28 MG/DL
LEUKOCYTE ESTERASE URINE: NEGATIVE
MICROSCOPIC-UA: NORMAL
NITRITE URINE: NEGATIVE
NONHDLC SERPL-MCNC: 62 MG/DL
PH URINE: 5.5
POTASSIUM SERPL-SCNC: 4.4 MMOL/L
PROT SERPL-MCNC: 6.4 G/DL
PROTEIN URINE: 30 MG/DL
RED BLOOD CELLS URINE: 2 /HPF
REVIEW: NORMAL
SODIUM SERPL-SCNC: 141 MMOL/L
SPECIFIC GRAVITY URINE: 1.02
TRIGL SERPL-MCNC: 172 MG/DL
UROBILINOGEN URINE: 0.2 MG/DL
WHITE BLOOD CELLS URINE: 0 /HPF

## 2023-07-10 PROCEDURE — 99214 OFFICE O/P EST MOD 30 MIN: CPT

## 2023-07-10 RX ORDER — LEVOTHYROXINE SODIUM 0.09 MG/1
88 TABLET ORAL DAILY
Qty: 90 | Refills: 3 | Status: DISCONTINUED | COMMUNITY
Start: 2022-11-08 | End: 2023-07-10

## 2023-07-10 NOTE — DATA REVIEWED
[FreeTextEntry1] : 07/03/2023 - on levothyroxine 88 micrograms po daily\par TSH 15.7 (elevated) \par \par 04/03/2023 - on levothyroxine 88 micrograms po daily\par TSH N\par \par 10/31/2022 - on levothyroxine (generic) 100 micrograms po daily\par TSH 0.2 (0.27 to 4.2)\par \par 09/16/2022 - on levothyroxine (generic) 112 micrograms po daily\par TSH 0.07 (0.27 to 4.2).

## 2023-07-10 NOTE — ASSESSMENT
[FreeTextEntry1] : Goal TSH in patient's 65 years and older is 4 to 6 but I will also accept a TSH in the normal range.\par \par The patient has primary hypothyroidism - due to Axitinb use (patient also was on pembrolizumab which is associated with hypothyroidism) - he is no longer on Axitinb as of 07/01/2022 and as of 11/21/2022, he is no longer on pembrolizumab. \par \par Last TSH was above goal so I increased the dose of levothyroxine. \par \par Last TSH - July 2023 - elevated\par \par Plan:\par 1. Increase levothyroxine (generic) to 100 micrograms po daily. \par 2. Labs to be done in 2 months - TSH\par 3. Follow up in 2 months to review results\par

## 2023-07-10 NOTE — PHYSICAL EXAM
[de-identified] : General: No distress, well nourished\par Eyes: Normal Sclera, EOMI, PERRL\par ENT: Normal appearance of the nose, normal oropharynx\par Neck/Thyroid: No cervical lymphadenopathy, thyroid gland 20 g in size, no thyroid nodules, non-tender\par Respiratory: No use of accessory muscles of respiration, vesicular breath sounds heard bilaterally, no crepitations or ronchi\par Cardiovascular: S1 and S2 heard and normal, no S3 or S4, no murmurs, radial pulse normal bilaterally\par Abdomen: soft, non-tender, no masses, normal bowel sounds\par Musculoskeletal: No swelling or deformities of joints of hands, no pedal edema\par Neurological: Normal range of motion in the hands, Normal brachioradialis reflexes bilaterally\par Psychiatry: Patient converses normally, good judgement and insight\par Skin: No rashes in hands, no nodules palpated in hands

## 2023-07-11 ENCOUNTER — APPOINTMENT (OUTPATIENT)
Dept: NEUROLOGY | Facility: CLINIC | Age: 78
End: 2023-07-11
Payer: MEDICARE

## 2023-07-11 VITALS
WEIGHT: 185 LBS | HEART RATE: 82 BPM | BODY MASS INDEX: 29.03 KG/M2 | DIASTOLIC BLOOD PRESSURE: 80 MMHG | HEIGHT: 67 IN | TEMPERATURE: 97.6 F | SYSTOLIC BLOOD PRESSURE: 122 MMHG

## 2023-07-11 DIAGNOSIS — R20.0 ANESTHESIA OF SKIN: ICD-10-CM

## 2023-07-11 DIAGNOSIS — R20.2 ANESTHESIA OF SKIN: ICD-10-CM

## 2023-07-11 PROCEDURE — 99214 OFFICE O/P EST MOD 30 MIN: CPT

## 2023-07-11 RX ORDER — LEVOTHYROXINE SODIUM 0.1 MG/1
100 TABLET ORAL DAILY
Qty: 90 | Refills: 3 | Status: DISCONTINUED | COMMUNITY
Start: 2023-07-10 | End: 2023-07-11

## 2023-07-11 RX ORDER — GINGER ROOT/GINGER ROOT EXT 262.5 MG
CAPSULE ORAL
Refills: 0 | Status: ACTIVE | COMMUNITY

## 2023-07-11 RX ORDER — MULTIVITAMIN/IRON/FOLIC ACID 18MG-0.4MG
TABLET ORAL
Refills: 0 | Status: ACTIVE | COMMUNITY

## 2023-07-11 NOTE — PHYSICAL EXAM
[General Appearance - Alert] : alert [General Appearance - In No Acute Distress] : in no acute distress [Oriented To Time, Place, And Person] : oriented to person, place, and time [Impaired Insight] : insight and judgment were intact [Affect] : the affect was normal [Person] : oriented to person [Place] : oriented to place [Time] : oriented to time [Concentration Intact] : normal concentrating ability [Naming Objects] : no difficulty naming common objects [Repeating Phrases] : no difficulty repeating a phrase [Fluency] : fluency intact [Comprehension] : comprehension intact [Past History] : adequate knowledge of personal past history [Cranial Nerves Optic (II)] : visual acuity intact bilaterally,  visual fields full to confrontation, pupils equal round and reactive to light [Cranial Nerves Oculomotor (III)] : extraocular motion intact [Cranial Nerves Trigeminal (V)] : facial sensation intact symmetrically [Cranial Nerves Facial (VII)] : face symmetrical [Cranial Nerves Vestibulocochlear (VIII)] : hearing was intact bilaterally [Cranial Nerves Glossopharyngeal (IX)] : tongue and palate midline [Cranial Nerves Accessory (XI - Cranial And Spinal)] : head turning and shoulder shrug symmetric [Cranial Nerves Hypoglossal (XII)] : there was no tongue deviation with protrusion [Motor Tone] : muscle tone was normal in all four extremities [Motor Strength] : muscle strength was normal in all four extremities [No Muscle Atrophy] : normal bulk in all four extremities [Sensation Tactile Decrease] : light touch was intact [Abnormal Walk] : normal gait [Balance] : balance was intact [2+] : Patella left 2+ [Registration Intact] : recent registration memory intact [Past-pointing] : there was no past-pointing [Tremor] : no tremor present [Coordination - Dysmetria Impaired Finger-to-Nose Bilateral] : not present [1+] : Patella right 1+ [0] : Ankle jerk left 0 [Plantar Reflex Right Only] : normal on the right [Plantar Reflex Left Only] : normal on the left [PERRL With Normal Accommodation] : pupils were equal in size, round, reactive to light, with normal accommodation [Extraocular Movements] : extraocular movements were intact [Full Visual Field] : full visual field [Outer Ear] : the ears and nose were normal in appearance [Hearing Threshold Finger Rub Not [place]] : hearing was normal [Neck Cervical Mass (___cm)] : no neck mass was observed [Auscultation Breath Sounds / Voice Sounds] : lungs were clear to auscultation bilaterally [Heart Rate And Rhythm] : heart rate was normal and rhythm regular [Heart Sounds] : normal S1 and S2 [Arterial Pulses Carotid] : carotid pulses were normal with no bruits [Edema] : there was no peripheral edema [Involuntary Movements] : no involuntary movements were seen [] : no rash

## 2023-07-11 NOTE — DATA REVIEWED
[de-identified] : 5/10/23: Acute 2.0 cm infarct extending from right ganglial capsular region into right corona radiata.  No evidence of hemorrhagic conversion.  Chronic lacunar infarcts left corona radiata and basal ganglia [de-identified] : 5/9/2023: CT angio head and neck: Calcified atherosclerotic plaques cavernous segments of bilateral internal carotid arteries, intracranial vertebral and basilar arteries are patent.  Fetal origin of left PCA.  CT perfusion no core infarct

## 2023-07-11 NOTE — HISTORY OF PRESENT ILLNESS
[FreeTextEntry1] : 78-year-old male with PMHx of HTN, A-fib on Xarelto, kidney CA status post right nephrectomy, had presented to [facility] ED on 5/9/2023 with complaints of left facial droop associated with difficulty speaking.  Patient had been in usual state of health the previous night, he babysat his granddaughter, later on his daughter noted he had a left facial droop and speech was slurred, soon after his balance was off and he fell, in ED /116, was evaluated for code stroke, NIHSS–1, CT head, CTA head/neck/CTP was negative for acute hemorrhage, LVO or core infarct, diffuse atherosclerotic changes of the intracranial vessels was noted, no tPA was deemed necessary as per discussion with tele-neurologist as patient had been on Xarelto and INR 1.36/PTT 38.3.  Patient was observed in telemetry, MRI of the brain revealed acute infarct right ganglial capsular region extending into right corona radiata.  Chronic lacunar infarcts left CR and left basal ganglia was seen.\par \par Patient was seen by cardiologist, echo was done, ASA was added to Xarelto, atorvastatin dose was increased to 80 mg.  Patient noted remarkable resolution of symptoms, he was discharged home, he reportedly returned to his work next day.\par \par Patient was noted to have elevated H&H, as outpatient he has had 2 phlebotomy procedures, his hemoglobin has dropped from 16.9-15.5, hematocrit from 49.9-48.6.\par \par Patient has no complaints today, he has noted resolution of facial droop and dysarthria.  He denies gait instability, he does however complain of numbness in the fingertips of both hands, he denies diminished dexterity of hand movements, he works as a .

## 2023-07-11 NOTE — DISCUSSION/SUMMARY
[FreeTextEntry1] : 78-year-old M with PMHx of HTN, A-fib on Xarelto, kidney CA status post right nephrectomy, presented to [facility] ED on 5/9/2023 with complaints of left facial droop associated with difficulty speaking. feelinf off balance and a fall, in ED /116, NIHSS–1, CT head, CTA head/neck/CTP negative for acute hemorrhage, LVO or core infarct, diffuse atherosclerotic changes of the intracranial vessels was noted, no tPA given. 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

## 2023-07-14 ENCOUNTER — NON-APPOINTMENT (OUTPATIENT)
Age: 78
End: 2023-07-14

## 2023-07-14 ENCOUNTER — APPOINTMENT (OUTPATIENT)
Dept: ELECTROPHYSIOLOGY | Facility: CLINIC | Age: 78
End: 2023-07-14
Payer: MEDICARE

## 2023-07-14 PROCEDURE — 93298 REM INTERROG DEV EVAL SCRMS: CPT

## 2023-07-14 PROCEDURE — G2066: CPT

## 2023-07-16 ENCOUNTER — NON-APPOINTMENT (OUTPATIENT)
Age: 78
End: 2023-07-16

## 2023-07-17 ENCOUNTER — EMERGENCY (EMERGENCY)
Facility: HOSPITAL | Age: 78
LOS: 0 days | Discharge: HOME | End: 2023-07-17
Attending: STUDENT IN AN ORGANIZED HEALTH CARE EDUCATION/TRAINING PROGRAM
Payer: MEDICARE

## 2023-07-17 VITALS
DIASTOLIC BLOOD PRESSURE: 70 MMHG | SYSTOLIC BLOOD PRESSURE: 154 MMHG | RESPIRATION RATE: 18 BRPM | OXYGEN SATURATION: 100 % | HEART RATE: 80 BPM | TEMPERATURE: 98 F

## 2023-07-17 VITALS — WEIGHT: 184.97 LBS | HEIGHT: 68 IN

## 2023-07-17 DIAGNOSIS — S80.12XA CONTUSION OF LEFT LOWER LEG, INITIAL ENCOUNTER: ICD-10-CM

## 2023-07-17 DIAGNOSIS — Z90.89 ACQUIRED ABSENCE OF OTHER ORGANS: ICD-10-CM

## 2023-07-17 DIAGNOSIS — M79.662 PAIN IN LEFT LOWER LEG: ICD-10-CM

## 2023-07-17 DIAGNOSIS — N40.0 BENIGN PROSTATIC HYPERPLASIA WITHOUT LOWER URINARY TRACT SYMPTOMS: ICD-10-CM

## 2023-07-17 DIAGNOSIS — Z98.890 OTHER SPECIFIED POSTPROCEDURAL STATES: Chronic | ICD-10-CM

## 2023-07-17 DIAGNOSIS — K80.20 CALCULUS OF GALLBLADDER WITHOUT CHOLECYSTITIS WITHOUT OBSTRUCTION: ICD-10-CM

## 2023-07-17 DIAGNOSIS — I48.91 UNSPECIFIED ATRIAL FIBRILLATION: ICD-10-CM

## 2023-07-17 DIAGNOSIS — Z85.528 PERSONAL HISTORY OF OTHER MALIGNANT NEOPLASM OF KIDNEY: ICD-10-CM

## 2023-07-17 DIAGNOSIS — R91.1 SOLITARY PULMONARY NODULE: ICD-10-CM

## 2023-07-17 DIAGNOSIS — Z79.01 LONG TERM (CURRENT) USE OF ANTICOAGULANTS: ICD-10-CM

## 2023-07-17 DIAGNOSIS — Z90.5 ACQUIRED ABSENCE OF KIDNEY: Chronic | ICD-10-CM

## 2023-07-17 DIAGNOSIS — Z90.49 ACQUIRED ABSENCE OF OTHER SPECIFIED PARTS OF DIGESTIVE TRACT: Chronic | ICD-10-CM

## 2023-07-17 DIAGNOSIS — I10 ESSENTIAL (PRIMARY) HYPERTENSION: ICD-10-CM

## 2023-07-17 DIAGNOSIS — Z90.89 ACQUIRED ABSENCE OF OTHER ORGANS: Chronic | ICD-10-CM
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
## 2023-07-17 DIAGNOSIS — N52.9 MALE ERECTILE DYSFUNCTION, UNSPECIFIED: ICD-10-CM

## 2023-07-17 DIAGNOSIS — K64.9 UNSPECIFIED HEMORRHOIDS: ICD-10-CM

## 2023-07-17 DIAGNOSIS — Z90.49 ACQUIRED ABSENCE OF OTHER SPECIFIED PARTS OF DIGESTIVE TRACT: ICD-10-CM

## 2023-07-17 DIAGNOSIS — K21.9 GASTRO-ESOPHAGEAL REFLUX DISEASE WITHOUT ESOPHAGITIS: ICD-10-CM

## 2023-07-17 DIAGNOSIS — Z90.5 ACQUIRED ABSENCE OF KIDNEY: ICD-10-CM

## 2023-07-17 DIAGNOSIS — Z79.82 LONG TERM (CURRENT) USE OF ASPIRIN: ICD-10-CM
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
## 2023-07-17 DIAGNOSIS — J34.2 DEVIATED NASAL SEPTUM: ICD-10-CM

## 2023-07-17 LAB
ALBUMIN SERPL ELPH-MCNC: 3.9 G/DL — SIGNIFICANT CHANGE UP (ref 3.3–5)
ALP SERPL-CCNC: 133 U/L — HIGH (ref 40–120)
ALT FLD-CCNC: 47 U/L — SIGNIFICANT CHANGE UP (ref 12–78)
ANION GAP SERPL CALC-SCNC: 1 MMOL/L — LOW (ref 5–17)
APTT BLD: 44.3 SEC — HIGH (ref 27.5–35.5)
AST SERPL-CCNC: 38 U/L — HIGH (ref 15–37)
BASOPHILS # BLD AUTO: 0.06 K/UL — SIGNIFICANT CHANGE UP (ref 0–0.2)
BASOPHILS NFR BLD AUTO: 0.7 % — SIGNIFICANT CHANGE UP (ref 0–2)
BILIRUB SERPL-MCNC: 3 MG/DL — HIGH (ref 0.2–1.2)
BLD GP AB SCN SERPL QL: SIGNIFICANT CHANGE UP
BUN SERPL-MCNC: 19 MG/DL — SIGNIFICANT CHANGE UP (ref 7–23)
CALCIUM SERPL-MCNC: 9.1 MG/DL — SIGNIFICANT CHANGE UP (ref 8.5–10.1)
CHLORIDE SERPL-SCNC: 111 MMOL/L — HIGH (ref 96–108)
CK SERPL-CCNC: 97 U/L — SIGNIFICANT CHANGE UP (ref 26–308)
CO2 SERPL-SCNC: 28 MMOL/L — SIGNIFICANT CHANGE UP (ref 22–31)
CREAT SERPL-MCNC: 1.35 MG/DL — HIGH (ref 0.5–1.3)
EGFR: 53 ML/MIN/1.73M2 — LOW
EOSINOPHIL # BLD AUTO: 0.2 K/UL — SIGNIFICANT CHANGE UP (ref 0–0.5)
EOSINOPHIL NFR BLD AUTO: 2.4 % — SIGNIFICANT CHANGE UP (ref 0–6)
GLUCOSE SERPL-MCNC: 99 MG/DL — SIGNIFICANT CHANGE UP (ref 70–99)
HCT VFR BLD CALC: 50.4 % — HIGH (ref 39–50)
HGB BLD-MCNC: 16.6 G/DL — SIGNIFICANT CHANGE UP (ref 13–17)
IMM GRANULOCYTES NFR BLD AUTO: 0.2 % — SIGNIFICANT CHANGE UP (ref 0–0.9)
INR BLD: 1.61 RATIO — HIGH (ref 0.88–1.16)
LYMPHOCYTES # BLD AUTO: 0.86 K/UL — LOW (ref 1–3.3)
LYMPHOCYTES # BLD AUTO: 10.5 % — LOW (ref 13–44)
MCHC RBC-ENTMCNC: 31.8 PG — SIGNIFICANT CHANGE UP (ref 27–34)
MCHC RBC-ENTMCNC: 32.9 GM/DL — SIGNIFICANT CHANGE UP (ref 32–36)
MCV RBC AUTO: 96.6 FL — SIGNIFICANT CHANGE UP (ref 80–100)
MONOCYTES # BLD AUTO: 0.73 K/UL — SIGNIFICANT CHANGE UP (ref 0–0.9)
MONOCYTES NFR BLD AUTO: 8.9 % — SIGNIFICANT CHANGE UP (ref 2–14)
NEUTROPHILS # BLD AUTO: 6.35 K/UL — SIGNIFICANT CHANGE UP (ref 1.8–7.4)
NEUTROPHILS NFR BLD AUTO: 77.3 % — HIGH (ref 43–77)
PLATELET # BLD AUTO: 146 K/UL — LOW (ref 150–400)
POTASSIUM SERPL-MCNC: 4.5 MMOL/L — SIGNIFICANT CHANGE UP (ref 3.5–5.3)
POTASSIUM SERPL-SCNC: 4.5 MMOL/L — SIGNIFICANT CHANGE UP (ref 3.5–5.3)
PROT SERPL-MCNC: 7.7 GM/DL — SIGNIFICANT CHANGE UP (ref 6–8.3)
PROTHROM AB SERPL-ACNC: 18.8 SEC — HIGH (ref 10.5–13.4)
RBC # BLD: 5.22 M/UL — SIGNIFICANT CHANGE UP (ref 4.2–5.8)
RBC # FLD: 14.1 % — SIGNIFICANT CHANGE UP (ref 10.3–14.5)
SODIUM SERPL-SCNC: 140 MMOL/L — SIGNIFICANT CHANGE UP (ref 135–145)
WBC # BLD: 8.22 K/UL — SIGNIFICANT CHANGE UP (ref 3.8–10.5)
WBC # FLD AUTO: 8.22 K/UL — SIGNIFICANT CHANGE UP (ref 3.8–10.5)

## 2023-07-17 PROCEDURE — 86850 RBC ANTIBODY SCREEN: CPT

## 2023-07-17 PROCEDURE — 80053 COMPREHEN METABOLIC PANEL: CPT

## 2023-07-17 PROCEDURE — 99285 EMERGENCY DEPT VISIT HI MDM: CPT | Mod: 25

## 2023-07-17 PROCEDURE — 73706 CT ANGIO LWR EXTR W/O&W/DYE: CPT | Mod: 26,LT,MA

## 2023-07-17 PROCEDURE — 93010 ELECTROCARDIOGRAM REPORT: CPT

## 2023-07-17 PROCEDURE — 85610 PROTHROMBIN TIME: CPT

## 2023-07-17 PROCEDURE — 73706 CT ANGIO LWR EXTR W/O&W/DYE: CPT | Mod: MA,LT

## 2023-07-17 PROCEDURE — 99285 EMERGENCY DEPT VISIT HI MDM: CPT | Mod: FS

## 2023-07-17 PROCEDURE — 85025 COMPLETE CBC W/AUTO DIFF WBC: CPT

## 2023-07-17 PROCEDURE — 93005 ELECTROCARDIOGRAM TRACING: CPT

## 2023-07-17 PROCEDURE — 85730 THROMBOPLASTIN TIME PARTIAL: CPT

## 2023-07-17 PROCEDURE — 36415 COLL VENOUS BLD VENIPUNCTURE: CPT

## 2023-07-17 PROCEDURE — 86901 BLOOD TYPING SEROLOGIC RH(D): CPT

## 2023-07-17 PROCEDURE — 82550 ASSAY OF CK (CPK): CPT

## 2023-07-17 PROCEDURE — 86900 BLOOD TYPING SEROLOGIC ABO: CPT

## 2023-07-17 RX ORDER — SODIUM CHLORIDE 9 MG/ML
1000 INJECTION INTRAMUSCULAR; INTRAVENOUS; SUBCUTANEOUS ONCE
Refills: 0 | Status: COMPLETED | OUTPATIENT
Start: 2023-07-17 | End: 2023-07-17

## 2023-07-17 RX ORDER — CEPHALEXIN 500 MG
1 CAPSULE ORAL
Qty: 40 | Refills: 0
Start: 2023-07-17 | End: 2023-07-26

## 2023-07-17 RX ADMIN — SODIUM CHLORIDE 1000 MILLILITER(S): 9 INJECTION INTRAMUSCULAR; INTRAVENOUS; SUBCUTANEOUS at 11:03

## 2023-07-17 NOTE — ED STATDOCS - PATIENT PORTAL LINK FT
You can access the FollowMyHealth Patient Portal offered by [facility] by registering at the following website: [email or web address]. By joining [employer]’s FollowMyHealth portal, you will also be able to view your health information using other applications (apps) compatible with our system.

## 2023-07-17 NOTE — ED STATDOCS - NSFOLLOWUPINSTRUCTIONS_ED_ALL_ED_FT
APPLY WARM COMPRESSES MULTIPLE TIMES DAILY. TAKE ANTIBIOTICS TO COMPLETION. KEEP LEG ELEVATED WHEN RESTING. RETURN TO ED IN EVENT OF BLUE DISCOLORATION TO FOOT/TOES AND/OR NUMBNESS/TINGLING TO FOOT/TOES. FOLLOW UP WITH PCP FOR FURTHER ASSESSMENT.     Elastic Bandage and RICE Therapy    Elastic bandages come in different shapes and sizes. They generally provide support to your injury and reduce swelling while you are healing, but they can perform different functions. Your health care provider will help you to decide what is best for your protection, recovery, or rehabilitation after an injury.  The routine care of many injuries includes rest, ice, compression, and elevation (RICE therapy). RICE therapy is often recommended for injuries to soft tissues, such as muscle strain, sprains, bruises, and overuse injuries. It can also be used for some bone injuries. Using RICE therapy can help to relieve pain and lessen swelling.  What are some general tips for using an elastic bandage?  Use the bandage as directed by the maker of the bandage that you are using.Do not wrap the bandage too tightly. This may block (cut off) the circulation in the arm or leg in the area below the bandage.  If part of your body beyond the bandage becomes blue, numb, cold, swollen, or more painful, your bandage is probably too tight. If this occurs, remove your bandage and reapply it more loosely.Remove and reapply an elastic bandage every 3–4 hours or as told by your health care provider.See your health care provider if the bandage seems to be making your problems worse rather than better.How to care for your injury with RICE therapy  Rest    Rest your injury. This may help with the healing process. Rest usually involves limiting your normal activities and not using the injured part of your body. Generally, you can return to your normal activities when your health care provider says it is okay and you can do them without much discomfort.  If you rest the injury too much, it may not heal as well. Some injuries heal better with early movement instead of resting for too long. Talk with your health care provider about how you should limit your activities and whether you should start range-of-motion exercises for your injury.  Ice    Ice your injury to lessen swelling and pain. Do not apply ice directly to your skin.  Put ice in a plastic bag.Place a towel between your skin and the bag.Leave the ice on for 20 minutes, 2–3 times a day. Use ice on as many days as told by your health care provider.  Compression    Put pressure (compression) on your injured area to control swelling, give support, and help with discomfort. Compression may be done with an elastic bandage.  Elevation    Raise (elevate) your injured area to lessen swelling and pain. If possible, elevate your injured area at or above the level of your heart or the center of your chest.  Contact a health care provider if:  Your pain and swelling continue.Your symptoms are getting worse rather than improving.Having these problems may mean that you need further evaluation or imaging tests, such as X-rays or an MRI. Sometimes, X-rays may not show a small broken bone (fracture) until days after the injury happened. Make a follow-up appointment with your health care provider. Ask your health care provider, or the department that is doing the imaging test, when your results will be ready.  Get help right away if:  You have sudden severe pain at or below the area of your injury.You have redness or increased swelling around your injury.You have tingling or numbness at or below the area of your injury and it does not improve after you remove the elastic bandage.Summary  Elastic bandages provide support to your injury and reduce swelling while you are healing. Your health care provider will help you decide which type of elastic bandage is best for your injury.Do not wrap the bandage too tightly. This may block (cut off) the circulation in the arm or leg in the area below the bandage.Putting pressure (compression) on your injured area with an elastic bandage is part of RICE therapy. RICE therapy includes rest, ice, compression, and elevation. This treatment is recommended for the routine care of many injuries.This information is not intended to replace advice given to you by your health care provider. Make sure you discuss any questions you have with your health care provider.

## 2023-07-17 NOTE — ED STATDOCS - PROGRESS NOTE DETAILS
79 y/o 77 y/o M with PMH of a-fib on xarelto, HTN presents with left calf pain s/p fall 1 week ago. Pt states he thought symptoms would improve which is why he did not seek care prior to today. Went to outside UC and was advised to go to ED. Pt denies head trauma, LOC. Has been ambulatory since fall. PE: well appearing. Cardiac: s1s2, RRR. Lungs: CTAB. Abdomen: NBS x4, soft, nontender. PV: +left leg ecchymosis with anteriorly located hematoma. +TTP over hematoma. Sensation intact to light touch in lower extremiteis. Cap refill < 2 sec in LE. A/P: No signs of compartment syndrome. Will check labs, CTA to assess for active bleed. Reassess. - Gwyn Tomlinson PA-C CTA findings reviewed. Low suspicion for superficial thrombophlebitis, patient currently on xarelto. Will dc home with keflex. Return precautions discussed including blue discoloration to foot/numbness to foot. Patient is agreeable to plan. - Gwyn Tomlinson PA-C

## 2023-07-17 NOTE — ED STATDOCS - ATTENDING APP SHARED VISIT CONTRIBUTION OF CARE
I, Raquel Marie DO,  performed the initial face to face bedside interview with this patient regarding history of present illness, review of symptoms and relevant past medical, social and family history.  I completed an independent physical examination.  I was the initial provider who evaluated this patient.   I personally saw the patient and performed a substantive portion of the visit including all aspects of the medical decision making.  I have signed out the follow up of any pending tests (i.e. labs, radiological studies) to the ACP.  I have communicated the patient’s plan of care and disposition with the ACP.  The history, relevant review of systems, past medical and surgical history, medical decision making, and physical examination was documented by the scribe in my presence and I attest to the accuracy of the documentation.

## 2023-07-17 NOTE — ED ADULT TRIAGE NOTE - CHIEF COMPLAINT QUOTE
PT presents to er with complaints of LLE pain and erythema since banging his leg last week, went to urgent care and was instructed to come in for further evaluation.

## 2023-07-17 NOTE — ED STATDOCS - OBJECTIVE STATEMENT
77 y/o male with PMHx of HTN and Afib on Xarelto and baby ASA presents to the ED s/p fall last week Tuesday. Pt states he tripped on carpeted stair and hurt his LLE, was able to grab onto stair rail. Denies headstrike or LOC. Denies any other injury. No fevers or chills. Pt c/o worsening LLE pain and bruising since. Went to  and was advised to come to the ED for further eval.

## 2023-07-17 NOTE — ED ADULT NURSE NOTE - NSFALLRISKFACTORS_ED_ALL_ED
Bone Condition: Including osteoporosis, prolonged steroid use or metastatic bone disease/cancer/Coagulation: Bleeding disorder either through use of anticoagulants or underlying clinical condition(s)

## 2023-07-24 ENCOUNTER — APPOINTMENT (OUTPATIENT)
Dept: ORTHOPEDIC SURGERY | Facility: CLINIC | Age: 78
End: 2023-07-24
Payer: MEDICARE

## 2023-07-24 VITALS
HEART RATE: 67 BPM | SYSTOLIC BLOOD PRESSURE: 121 MMHG | DIASTOLIC BLOOD PRESSURE: 74 MMHG | HEIGHT: 67 IN | WEIGHT: 185 LBS | BODY MASS INDEX: 29.03 KG/M2

## 2023-07-24 DIAGNOSIS — T14.8XXA OTHER INJURY OF UNSPECIFIED BODY REGION, INITIAL ENCOUNTER: ICD-10-CM

## 2023-07-24 PROCEDURE — 99203 OFFICE O/P NEW LOW 30 MIN: CPT

## 2023-07-25 PROBLEM — T14.8XXA CONTUSION OF BONE: Status: ACTIVE | Noted: 2023-07-25

## 2023-07-25 NOTE — ADDENDUM
[FreeTextEntry1] : This note was written by Joey Gardiner on 07/25/2023, acting as a scribe for Abel Flores III, MD

## 2023-07-25 NOTE — CONSULT LETTER
[Dear  ___] : Dear  [unfilled], [Consult Letter:] : I had the pleasure of evaluating your patient, [unfilled]. [Please see my note below.] : Please see my note below. [Consult Closing:] : Thank you very much for allowing me to participate in the care of this patient.  If you have any questions, please do not hesitate to contact me. [Sincerely,] : Sincerely, [FreeTextEntry3] : Abel Flores III, MD \par KAYCE/[email or web address]\par

## 2023-07-25 NOTE — PHYSICAL EXAM
[de-identified] : Right Knee: Range of Motion in Degrees	\par 	                  Claimant:	Normal:	\par Flexion Active	  135 	                135-degrees	\par Flexion Passive	  135	                135-degrees	\par Extension Active	  0-5	                0-5-degrees	\par Extension Passive	  0-5	                0-5-degrees	\par \par No weakness to flexion/extension.  No evidence of instability in the AP plane or varus or valgus stress.  Negative  Lachman.  Negative pivot shift.  Negative anterior drawer test.  Negative posterior drawer test.  Negative Mari.  Negative Apley grind.  No medial or lateral joint line tenderness.  No tenderness over the medial and lateral facet of the patella.  No patellofemoral crepitations.  No lateral tilting patella.  No patella apprehension.  No crepitation in the medial and lateral femoral condyle.  No proximal or distal swelling, edema or tenderness.  No gross neurological or vascular deficits distally.  No intra-articular swelling.  2+ DP and PT pulses. No varus or valgus malalignment.  Skin is intact.  No rashes, scars or lesions.\par \par Right Calf:  Nontender at the calf.  \par \par Right Ankle: Range of Motion in Degrees:\par 	                                  Claimant:	Normal:	\par Dorsiflexion (Active)	  40-degrees	40-degrees	\par Dorsiflexion (Passive)	  40-degrees	40-degrees	\par Plantar (Active)	                  40-degrees	40-degrees	\par Plantar (Passive)	                  40-degrees	40-degrees	\par Inversion (Active)	                  30-degrees	30-degrees	\par Inversion (Passive)	                  30-degrees	30-degrees	\par Eversion  (Active)	                  20-degrees	20-degrees	\par Eversion (Passive) 	                  20-degrees	20-degrees	\par \par No weakness in dorsiflexion, plantar flexion, inversion or eversion.  Normal sensation.  No tenderness over the medial or lateral ligaments.  No tenderness over the DLES.  No evidence of instability.  Negative anterior drawer sign.  No medial or lateral bony tenderness.  No proximal fibular tenderness.  No anterior, posterior, medial or lateral tendon tenderness.  No intra-articular swelling.  No extra-articular swelling, edema or tenderness.  No tenderness over the plantar aspect of the os calcis.  2+ DP and PT pulses. Skin is intact.  No rashes, scars or lesions.  \par \par Left Knee: Range of Motion in Degrees	\par 	                  Claimant:	Normal:	\par Flexion Active	  135 	                135-degrees	\par Flexion Passive	  135	                135-degrees	\par Extension Active	  0-5	                0-5-degrees	\par Extension Passive	  0-5	                0-5-degrees	\par \par No weakness to flexion/extension.  No evidence of instability in the AP plane or varus or valgus stress.  Negative  Lachman.  Negative pivot shift.  Negative anterior drawer test.  Negative posterior drawer test.  Negative Mari.  Negative Apley grind.  No medial or lateral joint line tenderness.  No tenderness over the medial and lateral facet of the patella.  No patellofemoral crepitations.  No lateral tilting patella.  No patella apprehension.  No crepitation in the medial and lateral femoral condyle.  No proximal or distal swelling, edema or tenderness.  No gross neurological or vascular deficits distally.  No intra-articular swelling.  2+ DP and PT pulses. No varus or valgus malalignment.  Skin is intact.  No rashes, scars or lesions.\par \par Left Calf:  Nontender at the calf.  \par \par Left Shin:  Diffuse ecchymosis, swelling and cellulitis at the anterior aspect of the tibia.   \par \par Left Ankle: Range of Motion in Degrees:\par 	                                  Claimant:	Normal:	\par Dorsiflexion (Active)	  40-degrees	40-degrees	\par Dorsiflexion (Passive)	  40-degrees	40-degrees	\par Plantar (Active)	                  40-degrees	40-degrees	\par Plantar (Passive)	                  40-degrees	40-degrees	\par Inversion (Active)	                  30-degrees	30-degrees	\par Inversion (Passive)	                  30-degrees	30-degrees	\par Eversion  (Active)	                  20-degrees	20-degrees	\par Eversion (Passive) 	                  20-degrees	20-degrees	\par \par No weakness in dorsiflexion, plantar flexion, inversion or eversion.  Normal sensation.  No tenderness over the medial or lateral ligaments.  No tenderness over the DLES.  No evidence of instability.  Negative anterior drawer sign.  No medial or lateral bony tenderness.  No proximal fibular tenderness.  No anterior, posterior, medial or lateral tendon tenderness.  No intra-articular swelling.  No extra-articular swelling, edema or tenderness.  No tenderness over the plantar aspect of the os calcis.  2+ DP and PT pulses. Skin is intact.  No rashes, scars or lesions.  \par   [de-identified] : Gait and Station:  Ambulating with a slightly antalgic to antalgic gait.  Normal Station.  [de-identified] : Appearance:  Well developed, well-nourished male in no acute distress.\par   [de-identified] : Review of CT scan of the left lower extremity showed no obvious osseous abnormalities.\par

## 2023-07-25 NOTE — DISCUSSION/SUMMARY
[de-identified] : At this time, due to contusion with cellulitis of the left shin, he is already on antibiotics for his cellulitis, so I recommended wrapping, home therapeutic modalities, continue antibiotics and be reassessed in 1 week.\par

## 2023-07-25 NOTE — HISTORY OF PRESENT ILLNESS
[(Does not interfere) 0] : the ailment interference is 0/10 (does not interfere) [4] : the ailment interference is 4/10 [de-identified] : The patient comes in today status post an injury to his left shin.  He states he tripped, fell and sustained a contusion.  He comes in complaining of pain and swelling.  The patient states the onset/injury occurred on 07/11/2023.  The patient states the pain is localized.  The patient describes the pain as sharp.   [de-identified] : Walking [de-identified] : Raised leg [] : No

## 2023-07-27 ENCOUNTER — APPOINTMENT (OUTPATIENT)
Dept: UROLOGY | Facility: CLINIC | Age: 78
End: 2023-07-27
Payer: MEDICARE

## 2023-07-27 ENCOUNTER — RESULT REVIEW (OUTPATIENT)
Age: 78
End: 2023-07-27

## 2023-07-27 ENCOUNTER — APPOINTMENT (OUTPATIENT)
Age: 78
End: 2023-07-27

## 2023-07-27 VITALS
WEIGHT: 185 LBS | BODY MASS INDEX: 29.03 KG/M2 | HEART RATE: 74 BPM | RESPIRATION RATE: 16 BRPM | OXYGEN SATURATION: 96 % | SYSTOLIC BLOOD PRESSURE: 119 MMHG | DIASTOLIC BLOOD PRESSURE: 70 MMHG | HEIGHT: 67 IN

## 2023-07-27 DIAGNOSIS — R31.0 GROSS HEMATURIA: ICD-10-CM

## 2023-07-27 LAB
BASOPHILS # BLD AUTO: 0.07 K/UL — SIGNIFICANT CHANGE UP (ref 0–0.2)
BASOPHILS NFR BLD AUTO: 1.1 % — SIGNIFICANT CHANGE UP (ref 0–2)
EOSINOPHIL # BLD AUTO: 0.22 K/UL — SIGNIFICANT CHANGE UP (ref 0–0.5)
EOSINOPHIL NFR BLD AUTO: 3.5 % — SIGNIFICANT CHANGE UP (ref 0–6)
HCT VFR BLD CALC: 47.6 % — SIGNIFICANT CHANGE UP (ref 39–50)
HGB BLD-MCNC: 15.5 G/DL — SIGNIFICANT CHANGE UP (ref 13–17)
IMM GRANULOCYTES NFR BLD AUTO: 0.5 % — SIGNIFICANT CHANGE UP (ref 0–0.9)
LYMPHOCYTES # BLD AUTO: 0.87 K/UL — LOW (ref 1–3.3)
LYMPHOCYTES # BLD AUTO: 13.9 % — SIGNIFICANT CHANGE UP (ref 13–44)
MCHC RBC-ENTMCNC: 31.3 PG — SIGNIFICANT CHANGE UP (ref 27–34)
MCHC RBC-ENTMCNC: 32.6 G/DL — SIGNIFICANT CHANGE UP (ref 32–36)
MCV RBC AUTO: 96.2 FL — SIGNIFICANT CHANGE UP (ref 80–100)
MONOCYTES # BLD AUTO: 0.54 K/UL — SIGNIFICANT CHANGE UP (ref 0–0.9)
MONOCYTES NFR BLD AUTO: 8.6 % — SIGNIFICANT CHANGE UP (ref 2–14)
NEUTROPHILS # BLD AUTO: 4.52 K/UL — SIGNIFICANT CHANGE UP (ref 1.8–7.4)
NEUTROPHILS NFR BLD AUTO: 72.4 % — SIGNIFICANT CHANGE UP (ref 43–77)
NRBC # BLD: 0 /100 WBCS — SIGNIFICANT CHANGE UP (ref 0–0)
PLATELET # BLD AUTO: 164 K/UL — SIGNIFICANT CHANGE UP (ref 150–400)
RBC # BLD: 4.95 M/UL — SIGNIFICANT CHANGE UP (ref 4.2–5.8)
RBC # FLD: 13.8 % — SIGNIFICANT CHANGE UP (ref 10.3–14.5)
WBC # BLD: 6.25 K/UL — SIGNIFICANT CHANGE UP (ref 3.8–10.5)
WBC # FLD AUTO: 6.25 K/UL — SIGNIFICANT CHANGE UP (ref 3.8–10.5)

## 2023-07-27 PROCEDURE — 52000 CYSTOURETHROSCOPY: CPT

## 2023-07-28 LAB — URINE CYTOLOGY: NORMAL

## 2023-07-31 ENCOUNTER — APPOINTMENT (OUTPATIENT)
Dept: ORTHOPEDIC SURGERY | Facility: CLINIC | Age: 78
End: 2023-07-31
Payer: MEDICARE

## 2023-07-31 VITALS
WEIGHT: 185 LBS | SYSTOLIC BLOOD PRESSURE: 128 MMHG | DIASTOLIC BLOOD PRESSURE: 76 MMHG | HEIGHT: 67 IN | HEART RATE: 68 BPM | BODY MASS INDEX: 29.03 KG/M2

## 2023-07-31 DIAGNOSIS — S80.02XA CONTUSION OF LEFT KNEE, INITIAL ENCOUNTER: ICD-10-CM

## 2023-07-31 DIAGNOSIS — L03.90 CELLULITIS, UNSPECIFIED: ICD-10-CM

## 2023-07-31 DIAGNOSIS — M17.12 UNILATERAL PRIMARY OSTEOARTHRITIS, LEFT KNEE: ICD-10-CM

## 2023-07-31 PROCEDURE — 99212 OFFICE O/P EST SF 10 MIN: CPT

## 2023-08-02 PROBLEM — S80.02XA CONTUSION OF LEFT KNEE, INITIAL ENCOUNTER: Status: ACTIVE | Noted: 2023-07-24

## 2023-08-02 PROBLEM — L03.90 CELLULITIS, UNSPECIFIED CELLULITIS SITE: Status: ACTIVE | Noted: 2023-07-24

## 2023-08-02 NOTE — HISTORY OF PRESENT ILLNESS
[de-identified] : The patient comes in today for his left leg.  He states he feels fine.  He states he finished the antibiotics and the redness is gone and the swelling is down.

## 2023-08-02 NOTE — DISCUSSION/SUMMARY
[de-identified] : At this time, due to resolved cellulitis and resolving hematoma of left leg, he was instructed on home therapeutic modalities and to follow up with me on an as needed basis.

## 2023-08-02 NOTE — PHYSICAL EXAM
[Normal] : Gait: normal [de-identified] : Left Knee: Range of Motion in Degrees 	 	                  Claimant:    Normal:	 Flexion Active	    135 	    135-degrees	 Flexion Passive	    135	    135-degrees	 Extension Active	    0-5	    0-5-degrees	 Extension Passive	    0-5	    0-5-degrees	  No weakness to flexion/extension.  No evidence of instability in the AP plane or varus or valgus stress.  Negative  Lachman.  Negative pivot shift.  Negative anterior drawer test.  Negative posterior drawer test.  Negative Mari.  Negative Apley grind.  No medial or lateral joint line tenderness.  No tenderness over the medial and lateral facet of the patella.  No patellofemoral crepitations.  No lateral tilting patella.  No patellar apprehension.  No crepitation in the medial and lateral femoral condyle.  No proximal or distal swelling, edema or tenderness.  No gross motor or sensory deficits.  No intra-articular swelling.  2+ DP and PT pulses. No varus or valgus malalignment.  Skin is intact.  No rashes, scars or lesions.    Left Ankle: Range of Motion in Degrees: 	                                Claimant:	                Normal:	 Dorsiflexion (Active)	40-degrees	40-degrees	 Dorsiflexion (Passive)	40-degrees	40-degrees	 Plantar (Active)	                40-degrees	40-degrees	 Plantar (Passive)	                40-degrees	40-degrees	 Inversion (Active)	                30-degrees	30-degrees	 Inversion (Passive)	                30-degrees	30-degrees	 Eversion  (Active)	                20-degrees	20-degrees	 Eversion (Passive) 	                20-degrees	20-degrees	  No weakness in dorsiflexion, plantar flexion, inversion or eversion.  Normal sensation.  No tenderness over the medial or lateral ligaments.  No tenderness over the DLES.  No evidence of instability.  Negative anterior drawer sign.  No medial or lateral bony tenderness.  No proximal fibular tenderness.  No anterior, posterior, medial or lateral tendon tenderness.  No intra-articular swelling.  No extra-articular swelling, edema or tenderness.  No tenderness over the plantar aspect of the os calcis.  2+ DP and PT pulses. Skin is intact.  No rashes, scars or lesions.     Left Leg:  No evidence of cellulitis.  The hematoma is markedly reduced.  [de-identified] : Appearance:  Well developed, well-nourished male in no acute distress.

## 2023-08-02 NOTE — ADDENDUM
[FreeTextEntry1] : This note was written by Joey Gardiner on 08/02/2023, acting as a scribe for Abel Flores III, MD

## 2023-08-04 ENCOUNTER — RX RENEWAL (OUTPATIENT)
Age: 78
End: 2023-08-04

## 2023-08-09 ENCOUNTER — RX CHANGE (OUTPATIENT)
Age: 78
End: 2023-08-09

## 2023-08-09 ENCOUNTER — APPOINTMENT (OUTPATIENT)
Dept: CARDIOLOGY | Facility: CLINIC | Age: 78
End: 2023-08-09
Payer: MEDICARE

## 2023-08-09 VITALS
HEART RATE: 74 BPM | DIASTOLIC BLOOD PRESSURE: 70 MMHG | OXYGEN SATURATION: 97 % | SYSTOLIC BLOOD PRESSURE: 122 MMHG | HEIGHT: 67 IN | WEIGHT: 190 LBS | BODY MASS INDEX: 29.82 KG/M2 | RESPIRATION RATE: 15 BRPM

## 2023-08-09 VITALS
OXYGEN SATURATION: 97 % | WEIGHT: 190 LBS | HEART RATE: 74 BPM | DIASTOLIC BLOOD PRESSURE: 70 MMHG | BODY MASS INDEX: 29.76 KG/M2 | SYSTOLIC BLOOD PRESSURE: 122 MMHG

## 2023-08-09 DIAGNOSIS — I63.9 CEREBRAL INFARCTION, UNSPECIFIED: ICD-10-CM

## 2023-08-09 DIAGNOSIS — N52.9 MALE ERECTILE DYSFUNCTION, UNSPECIFIED: ICD-10-CM

## 2023-08-09 PROCEDURE — 93000 ELECTROCARDIOGRAM COMPLETE: CPT

## 2023-08-09 PROCEDURE — 99214 OFFICE O/P EST MOD 30 MIN: CPT

## 2023-08-09 RX ORDER — SILDENAFIL CITRATE 100 MG/1
100 TABLET, FILM COATED ORAL
Qty: 10 | Refills: 5 | Status: COMPLETED | COMMUNITY
Start: 2022-12-29 | End: 2023-08-09

## 2023-08-09 NOTE — DISCHARGE NOTE PROVIDER - HOSPITAL COURSE
Medical Week 2 Survey      Flowsheet Row Responses   [facility] patient discharged from? [facility]   Does the patient have one of the following disease processes/diagnoses(primary or secondary)? Other   Week 2 attempt successful? Yes   Call start time 1022   Discharge diagnosis Left lower extremity swelling   Call end time 1037   Meds reviewed with patient/caregiver? Yes   Is the patient taking all medications as directed (includes completed medication regime)? Yes   Does the patient have a primary care provider?  Yes   Has the patient kept scheduled appointments due by today? Yes   [facility]   What is the patient's perception of their health status since discharge? Same  [Swelling has returned in the left leg - patient's doctor's are aware ]   Week 2 Call Completed? Yes   Call end time 1037            Shahrzad H - Registered Nurse  
Patient presented for right side incisional hernia repair and epigastric incisional hernia repair. Right sided hernia repaired with mesh. 2 DAMARIS drains placed. Tolerated procedure well.

## 2023-08-09 NOTE — HISTORY OF PRESENT ILLNESS
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  He was hospitalized in May 2023 with acute brain infarct (right ganglio-capsular region extending into right mendoza radiata); Dr Bishop suspected a hypertensive etiology ("less likely embolic"); aspirin 81 mg daily was recommended (in addition to chronic use of Xarelto).  He has no new cardiovascular complaints during today's visit.  He has a good baseline functional status and has been tolerating prescribed medications.

## 2023-08-09 NOTE — DISCUSSION/SUMMARY
[With Me] : with me [___ Month(s)] : in [unfilled] month(s) [FreeTextEntry1] :  Atrial fibrillation:  Atrial fibrillation is permanent but satisfactorily controlled; Continue Metoprolol and Xarelto.  Hypertension: Controlled; continue amlodipine.  Recent CVA: Has seen Dr Bishop; doing well; tolerating aspirin and atorvastatin

## 2023-08-09 NOTE — CARDIOLOGY SUMMARY
[de-identified] : 8/9/2023.  Atrial fibrillation [de-identified] : 5/11/2023.  Normal left ventricular size and function with estimated ejection fraction 55 to 60%.  Mild to moderate concentric LVH.  Moderate left atrial enlargement.  Moderate aortic regurgitation.  Mild to moderate mitral regurgitation.  Mild to moderate tricuspid regurgitation.

## 2023-08-09 NOTE — PHYSICAL EXAM
[No Acute Distress] : no acute distress [Clear Lung Fields] : clear lung fields [Normal Gait] : normal gait [No Edema] : no edema [Alert and Oriented] : alert and oriented [Normal S1, S2] : normal S1, S2 [de-identified] : Irregular, murmur

## 2023-08-09 NOTE — REVIEW OF SYSTEMS
[Weight Loss (___ Lbs)] : no recent weight loss [SOB] : no shortness of breath [Chest Discomfort] : no chest discomfort [Palpitations] : no palpitations [Erectile Dysfunction] : erectile dysfunction [Dizziness] : no dizziness [Easy Bruising] : a tendency for easy bruising [FreeTextEntry8] : He requests Cialis

## 2023-08-16 ENCOUNTER — OUTPATIENT (OUTPATIENT)
Dept: OUTPATIENT SERVICES | Facility: HOSPITAL | Age: 78
LOS: 1 days | Discharge: HOME | End: 2023-08-16

## 2023-08-16 DIAGNOSIS — Z98.890 OTHER SPECIFIED POSTPROCEDURAL STATES: Chronic | ICD-10-CM

## 2023-08-16 DIAGNOSIS — Z90.49 ACQUIRED ABSENCE OF OTHER SPECIFIED PARTS OF DIGESTIVE TRACT: Chronic | ICD-10-CM

## 2023-08-16 DIAGNOSIS — C64.9 MALIGNANT NEOPLASM OF UNSPECIFIED KIDNEY, EXCEPT RENAL PELVIS: ICD-10-CM

## 2023-08-16 DIAGNOSIS — Z90.89 ACQUIRED ABSENCE OF OTHER ORGANS: Chronic | ICD-10-CM

## 2023-08-16 DIAGNOSIS — Z90.5 ACQUIRED ABSENCE OF KIDNEY: Chronic | ICD-10-CM

## 2023-08-18 ENCOUNTER — NON-APPOINTMENT (OUTPATIENT)
Age: 78
End: 2023-08-18

## 2023-08-18 ENCOUNTER — APPOINTMENT (OUTPATIENT)
Dept: ELECTROPHYSIOLOGY | Facility: CLINIC | Age: 78
End: 2023-08-18
Payer: MEDICARE

## 2023-08-19 PROCEDURE — G2066: CPT

## 2023-08-19 PROCEDURE — 93298 REM INTERROG DEV EVAL SCRMS: CPT

## 2023-08-24 ENCOUNTER — APPOINTMENT (OUTPATIENT)
Age: 78
End: 2023-08-24
Payer: MEDICARE

## 2023-08-24 ENCOUNTER — RESULT REVIEW (OUTPATIENT)
Age: 78
End: 2023-08-24

## 2023-08-24 ENCOUNTER — APPOINTMENT (OUTPATIENT)
Age: 78
End: 2023-08-24

## 2023-08-24 VITALS
TEMPERATURE: 97.9 F | RESPIRATION RATE: 16 BRPM | WEIGHT: 189.6 LBS | DIASTOLIC BLOOD PRESSURE: 80 MMHG | HEART RATE: 64 BPM | OXYGEN SATURATION: 99 % | BODY MASS INDEX: 29.69 KG/M2 | SYSTOLIC BLOOD PRESSURE: 132 MMHG

## 2023-08-24 LAB
BASOPHILS # BLD AUTO: 0.04 K/UL — SIGNIFICANT CHANGE UP (ref 0–0.2)
BASOPHILS NFR BLD AUTO: 0.7 % — SIGNIFICANT CHANGE UP (ref 0–2)
EOSINOPHIL # BLD AUTO: 0.24 K/UL — SIGNIFICANT CHANGE UP (ref 0–0.5)
EOSINOPHIL NFR BLD AUTO: 4.1 % — SIGNIFICANT CHANGE UP (ref 0–6)
HCT VFR BLD CALC: 48.1 % — SIGNIFICANT CHANGE UP (ref 39–50)
HGB BLD-MCNC: 15.5 G/DL — SIGNIFICANT CHANGE UP (ref 13–17)
IMM GRANULOCYTES NFR BLD AUTO: 0.2 % — SIGNIFICANT CHANGE UP (ref 0–0.9)
LYMPHOCYTES # BLD AUTO: 1.18 K/UL — SIGNIFICANT CHANGE UP (ref 1–3.3)
LYMPHOCYTES # BLD AUTO: 20.1 % — SIGNIFICANT CHANGE UP (ref 13–44)
MCHC RBC-ENTMCNC: 31 PG — SIGNIFICANT CHANGE UP (ref 27–34)
MCHC RBC-ENTMCNC: 32.2 G/DL — SIGNIFICANT CHANGE UP (ref 32–36)
MCV RBC AUTO: 96.2 FL — SIGNIFICANT CHANGE UP (ref 80–100)
MONOCYTES # BLD AUTO: 0.7 K/UL — SIGNIFICANT CHANGE UP (ref 0–0.9)
MONOCYTES NFR BLD AUTO: 11.9 % — SIGNIFICANT CHANGE UP (ref 2–14)
NEUTROPHILS # BLD AUTO: 3.71 K/UL — SIGNIFICANT CHANGE UP (ref 1.8–7.4)
NEUTROPHILS NFR BLD AUTO: 63 % — SIGNIFICANT CHANGE UP (ref 43–77)
NRBC # BLD: 0 /100 WBCS — SIGNIFICANT CHANGE UP (ref 0–0)
PLATELET # BLD AUTO: 146 K/UL — LOW (ref 150–400)
RBC # BLD: 5 M/UL — SIGNIFICANT CHANGE UP (ref 4.2–5.8)
RBC # FLD: 13.2 % — SIGNIFICANT CHANGE UP (ref 10.3–14.5)
WBC # BLD: 5.88 K/UL — SIGNIFICANT CHANGE UP (ref 3.8–10.5)
WBC # FLD AUTO: 5.88 K/UL — SIGNIFICANT CHANGE UP (ref 3.8–10.5)

## 2023-08-24 PROCEDURE — 99214 OFFICE O/P EST MOD 30 MIN: CPT

## 2023-08-25 NOTE — HISTORY OF PRESENT ILLNESS
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   CASE SYNOPSIS: September 2019-reports gross hematuria, spontaneously resolved.  No other associated  symptoms.  11/1/2019: CT A/P -9.7 x 10 x 7.5 cm partially heterogeneous mass extending off the lower pole of the right kidney with invasion of the lower pole renal collecting system, highly concerning for renal cell carcinoma.  No gross urothelial lesion.  Few scattered pulmonary nodules measuring up to 4 mm, nonspecific, possibly representing postinflammatory benign nodules, but cannot exclude metastasis.  Further evaluation with CT scan of the chest recommended.  1.2 x 1.5 cm low-attenuation lesion of the spleen representing hemangioma.  11/8/2019: CT chest-tree in bud opacities noted within the right lung likely representing mucoid impacted distal small airways.  12/5/2019: Radical nephrectomy (right kidney and proximal ureter) performed at [facility] (); surgical pathology: clear cell renal carcinoma, 10 cm, with extensive necrosis, grade 4 with focal rhabdoid features, no definitive LVI, vascular and ureteral resection margins free of tumor, perinephric adipose tissue negative for malignancy.  Renal parenchyma showed mild nephrosclerosis and patchy interstitial chronic inflammation (pT2, pN0).  3/23/2020: CT A/P-interval right nephrectomy.  No evidence of locally recurrent or metastatic disease.  4/9/2021: CT C/A/P-5 mm RUL pulmonary nodule may reflect metastasis and is new since prior chest CT of 11/8/2019.  Close follow-up recommended.  No evidence for residual or recurrent tumor in the right nephrectomy bed.  8/13/2021: CT chest-new right upper lobe 8 mm peripheral nodule indeterminate; previously measuring 5 mm.  Interval growth suggest metastatic disease.  9/8/2021:referred to Dr. Schmitt (thoracic surgery) for evaluation.  10/29/2021: Right upper lobe VATS/ wedge resection ([facility], Dr. Schmitt) -metastatic clear-cell renal cell carcinoma, R4 lymph node excision negative for malignancy, level 7 lymph node excision negative for malignancy, acid-fast bacilli and fungi.  Both lymph nodes with noncaseating granulomatous inflammation.  11/19/21- cycle # 1 Pembrolizumab  11/30/21- discontinues Multaq (antiarrhythmic) due to interactions with axitinib  12/4/21- started axitinb  1/15/2022-discontinued axitinib temporarily due to mucosal sensitivity  2/9/22: Develops macular rash on both forearms; TSH 90.7.   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  6/11/2022 CT C/A/P-no residual or recurrent disease in the nephrectomy bed.  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  8/12/22 -resumed Pembrolizumab only; axitinib on hold.  9/29/2022 CT C/A/P: 6 status post right nephrectomy with stable exam as compared to 6/11/2022 11/21/22- Pembrolizumab last dose before d/c  5/9 - 5/11/2023-hospitalized at [facility] with left facial droop associated with difficulty speaking; s/p mechanical fall.  MRI head consistent with an acute 2 cm infarct extending from right ganglial-capsular region into ipsilateral corona radiata.  No evidence of hemorrhagic conversion.  Chronic lacunar infarcts left corona radiata and basal ganglia also noted. Started on Aspirin.  TTE-preserved ejection fraction, mild-moderate valvular disease.   6/10/2023-presents to ED at [facility] with hematuria; anticoagulation continued, but aspirin d/c.  CT A/P-allowing for the absence of IV contrast, no significant changes outside of right nephrectomy.    [FreeTextEntry1] : \par  \par   [de-identified] : Patient here for follow up.  Last seen in office in June 2023.  Denies microscopic hematuria.  Has completed 1 year maintenance treatment in November 2022.  Overall physical status unchanged, constitutionally stable and compliant with medication (list reviewed).  Denies changes in appetite, but continues to increase in weight.  At times fatigued. Had CT brain 5/9/2023 consistent with no acute intracranial hemorrhage or acute territorial infarct.  Subsequent MRI brain on 5/10/2023 showed approximately 2.0 cm acute infarct extended from the right ganglial capsular region into the ipsilateral corona radiata.  No evidence of hemorrhagic conversion.  Chronic lacunar infarcts left corona radiata and basal ganglia as well as findings most concordant with chronic microvascular ischemic changes.  Patient's speech is not affected.  Denies new hospitalization in the past 2-month.

## 2023-08-25 NOTE — ED ADULT NURSE NOTE - NS PRO AD PATIENT TYPE
Called [facility], advised that urgent care has placed an urgent referral and provided the number to call to get him scheduled. [facility] verbalized understanding and will contact them as soon as they open  
Provider: NANETTE SHANE  Caller: EVY GALICIA (ON  [place])  Relationship to Patient: MOTHER  Pharmacy:   Phone Number: [phone]   Reason for Call: PATIENT WENT TO URGENT CARE 8/24/23, HAS BOIL RIGHT ABOVE HIS REAR END. URGENT CARE TOLD MOM TO CALL AND SPEAK WITH NANETTE REGARDING THIS TO MAKE SURE REFERRAL HAS BEEN SENT    PLEASE CALL ASAP  
Health Care Proxy (HCP)

## 2023-08-25 NOTE — PHYSICAL EXAM
[Restricted in physically strenuous activity but ambulatory and able to carry out work of a light or sedentary nature] : Status 1- Restricted in physically strenuous activity but ambulatory and able to carry out work of a light or sedentary nature, e.g., light house work, office work [Thin] : thin [Normal] : full range of motion and no deformities appreciated [de-identified] : scars s/p VATS right chest wall - healed [de-identified] : Dry, no rash [de-identified] : no residual hematuria

## 2023-08-25 NOTE — REVIEW OF SYSTEMS
[Shortness Of Breath] : shortness of breath [SOB on Exertion] : shortness of breath during exertion [Skin Rash] : skin rash [Negative] : Neurological [Fatigue] : no fatigue [Recent Change In Weight] : ~T no recent weight change [Hoarseness] : no hoarseness [FreeTextEntry2] : gained 13 lbs since last visit [FreeTextEntry4] : Hoarseness resolved [FreeTextEntry6] : s/p RUL VATS/wedge resection for lung metastases [FreeTextEntry8] :  right clear-cell kidney cancer, s/p right nephrectomy [de-identified] : upper extremities, triggered by high temperatures

## 2023-08-25 NOTE — ASSESSMENT
[FreeTextEntry1] : Mr. TAM 's questions were answered to his satisfaction.  He  expressed his  understanding and willingness to comply with the above recommendations, and  will return to the office in 4 months.

## 2023-08-30 DIAGNOSIS — R71.8 OTHER ABNORMALITY OF RED BLOOD CELLS: ICD-10-CM

## 2023-08-31 NOTE — ASU PATIENT PROFILE, ADULT - VISION (WITH CORRECTIVE LENSES IF THE PATIENT USUALLY WEARS THEM):
Normal vision: sees adequately in most situations; can see medication labels, newsprint glasses with patient/Partially impaired: cannot see medication labels or newsprint, but can see obstacles in path, and the surrounding layout; can count fingers at arm's length

## 2023-09-01 ENCOUNTER — OUTPATIENT (OUTPATIENT)
Dept: OUTPATIENT SERVICES | Facility: HOSPITAL | Age: 78
LOS: 1 days | Discharge: HOME | End: 2023-09-01
Payer: MEDICARE

## 2023-09-01 VITALS
SYSTOLIC BLOOD PRESSURE: 119 MMHG | HEART RATE: 81 BPM | HEIGHT: 69 IN | RESPIRATION RATE: 16 BRPM | TEMPERATURE: 97 F | OXYGEN SATURATION: 100 % | WEIGHT: 186.07 LBS | DIASTOLIC BLOOD PRESSURE: 74 MMHG

## 2023-09-01 VITALS
RESPIRATION RATE: 16 BRPM | HEART RATE: 88 BPM | OXYGEN SATURATION: 98 % | DIASTOLIC BLOOD PRESSURE: 64 MMHG | SYSTOLIC BLOOD PRESSURE: 110 MMHG

## 2023-09-01 DIAGNOSIS — Z90.49 ACQUIRED ABSENCE OF OTHER SPECIFIED PARTS OF DIGESTIVE TRACT: Chronic | ICD-10-CM

## 2023-09-01 DIAGNOSIS — Z98.890 OTHER SPECIFIED POSTPROCEDURAL STATES: Chronic | ICD-10-CM

## 2023-09-01 DIAGNOSIS — Z90.5 ACQUIRED ABSENCE OF KIDNEY: Chronic | ICD-10-CM

## 2023-09-01 DIAGNOSIS — Z90.89 ACQUIRED ABSENCE OF OTHER ORGANS: Chronic | ICD-10-CM

## 2023-09-01 DIAGNOSIS — I48.91 UNSPECIFIED ATRIAL FIBRILLATION: ICD-10-CM

## 2023-09-01 PROCEDURE — 33286 RMVL SUBQ CAR RHYTHM MNTR: CPT

## 2023-09-01 RX ORDER — MULTIVIT-MIN/FERROUS GLUCONATE 9 MG/15 ML
1 LIQUID (ML) ORAL
Refills: 0 | DISCHARGE

## 2023-09-01 RX ORDER — MAGNESIUM OXIDE 400 MG ORAL TABLET 241.3 MG
1 TABLET ORAL
Refills: 0 | DISCHARGE

## 2023-09-01 RX ORDER — RIVAROXABAN 15 MG-20MG
1 KIT ORAL
Qty: 0 | Refills: 0 | DISCHARGE

## 2023-09-01 RX ORDER — LISINOPRIL 2.5 MG/1
1 TABLET ORAL
Qty: 0 | Refills: 0 | DISCHARGE

## 2023-09-01 RX ORDER — AMLODIPINE BESYLATE 2.5 MG/1
1 TABLET ORAL
Refills: 0 | DISCHARGE

## 2023-09-01 RX ORDER — LEVOTHYROXINE SODIUM 125 MCG
1 TABLET ORAL
Refills: 0 | DISCHARGE

## 2023-09-01 RX ORDER — METOPROLOL TARTRATE 50 MG
0.5 TABLET ORAL
Refills: 0 | DISCHARGE

## 2023-09-01 RX ORDER — METOPROLOL TARTRATE 50 MG
1 TABLET ORAL
Refills: 0 | DISCHARGE

## 2023-09-01 RX ORDER — FAMOTIDINE 10 MG/ML
1 INJECTION INTRAVENOUS
Refills: 0 | DISCHARGE

## 2023-09-01 NOTE — PROCEDURAL SAFETY CHECKLIST WITH OR WITHOUT SEDATION - NSPOSTCOMMENTFT_GEN_ALL_CORE
Brief at 1340. Time Out at 1342. Start Time at 1345.  End Time at 1350. Patient s/p Implantable Loop Recorder Explant of Left Anterior Chest Wall. Surgical Site with sutures, steri strips and Dermabond to Wound Class I Left Anterior Chest Wall.  VS stable. Patient denies pain. Discharge instructions reviewed with patient and patient verbalizes understanding. Patient discharged home at this time.

## 2023-09-01 NOTE — PROCEDURAL SAFETY CHECKLIST WITH OR WITHOUT SEDATION - NSINSTRUMENTCOUNTSD_GEN_ALL_CORE
Pre Procedure: 6 Instruments, 2 Sutures, 2 Needles, 1 Scalpel Post Procedure Instruments:  6 Instruments, 2 sutures, 2 needles, 1 scalpel/done

## 2023-09-01 NOTE — ASU PREOP CHECKLIST - LAST DOSE WITHIN LAST 24HRS
Medical Excuse    Date: 09/12/2018  Patient's Name: MEGAN CUMMINGS  MRN: [id number]    TO WHOM IT MAY CONCERN  The above-named person:  Has received treatment at this office on the following dates: 09/12/2018.  Feel free to call our office with any questions or concerns.    Sincerely,      Dr. Guerita M.D.        Medical information is confidential and cannot be disclosed without the written consent of the patient or his/her representative.     Signatures   Electronically signed by : Josiane Huitron CMA; Sep 12 2018  2:06PM CST     Yes

## 2023-09-05 LAB — TSH SERPL-ACNC: 3.23 UIU/ML

## 2023-09-08 DIAGNOSIS — T82.111A BREAKDOWN (MECHANICAL) OF CARDIAC PULSE GENERATOR (BATTERY), INITIAL ENCOUNTER: ICD-10-CM
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
## 2023-09-11 ENCOUNTER — APPOINTMENT (OUTPATIENT)
Dept: ENDOCRINOLOGY | Facility: CLINIC | Age: 78
End: 2023-09-11
Payer: MEDICARE

## 2023-09-11 VITALS
OXYGEN SATURATION: 97 % | DIASTOLIC BLOOD PRESSURE: 60 MMHG | SYSTOLIC BLOOD PRESSURE: 122 MMHG | BODY MASS INDEX: 29.66 KG/M2 | HEART RATE: 68 BPM | WEIGHT: 189 LBS | HEIGHT: 67 IN | RESPIRATION RATE: 16 BRPM | TEMPERATURE: 97.2 F

## 2023-09-11 PROCEDURE — 99214 OFFICE O/P EST MOD 30 MIN: CPT

## 2023-09-11 RX ORDER — TADALAFIL 10 MG/1
10 TABLET, FILM COATED ORAL
Qty: 30 | Refills: 3 | Status: DISCONTINUED | COMMUNITY
Start: 2023-08-09 | End: 2023-09-11

## 2023-09-14 ENCOUNTER — APPOINTMENT (OUTPATIENT)
Dept: ELECTROPHYSIOLOGY | Facility: CLINIC | Age: 78
End: 2023-09-14
Payer: MEDICARE

## 2023-09-14 VITALS
HEIGHT: 67 IN | WEIGHT: 186 LBS | OXYGEN SATURATION: 100 % | SYSTOLIC BLOOD PRESSURE: 149 MMHG | HEART RATE: 48 BPM | DIASTOLIC BLOOD PRESSURE: 61 MMHG | BODY MASS INDEX: 29.19 KG/M2

## 2023-09-14 DIAGNOSIS — R00.1 BRADYCARDIA, UNSPECIFIED: ICD-10-CM

## 2023-09-14 DIAGNOSIS — Z95.818 PRESENCE OF OTHER CARDIAC IMPLANTS AND GRAFTS: ICD-10-CM

## 2023-09-14 PROCEDURE — 99211 OFF/OP EST MAY X REQ PHY/QHP: CPT

## 2023-09-15 ENCOUNTER — APPOINTMENT (OUTPATIENT)
Dept: ELECTROPHYSIOLOGY | Facility: CLINIC | Age: 78
End: 2023-09-15

## 2023-09-18 ENCOUNTER — RESULT REVIEW (OUTPATIENT)
Age: 78
End: 2023-09-18

## 2023-09-18 ENCOUNTER — APPOINTMENT (OUTPATIENT)
Age: 78
End: 2023-09-18

## 2023-09-18 ENCOUNTER — APPOINTMENT (OUTPATIENT)
Dept: HEMATOLOGY ONCOLOGY | Facility: CLINIC | Age: 78
End: 2023-09-18

## 2023-09-18 LAB
BASOPHILS # BLD AUTO: 0.05 K/UL — SIGNIFICANT CHANGE UP (ref 0–0.2)
BASOPHILS NFR BLD AUTO: 0.8 % — SIGNIFICANT CHANGE UP (ref 0–2)
EOSINOPHIL # BLD AUTO: 0.25 K/UL — SIGNIFICANT CHANGE UP (ref 0–0.5)
EOSINOPHIL NFR BLD AUTO: 3.8 % — SIGNIFICANT CHANGE UP (ref 0–6)
HCT VFR BLD CALC: 50 % — SIGNIFICANT CHANGE UP (ref 39–50)
HGB BLD-MCNC: 16.2 G/DL — SIGNIFICANT CHANGE UP (ref 13–17)
IMM GRANULOCYTES NFR BLD AUTO: 0.3 % — SIGNIFICANT CHANGE UP (ref 0–0.9)
LYMPHOCYTES # BLD AUTO: 1.16 K/UL — SIGNIFICANT CHANGE UP (ref 1–3.3)
LYMPHOCYTES # BLD AUTO: 17.7 % — SIGNIFICANT CHANGE UP (ref 13–44)
MCHC RBC-ENTMCNC: 30.7 PG — SIGNIFICANT CHANGE UP (ref 27–34)
MCHC RBC-ENTMCNC: 32.4 G/DL — SIGNIFICANT CHANGE UP (ref 32–36)
MCV RBC AUTO: 94.7 FL — SIGNIFICANT CHANGE UP (ref 80–100)
MONOCYTES # BLD AUTO: 0.83 K/UL — SIGNIFICANT CHANGE UP (ref 0–0.9)
MONOCYTES NFR BLD AUTO: 12.7 % — SIGNIFICANT CHANGE UP (ref 2–14)
NEUTROPHILS # BLD AUTO: 4.24 K/UL — SIGNIFICANT CHANGE UP (ref 1.8–7.4)
NEUTROPHILS NFR BLD AUTO: 64.7 % — SIGNIFICANT CHANGE UP (ref 43–77)
NRBC # BLD: 0 /100 WBCS — SIGNIFICANT CHANGE UP (ref 0–0)
PLATELET # BLD AUTO: 167 K/UL — SIGNIFICANT CHANGE UP (ref 150–400)
RBC # BLD: 5.28 M/UL — SIGNIFICANT CHANGE UP (ref 4.2–5.8)
RBC # FLD: 12.7 % — SIGNIFICANT CHANGE UP (ref 10.3–14.5)
WBC # BLD: 6.55 K/UL — SIGNIFICANT CHANGE UP (ref 3.8–10.5)
WBC # FLD AUTO: 6.55 K/UL — SIGNIFICANT CHANGE UP (ref 3.8–10.5)

## 2023-09-19 LAB
ALBUMIN SERPL ELPH-MCNC: 4.7 G/DL
ALP BLD-CCNC: 145 U/L
ALT SERPL-CCNC: 31 U/L
ANION GAP SERPL CALC-SCNC: 10 MMOL/L
AST SERPL-CCNC: 33 U/L
BILIRUB SERPL-MCNC: 1.8 MG/DL
BUN SERPL-MCNC: 22 MG/DL
CALCIUM SERPL-MCNC: 9.8 MG/DL
CHLORIDE SERPL-SCNC: 106 MMOL/L
CO2 SERPL-SCNC: 25 MMOL/L
CREAT SERPL-MCNC: 1.29 MG/DL
EGFR: 56 ML/MIN/1.73M2
GLUCOSE SERPL-MCNC: 84 MG/DL
POTASSIUM SERPL-SCNC: 4.8 MMOL/L
PROT SERPL-MCNC: 7.4 G/DL
SODIUM SERPL-SCNC: 142 MMOL/L

## 2023-09-29 ENCOUNTER — RESULT REVIEW (OUTPATIENT)
Age: 78
End: 2023-09-29

## 2023-09-29 ENCOUNTER — APPOINTMENT (OUTPATIENT)
Dept: HEMATOLOGY ONCOLOGY | Facility: CLINIC | Age: 78
End: 2023-09-29

## 2023-09-29 ENCOUNTER — APPOINTMENT (OUTPATIENT)
Dept: INFUSION THERAPY | Facility: HOSPITAL | Age: 78
End: 2023-09-29

## 2023-09-29 LAB
BASOPHILS # BLD AUTO: 0.03 K/UL — SIGNIFICANT CHANGE UP (ref 0–0.2)
BASOPHILS NFR BLD AUTO: 0.3 % — SIGNIFICANT CHANGE UP (ref 0–2)
EOSINOPHIL # BLD AUTO: 0.01 K/UL — SIGNIFICANT CHANGE UP (ref 0–0.5)
EOSINOPHIL NFR BLD AUTO: 0.1 % — SIGNIFICANT CHANGE UP (ref 0–6)
HCT VFR BLD CALC: 50.9 % — HIGH (ref 39–50)
HGB BLD-MCNC: 16.3 G/DL — SIGNIFICANT CHANGE UP (ref 13–17)
IMM GRANULOCYTES NFR BLD AUTO: 0.3 % — SIGNIFICANT CHANGE UP (ref 0–0.9)
LYMPHOCYTES # BLD AUTO: 0.65 K/UL — LOW (ref 1–3.3)
LYMPHOCYTES # BLD AUTO: 6.8 % — LOW (ref 13–44)
MCHC RBC-ENTMCNC: 30 PG — SIGNIFICANT CHANGE UP (ref 27–34)
MCHC RBC-ENTMCNC: 32 G/DL — SIGNIFICANT CHANGE UP (ref 32–36)
MCV RBC AUTO: 93.6 FL — SIGNIFICANT CHANGE UP (ref 80–100)
MONOCYTES # BLD AUTO: 0.3 K/UL — SIGNIFICANT CHANGE UP (ref 0–0.9)
MONOCYTES NFR BLD AUTO: 3.1 % — SIGNIFICANT CHANGE UP (ref 2–14)
NEUTROPHILS # BLD AUTO: 8.56 K/UL — HIGH (ref 1.8–7.4)
NEUTROPHILS NFR BLD AUTO: 89.4 % — HIGH (ref 43–77)
NRBC # BLD: 0 /100 WBCS — SIGNIFICANT CHANGE UP (ref 0–0)
PLATELET # BLD AUTO: 165 K/UL — SIGNIFICANT CHANGE UP (ref 150–400)
RBC # BLD: 5.44 M/UL — SIGNIFICANT CHANGE UP (ref 4.2–5.8)
RBC # FLD: 12.8 % — SIGNIFICANT CHANGE UP (ref 10.3–14.5)
WBC # BLD: 9.58 K/UL — SIGNIFICANT CHANGE UP (ref 3.8–10.5)
WBC # FLD AUTO: 9.58 K/UL — SIGNIFICANT CHANGE UP (ref 3.8–10.5)

## 2023-10-11 ENCOUNTER — APPOINTMENT (OUTPATIENT)
Dept: ORTHOPEDIC SURGERY | Facility: CLINIC | Age: 78
End: 2023-10-11
Payer: MEDICARE

## 2023-10-11 VITALS
HEIGHT: 67 IN | HEART RATE: 79 BPM | SYSTOLIC BLOOD PRESSURE: 106 MMHG | BODY MASS INDEX: 29.19 KG/M2 | WEIGHT: 186 LBS | DIASTOLIC BLOOD PRESSURE: 65 MMHG

## 2023-10-11 PROCEDURE — 72100 X-RAY EXAM L-S SPINE 2/3 VWS: CPT

## 2023-10-11 PROCEDURE — 99213 OFFICE O/P EST LOW 20 MIN: CPT

## 2023-10-15 ENCOUNTER — APPOINTMENT (OUTPATIENT)
Dept: ELECTROPHYSIOLOGY | Facility: CLINIC | Age: 78
End: 2023-10-15

## 2023-10-23 ENCOUNTER — APPOINTMENT (OUTPATIENT)
Dept: ORTHOPEDIC SURGERY | Facility: CLINIC | Age: 78
End: 2023-10-23
Payer: MEDICARE

## 2023-10-23 DIAGNOSIS — S39.012A STRAIN OF MUSCLE, FASCIA AND TENDON OF LOWER BACK, INITIAL ENCOUNTER: ICD-10-CM

## 2023-10-23 PROCEDURE — 99213 OFFICE O/P EST LOW 20 MIN: CPT

## 2023-10-23 NOTE — ED ADULT TRIAGE NOTE - INTERNATIONAL TRAVEL
Department of Anesthesiology  Postprocedure Note    Patient: Stormy Garcia  MRN: [id number]  [place]: 1955  Date of evaluation: 3/2/2021  Time:  8:35 AM     Procedure Summary     Date: 03/02/21 Room / Location: [address] [place] [address]    Anesthesia Start: 0325 Anesthesia Stop: Jeni Ivory    Procedures:       COLORECTAL CANCER SCREENING, NOT HIGH RISK (N/A )      EGD BIOPSY (N/A ) Diagnosis: (SCREENING DYSPHAGIA)    Surgeons: Precious Prieto MD Responsible Provider: Juliet Shay MD    Anesthesia Type: MAC ASA Status: 3          Anesthesia Type: MAC    Marcos Phase I: Marcos Score: 10    Marcos Phase II: Marcos Score: 10    Last vitals: Reviewed and per EMR flowsheets.        Anesthesia Post Evaluation    Patient location during evaluation: PACU  Patient participation: complete - patient participated  Level of consciousness: awake and alert  Pain score: 0  Airway patency: patent  Nausea & Vomiting: no vomiting and no nausea  Complications: no  Cardiovascular status: hemodynamically stable  Respiratory status: spontaneous ventilation  Hydration status: stable No PAIN

## 2023-10-24 VITALS — WEIGHT: 186 LBS | HEIGHT: 67 IN | BODY MASS INDEX: 29.19 KG/M2

## 2023-10-24 PROBLEM — S39.012A STRAIN OF LUMBAR REGION, INITIAL ENCOUNTER: Status: ACTIVE | Noted: 2023-10-11

## 2023-10-25 ENCOUNTER — RX RENEWAL (OUTPATIENT)
Age: 78
End: 2023-10-25

## 2023-11-20 ENCOUNTER — OUTPATIENT (OUTPATIENT)
Dept: OUTPATIENT SERVICES | Facility: HOSPITAL | Age: 78
LOS: 1 days | Discharge: HOME | End: 2023-11-20

## 2023-11-20 ENCOUNTER — EMERGENCY (EMERGENCY)
Facility: HOSPITAL | Age: 78
LOS: 0 days | Discharge: HOME | End: 2023-11-20
Attending: EMERGENCY MEDICINE
Payer: MEDICARE

## 2023-11-20 VITALS
RESPIRATION RATE: 18 BRPM | HEART RATE: 66 BPM | SYSTOLIC BLOOD PRESSURE: 124 MMHG | DIASTOLIC BLOOD PRESSURE: 74 MMHG | OXYGEN SATURATION: 100 % | TEMPERATURE: 98 F

## 2023-11-20 VITALS — HEIGHT: 70 IN | WEIGHT: 184.97 LBS

## 2023-11-20 DIAGNOSIS — S80.11XA CONTUSION OF RIGHT LOWER LEG, INITIAL ENCOUNTER: ICD-10-CM

## 2023-11-20 DIAGNOSIS — Z98.890 OTHER SPECIFIED POSTPROCEDURAL STATES: Chronic | ICD-10-CM

## 2023-11-20 DIAGNOSIS — M25.551 PAIN IN RIGHT HIP: ICD-10-CM

## 2023-11-20 DIAGNOSIS — C64.9 MALIGNANT NEOPLASM OF UNSPECIFIED KIDNEY, EXCEPT RENAL PELVIS: ICD-10-CM

## 2023-11-20 DIAGNOSIS — Z90.89 ACQUIRED ABSENCE OF OTHER ORGANS: Chronic | ICD-10-CM

## 2023-11-20 DIAGNOSIS — Z79.01 LONG TERM (CURRENT) USE OF ANTICOAGULANTS: ICD-10-CM

## 2023-11-20 DIAGNOSIS — Z90.49 ACQUIRED ABSENCE OF OTHER SPECIFIED PARTS OF DIGESTIVE TRACT: Chronic | ICD-10-CM

## 2023-11-20 DIAGNOSIS — S30.0XXA CONTUSION OF LOWER BACK AND PELVIS, INITIAL ENCOUNTER: ICD-10-CM

## 2023-11-20 DIAGNOSIS — G89.29 OTHER CHRONIC PAIN: ICD-10-CM

## 2023-11-20 DIAGNOSIS — S70.01XA CONTUSION OF RIGHT HIP, INITIAL ENCOUNTER: ICD-10-CM

## 2023-11-20 DIAGNOSIS — R71.8 OTHER ABNORMALITY OF RED BLOOD CELLS: ICD-10-CM

## 2023-11-20 DIAGNOSIS — Z90.5 ACQUIRED ABSENCE OF KIDNEY: Chronic | ICD-10-CM

## 2023-11-20 DIAGNOSIS — I48.91 UNSPECIFIED ATRIAL FIBRILLATION: ICD-10-CM

## 2023-11-20 DIAGNOSIS — Z87.19 PERSONAL HISTORY OF OTHER DISEASES OF THE DIGESTIVE SYSTEM: ICD-10-CM

## 2023-11-20 DIAGNOSIS — Z85.118 PERSONAL HISTORY OF OTHER MALIGNANT NEOPLASM OF BRONCHUS AND LUNG: ICD-10-CM

## 2023-11-20 DIAGNOSIS — R80.9 PROTEINURIA, UNSPECIFIED: ICD-10-CM
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
## 2023-11-20 DIAGNOSIS — I10 ESSENTIAL (PRIMARY) HYPERTENSION: ICD-10-CM

## 2023-11-20 DIAGNOSIS — Z85.528 PERSONAL HISTORY OF OTHER MALIGNANT NEOPLASM OF KIDNEY: ICD-10-CM
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
## 2023-11-20 DIAGNOSIS — Z79.82 LONG TERM (CURRENT) USE OF ASPIRIN: ICD-10-CM

## 2023-11-20 DIAGNOSIS — M54.50 LOW BACK PAIN, UNSPECIFIED: ICD-10-CM

## 2023-11-20 LAB
ALBUMIN SERPL ELPH-MCNC: 3.8 G/DL — SIGNIFICANT CHANGE UP (ref 3.3–5)
ALBUMIN SERPL ELPH-MCNC: 3.8 G/DL — SIGNIFICANT CHANGE UP (ref 3.3–5)
ALP SERPL-CCNC: 148 U/L — HIGH (ref 40–120)
ALP SERPL-CCNC: 148 U/L — HIGH (ref 40–120)
ALT FLD-CCNC: 31 U/L — SIGNIFICANT CHANGE UP (ref 12–78)
ALT FLD-CCNC: 31 U/L — SIGNIFICANT CHANGE UP (ref 12–78)
ANION GAP SERPL CALC-SCNC: 5 MMOL/L — SIGNIFICANT CHANGE UP (ref 5–17)
ANION GAP SERPL CALC-SCNC: 5 MMOL/L — SIGNIFICANT CHANGE UP (ref 5–17)
APPEARANCE UR: CLEAR — SIGNIFICANT CHANGE UP
APPEARANCE UR: CLEAR — SIGNIFICANT CHANGE UP
APTT BLD: 42.1 SEC — HIGH (ref 24.5–35.6)
APTT BLD: 42.1 SEC — HIGH (ref 24.5–35.6)
AST SERPL-CCNC: 33 U/L — SIGNIFICANT CHANGE UP (ref 15–37)
AST SERPL-CCNC: 33 U/L — SIGNIFICANT CHANGE UP (ref 15–37)
BACTERIA # UR AUTO: NEGATIVE /HPF — SIGNIFICANT CHANGE UP
BACTERIA # UR AUTO: NEGATIVE /HPF — SIGNIFICANT CHANGE UP
BASOPHILS # BLD AUTO: 0.05 K/UL — SIGNIFICANT CHANGE UP (ref 0–0.2)
BASOPHILS # BLD AUTO: 0.05 K/UL — SIGNIFICANT CHANGE UP (ref 0–0.2)
BASOPHILS NFR BLD AUTO: 0.6 % — SIGNIFICANT CHANGE UP (ref 0–2)
BASOPHILS NFR BLD AUTO: 0.6 % — SIGNIFICANT CHANGE UP (ref 0–2)
BILIRUB SERPL-MCNC: 4.3 MG/DL — HIGH (ref 0.2–1.2)
BILIRUB SERPL-MCNC: 4.3 MG/DL — HIGH (ref 0.2–1.2)
BILIRUB UR-MCNC: NEGATIVE — SIGNIFICANT CHANGE UP
BILIRUB UR-MCNC: NEGATIVE — SIGNIFICANT CHANGE UP
BLD GP AB SCN SERPL QL: SIGNIFICANT CHANGE UP
BLD GP AB SCN SERPL QL: SIGNIFICANT CHANGE UP
BUN SERPL-MCNC: 20 MG/DL — SIGNIFICANT CHANGE UP (ref 7–23)
BUN SERPL-MCNC: 20 MG/DL — SIGNIFICANT CHANGE UP (ref 7–23)
CALCIUM SERPL-MCNC: 8.8 MG/DL — SIGNIFICANT CHANGE UP (ref 8.5–10.1)
CALCIUM SERPL-MCNC: 8.8 MG/DL — SIGNIFICANT CHANGE UP (ref 8.5–10.1)
CAST: 0 /LPF — SIGNIFICANT CHANGE UP (ref 0–4)
CAST: 0 /LPF — SIGNIFICANT CHANGE UP (ref 0–4)
CHLORIDE SERPL-SCNC: 106 MMOL/L — SIGNIFICANT CHANGE UP (ref 96–108)
CHLORIDE SERPL-SCNC: 106 MMOL/L — SIGNIFICANT CHANGE UP (ref 96–108)
CO2 SERPL-SCNC: 27 MMOL/L — SIGNIFICANT CHANGE UP (ref 22–31)
CO2 SERPL-SCNC: 27 MMOL/L — SIGNIFICANT CHANGE UP (ref 22–31)
COLOR SPEC: SIGNIFICANT CHANGE UP
COLOR SPEC: SIGNIFICANT CHANGE UP
CREAT SERPL-MCNC: 1.47 MG/DL — HIGH (ref 0.5–1.3)
CREAT SERPL-MCNC: 1.47 MG/DL — HIGH (ref 0.5–1.3)
DIFF PNL FLD: NEGATIVE — SIGNIFICANT CHANGE UP
DIFF PNL FLD: NEGATIVE — SIGNIFICANT CHANGE UP
EGFR: 48 ML/MIN/1.73M2 — LOW
EGFR: 48 ML/MIN/1.73M2 — LOW
EOSINOPHIL # BLD AUTO: 0.17 K/UL — SIGNIFICANT CHANGE UP (ref 0–0.5)
EOSINOPHIL # BLD AUTO: 0.17 K/UL — SIGNIFICANT CHANGE UP (ref 0–0.5)
EOSINOPHIL NFR BLD AUTO: 2.1 % — SIGNIFICANT CHANGE UP (ref 0–6)
EOSINOPHIL NFR BLD AUTO: 2.1 % — SIGNIFICANT CHANGE UP (ref 0–6)
GLUCOSE SERPL-MCNC: 87 MG/DL — SIGNIFICANT CHANGE UP (ref 70–99)
GLUCOSE SERPL-MCNC: 87 MG/DL — SIGNIFICANT CHANGE UP (ref 70–99)
GLUCOSE UR QL: NEGATIVE MG/DL — SIGNIFICANT CHANGE UP
GLUCOSE UR QL: NEGATIVE MG/DL — SIGNIFICANT CHANGE UP
HCT VFR BLD CALC: 45.6 % — SIGNIFICANT CHANGE UP (ref 39–50)
HCT VFR BLD CALC: 45.6 % — SIGNIFICANT CHANGE UP (ref 39–50)
HGB BLD-MCNC: 14.4 G/DL — SIGNIFICANT CHANGE UP (ref 13–17)
HGB BLD-MCNC: 14.4 G/DL — SIGNIFICANT CHANGE UP (ref 13–17)
IMM GRANULOCYTES NFR BLD AUTO: 0.4 % — SIGNIFICANT CHANGE UP (ref 0–0.9)
IMM GRANULOCYTES NFR BLD AUTO: 0.4 % — SIGNIFICANT CHANGE UP (ref 0–0.9)
INR BLD: 1.78 RATIO — HIGH (ref 0.85–1.18)
INR BLD: 1.78 RATIO — HIGH (ref 0.85–1.18)
KETONES UR-MCNC: ABNORMAL MG/DL
KETONES UR-MCNC: ABNORMAL MG/DL
LEUKOCYTE ESTERASE UR-ACNC: ABNORMAL
LEUKOCYTE ESTERASE UR-ACNC: ABNORMAL
LYMPHOCYTES # BLD AUTO: 1.1 K/UL — SIGNIFICANT CHANGE UP (ref 1–3.3)
LYMPHOCYTES # BLD AUTO: 1.1 K/UL — SIGNIFICANT CHANGE UP (ref 1–3.3)
LYMPHOCYTES # BLD AUTO: 13.6 % — SIGNIFICANT CHANGE UP (ref 13–44)
LYMPHOCYTES # BLD AUTO: 13.6 % — SIGNIFICANT CHANGE UP (ref 13–44)
MCHC RBC-ENTMCNC: 28.9 PG — SIGNIFICANT CHANGE UP (ref 27–34)
MCHC RBC-ENTMCNC: 28.9 PG — SIGNIFICANT CHANGE UP (ref 27–34)
MCHC RBC-ENTMCNC: 31.6 GM/DL — LOW (ref 32–36)
MCHC RBC-ENTMCNC: 31.6 GM/DL — LOW (ref 32–36)
MCV RBC AUTO: 91.6 FL — SIGNIFICANT CHANGE UP (ref 80–100)
MCV RBC AUTO: 91.6 FL — SIGNIFICANT CHANGE UP (ref 80–100)
MONOCYTES # BLD AUTO: 0.86 K/UL — SIGNIFICANT CHANGE UP (ref 0–0.9)
MONOCYTES # BLD AUTO: 0.86 K/UL — SIGNIFICANT CHANGE UP (ref 0–0.9)
MONOCYTES NFR BLD AUTO: 10.6 % — SIGNIFICANT CHANGE UP (ref 2–14)
MONOCYTES NFR BLD AUTO: 10.6 % — SIGNIFICANT CHANGE UP (ref 2–14)
NEUTROPHILS # BLD AUTO: 5.9 K/UL — SIGNIFICANT CHANGE UP (ref 1.8–7.4)
NEUTROPHILS # BLD AUTO: 5.9 K/UL — SIGNIFICANT CHANGE UP (ref 1.8–7.4)
NEUTROPHILS NFR BLD AUTO: 72.7 % — SIGNIFICANT CHANGE UP (ref 43–77)
NEUTROPHILS NFR BLD AUTO: 72.7 % — SIGNIFICANT CHANGE UP (ref 43–77)
NITRITE UR-MCNC: NEGATIVE — SIGNIFICANT CHANGE UP
NITRITE UR-MCNC: NEGATIVE — SIGNIFICANT CHANGE UP
PH UR: 6.5 — SIGNIFICANT CHANGE UP (ref 5–8)
PH UR: 6.5 — SIGNIFICANT CHANGE UP (ref 5–8)
PLATELET # BLD AUTO: 176 K/UL — SIGNIFICANT CHANGE UP (ref 150–400)
PLATELET # BLD AUTO: 176 K/UL — SIGNIFICANT CHANGE UP (ref 150–400)
POTASSIUM SERPL-MCNC: 4.4 MMOL/L — SIGNIFICANT CHANGE UP (ref 3.5–5.3)
POTASSIUM SERPL-MCNC: 4.4 MMOL/L — SIGNIFICANT CHANGE UP (ref 3.5–5.3)
POTASSIUM SERPL-SCNC: 4.4 MMOL/L — SIGNIFICANT CHANGE UP (ref 3.5–5.3)
POTASSIUM SERPL-SCNC: 4.4 MMOL/L — SIGNIFICANT CHANGE UP (ref 3.5–5.3)
PROT SERPL-MCNC: 7.7 GM/DL — SIGNIFICANT CHANGE UP (ref 6–8.3)
PROT SERPL-MCNC: 7.7 GM/DL — SIGNIFICANT CHANGE UP (ref 6–8.3)
PROT UR-MCNC: 30 MG/DL
PROT UR-MCNC: 30 MG/DL
PROTHROM AB SERPL-ACNC: 19.8 SEC — HIGH (ref 9.5–13)
PROTHROM AB SERPL-ACNC: 19.8 SEC — HIGH (ref 9.5–13)
RBC # BLD: 4.98 M/UL — SIGNIFICANT CHANGE UP (ref 4.2–5.8)
RBC # BLD: 4.98 M/UL — SIGNIFICANT CHANGE UP (ref 4.2–5.8)
RBC # FLD: 14 % — SIGNIFICANT CHANGE UP (ref 10.3–14.5)
RBC # FLD: 14 % — SIGNIFICANT CHANGE UP (ref 10.3–14.5)
RBC CASTS # UR COMP ASSIST: 1 /HPF — SIGNIFICANT CHANGE UP (ref 0–4)
RBC CASTS # UR COMP ASSIST: 1 /HPF — SIGNIFICANT CHANGE UP (ref 0–4)
SODIUM SERPL-SCNC: 138 MMOL/L — SIGNIFICANT CHANGE UP (ref 135–145)
SODIUM SERPL-SCNC: 138 MMOL/L — SIGNIFICANT CHANGE UP (ref 135–145)
SP GR SPEC: 1.02 — SIGNIFICANT CHANGE UP (ref 1–1.03)
SP GR SPEC: 1.02 — SIGNIFICANT CHANGE UP (ref 1–1.03)
SQUAMOUS # UR AUTO: 0 /HPF — SIGNIFICANT CHANGE UP (ref 0–5)
SQUAMOUS # UR AUTO: 0 /HPF — SIGNIFICANT CHANGE UP (ref 0–5)
UROBILINOGEN FLD QL: 1 MG/DL — SIGNIFICANT CHANGE UP (ref 0.2–1)
UROBILINOGEN FLD QL: 1 MG/DL — SIGNIFICANT CHANGE UP (ref 0.2–1)
WBC # BLD: 8.11 K/UL — SIGNIFICANT CHANGE UP (ref 3.8–10.5)
WBC # BLD: 8.11 K/UL — SIGNIFICANT CHANGE UP (ref 3.8–10.5)
WBC # FLD AUTO: 8.11 K/UL — SIGNIFICANT CHANGE UP (ref 3.8–10.5)
WBC # FLD AUTO: 8.11 K/UL — SIGNIFICANT CHANGE UP (ref 3.8–10.5)
WBC UR QL: 1 /HPF — SIGNIFICANT CHANGE UP (ref 0–5)
WBC UR QL: 1 /HPF — SIGNIFICANT CHANGE UP (ref 0–5)

## 2023-11-20 PROCEDURE — 76376 3D RENDER W/INTRP POSTPROCES: CPT | Mod: 26

## 2023-11-20 PROCEDURE — 72192 CT PELVIS W/O DYE: CPT | Mod: ME

## 2023-11-20 PROCEDURE — 70450 CT HEAD/BRAIN W/O DYE: CPT | Mod: MA

## 2023-11-20 PROCEDURE — 93010 ELECTROCARDIOGRAM REPORT: CPT

## 2023-11-20 PROCEDURE — 85610 PROTHROMBIN TIME: CPT

## 2023-11-20 PROCEDURE — 70450 CT HEAD/BRAIN W/O DYE: CPT | Mod: 26,MA

## 2023-11-20 PROCEDURE — 93005 ELECTROCARDIOGRAM TRACING: CPT

## 2023-11-20 PROCEDURE — 73502 X-RAY EXAM HIP UNI 2-3 VIEWS: CPT | Mod: RT

## 2023-11-20 PROCEDURE — 99285 EMERGENCY DEPT VISIT HI MDM: CPT | Mod: FS

## 2023-11-20 PROCEDURE — G1004: CPT

## 2023-11-20 PROCEDURE — 85730 THROMBOPLASTIN TIME PARTIAL: CPT

## 2023-11-20 PROCEDURE — 73502 X-RAY EXAM HIP UNI 2-3 VIEWS: CPT | Mod: 26,RT

## 2023-11-20 PROCEDURE — 87086 URINE CULTURE/COLONY COUNT: CPT

## 2023-11-20 PROCEDURE — 80053 COMPREHEN METABOLIC PANEL: CPT

## 2023-11-20 PROCEDURE — 36415 COLL VENOUS BLD VENIPUNCTURE: CPT

## 2023-11-20 PROCEDURE — 85025 COMPLETE CBC W/AUTO DIFF WBC: CPT

## 2023-11-20 PROCEDURE — 99285 EMERGENCY DEPT VISIT HI MDM: CPT | Mod: 25

## 2023-11-20 PROCEDURE — 81001 URINALYSIS AUTO W/SCOPE: CPT

## 2023-11-20 PROCEDURE — 86901 BLOOD TYPING SEROLOGIC RH(D): CPT

## 2023-11-20 PROCEDURE — 86900 BLOOD TYPING SEROLOGIC ABO: CPT

## 2023-11-20 PROCEDURE — 72192 CT PELVIS W/O DYE: CPT | Mod: 26,ME

## 2023-11-20 PROCEDURE — 76376 3D RENDER W/INTRP POSTPROCES: CPT

## 2023-11-20 PROCEDURE — 86850 RBC ANTIBODY SCREEN: CPT

## 2023-11-20 NOTE — ED ADULT NURSE NOTE - OBJECTIVE STATEMENT
Pt A&Ox4 78 y.o male breathing unlabored c/o low pack and right buttocks pain s/p a trip and fall last week. Pt describes the pain as constant and aching and a 5/10 at rest and 8/10 with activity. Pt states he's on blood thinners. Pt denies fevers, N/V/D.   #20g IV in left AC, labs drawn.

## 2023-11-20 NOTE — ED STATDOCS - CARE PROVIDER_API CALL
Román Ibarra [place]  Pulmonary Disease  [facility], [address] [address]  Phone: [phone]  Fax: [phone]  Follow Up Time:

## 2023-11-20 NOTE — ED ADULT TRIAGE NOTE - CHIEF COMPLAINT QUOTE
Pt c/o lower back pain and R buttock pain s/p fall 1 week ago. On Xarelto and 81mg ASA daily. No pain meds taken PTA. Ambulatory to triage.

## 2023-11-20 NOTE — ED STATDOCS - CLINICAL SUMMARY MEDICAL DECISION MAKING FREE TEXT BOX
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 Will check labs, EKG, and XR of the hip. If everything normal, will have patient followup with Dr. Flores for chronic back pain. 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 Will check labs, EKG, and XR of the hip. If everything normal, will have patient followup with Dr. Flores for chronic back pain.        CT demonstrating 5.2 cm buttock hematoma Contusion in subcutaneous fat in surrounding myofascial planes.  CT head negative for acute findings.   INR 1.7 with proteinuria as well.  Pt. aware of results and need to have labs repeated with his hematologist and PMD.  Daniela Martinez PA-C

## 2023-11-20 NOTE — ED STATDOCS - NSFOLLOWUPINSTRUCTIONS_ED_ALL_ED_FT
Ecchymosis    WHAT YOU NEED TO KNOW:    Ecchymosis is a collection of blood under the skin. Blood leaks from blood vessels and collects in nearby tissues. This can happen anywhere just below the skin, or in a mucus membrane, such as your mouth. Ecchymosis may appear as a large red, blue, or purple area of skin. You may also have pain or swelling in the area. Signs and symptoms may move to nearby body areas. It is important for you to follow up with your healthcare provider. Some causes of ecchymosis are serious and need medical treatment.    DISCHARGE INSTRUCTIONS:    Call your doctor if:    You have new symptoms.    Your bruise suddenly gets bigger and feels hard.    The affected area does not improve within 2 weeks.    You have ecchymosis around your eye and you are having trouble seeing.    You have questions or concerns about your condition or care.  Self-care:  R.I.C.E.      Hematoma    WHAT YOU NEED TO KNOW:    A hematoma is a collection of blood. A bruise is a type of hematoma. A hematoma may form in a muscle or in the tissues just under the skin. A hematoma that forms under the skin will feel like a bump or hard mass. Hematomas can happen anywhere in your body, including in your brain. Your body may break down and absorb a mild hematoma on its own. A more serious hematoma may need treatment.    DISCHARGE INSTRUCTIONS:    Medicines: You may need any of the following:    Prescription pain medicine may be given. Ask how to take this medicine safely.    NSAIDs, such as ibuprofen, help decrease swelling, pain, and fever. This medicine is available with or without a doctor's order. NSAIDs can cause stomach bleeding or kidney problems in certain people. If you take blood thinner medicine, always ask your healthcare provider if NSAIDs are safe for you. Always read the medicine label and follow directions.    Antibiotics prevent or treat a bacterial infection.    Take your medicine as directed. Contact your healthcare provider if you think your medicine is not helping or if you have side effects. Tell your provider if you are allergic to any medicine. Keep a list of the medicines, vitamins, and herbs you take. Include the amounts, and when and why you take them. Bring the list or the pill bottles to follow-up visits. Carry your medicine list with you in case of an emergency.  Return to the emergency department if:    You have new or worsening pain, or pain that does not get better with medicine.    You have a fever.    You have trouble moving the body part that has the hematoma.  Contact your healthcare provider if:    You have questions or concerns about your condition or care.    Follow up with your healthcare provider as directed: You may need to have surgery if your hematoma is severe. You may also need other tests to make sure there is no other damage that needs to be treated. Write down your questions so you remember to ask them during your visits.    Self-care:    Rest the area. Rest will help your body heal and will also help prevent more damage.    Apply ice as directed. Ice helps reduce swelling. Ice may also help prevent tissue damage. Use an ice pack, or put crushed ice in a bag. Cover it with a towel. Place it on your hematoma for 20 minutes every hour, or as directed. Ask how many times each day to apply ice, and for how many days.    Compress the injury if possible. Lightly wrap the injury with an elastic or soft bandage. This may help control swelling. Ask your healthcare provider how to wrap your injury properly.    Elevate the area as directed. If possible, raise the area above the level of your heart as often as you can. This will help decrease swelling.    Keep the hematoma covered with a bandage. This will help protect the area while it healsRest the area to help the tissues heal.    Apply ice to the area to relieve pain and swelling. Ice can also help prevent tissue damage. Use an ice pack, or put crushed ice in a bag. Cover the ice pack or bag with a small towel before you apply it to your skin. Apply ice for 20 minutes every hour, or as directed.    Elevate the affected area to reduce swelling, and to improve circulation. Prop the area on pillows to keep it elevated above the level of your heart. Do this as often as possible.    NSAID medicines such as ibuprofen can help reduce pain and swelling. NSAIDs are available without a prescription. Ask your healthcare provider which medicine is right for you. Ask how much to take and how often to take it. Follow directions. NSAIDs can cause stomach bleeding and kidney damage if not taken correctly. If you take blood thinner medicine, always ask if NSAIDs are safe for you.  Follow up with your healthcare provider as directed: Write down your questions so you remember to ask them during your visits.

## 2023-11-20 NOTE — ED STATDOCS - PROGRESS NOTE DETAILS
Pt. is an 78 year old male Hx renal cancer with lung mets.  Lobectomy of lung, HTN, Afib on baby ASA and Xarelto presenting with right hip and back pain.  Pt. had a syncopal episode one week ago and how with hematoma to hip and leg.  Pt. with chronic hip pain.  Pt. denies headache.   Neg. CP, SOB, or other complaints.  Daniela Martinez PA-C large area of ecchymosis to right buttock tracking down to posterior calf.  Daniela Martinez PA-C CT demonstrating 5.2 cm buttock hematoma Contusion in subcutaneous fat in surrounding myofascial planes.  CT head negative for acute findings.   INR 1.7 with proteinuria as well.  Pt. aware of results and need to have labs repeated with his hematologist and PMD.  Daniela Martinez PA-C Pt. is an 78 year old male Hx renal cancer (with removal of kidney) with lung mets.  Lobectomy of lung, HTN, Afib on baby ASA and Xarelto presenting with right hip and back pain.  Pt. had a syncopal episode one week ago and how with hematoma to hip and leg.  Pt. with chronic hip pain.  Pt. denies headache.   Neg. CP, SOB, or other complaints.  Daniela Martinez PA-C

## 2023-11-20 NOTE — ED STATDOCS - PATIENT PORTAL LINK FT
You can access the FollowMyHealth Patient Portal offered by [facility] by registering at the following website: [email or web address]. By joining [facility]’s FollowMyHealth portal, you will also be able to view your health information using other applications (apps) compatible with our system.

## 2023-11-20 NOTE — ED STATDOCS - OBJECTIVE STATEMENT
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 He has been ambulatory since the fall.   Ortho: Dr. Flores

## 2023-11-20 NOTE — ED STATDOCS - ATTENDING APP SHARED VISIT CONTRIBUTION OF CARE
I personally saw the patient with the KEATON, and completed the key components of the history and physical exam. I then discussed the management plan with the KEATON.

## 2023-11-20 NOTE — ED STATDOCS - PROGRESS NOTE3
Patient presents to unit with c/o contractions. Patient states contractions started yesterday around 4pm. Contraction frequency stayed the same and pain has increase. Patient states she has a history of fast labor but feels that her symptoms isn't labor. She has never felt this before and wanted to get checked out.    1203  Paged Kevin 45    1210  RACHAEL Bowman returned page. Report patient presents to unit with c/o of contractions. Report patient states she doesn't feel like what she if feeling is labor but has felt this before and wanted to get checked out. Report patient's SVE and BP. Orders received to [facility] panel and offer patient to walk and get rechecked in an hour. If patient does not want to walk and get rechecked patient is to be discharge after lab results return and are WNL. [address] patient reported to RACHAEL Bowman on her symptoms. Advised lab work ordered and if lab results WNL patient is to be discharged. Also explained to patient Kevin 45 stated symptoms are pregnancy related. Advised orders received offer her to walk for one hour and recheck cervix if patient wants. Patient declined walking and recheck and wants to go home after lab results. Roc Simpson  Page returned. Lab results reported. Patient to be discharged. Patient may take Tylenol and use heating pad to help with pain. If patient wants one percocet before discharge patient may be given before discharge.
Stable.

## 2023-11-21 ENCOUNTER — LABORATORY RESULT (OUTPATIENT)
Age: 78
End: 2023-11-21

## 2023-11-21 LAB
CULTURE RESULTS: SIGNIFICANT CHANGE UP
CULTURE RESULTS: SIGNIFICANT CHANGE UP
SPECIMEN SOURCE: SIGNIFICANT CHANGE UP
SPECIMEN SOURCE: SIGNIFICANT CHANGE UP

## 2023-11-22 LAB
ALBUMIN SERPL ELPH-MCNC: 4.2 G/DL
ALP BLD-CCNC: 147 U/L
ALT SERPL-CCNC: 21 U/L
ANION GAP SERPL CALC-SCNC: 10 MMOL/L
AST SERPL-CCNC: 28 U/L
BASOPHILS # BLD AUTO: 0.04 K/UL
BASOPHILS NFR BLD AUTO: 0.6 %
BILIRUB SERPL-MCNC: 4.5 MG/DL
BUN SERPL-MCNC: 20 MG/DL
CALCIUM SERPL-MCNC: 9.2 MG/DL
CHLORIDE SERPL-SCNC: 106 MMOL/L
CHOLEST SERPL-MCNC: 92 MG/DL
CO2 SERPL-SCNC: 25 MMOL/L
CREAT SERPL-MCNC: 1.35 MG/DL
EGFR: 53 ML/MIN/1.73M2
EOSINOPHIL # BLD AUTO: 0.11 K/UL
EOSINOPHIL NFR BLD AUTO: 1.6 %
GLUCOSE SERPL-MCNC: 83 MG/DL
HCT VFR BLD CALC: 42.7 %
HDLC SERPL-MCNC: 41 MG/DL
HGB BLD-MCNC: 13.4 G/DL
IMM GRANULOCYTES NFR BLD AUTO: 0.3 %
LDLC SERPL CALC-MCNC: 32 MG/DL
LYMPHOCYTES # BLD AUTO: 0.99 K/UL
LYMPHOCYTES NFR BLD AUTO: 14.1 %
MAN DIFF?: NORMAL
MCHC RBC-ENTMCNC: 28.5 PG
MCHC RBC-ENTMCNC: 31.4 GM/DL
MCV RBC AUTO: 90.9 FL
MONOCYTES # BLD AUTO: 0.68 K/UL
MONOCYTES NFR BLD AUTO: 9.7 %
NEUTROPHILS # BLD AUTO: 5.16 K/UL
NEUTROPHILS NFR BLD AUTO: 73.7 %
NONHDLC SERPL-MCNC: 51 MG/DL
PLATELET # BLD AUTO: 180 K/UL
POTASSIUM SERPL-SCNC: 4.7 MMOL/L
PROT SERPL-MCNC: 6.7 G/DL
PSA SERPL-MCNC: 4.65 NG/ML
RBC # BLD: 4.7 M/UL
RBC # FLD: 14.4 %
SODIUM SERPL-SCNC: 141 MMOL/L
TRIGL SERPL-MCNC: 102 MG/DL
TSH SERPL-ACNC: 5.1 UIU/ML
WBC # FLD AUTO: 7 K/UL

## 2023-11-22 NOTE — REVIEW OF SYSTEMS
[FreeTextEntry2] : Constitutional: No Fever\par Eyes: No visual difficulty\par ENT: no difficulty hearing\par Cardiovascular: No palpitations\par Respiratory: No Cough\par Gastrointestinal: No nausea\par Genitourinary: No dysuria\par Musculoskeletal: No joint pain\par Neurological: No headaches\par Psychiatry: No sleep abnormalities\par Skin: No rashes\par Hematology: No bleeding  no

## 2023-11-24 LAB
APPEARANCE: CLEAR
BACTERIA: NEGATIVE /HPF
BILIRUBIN URINE: NEGATIVE
BLOOD URINE: NEGATIVE
CAST: 0 /LPF
COLOR: YELLOW
EPITHELIAL CELLS: 0 /HPF
GLUCOSE QUALITATIVE U: NEGATIVE MG/DL
KETONES URINE: NEGATIVE MG/DL
LEUKOCYTE ESTERASE URINE: NEGATIVE
MICROSCOPIC-UA: NORMAL
NITRITE URINE: NEGATIVE
PH URINE: 7.5
PROTEIN URINE: NEGATIVE MG/DL
RED BLOOD CELLS URINE: 1 /HPF
SPECIFIC GRAVITY URINE: 1.01
UROBILINOGEN URINE: 0.2 MG/DL
WHITE BLOOD CELLS URINE: 0 /HPF

## 2023-11-28 ENCOUNTER — APPOINTMENT (OUTPATIENT)
Dept: INTERNAL MEDICINE | Facility: CLINIC | Age: 78
End: 2023-11-28
Payer: MEDICARE

## 2023-11-28 VITALS
RESPIRATION RATE: 16 BRPM | SYSTOLIC BLOOD PRESSURE: 108 MMHG | HEART RATE: 79 BPM | WEIGHT: 190 LBS | TEMPERATURE: 96.9 F | OXYGEN SATURATION: 99 % | BODY MASS INDEX: 29.82 KG/M2 | HEIGHT: 67 IN | DIASTOLIC BLOOD PRESSURE: 64 MMHG

## 2023-11-28 DIAGNOSIS — R55 SYNCOPE AND COLLAPSE: ICD-10-CM

## 2023-11-28 DIAGNOSIS — S70.01XA CONTUSION OF RIGHT HIP, INITIAL ENCOUNTER: ICD-10-CM

## 2023-11-28 DIAGNOSIS — Z00.00 ENCOUNTER FOR GENERAL ADULT MEDICAL EXAMINATION W/OUT ABNORMAL FINDINGS: ICD-10-CM

## 2023-11-28 PROCEDURE — G0439: CPT

## 2023-11-30 ENCOUNTER — APPOINTMENT (OUTPATIENT)
Dept: NEUROLOGY | Facility: CLINIC | Age: 78
End: 2023-11-30
Payer: MEDICARE

## 2023-11-30 DIAGNOSIS — G56.22 LESION OF ULNAR NERVE, LEFT UPPER LIMB: ICD-10-CM

## 2023-11-30 PROCEDURE — 95886 MUSC TEST DONE W/N TEST COMP: CPT | Mod: 59

## 2023-11-30 PROCEDURE — 99213 OFFICE O/P EST LOW 20 MIN: CPT | Mod: 25

## 2023-11-30 PROCEDURE — 95910 NRV CNDJ TEST 7-8 STUDIES: CPT | Mod: 59

## 2023-12-01 ENCOUNTER — APPOINTMENT (OUTPATIENT)
Dept: ELECTROPHYSIOLOGY | Facility: CLINIC | Age: 78
End: 2023-12-01

## 2023-12-04 LAB — TSH SERPL-ACNC: 4.2 UIU/ML

## 2023-12-05 ENCOUNTER — RESULT REVIEW (OUTPATIENT)
Age: 78
End: 2023-12-05

## 2023-12-05 ENCOUNTER — APPOINTMENT (OUTPATIENT)
Dept: INFUSION THERAPY | Facility: HOSPITAL | Age: 78
End: 2023-12-05

## 2023-12-05 ENCOUNTER — APPOINTMENT (OUTPATIENT)
Dept: HEMATOLOGY ONCOLOGY | Facility: CLINIC | Age: 78
End: 2023-12-05
Payer: MEDICARE

## 2023-12-05 VITALS
HEART RATE: 69 BPM | TEMPERATURE: 96.8 F | SYSTOLIC BLOOD PRESSURE: 139 MMHG | WEIGHT: 187 LBS | DIASTOLIC BLOOD PRESSURE: 87 MMHG | RESPIRATION RATE: 16 BRPM | BODY MASS INDEX: 29.29 KG/M2 | OXYGEN SATURATION: 98 %

## 2023-12-05 LAB
BASOPHILS # BLD AUTO: 0.04 K/UL — SIGNIFICANT CHANGE UP (ref 0–0.2)
BASOPHILS # BLD AUTO: 0.04 K/UL — SIGNIFICANT CHANGE UP (ref 0–0.2)
BASOPHILS NFR BLD AUTO: 0.8 % — SIGNIFICANT CHANGE UP (ref 0–2)
BASOPHILS NFR BLD AUTO: 0.8 % — SIGNIFICANT CHANGE UP (ref 0–2)
EOSINOPHIL # BLD AUTO: 0.16 K/UL — SIGNIFICANT CHANGE UP (ref 0–0.5)
EOSINOPHIL # BLD AUTO: 0.16 K/UL — SIGNIFICANT CHANGE UP (ref 0–0.5)
EOSINOPHIL NFR BLD AUTO: 3.1 % — SIGNIFICANT CHANGE UP (ref 0–6)
EOSINOPHIL NFR BLD AUTO: 3.1 % — SIGNIFICANT CHANGE UP (ref 0–6)
HCT VFR BLD CALC: 47 % — SIGNIFICANT CHANGE UP (ref 39–50)
HCT VFR BLD CALC: 47 % — SIGNIFICANT CHANGE UP (ref 39–50)
HGB BLD-MCNC: 15 G/DL — SIGNIFICANT CHANGE UP (ref 13–17)
HGB BLD-MCNC: 15 G/DL — SIGNIFICANT CHANGE UP (ref 13–17)
IMM GRANULOCYTES NFR BLD AUTO: 0.2 % — SIGNIFICANT CHANGE UP (ref 0–0.9)
IMM GRANULOCYTES NFR BLD AUTO: 0.2 % — SIGNIFICANT CHANGE UP (ref 0–0.9)
LYMPHOCYTES # BLD AUTO: 1.03 K/UL — SIGNIFICANT CHANGE UP (ref 1–3.3)
LYMPHOCYTES # BLD AUTO: 1.03 K/UL — SIGNIFICANT CHANGE UP (ref 1–3.3)
LYMPHOCYTES # BLD AUTO: 20.2 % — SIGNIFICANT CHANGE UP (ref 13–44)
LYMPHOCYTES # BLD AUTO: 20.2 % — SIGNIFICANT CHANGE UP (ref 13–44)
MCHC RBC-ENTMCNC: 29.6 PG — SIGNIFICANT CHANGE UP (ref 27–34)
MCHC RBC-ENTMCNC: 29.6 PG — SIGNIFICANT CHANGE UP (ref 27–34)
MCHC RBC-ENTMCNC: 31.9 G/DL — LOW (ref 32–36)
MCHC RBC-ENTMCNC: 31.9 G/DL — LOW (ref 32–36)
MCV RBC AUTO: 92.7 FL — SIGNIFICANT CHANGE UP (ref 80–100)
MCV RBC AUTO: 92.7 FL — SIGNIFICANT CHANGE UP (ref 80–100)
MONOCYTES # BLD AUTO: 0.61 K/UL — SIGNIFICANT CHANGE UP (ref 0–0.9)
MONOCYTES # BLD AUTO: 0.61 K/UL — SIGNIFICANT CHANGE UP (ref 0–0.9)
MONOCYTES NFR BLD AUTO: 11.9 % — SIGNIFICANT CHANGE UP (ref 2–14)
MONOCYTES NFR BLD AUTO: 11.9 % — SIGNIFICANT CHANGE UP (ref 2–14)
NEUTROPHILS # BLD AUTO: 3.26 K/UL — SIGNIFICANT CHANGE UP (ref 1.8–7.4)
NEUTROPHILS # BLD AUTO: 3.26 K/UL — SIGNIFICANT CHANGE UP (ref 1.8–7.4)
NEUTROPHILS NFR BLD AUTO: 63.8 % — SIGNIFICANT CHANGE UP (ref 43–77)
NEUTROPHILS NFR BLD AUTO: 63.8 % — SIGNIFICANT CHANGE UP (ref 43–77)
NRBC # BLD: 0 /100 WBCS — SIGNIFICANT CHANGE UP (ref 0–0)
NRBC # BLD: 0 /100 WBCS — SIGNIFICANT CHANGE UP (ref 0–0)
PLATELET # BLD AUTO: 145 K/UL — LOW (ref 150–400)
PLATELET # BLD AUTO: 145 K/UL — LOW (ref 150–400)
RBC # BLD: 5.07 M/UL — SIGNIFICANT CHANGE UP (ref 4.2–5.8)
RBC # BLD: 5.07 M/UL — SIGNIFICANT CHANGE UP (ref 4.2–5.8)
RBC # FLD: 16.5 % — HIGH (ref 10.3–14.5)
RBC # FLD: 16.5 % — HIGH (ref 10.3–14.5)
WBC # BLD: 5.11 K/UL — SIGNIFICANT CHANGE UP (ref 3.8–10.5)
WBC # BLD: 5.11 K/UL — SIGNIFICANT CHANGE UP (ref 3.8–10.5)
WBC # FLD AUTO: 5.11 K/UL — SIGNIFICANT CHANGE UP (ref 3.8–10.5)
WBC # FLD AUTO: 5.11 K/UL — SIGNIFICANT CHANGE UP (ref 3.8–10.5)

## 2023-12-05 PROCEDURE — 99214 OFFICE O/P EST MOD 30 MIN: CPT

## 2023-12-07 ENCOUNTER — RX RENEWAL (OUTPATIENT)
Age: 78
End: 2023-12-07

## 2023-12-11 ENCOUNTER — APPOINTMENT (OUTPATIENT)
Dept: ENDOCRINOLOGY | Facility: CLINIC | Age: 78
End: 2023-12-11
Payer: MEDICARE

## 2023-12-11 VITALS
WEIGHT: 186 LBS | HEIGHT: 67 IN | BODY MASS INDEX: 29.19 KG/M2 | TEMPERATURE: 97 F | SYSTOLIC BLOOD PRESSURE: 130 MMHG | DIASTOLIC BLOOD PRESSURE: 80 MMHG

## 2023-12-11 DIAGNOSIS — T45.1X5A ADVERSE EFFECT OF ANTINEOPLASTIC AND IMMUNOSUPPRESSIVE DRUGS, INITIAL ENCOUNTER: ICD-10-CM

## 2023-12-11 PROCEDURE — 99214 OFFICE O/P EST MOD 30 MIN: CPT

## 2023-12-11 RX ORDER — LEVOTHYROXINE SODIUM 0.1 MG/1
100 TABLET ORAL DAILY
Qty: 90 | Refills: 3 | Status: ACTIVE | COMMUNITY
Start: 2023-09-11 | End: 1900-01-01

## 2023-12-12 ENCOUNTER — RX RENEWAL (OUTPATIENT)
Age: 78
End: 2023-12-12

## 2023-12-12 RX ORDER — ATORVASTATIN CALCIUM 80 MG/1
80 TABLET, FILM COATED ORAL DAILY
Qty: 90 | Refills: 3 | Status: ACTIVE | COMMUNITY
Start: 2023-12-07 | End: 1900-01-01

## 2023-12-30 NOTE — DISCHARGE NOTE NURSING/CASE MANAGEMENT/SOCIAL WORK - NSDCFUADDAPPT_GEN_ALL_CORE_FT
Leave dressing intact until tomorrow evening, reinforcing with tape if necessary (about 36 hours from chest tube removal). At that time you may remove the dressing and take a shower. Place clean gauze over wound if continual drainage. Call Dr. Schmitt's office at [phone] tomorrow or the next business day to make a followup appointment. Call the office if you experience any fevers, shortness of breath, chest pain or excessive drainage from the incision, day or night. Go to the emergency room if any of these symptoms are severe. Take your medications as ordered and take a stool softener if needed with the narcotic medications. 
Yes

## 2024-01-02 ENCOUNTER — APPOINTMENT (OUTPATIENT)
Dept: UROLOGY | Facility: CLINIC | Age: 79
End: 2024-01-02
Payer: MEDICARE

## 2024-01-02 PROCEDURE — 99213 OFFICE O/P EST LOW 20 MIN: CPT

## 2024-01-02 NOTE — ASSESSMENT
[FreeTextEntry1] : 78 year old M with history of oligometastatic ccRCC, episode of hematuria s/p negative cystoscopy. CT ABD P with contrast 5/2023 was negative for upper tract pathology, CT 6/203 without contrast was unremarkable as well. Now with elevated PSA of 4.65. Discussed PSA screening and latest recommendations/guidelines- USPTF and AUA. Will repeat PSA in 1 month Will obtain UA/UCx and urine cytology. RTO in 6 months or sooner PRN. Will repeat urine studies then.

## 2024-01-02 NOTE — HISTORY OF PRESENT ILLNESS
[FreeTextEntry1] : 78 year old man seen 06/13/2023 with complaint of gross hematuria. He passed pink urine. no clots, no obstructive symptoms. He held his aspirin, but did not hold xarelto, and urine cleared. No other symptoms at the time. Of note, he is s/p RIGHT nephrecomy for 10 cm mass, path ccRCC. RIGHT lung wedge resection for mass with was +ccRCC 10/2021. He was on axitinib but taken off for AE, he thinks was esophagitis. On Keytruda. UCx at  was neg. CT 4/2023 was neg for upper tract lesions. Now without symptoms or complaints.  01/02/2024: Patient presents for follow up. Had a negative cystoscopy in 6/2023. Reports his PCP checked his PSA and it was noted to be 4.65 in 11/2023. Denies any LUTS. No hematuria, no dysuria, no frequency, no urgency, no hesitancy, no straining. No incontinence. No fevers, no chills, no nausea, no vomiting, no flank pain.

## 2024-01-08 ENCOUNTER — RESULT REVIEW (OUTPATIENT)
Age: 79
End: 2024-01-08

## 2024-01-08 ENCOUNTER — APPOINTMENT (OUTPATIENT)
Dept: HEMATOLOGY ONCOLOGY | Facility: CLINIC | Age: 79
End: 2024-01-08

## 2024-01-08 ENCOUNTER — APPOINTMENT (OUTPATIENT)
Dept: INFUSION THERAPY | Facility: HOSPITAL | Age: 79
End: 2024-01-08

## 2024-01-08 LAB
BASOPHILS # BLD AUTO: 0.04 K/UL — SIGNIFICANT CHANGE UP (ref 0–0.2)
BASOPHILS # BLD AUTO: 0.04 K/UL — SIGNIFICANT CHANGE UP (ref 0–0.2)
BASOPHILS NFR BLD AUTO: 0.7 % — SIGNIFICANT CHANGE UP (ref 0–2)
BASOPHILS NFR BLD AUTO: 0.7 % — SIGNIFICANT CHANGE UP (ref 0–2)
EOSINOPHIL # BLD AUTO: 0.31 K/UL — SIGNIFICANT CHANGE UP (ref 0–0.5)
EOSINOPHIL # BLD AUTO: 0.31 K/UL — SIGNIFICANT CHANGE UP (ref 0–0.5)
EOSINOPHIL NFR BLD AUTO: 5.3 % — SIGNIFICANT CHANGE UP (ref 0–6)
EOSINOPHIL NFR BLD AUTO: 5.3 % — SIGNIFICANT CHANGE UP (ref 0–6)
HCT VFR BLD CALC: 51 % — HIGH (ref 39–50)
HCT VFR BLD CALC: 51 % — HIGH (ref 39–50)
HGB BLD-MCNC: 16.5 G/DL — SIGNIFICANT CHANGE UP (ref 13–17)
HGB BLD-MCNC: 16.5 G/DL — SIGNIFICANT CHANGE UP (ref 13–17)
IMM GRANULOCYTES NFR BLD AUTO: 0.3 % — SIGNIFICANT CHANGE UP (ref 0–0.9)
IMM GRANULOCYTES NFR BLD AUTO: 0.3 % — SIGNIFICANT CHANGE UP (ref 0–0.9)
LYMPHOCYTES # BLD AUTO: 1.13 K/UL — SIGNIFICANT CHANGE UP (ref 1–3.3)
LYMPHOCYTES # BLD AUTO: 1.13 K/UL — SIGNIFICANT CHANGE UP (ref 1–3.3)
LYMPHOCYTES # BLD AUTO: 19.3 % — SIGNIFICANT CHANGE UP (ref 13–44)
LYMPHOCYTES # BLD AUTO: 19.3 % — SIGNIFICANT CHANGE UP (ref 13–44)
MCHC RBC-ENTMCNC: 29.3 PG — SIGNIFICANT CHANGE UP (ref 27–34)
MCHC RBC-ENTMCNC: 29.3 PG — SIGNIFICANT CHANGE UP (ref 27–34)
MCHC RBC-ENTMCNC: 32.4 G/DL — SIGNIFICANT CHANGE UP (ref 32–36)
MCHC RBC-ENTMCNC: 32.4 G/DL — SIGNIFICANT CHANGE UP (ref 32–36)
MCV RBC AUTO: 90.6 FL — SIGNIFICANT CHANGE UP (ref 80–100)
MCV RBC AUTO: 90.6 FL — SIGNIFICANT CHANGE UP (ref 80–100)
MONOCYTES # BLD AUTO: 0.67 K/UL — SIGNIFICANT CHANGE UP (ref 0–0.9)
MONOCYTES # BLD AUTO: 0.67 K/UL — SIGNIFICANT CHANGE UP (ref 0–0.9)
MONOCYTES NFR BLD AUTO: 11.5 % — SIGNIFICANT CHANGE UP (ref 2–14)
MONOCYTES NFR BLD AUTO: 11.5 % — SIGNIFICANT CHANGE UP (ref 2–14)
NEUTROPHILS # BLD AUTO: 3.68 K/UL — SIGNIFICANT CHANGE UP (ref 1.8–7.4)
NEUTROPHILS # BLD AUTO: 3.68 K/UL — SIGNIFICANT CHANGE UP (ref 1.8–7.4)
NEUTROPHILS NFR BLD AUTO: 62.9 % — SIGNIFICANT CHANGE UP (ref 43–77)
NEUTROPHILS NFR BLD AUTO: 62.9 % — SIGNIFICANT CHANGE UP (ref 43–77)
NRBC # BLD: 0 /100 WBCS — SIGNIFICANT CHANGE UP (ref 0–0)
NRBC # BLD: 0 /100 WBCS — SIGNIFICANT CHANGE UP (ref 0–0)
PLATELET # BLD AUTO: 138 K/UL — LOW (ref 150–400)
PLATELET # BLD AUTO: 138 K/UL — LOW (ref 150–400)
RBC # BLD: 5.63 M/UL — SIGNIFICANT CHANGE UP (ref 4.2–5.8)
RBC # BLD: 5.63 M/UL — SIGNIFICANT CHANGE UP (ref 4.2–5.8)
RBC # FLD: 16.1 % — HIGH (ref 10.3–14.5)
RBC # FLD: 16.1 % — HIGH (ref 10.3–14.5)
WBC # BLD: 5.85 K/UL — SIGNIFICANT CHANGE UP (ref 3.8–10.5)
WBC # BLD: 5.85 K/UL — SIGNIFICANT CHANGE UP (ref 3.8–10.5)
WBC # FLD AUTO: 5.85 K/UL — SIGNIFICANT CHANGE UP (ref 3.8–10.5)
WBC # FLD AUTO: 5.85 K/UL — SIGNIFICANT CHANGE UP (ref 3.8–10.5)

## 2024-01-17 PROBLEM — Z95.818 PRESENCE OF ELECTRONIC CARDIAC DEVICE: Status: ACTIVE | Noted: 2017-09-02

## 2024-01-17 PROBLEM — R00.1 SINUS BRADYCARDIA: Status: ACTIVE | Noted: 2020-07-15

## 2024-01-19 ENCOUNTER — RX RENEWAL (OUTPATIENT)
Age: 79
End: 2024-01-19

## 2024-01-19 RX ORDER — METOPROLOL SUCCINATE 25 MG/1
25 TABLET, EXTENDED RELEASE ORAL DAILY
Qty: 135 | Refills: 3 | Status: ACTIVE | COMMUNITY
Start: 2021-10-12 | End: 1900-01-01

## 2024-01-27 ENCOUNTER — RESULT REVIEW (OUTPATIENT)
Age: 79
End: 2024-01-27

## 2024-02-01 ENCOUNTER — OUTPATIENT (OUTPATIENT)
Dept: OUTPATIENT SERVICES | Facility: HOSPITAL | Age: 79
LOS: 1 days | Discharge: HOME | End: 2024-02-01

## 2024-02-01 DIAGNOSIS — C64.9 MALIGNANT NEOPLASM OF UNSPECIFIED KIDNEY, EXCEPT RENAL PELVIS: ICD-10-CM

## 2024-02-01 DIAGNOSIS — Z90.5 ACQUIRED ABSENCE OF KIDNEY: Chronic | ICD-10-CM

## 2024-02-01 DIAGNOSIS — Z98.890 OTHER SPECIFIED POSTPROCEDURAL STATES: Chronic | ICD-10-CM

## 2024-02-01 DIAGNOSIS — Z90.49 ACQUIRED ABSENCE OF OTHER SPECIFIED PARTS OF DIGESTIVE TRACT: Chronic | ICD-10-CM

## 2024-02-01 DIAGNOSIS — Z90.89 ACQUIRED ABSENCE OF OTHER ORGANS: Chronic | ICD-10-CM

## 2024-02-05 ENCOUNTER — APPOINTMENT (OUTPATIENT)
Dept: INFUSION THERAPY | Facility: HOSPITAL | Age: 79
End: 2024-02-05

## 2024-02-05 ENCOUNTER — APPOINTMENT (OUTPATIENT)
Dept: HEMATOLOGY ONCOLOGY | Facility: CLINIC | Age: 79
End: 2024-02-05

## 2024-02-05 ENCOUNTER — RESULT REVIEW (OUTPATIENT)
Age: 79
End: 2024-02-05

## 2024-02-05 LAB
BASOPHILS # BLD AUTO: 0.04 K/UL — SIGNIFICANT CHANGE UP (ref 0–0.2)
BASOPHILS NFR BLD AUTO: 0.7 % — SIGNIFICANT CHANGE UP (ref 0–2)
EOSINOPHIL # BLD AUTO: 0.2 K/UL — SIGNIFICANT CHANGE UP (ref 0–0.5)
EOSINOPHIL NFR BLD AUTO: 3.6 % — SIGNIFICANT CHANGE UP (ref 0–6)
HCT VFR BLD CALC: 54.2 % — HIGH (ref 39–50)
HGB BLD-MCNC: 17.2 G/DL — HIGH (ref 13–17)
IMM GRANULOCYTES NFR BLD AUTO: 0.2 % — SIGNIFICANT CHANGE UP (ref 0–0.9)
LYMPHOCYTES # BLD AUTO: 0.98 K/UL — LOW (ref 1–3.3)
LYMPHOCYTES # BLD AUTO: 17.5 % — SIGNIFICANT CHANGE UP (ref 13–44)
MCHC RBC-ENTMCNC: 29.5 PG — SIGNIFICANT CHANGE UP (ref 27–34)
MCHC RBC-ENTMCNC: 31.7 G/DL — LOW (ref 32–36)
MCV RBC AUTO: 92.8 FL — SIGNIFICANT CHANGE UP (ref 80–100)
MONOCYTES # BLD AUTO: 0.51 K/UL — SIGNIFICANT CHANGE UP (ref 0–0.9)
MONOCYTES NFR BLD AUTO: 9.1 % — SIGNIFICANT CHANGE UP (ref 2–14)
NEUTROPHILS # BLD AUTO: 3.85 K/UL — SIGNIFICANT CHANGE UP (ref 1.8–7.4)
NEUTROPHILS NFR BLD AUTO: 68.9 % — SIGNIFICANT CHANGE UP (ref 43–77)
NRBC # BLD: 0 /100 WBCS — SIGNIFICANT CHANGE UP (ref 0–0)
PLATELET # BLD AUTO: 145 K/UL — LOW (ref 150–400)
RBC # BLD: 5.84 M/UL — HIGH (ref 4.2–5.8)
RBC # FLD: 15.7 % — HIGH (ref 10.3–14.5)
WBC # BLD: 5.59 K/UL — SIGNIFICANT CHANGE UP (ref 3.8–10.5)
WBC # FLD AUTO: 5.59 K/UL — SIGNIFICANT CHANGE UP (ref 3.8–10.5)

## 2024-02-07 ENCOUNTER — NON-APPOINTMENT (OUTPATIENT)
Age: 79
End: 2024-02-07

## 2024-02-07 ENCOUNTER — APPOINTMENT (OUTPATIENT)
Dept: CARDIOLOGY | Facility: CLINIC | Age: 79
End: 2024-02-07
Payer: MEDICARE

## 2024-02-07 VITALS
DIASTOLIC BLOOD PRESSURE: 68 MMHG | WEIGHT: 193 LBS | SYSTOLIC BLOOD PRESSURE: 114 MMHG | BODY MASS INDEX: 30.23 KG/M2 | HEART RATE: 74 BPM

## 2024-02-07 VITALS
DIASTOLIC BLOOD PRESSURE: 68 MMHG | HEIGHT: 67 IN | SYSTOLIC BLOOD PRESSURE: 114 MMHG | WEIGHT: 193 LBS | RESPIRATION RATE: 15 BRPM | OXYGEN SATURATION: 98 % | BODY MASS INDEX: 30.29 KG/M2 | HEART RATE: 74 BPM

## 2024-02-07 DIAGNOSIS — I35.1 NONRHEUMATIC AORTIC (VALVE) INSUFFICIENCY: ICD-10-CM

## 2024-02-07 DIAGNOSIS — I10 ESSENTIAL (PRIMARY) HYPERTENSION: ICD-10-CM

## 2024-02-07 LAB
APPEARANCE: CLEAR
BACTERIA UR CULT: NORMAL
BACTERIA: NEGATIVE /HPF
BILIRUBIN URINE: NEGATIVE
BLOOD URINE: ABNORMAL
CAST: 0 /LPF
COLOR: YELLOW
EPITHELIAL CELLS: 0 /HPF
GLUCOSE QUALITATIVE U: NEGATIVE MG/DL
KETONES URINE: NEGATIVE MG/DL
LEUKOCYTE ESTERASE URINE: NEGATIVE
MICROSCOPIC-UA: NORMAL
NITRITE URINE: NEGATIVE
PH URINE: 7
PROTEIN URINE: NORMAL MG/DL
PSA FREE FLD-MCNC: 47 %
PSA FREE SERPL-MCNC: 1.08 NG/ML
PSA SERPL-MCNC: 2.29 NG/ML
RED BLOOD CELLS URINE: 47 /HPF
SPECIFIC GRAVITY URINE: 1.02
UROBILINOGEN URINE: 0.2 MG/DL
WHITE BLOOD CELLS URINE: 0 /HPF

## 2024-02-07 PROCEDURE — 99214 OFFICE O/P EST MOD 30 MIN: CPT

## 2024-02-07 PROCEDURE — G2211 COMPLEX E/M VISIT ADD ON: CPT

## 2024-02-07 PROCEDURE — 93000 ELECTROCARDIOGRAM COMPLETE: CPT

## 2024-02-07 RX ORDER — SILDENAFIL CITRATE 100 MG/1
100 TABLET, FILM COATED ORAL
Refills: 0 | Status: ACTIVE | COMMUNITY

## 2024-02-07 NOTE — HISTORY OF PRESENT ILLNESS
[FreeTextEntry1] : Denny Johnson is an 79-year-old man with a history of atrial fibrillation, implantable loop monitor, hypertension, heart valvular disease (moderate aortic regurgitation, mild to moderate mitral regurgitation, mild to moderate tricuspid regurgitation), CVA (2023), chronic renal insufficiency, right renal cell carcinoma s/p radical nephrectomy (2019) with metastasis to lung (s/p RUL wedge resection in 10/2021), and hypothyroidism who returns for cardiac examination.   Since I last saw him, he had his loop recorder explanted because it was at end-of-life.  He continues to feel well from a cardiovascular standpoint.  No angina, dyspnea, palpitations, or syncope.  He describes cold distal fingers exacerbated by cold weather but not typical Raynaud's (no color change or pain).  * This visit was conducted as part of ongoing, longitudinal medical care for patient's chronic medical diagnoses and other issues.

## 2024-02-07 NOTE — PHYSICAL EXAM
[No Acute Distress] : no acute distress [Normal S1, S2] : normal S1, S2 [Clear Lung Fields] : clear lung fields [Normal Gait] : normal gait [No Edema] : no edema [Alert and Oriented] : alert and oriented [de-identified] : Irregular [de-identified] : Warm, dry

## 2024-02-07 NOTE — CARDIOLOGY SUMMARY
[de-identified] : 2/7/2024. Atrial fibrillation [de-identified] : 5/11/2023.  Normal left ventricular size and function with estimated ejection fraction 55 to 60%.  Mild to moderate concentric LVH.  Moderate left atrial enlargement.  Moderate aortic regurgitation.  Mild to moderate mitral regurgitation.  Mild to moderate tricuspid regurgitation.

## 2024-02-07 NOTE — REVIEW OF SYSTEMS
[Easy Bruising] : a tendency for easy bruising [Weight Gain (___ Lbs)] : [unfilled] ~Ulb weight gain [Dyspnea on exertion] : not dyspnea during exertion [Chest Discomfort] : no chest discomfort [Palpitations] : no palpitations

## 2024-02-07 NOTE — DISCUSSION/SUMMARY
[With Me] : with me [___ Month(s)] : in [unfilled] month(s) [FreeTextEntry1] :  Atrial fibrillation:  Permanent & controlled (asymptomatic); Continue current doses of metoprolol and Xarelto.  Hypertension: Controlled; continue amlodipine.  Aortic valve insufficiency: Moderate in severity; asymptomatic; observation and serial imaging planned--last study was performed in May 2023 while hospitalized.

## 2024-02-09 DIAGNOSIS — R71.8 OTHER ABNORMALITY OF RED BLOOD CELLS: ICD-10-CM

## 2024-03-12 ENCOUNTER — APPOINTMENT (OUTPATIENT)
Dept: INFUSION THERAPY | Facility: HOSPITAL | Age: 79
End: 2024-03-12

## 2024-03-12 ENCOUNTER — RESULT REVIEW (OUTPATIENT)
Age: 79
End: 2024-03-12

## 2024-03-12 ENCOUNTER — APPOINTMENT (OUTPATIENT)
Dept: HEMATOLOGY ONCOLOGY | Facility: CLINIC | Age: 79
End: 2024-03-12
Payer: MEDICARE

## 2024-03-12 ENCOUNTER — APPOINTMENT (OUTPATIENT)
Dept: HEMATOLOGY ONCOLOGY | Facility: CLINIC | Age: 79
End: 2024-03-12

## 2024-03-12 LAB
BASOPHILS # BLD AUTO: 0.06 K/UL — SIGNIFICANT CHANGE UP (ref 0–0.2)
BASOPHILS NFR BLD AUTO: 0.9 % — SIGNIFICANT CHANGE UP (ref 0–2)
EOSINOPHIL # BLD AUTO: 0.19 K/UL — SIGNIFICANT CHANGE UP (ref 0–0.5)
EOSINOPHIL NFR BLD AUTO: 2.9 % — SIGNIFICANT CHANGE UP (ref 0–6)
HCT VFR BLD CALC: 54.4 % — HIGH (ref 39–50)
HGB BLD-MCNC: 17.7 G/DL — HIGH (ref 13–17)
IMM GRANULOCYTES NFR BLD AUTO: 0.2 % — SIGNIFICANT CHANGE UP (ref 0–0.9)
LYMPHOCYTES # BLD AUTO: 1.16 K/UL — SIGNIFICANT CHANGE UP (ref 1–3.3)
LYMPHOCYTES # BLD AUTO: 17.6 % — SIGNIFICANT CHANGE UP (ref 13–44)
MCHC RBC-ENTMCNC: 30 PG — SIGNIFICANT CHANGE UP (ref 27–34)
MCHC RBC-ENTMCNC: 32.5 G/DL — SIGNIFICANT CHANGE UP (ref 32–36)
MCV RBC AUTO: 92.2 FL — SIGNIFICANT CHANGE UP (ref 80–100)
MONOCYTES # BLD AUTO: 0.6 K/UL — SIGNIFICANT CHANGE UP (ref 0–0.9)
MONOCYTES NFR BLD AUTO: 9.1 % — SIGNIFICANT CHANGE UP (ref 2–14)
NEUTROPHILS # BLD AUTO: 4.56 K/UL — SIGNIFICANT CHANGE UP (ref 1.8–7.4)
NEUTROPHILS NFR BLD AUTO: 69.3 % — SIGNIFICANT CHANGE UP (ref 43–77)
NRBC # BLD: 0 /100 WBCS — SIGNIFICANT CHANGE UP (ref 0–0)
PLATELET # BLD AUTO: 148 K/UL — LOW (ref 150–400)
RBC # BLD: 5.9 M/UL — HIGH (ref 4.2–5.8)
RBC # FLD: 14.5 % — SIGNIFICANT CHANGE UP (ref 10.3–14.5)
WBC # BLD: 6.58 K/UL — SIGNIFICANT CHANGE UP (ref 3.8–10.5)
WBC # FLD AUTO: 6.58 K/UL — SIGNIFICANT CHANGE UP (ref 3.8–10.5)

## 2024-03-12 PROCEDURE — 99214 OFFICE O/P EST MOD 30 MIN: CPT

## 2024-03-13 NOTE — PHYSICAL EXAM
[Restricted in physically strenuous activity but ambulatory and able to carry out work of a light or sedentary nature] : Status 1- Restricted in physically strenuous activity but ambulatory and able to carry out work of a light or sedentary nature, e.g., light house work, office work [Thin] : thin [Normal] : full range of motion and no deformities appreciated [de-identified] : scars s/p VATS right chest wall - healed [de-identified] : no residual hematuria [de-identified] : Dry, no rash

## 2024-03-13 NOTE — REVIEW OF SYSTEMS
[Shortness Of Breath] : shortness of breath [SOB on Exertion] : shortness of breath during exertion [Skin Rash] : skin rash [Negative] : Neurological [Fatigue] : no fatigue [Recent Change In Weight] : ~T no recent weight change [Hoarseness] : no hoarseness [FreeTextEntry4] : hoarseness resolved [FreeTextEntry2] : gained 13 lbs since last visit [FreeTextEntry6] : s/p RUL VATS/wedge resection for lung metastases [de-identified] : upper extremities, triggered by high temperatures [FreeTextEntry8] :  right clear-cell kidney cancer, s/p right nephrectomy

## 2024-03-13 NOTE — ASSESSMENT
[FreeTextEntry1] : Mr. RAETAM 's questions were answered to his satisfaction.  He  expressed his  understanding and willingness to comply with the above recommendations, and  will return to the office in 3-6 months.

## 2024-03-13 NOTE — HISTORY OF PRESENT ILLNESS
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   CASE SYNOPSIS: September 2019-reports gross hematuria, spontaneously resolved.  No other associated  symptoms.  11/1/2019: CT A/P -9.7 x 10 x 7.5 cm partially heterogeneous mass extending off the lower pole of the right kidney with invasion of the lower pole renal collecting system, highly concerning for renal cell carcinoma.  No gross urothelial lesion.  Few scattered pulmonary nodules measuring up to 4 mm, nonspecific, possibly representing postinflammatory benign nodules, but cannot exclude metastasis.  Further evaluation with CT scan of the chest recommended.  1.2 x 1.5 cm low-attenuation lesion of the spleen representing hemangioma.  11/8/2019: CT chest-tree in bud opacities noted within the right lung likely representing mucoid impacted distal small airways.  12/5/2019: Radical nephrectomy (right kidney and proximal ureter) performed at [facility] (); surgical pathology: clear cell renal carcinoma, 10 cm, with extensive necrosis, grade 4 with focal rhabdoid features, no definitive LVI, vascular and ureteral resection margins free of tumor, perinephric adipose tissue negative for malignancy.  Renal parenchyma showed mild nephrosclerosis and patchy interstitial chronic inflammation (pT2, pN0).  3/23/2020: CT A/P-interval right nephrectomy.  No evidence of locally recurrent or metastatic disease.  4/9/2021: CT C/A/P-5 mm RUL pulmonary nodule may reflect metastasis and is new since prior chest CT of 11/8/2019.  Close follow-up recommended.  No evidence for residual or recurrent tumor in the right nephrectomy bed.  8/13/2021: CT chest-new right upper lobe 8 mm peripheral nodule indeterminate; previously measuring 5 mm.  Interval growth suggest metastatic disease.  9/8/2021:referred to Dr. Schmitt (thoracic surgery) for evaluation.  10/29/2021: Right upper lobe VATS/ wedge resection ([facility], Dr. Schmitt) -metastatic clear-cell renal cell carcinoma, R4 lymph node excision negative for malignancy, level 7 lymph node excision negative for malignancy, acid-fast bacilli and fungi.  Both lymph nodes with noncaseating granulomatous inflammation.  11/19/21- cycle # 1 Pembrolizumab  11/30/21- discontinues Multaq (antiarrhythmic) due to interactions with axitinib  12/4/21- started axitinb  1/15/2022-discontinued axitinib temporarily due to mucosal sensitivity  2/9/22: Develops macular rash on both forearms; TSH 90.7.   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  6/11/2022 CT C/A/P-no residual or recurrent disease in the nephrectomy bed.  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  8/12/22 -resumed Pembrolizumab only; axitinib on hold.  9/29/2022 CT C/A/P: 6 status post right nephrectomy with stable exam as compared to 6/11/2022 11/21/22- Pembrolizumab last dose before d/c  5/9 - 5/11/2023-hospitalized at [facility] with left facial droop associated with difficulty speaking; s/p mechanical fall.  MRI head consistent with an acute 2 cm infarct extending from right ganglial-capsular region into ipsilateral corona radiata.  No evidence of hemorrhagic conversion.  Chronic lacunar infarcts left corona radiata and basal ganglia also noted. Started on Aspirin.  TTE-preserved ejection fraction, mild-moderate valvular disease.   6/10/2023-presents to ED at [place] with hematuria; anticoagulation continued, but aspirin d/c.  CT A/P-allowing for the absence of IV contrast, no significant changes outside of right nephrectomy.   11/20/2023- patient passed out while urinating; in ED at [place].  4/12/2023 CT C/A/P -status post right nephrectomy. Stable examination compared with prior imaging 9/29/2022.    [FreeTextEntry1] : \par  \par   [de-identified] : Here for follow up after 3 months since last encounter.  Reports no interval development of new symptoms.  Continues to be physically active ; no changes in appetite or weight.  Minimally symptomatic, patient denies B symptoms.  Resume therapeutic phlebotomies at 6 weeks - 2-month interval follow-up, for hemoglobin > 17 g/dL.  Today's hemoglobin at 17.7 g/dL. Mr. TAM had last CT A/P in April 2023 which showed no evidence of recurrent metastatic disease.  He rates current health as very good and his mood is good.  Medication list reviewed. Continues follow up with cardiology and .

## 2024-03-19 ENCOUNTER — LABORATORY RESULT (OUTPATIENT)
Age: 79
End: 2024-03-19

## 2024-03-20 ENCOUNTER — NON-APPOINTMENT (OUTPATIENT)
Age: 79
End: 2024-03-20

## 2024-03-22 ENCOUNTER — APPOINTMENT (OUTPATIENT)
Dept: INFUSION THERAPY | Facility: HOSPITAL | Age: 79
End: 2024-03-22

## 2024-03-26 ENCOUNTER — LABORATORY RESULT (OUTPATIENT)
Age: 79
End: 2024-03-26

## 2024-03-29 ENCOUNTER — APPOINTMENT (OUTPATIENT)
Dept: INFUSION THERAPY | Facility: HOSPITAL | Age: 79
End: 2024-03-29

## 2024-04-05 ENCOUNTER — APPOINTMENT (OUTPATIENT)
Dept: CT IMAGING | Facility: CLINIC | Age: 79
End: 2024-04-05
Payer: MEDICARE

## 2024-04-05 ENCOUNTER — OUTPATIENT (OUTPATIENT)
Dept: OUTPATIENT SERVICES | Facility: HOSPITAL | Age: 79
LOS: 1 days | End: 2024-04-05
Payer: MEDICARE

## 2024-04-05 DIAGNOSIS — Z90.5 ACQUIRED ABSENCE OF KIDNEY: Chronic | ICD-10-CM

## 2024-04-05 DIAGNOSIS — Z98.890 OTHER SPECIFIED POSTPROCEDURAL STATES: Chronic | ICD-10-CM

## 2024-04-05 DIAGNOSIS — Z90.89 ACQUIRED ABSENCE OF OTHER ORGANS: Chronic | ICD-10-CM

## 2024-04-05 DIAGNOSIS — C78.00 SECONDARY MALIGNANT NEOPLASM OF UNSPECIFIED LUNG: ICD-10-CM

## 2024-04-05 PROCEDURE — 71260 CT THORAX DX C+: CPT | Mod: 26,MH

## 2024-04-05 PROCEDURE — 74177 CT ABD & PELVIS W/CONTRAST: CPT

## 2024-04-05 PROCEDURE — 74177 CT ABD & PELVIS W/CONTRAST: CPT | Mod: 26,MH

## 2024-04-05 PROCEDURE — 71260 CT THORAX DX C+: CPT

## 2024-04-17 ENCOUNTER — APPOINTMENT (OUTPATIENT)
Dept: NEUROLOGY | Facility: CLINIC | Age: 79
End: 2024-04-17
Payer: MEDICARE

## 2024-04-17 ENCOUNTER — OUTPATIENT (OUTPATIENT)
Dept: OUTPATIENT SERVICES | Facility: HOSPITAL | Age: 79
LOS: 1 days | Discharge: HOME | End: 2024-04-17

## 2024-04-17 VITALS
BODY MASS INDEX: 29.03 KG/M2 | WEIGHT: 185 LBS | HEIGHT: 67 IN | DIASTOLIC BLOOD PRESSURE: 87 MMHG | SYSTOLIC BLOOD PRESSURE: 133 MMHG | TEMPERATURE: 97.8 F | HEART RATE: 66 BPM

## 2024-04-17 DIAGNOSIS — S39.012A STRAIN OF MUSCLE, FASCIA AND TENDON OF LOWER BACK, INITIAL ENCOUNTER: ICD-10-CM

## 2024-04-17 DIAGNOSIS — I67.2 CEREBRAL ATHEROSCLEROSIS: ICD-10-CM

## 2024-04-17 DIAGNOSIS — Z90.5 ACQUIRED ABSENCE OF KIDNEY: Chronic | ICD-10-CM

## 2024-04-17 DIAGNOSIS — Z90.49 ACQUIRED ABSENCE OF OTHER SPECIFIED PARTS OF DIGESTIVE TRACT: Chronic | ICD-10-CM

## 2024-04-17 DIAGNOSIS — G56.01 CARPAL TUNNEL SYNDROME, RIGHT UPPER LIMB: ICD-10-CM

## 2024-04-17 DIAGNOSIS — Z98.890 OTHER SPECIFIED POSTPROCEDURAL STATES: Chronic | ICD-10-CM

## 2024-04-17 DIAGNOSIS — C64.9 MALIGNANT NEOPLASM OF UNSPECIFIED KIDNEY, EXCEPT RENAL PELVIS: ICD-10-CM

## 2024-04-17 DIAGNOSIS — I63.81 OTHER CEREBRAL INFARCTION DUE TO OCCLUSION OR STENOSIS OF SMALL ARTERY: ICD-10-CM

## 2024-04-17 DIAGNOSIS — Z90.89 ACQUIRED ABSENCE OF OTHER ORGANS: Chronic | ICD-10-CM

## 2024-04-17 PROCEDURE — G2211 COMPLEX E/M VISIT ADD ON: CPT

## 2024-04-17 PROCEDURE — 99214 OFFICE O/P EST MOD 30 MIN: CPT

## 2024-04-17 NOTE — REVIEW OF SYSTEMS
[Numbness] : numbness [Negative] : Heme/Lymph [Tingling] : no tingling [FreeTextEntry9] : LBP [de-identified] : cold feeling and bluish discoloration of fingertips resolved

## 2024-04-17 NOTE — HISTORY OF PRESENT ILLNESS
[FreeTextEntry1] : Patient is here for a follow-up visit today, last seen on 11/30/23. Patient reports he has noted total resolution of cold feeling/bluish discoloration and numbness of fingertips of both hands since weather changed to spring, currently he has no symptoms, he is able to perform all his fine motor movements well.  He did follow-up with a rheumatologist in [place], was prescribed prednisone, short course, reports he does not have any follow-up with them.  Patient reports he has minimal numbness in the hands, he is using a wrist splint which helps alleviate his symptoms, he attended few sessions of hand therapy/OT, did not notice much improvement hence has not pursued it further  Patient complains of low back strain, he reports he has had in the past but this 1 has lasted for few months, he followed up with an orthopedist was recommended PT, he has been following up with chiropractor.  Patient denies radiation of low back to the legs  Patient has no complaints in regard to stroke, he denies dysarthria, gait instability, he is back to his normal functioning self.  He continues to take Xarelto, basal and high-dose atorvastatin for stroke prophylaxis/A-fib.

## 2024-04-17 NOTE — REASON FOR VISIT
[Follow-Up: _____] : a [unfilled] follow-up visit [FreeTextEntry1] : For paresthesias hands, Raynaud's, stroke

## 2024-04-17 NOTE — PHYSICAL EXAM
[General Appearance - Alert] : alert [General Appearance - In No Acute Distress] : in no acute distress [Oriented To Time, Place, And Person] : oriented to person, place, and time [Impaired Insight] : insight and judgment were intact [Affect] : the affect was normal [Person] : oriented to person [Place] : oriented to place [Time] : oriented to time [Registration Intact] : recent registration memory intact [Concentration Intact] : normal concentrating ability [Naming Objects] : no difficulty naming common objects [Repeating Phrases] : no difficulty repeating a phrase [Fluency] : fluency intact [Comprehension] : comprehension intact [Past History] : adequate knowledge of personal past history [Cranial Nerves Optic (II)] : visual acuity intact bilaterally,  visual fields full to confrontation, pupils equal round and reactive to light [Cranial Nerves Trigeminal (V)] : facial sensation intact symmetrically [Cranial Nerves Oculomotor (III)] : extraocular motion intact [Cranial Nerves Facial (VII)] : face symmetrical [Cranial Nerves Vestibulocochlear (VIII)] : hearing was intact bilaterally [Cranial Nerves Glossopharyngeal (IX)] : tongue and palate midline [Cranial Nerves Accessory (XI - Cranial And Spinal)] : head turning and shoulder shrug symmetric [Cranial Nerves Hypoglossal (XII)] : there was no tongue deviation with protrusion [Motor Tone] : muscle tone was normal in all four extremities [Motor Strength] : muscle strength was normal in all four extremities [No Muscle Atrophy] : normal bulk in all four extremities [Sensation Tactile Decrease] : light touch was intact [Abnormal Walk] : normal gait [Balance] : balance was intact [1+] : Patella right 1+ [2+] : Patella left 2+ [0] : Ankle jerk left 0 [PERRL With Normal Accommodation] : pupils were equal in size, round, reactive to light, with normal accommodation [Extraocular Movements] : extraocular movements were intact [Full Visual Field] : full visual field [Outer Ear] : the ears and nose were normal in appearance [Hearing Threshold Finger Rub Not [place]] : hearing was normal [] : the neck was supple [Neck Cervical Mass (___cm)] : no neck mass was observed [Past-pointing] : there was no past-pointing [Tremor] : no tremor present [Coordination - Dysmetria Impaired Finger-to-Nose Bilateral] : not present [Plantar Reflex Right Only] : normal on the right [Plantar Reflex Left Only] : normal on the left

## 2024-04-17 NOTE — DATA REVIEWED
[de-identified] : 5/10/23: Acute 2.0 cm infarct extending from right ganglial capsular region into right corona radiata.  No evidence of hemorrhagic conversion.  Chronic lacunar infarcts left corona radiata and basal ganglia [de-identified] : 5/9/2023: CT angio head and neck: Calcified atherosclerotic plaques cavernous segments of bilateral internal carotid arteries, intracranial vertebral and basilar arteries are patent.  Fetal origin of left PCA.  CT perfusion no core infarct 11/30/23: EMG/NCV studies of upper extremities. There is electrodiagnostic evidence of mild median sensory neuropathy at the wrists; consistent with diagnosis of mild carpal tunnel syndrome on right by CSI criteria. In addition, there is evidence of mild ulnar neuropathy at/across left elbow segment. Sensory CVs are uniformly slow with increased DLs; most likely secondary to low skin temperature/possible Raynaud's (skin temperature recorded 27-28 degrees despite warming)

## 2024-04-17 NOTE — DISCUSSION/SUMMARY
[FreeTextEntry1] : 79-year-old M with PMHx of HTN, A-fib on Xarelto, kidney CA status post right nephrectomy, presented to [facility] ED on 5/9/2023 with complaints of left facial droop associated with difficulty speaking. feelinf off balance and a fall, in ED /116, NIHSS-1, CT head, CTA head/neck/CTP negative for acute hemorrhage, LVO or core infarct, diffuse atherosclerotic changes of the intracranial vessels was noted, no tPA given. MRI of the brain revealed acute infarct right ganglial capsular region extending into right corona radiata, Chronic lacunar infarcts left CR and left basal ganglia was seen.  #Mild carpal tunnel syndrome right side, mild ulnar neuropathy left side/ possible Raynauds' - Improved with weather change  - Have recommended using the cock-up splint for right wrist at night - F/U with rheumatology/PMD as needed  #Right basal ganglia/CR acute infarcts  #History of A-fib, on Xarelto   # intracranial diffuse atherosclerotic disease   - Agree with BASA added to Xarelto, atorvastatin  80 mg. -Stroke education provided -And advised regarding falls be cautious  # Lumbar strain  - PT, patient refuses at present  - Continue treatments with chiropractor and follow-up with orthopedist as needed

## 2024-04-20 LAB
BASOPHILS # BLD AUTO: 0.06 K/UL
BASOPHILS NFR BLD AUTO: 1 %
EOSINOPHIL # BLD AUTO: 0.24 K/UL
EOSINOPHIL NFR BLD AUTO: 4 %
HCT VFR BLD CALC: 53.8 %
HGB BLD-MCNC: 16.8 G/DL
IMM GRANULOCYTES NFR BLD AUTO: 0.3 %
LYMPHOCYTES # BLD AUTO: 1.42 K/UL
LYMPHOCYTES NFR BLD AUTO: 23.4 %
MAN DIFF?: NORMAL
MCHC RBC-ENTMCNC: 29.7 PG
MCHC RBC-ENTMCNC: 31.2 GM/DL
MCV RBC AUTO: 95.2 FL
MONOCYTES # BLD AUTO: 0.63 K/UL
MONOCYTES NFR BLD AUTO: 10.4 %
NEUTROPHILS # BLD AUTO: 3.69 K/UL
NEUTROPHILS NFR BLD AUTO: 60.9 %
PLATELET # BLD AUTO: 146 K/UL
RBC # BLD: 5.65 M/UL
RBC # FLD: 15.2 %
WBC # FLD AUTO: 6.06 K/UL

## 2024-04-23 ENCOUNTER — APPOINTMENT (OUTPATIENT)
Dept: INFUSION THERAPY | Facility: HOSPITAL | Age: 79
End: 2024-04-23

## 2024-05-01 DIAGNOSIS — D75.1 SECONDARY POLYCYTHEMIA: ICD-10-CM

## 2024-05-03 ENCOUNTER — APPOINTMENT (OUTPATIENT)
Dept: THORACIC SURGERY | Facility: CLINIC | Age: 79
End: 2024-05-03
Payer: MEDICARE

## 2024-05-03 VITALS
HEIGHT: 67 IN | RESPIRATION RATE: 16 BRPM | HEART RATE: 66 BPM | BODY MASS INDEX: 29.51 KG/M2 | DIASTOLIC BLOOD PRESSURE: 80 MMHG | OXYGEN SATURATION: 98 % | WEIGHT: 188 LBS | SYSTOLIC BLOOD PRESSURE: 134 MMHG

## 2024-05-03 PROCEDURE — 99213 OFFICE O/P EST LOW 20 MIN: CPT

## 2024-05-03 NOTE — ASSESSMENT
[FreeTextEntry1] :  Mr. TAM is a 76 year old nonsmoker with past medical history of AIVR, chronic renal insufficiency, essential hypertension, infrapatellar bursitis of right knee, inguinal hernia, right lung nodules, male erectile disorder, paroxysmal atrial fibrillation (Xarelto), polycythemia (followed by Dr. Rollins), mature ventricular complex, s/p removal of electronic cardiac device (Medtronic) 2023, renal cell carcinoma (s/p radical R nephrectomy 2019), trochanteric bursitis of left hip, and history of biceps tendinitis of right upper extremity. History of asbestos exposure as a lux.  Status post Flex Bronch Right robotic assisted VATS right upper lobe wedge resection, mediastinal lymph node dissection multilevel intercostal nerve block on 10/29/21. 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   Follows up with Dr. Catrina Morfin.  CT chest on 4/05/2024: - Right upper lobe wedge resection.  - A 6 mm right upper lobe nodule (5, 19), previously 4 mm.  - Few additional scattered sub-5 mm pulmonary nodules are unchanged.   I have reviewed the patient's medical records and diagnostic images at time of this office consultation and have made the following recommendation: 1. CT chest reviewed and discussed with patient, lung nodules with unclear etiology; patient has a history of metastasis of renal cell carcinoma to the right lung. Will discuss case with Dr. Morfin ([facility]) and TTM in next week to discuss care plan.   I, CHU Hackett, personally performed the evaluation and management (E/M) services for this established patient who presents today with (a) new problem(s)/exacerbation of (an) existing condition(s).  That E/M includes conducting the examination, assessing all new/exacerbated conditions, and establishing a new plan of care.  Today, my ACP, Tere Quiles/NANCIE Liz, was here to observe my evaluation and management services for this new problem/exacerbated condition to be followed going forward.

## 2024-05-03 NOTE — HISTORY OF PRESENT ILLNESS
[FreeTextEntry1] : Mr. TAM is a 76 year old nonsmoker with past medical history of AIVR, chronic renal insufficiency, essential hypertension, infrapatellar bursitis of right knee, inguinal hernia, right, lung nodules, male erectile disorder, paroxysmal atrial fibrillation (Xarelto), polycythemia (followed by Dr. Rollins), mature ventricular complex, presence of electronic cardiac device (Medtronic), renal cell carcinoma(s/p radical R nephrectomy 2019),  trochanteric bursitis of left hip, and history of biceps tendinitis of right upper extremity. History of asbestos exposure as a lux.  Status post Flex Bronch Right robotic assisted VATS right upper lobe wedge resection, mediastinal lymph node dissection multilevel intercostal nerve block on 10/29/21. 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   Follows up with Dr. Catrina Morfin.  CT chest on 4/05/2024: - Right upper lobe wedge resection.  - A 6 mm right upper lobe nodule (5, 19), previously 4 mm.  - Few additional scattered sub-5 mm pulmonary nodules are unchanged.  Patient presents today for follow up. Overall, he is feeling well, denies any CP, cough or SOB.

## 2024-05-20 ENCOUNTER — APPOINTMENT (OUTPATIENT)
Dept: THORACIC SURGERY | Facility: HOSPITAL | Age: 79
End: 2024-05-20
Payer: MEDICARE

## 2024-05-20 PROCEDURE — ZZZZZ: CPT

## 2024-05-21 NOTE — DATA REVIEWED
[FreeTextEntry1] : CT ABDOMEN AND PELVIS OC IC performed through [facility] CT CHEST IC   PROCEDURE DATE:  04/05/2024 INTERPRETATION:  CLINICAL INFORMATION: Restaging metastatic renal cancer. COMPARISON: CT abdomen pelvis 6/10/2023. CT chest abdomen pelvis 4/7/2023. CONTRAST/COMPLICATIONS: IV Contrast: Omnipaque 350  90 cc administered   10 cc discarded Oral Contrast: Omnipaque 300 Complications: None reported at time of study completion  PROCEDURE: CT of the Chest, Abdomen and Pelvis was performed. Precontrast, Arterial and Delayed phases were acquired though the abdomen. Sagittal and coronal reformats were performed.  FINDINGS: CHEST: LUNGS AND LARGE AIRWAYS: Patent central airways. Right upper lobe wedge resection. A 6 mm right upper lobe nodule (5, 19), previously 4 mm. Few additional scattered sub-5 mm pulmonary nodules are unchanged. PLEURA: No pleural effusion. VESSELS: Atherosclerotic changes of the aorta and coronary arteries. HEART: Heart size is normal. No pericardial effusion. MEDIASTINUM AND JEANE: No lymphadenopathy. CHEST WALL AND LOWER NECK: Within normal limits.  ABDOMEN AND PELVIS: LIVER: Within normal limits. BILE DUCTS: Normal caliber. GALLBLADDER: Cholecystectomy. SPLEEN: Subcentimeter hypodensity too small to characterize, decreased. PANCREAS: Within normal limits. ADRENALS: Within normal limits. KIDNEYS/URETERS: Right nephrectomy. No evidence of locally recurrent disease. Small left renal cyst.  BLADDER: Within normal limits. REPRODUCTIVE ORGANS: Prostate is enlarged.  BOWEL: No bowel obstruction. Appendix is normal. Colonic diverticulosis. PERITONEUM: No ascites. VESSELS: Atherosclerotic changes. RETROPERITONEUM/LYMPH NODES: No lymphadenopathy. ABDOMINAL WALL: Small fat-containing ventral hernia. BONES: Degenerative changes. L1 vertebral superior endplate compression fracture deformity, new from 6/10/2023.  IMPRESSION: Interval enlargement of a 6 mm right upper lobe pulmonary nodule, concerning for metastasis.  L1 vertebral compression fracture deformity, new from 6/10/2023.  Findings were sent via secure email to ordering provider.  --- End of Report ---  ZAHRAA CRENSHAW MD; Attending Radiologist

## 2024-05-21 NOTE — HISTORY OF PRESENT ILLNESS
[FreeTextEntry1] : Mr. TAM is an 79-year-old nonsmoker with past medical history of AIVR, chronic renal insufficiency, essential hypertension, infrapatellar bursitis of right knee, inguinal hernia, right, lung nodules, male erectile disorder, paroxysmal atrial fibrillation (Xarelto), polycythemia (followed by Dr. Rollins), mature ventricular complex, presence of electronic cardiac device (Medtronic), renal cell carcinoma(s/p radical R nephrectomy 2019), trochanteric bursitis of left hip, and history of biceps tendinitis of right upper extremity. History of asbestos exposure as a lux.  Status post Flex Bronch Right robotic assisted VATS right upper lobe wedge resection, mediastinal lymph node dissection multilevel intercostal nerve block on 10/29/21. 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  Follows up with Dr. Catrina Morfin.  CT chest on 4/05/2024: - Right upper lobe wedge resection. - A 6 mm right upper lobe nodule (5, 19), previously 4 mm. - Few additional scattered sub-5 mm pulmonary nodules are unchanged.  Mr. Tam presents today to discuss surgical planning d/w patient new nodule and interval growth from 4mm to 6mm, informed in regards to position of nodule would require extensive sublobar resection d/w Dr Morfin plan for systemic treatment and monitor response via nodule vs metastectomy at this time plan would be to monitor for enlargement and interval CT chest in 3 months, if any growth noted then likely move forward with sublobar resection plan was conveyed to patient   total of 11 minutes were spent on this call

## 2024-05-22 LAB — TSH SERPL-ACNC: 1.75 UIU/ML

## 2024-05-23 ENCOUNTER — NON-APPOINTMENT (OUTPATIENT)
Age: 79
End: 2024-05-23

## 2024-05-23 LAB
APPEARANCE: CLEAR
BACTERIA: NEGATIVE /HPF
BASOPHILS # BLD AUTO: 0.06 K/UL
BASOPHILS NFR BLD AUTO: 0.8 %
BILIRUBIN URINE: NEGATIVE
BLOOD URINE: NEGATIVE
CAST: 0 /LPF
COLOR: YELLOW
EOSINOPHIL # BLD AUTO: 0.2 K/UL
EOSINOPHIL NFR BLD AUTO: 2.8 %
EPITHELIAL CELLS: 0 /HPF
GLUCOSE QUALITATIVE U: NEGATIVE MG/DL
HCT VFR BLD CALC: 52.3 %
HGB BLD-MCNC: 17 G/DL
IMM GRANULOCYTES NFR BLD AUTO: 0.1 %
KETONES URINE: NEGATIVE MG/DL
LEUKOCYTE ESTERASE URINE: NEGATIVE
LYMPHOCYTES # BLD AUTO: 1.29 K/UL
LYMPHOCYTES NFR BLD AUTO: 18.2 %
MAN DIFF?: NORMAL
MCHC RBC-ENTMCNC: 30.2 PG
MCHC RBC-ENTMCNC: 32.5 GM/DL
MCV RBC AUTO: 93.1 FL
MICROSCOPIC-UA: NORMAL
MONOCYTES # BLD AUTO: 0.73 K/UL
MONOCYTES NFR BLD AUTO: 10.3 %
NEUTROPHILS # BLD AUTO: 4.79 K/UL
NEUTROPHILS NFR BLD AUTO: 67.8 %
NITRITE URINE: NEGATIVE
PH URINE: 7.5
PLATELET # BLD AUTO: 147 K/UL
PROTEIN URINE: NORMAL MG/DL
PSA SERPL-MCNC: 2.7 NG/ML
RBC # BLD: 5.62 M/UL
RBC # FLD: 14 %
RED BLOOD CELLS URINE: 1 /HPF
SPECIFIC GRAVITY URINE: 1.01
UROBILINOGEN URINE: 0.2 MG/DL
WBC # FLD AUTO: 7.08 K/UL
WHITE BLOOD CELLS URINE: 0 /HPF

## 2024-05-24 ENCOUNTER — APPOINTMENT (OUTPATIENT)
Dept: HEMATOLOGY ONCOLOGY | Facility: CLINIC | Age: 79
End: 2024-05-24
Payer: MEDICARE

## 2024-05-24 VITALS
RESPIRATION RATE: 16 BRPM | DIASTOLIC BLOOD PRESSURE: 67 MMHG | TEMPERATURE: 97.9 F | SYSTOLIC BLOOD PRESSURE: 113 MMHG | BODY MASS INDEX: 28.98 KG/M2 | WEIGHT: 185 LBS

## 2024-05-24 DIAGNOSIS — C64.9 SECONDARY MALIGNANT NEOPLASM OF UNSPECIFIED LUNG: ICD-10-CM

## 2024-05-24 DIAGNOSIS — C78.00 SECONDARY MALIGNANT NEOPLASM OF UNSPECIFIED LUNG: ICD-10-CM

## 2024-05-24 PROCEDURE — 99215 OFFICE O/P EST HI 40 MIN: CPT

## 2024-05-26 PROBLEM — C78.00 METASTATIC RENAL CELL CARCINOMA TO LUNG: Status: ACTIVE | Noted: 2021-11-09

## 2024-05-26 NOTE — PHYSICAL EXAM
[Restricted in physically strenuous activity but ambulatory and able to carry out work of a light or sedentary nature] : Status 1- Restricted in physically strenuous activity but ambulatory and able to carry out work of a light or sedentary nature, e.g., light house work, office work [Thin] : thin [Normal] : full range of motion and no deformities appreciated [de-identified] : scars s/p VATS right chest wall - healed [de-identified] : Dry, no rash [de-identified] : no residual hematuria

## 2024-05-26 NOTE — END OF VISIT
After Visit Summary      Facility     Name Address Phone       [facility] [address] [facility] [address] [phone]            Patient Discharge Summary   2/12/2017    Argentina Rios            Please bring this medication reconciliation form to your next doctor’s appointment(s). Please update the form if you stop taking any of these medications or you start taking any new medications including over the counter medications. Also please carry a copy of this form with you at all times in the event of an emergency.    A copy of these discharge instructions was reviewed with and given to the patient/patient representative/Guardian/Caregiver at discharge.          The doctor who took care of you in the hospital     Provider Specialty    Jj Rodriguez MD Internal Medicine    Toby Rodriguez MD Internal Medicine    Alex Oneal MD Hospitalist    Jef Galvan MD Hospitalist    Ashleigh Davis MD Hospitalist    Eleuterio Belcher MD Cardiothoracic Surgery      Allergies as of 2/23/2017        Reactions    Cat Dander Other (See Comments)    Eyes itching,     Seasonal Other (See Comments)    Running nose, eyes itching           Discharge Medications              What to Do with Your Medications      START taking these medications today unless otherwise stated        Details    Morning Noon Evening Bedtime As needed    albuterol-ipratropium  MCG/ACT inhaler   Commonly known as:  COMBIVENT RESPIMAT        Inhale 1 puff into the lungs 2 times daily.   Authorizing Provider:  Danay Christianson                                  aspirin 81 MG EC tablet        Take 1 tablet by mouth daily.   Authorizing Provider:  Danay Christianson   Last Dose:  81 mg on 2/23/2017  9:42 AM                               colchicine 0.6 MG tablet   Commonly known as:  COLCRYS        Take 1 tablet by mouth every 12 hours.   Authorizing Provider:  Danay OLIVER  Sammy   Last Dose:  0.6 mg on 2/23/2017  9:41 AM                                  ferrous sulfate 325 (65 FE) MG tablet        Take 1 tablet by mouth 2 times daily (with meals).   Authorizing Provider:  Danay Christianson   Last Dose:  325 mg on 2/23/2017  9:42 AM                                  furosemide 40 MG tablet   Commonly known as:  LASIX        Take 1 tablet by mouth 2 times daily.   Authorizing Provider:  Danay Christianson   Last Dose:  20 mg on 2/23/2017  6:18 AM                                  metoPROLOL 25 MG tablet   Commonly known as:  LOPRESSOR        Take 1 tablet by mouth every 12 hours.   Authorizing Provider:  Danay Christianson   Last Dose:  25 mg on 2/23/2017  9:42 AM                                  nicotine 14 MG/24HR patch   Commonly known as:  NICODERM        Place 1 patch onto the skin daily.   Authorizing Provider:  Rojelio Londono   Last Dose:  1 patch on 2/23/2017  9:40 AM                               oxyCODONE/APAP 5-325 MG per tablet   Commonly known as:  PERCOCET        Take 1-2 tablets by mouth every 4 hours as needed for Pain.   Authorizing Provider:  Danay Christianson   Last Dose:  2 tablets on 2/23/2017  6:18 AM                               sodium chloride 0.9 % Solution 100 mL with cefTRIAXone 2 G Recon Soln 2,000 mg        Inject 2,000 mg into the vein daily.   Authorizing Provider:  Danay Christianson   Last Dose:  2,000 mg on 2/23/2017  9:41 AM                                                        CONTINUE taking these medications which have NOT CHANGED        Details    Morning Noon Evening Bedtime As needed    albuterol 108 (90 BASE) MCG/ACT inhaler   Commonly known as:  PROAIR HFA        Inhale 2 puffs into the lungs every 4 hours as needed for Shortness of Breath or Wheezing.   Authorizing Provider:  Juan Madrigal   Last Dose:  2 puffs on 2/17/2017  5:53 AM                               budesonide-formoterol 160-4.5 MCG/ACT inhaler   Commonly known  [Time Spent: ___ minutes] : I have spent [unfilled] minutes of time on the encounter. as:  SYMBICORT        Inhale 2 puffs into the lungs 2 times daily. Rinse mouth and throat after each use   Authorizing Provider:  Juan Madrigal                                  omeprazole 40 MG capsule   Commonly known as:  PRILOSEC        Take 1 capsule by mouth daily.   Authorizing Provider:  Munir Martinez                               simvastatin 40 MG tablet   Commonly known as:  ZOCOR        TAKE ONE TABLET BY MOUTH AT BEDTIME   Authorizing Provider:  Jair Cramer   Last Dose:  40 mg on 2/14/2017  9:13 PM                               zolpidem 10 MG tablet   Commonly known as:  AMBIEN        Take 1 tablet by mouth nightly as needed for Sleep.   Authorizing Provider:  Juan Madrigal   Comment:  Called to  with message she has to be seen by MD or NP in Jan 2017 for further refills. Pt has appt 1-30-17.   Last Dose:  5 mg on 2/22/2017  9:59 PM                                                        STOP taking these medications, discuss with your Pharmacist        Reason for stopping Comments    acetaminophen 500 MG tablet   Commonly known as:  TYLENOL             azithromycin 250 MG tablet   Commonly known as:  ZITHROMAX             HYDROcodone-acetaminophen 5-325 MG per tablet   Commonly known as:  NORCO             ibuprofen 800 MG tablet   Commonly known as:  MOTRIN             predniSONE 20 MG tablet   Commonly known as:  DELTASONE                                    Where to Get Your Medications      These medications were sent to [id number] - [place], [place] - [address] [facility]  [address] [facility] [address]     Phone:  [phone]     albuterol-ipratropium  MCG/ACT inhaler         You are receiving a paper prescription for the following medications, you can take these to any pharmacy.     Bring a paper prescription for each of these medications     aspirin 81 MG EC tablet    colchicine 0.6 MG tablet    ferrous sulfate 325 (65 FE) MG tablet    furosemide 40 MG tablet     metoPROLOL 25 MG tablet    nicotine 14 MG/24HR patch    oxyCODONE/APAP 5-325 MG per tablet         Continue to take these prescriptions as directed by your doctor. Ask your doctor or nurse if these meds have been called to your pharmacy.     ! Ask your nurse or doctor about these medications     sodium chloride 0.9 % Solution 100 mL with cefTRIAXone 2 G Recon Soln 2,000 mg               Your To Do List     Future Orders Complete By Expires    SERVICE TO CARDIAC REHAB  As directed     SERVICE TO HOME CARE NURSING/THERAPY  As directed     SERVICE TO HOME CARE NURSING/THERAPY  As directed         Discharge Instructions       Cardiac Rehab:   Phase 2 outpatient cardiac rehabilitation referral to [facility], [phone]. Follow activity restrictions and exercise guidelines in the red cardiac rehabilitation folder.     Discharge Instructions After Cardiac Surgery    Activity    A certain amount of activity is good for you during your recovery, while other activity should be avoided. Please use your cardiac rehabilitation instructions as a guide. Do not lift, push or pull more than 10 pounds for 6 weeks after being discharged. You will be allowed to lift up to 40 pounds from then until 3 months after your surgery, unless given other instructions. You can climb stairs slowly as tolerated. Sexual activity usually can be resumed 2 to 3 weeks after being discharged, or when you can easily climb a flight of stairs. Take time to exercise daily according to the guidelines provided by Cardiac Rehab or the staff. Routine daily activity is not sufficient.    You likely will be contacted in a few weeks to enroll in an outpatient cardiac rehab program, or you can contact your local hospital/clinic to enroll and get stated. Until then, exercise regularly and increase your activity/exercise level gradually as you tolerate it. At the time of your office visit, your surgeon will discuss releasing you to return to  work--and if not, you should ask.    Travel    Do not drive for 2 weeks after being discharged (or longer if you are still taking pain medication), unless you are told you should do otherwise. You may ride as a passenger at any time. Please wear your safety belt. If riding for long distances, stop every hour or so and walk around for 10 to 15 minutes. Wear your TORREY (thromboembolism deterrent) hose while riding.    You may fly on an airplane at any time during your recovery. You should not have difficulty going through the metal detector at the airport. If you are traveling long distances, where you will be sitting for more than 1 1/2 hours at a time, try to get up and walk or stretch your legs. Wear your TORREY hose while flying.    Discomfort    Mild amounts of postoperative discomfort commonly continue for a few weeks. Remember to take your pain medication as needed throughout the day and at bedtime if you need it. You may want to write down the time you take a pain pill to help give you adequate, but not too much, pain medicine.    As you wean yourself from the pain pills, you may take Tylenol for general aches and pains. If you have no history of kidney disease/problems, you may also use ibuprofen. Continue to exercise daily as instructed, because this will help reduce muscle stiffness. You may use a heating pad as directed on low heat. Call your cardiologist if you ever have chest discomfort similar to any angina pain (chest pain) you may have had before surgery.    Temporary numbness, tingling and discomfort sometimes occur in some of the fingers. This is caused by the stretching of the chest during surgery and should improve gradually over 6 to 12 weeks. Many patients experience hypersensitivity of the skin of the chest, resulting in discomfort with light touch or contact with clothing. This also is normal and improves as the wound heals. Many patients have increased tenderness and numbness of the side of the  sternum if a mammary artery was used for bypass. This will improve gradually during the next six months.     A sore, scratchy throat can occur from the insertion of your breathing tube and may last for several weeks. You also may notice some hoarseness in your voice. If not improved by your checkup, we may refer you to an ENT (ear, nose and throat) physician for further evaluation.    Sleeping    Your sleeping pattern may change temporarily after surgery or be worse if you slept poorly before surgery. Insomnia may be cause or be worse if you slept poorly before surgery. Insomnia may be caused partly by discomfort and inactivity. You may find that you only sleep for a few hours at a time at night. You can take 1 or 2 short daytime naps to help fight fatigue, but do not oversleep during the day. You may want to take a pain pill at bedtime to help you sleep comfortably. Sleeping pills are not suggested, but Tylenol PM is acceptable, however do not take with pain medication, as they both contain Tylenol. You can sleep in any position, but you may be limited in the beginning because of discomfort in some positions.    Breathing    Mild shortness of breath with exercise/activity is common after surgery. This feeling should go away with a few minutes of rest. If you notice shortness of breath or difficulty breathing at night or when resting, call our office. Continue to use your Voldyne breathing exerciser a few times each day for 1 to 2 weeks at home. You may have a productive cough for a few weeks--this is normal. Call our office if you cough up bloody/tan or yellow/green phlegm, or if you have a persistent dry cough.    Diet and appetite    Due to inactivity and medication effects, it is fairly common to have a poor appetite for several weeks after surgery.  Changes in your sense of taste and smell are also common and are temporary.  Eating smaller portions more frequently is recommended.  Your low-fat, low-cholesterol  diet is not restricted for the first month at home in order to encourage a better appetite.  However, you do need to follow your diabetic and sodium restrictions.  After that, you should follow the diet that was taught to you in the hospital.  If you were discharged home on an iron supplement, you may experience black stools.  Once the iron prescription is completed, this will resolve.  Take medication with food unless you are directed to take it on an empty stomach.  Call our office with persistent nausea or vomiting.  If you are diabetic and you experience vomiting or a poor appetite, it is important that you call the physician managing your diabetes because this can change your blood sugar.    Wound care    Your incision care is simple.  Wash incisions daily with plain soap such as Dove, Ivory, or Dial.  Avoid using perfumed soaps.  Use warm water and pat dry.  Do not use hot water and do not rub incisions.  Try not to touch your incisions unless it is necessary.  You may shower, but do not soak in the tub until the scabs are off your incision.  Until the scabs have fallen off, do not apply any lotion, powder, cream or ointment to incisions.  Leave them open to air unless there is drainage present, and then cover with a dry gauze dressing daily after washing the area.  Your chest tube sites may drain a blood-tinged to clear yellow fluid on and off for two weeks.  Sometimes the fluid will come out in a \"gush.\" This is normal and you shouldn't be alarmed.  Keep them covered with dry gauze when they are draining.  Numbness and itching along the incision line are normal.  Your chest incision is likely to have a bump or swelling at the top. This is normal and will gradually return to normal in 2 to 3 months.  Signs of possible infection to watch for include: redness, swelling, warmth, drainage or tenderness.  Call our office if you experience any of these signs.    Stockings (TEDs)    Wear knee- or thigh-high TORREY  stocking that were provided to you.  Wear them on both legs for 2 weeks after being discharged--you probably will need some help putting them on each day.  You may take them off at night.  Your stockings can be washed in your regular laundry, but do not place them in the dryer.    Swelling    Elevate your legs 2 to 3 times daily for 30 to 60 minutes.  This means your legs should be 24 inches above the level of your heart.  Sitting in a recliner or chair with your legs on a footstool is not adequate since you're bent at the waist.  Lying on the couch or in bed will be more effective. Avoid sitting for prolonged periods of time.  Remember not to cross your legs at the knee when sitting, because it decreases blood circulation in your legs and increases swelling.  If a vein or artery was taken from your arm, try to elevate the arm on a pillow when resting for the first 2 weeks.  Avoid blood pressures or blood draws from this arm for 1 month if possible.  Report any sudden change in temperature or sensation of this arm to our office.    Weighing yourself    If you have been prescribed a diuretic (water pill), you should weigh yourself daily and record it.  Put your scale on a hard, level surface and weigh yourself at the same time each day, preferably without clothes.  Call our office with any weight gain or if you lose 5 pounds in 3 days.    Temperature    It is fairly common to feel sudden rushes of warm and/or cold after surgery. A lot of sweating, especially at night, is common. Take your temperature if you ever feel warm, flushed, have the chills, or have wound drainage.  Call our office if your temperature is higher than 101 degrees.    Wearing a bra    Some female patients feel more comfortable if they wear an elastic/sports bra after surgery.  The support is especially important for larger-breasted women and may be helpful to wear 24 hours a day initially.  While wearing your bra, please keep dry gauze between  your breasts and on the portion of your incisions where your bra will rest.    Medication refills    If you need pain medication refills, you should contact your surgeon's office.  Please call for medication questions or refills between 9 a.m. and 4 p.m. Monday through Friday. All other refill requests should be directed to your cardiologist or primary (family) doctor's office.    Dental visits    If you had a valve repair or replacement, you should try to avoid routine cleanings or non-emergent work on your teeth for 3 months following surgery.  Then, prior to dental visits, you will need antibiotics to prevent an infection of the valve.  This is routinely taken 1 hour before each visit and is usually prescribed by your dentist.  If you or your dentist has questions, please feel free to contact our office.  Also, it is important to visit the dentist regularly (about every 6 months), because teeth in bad repair can lead to valve infections.    Smoking    Absolutely and positively refrain from smoking.        Discharge References/Attachments     ASPIRIN (ENGLISH)    COLCHICINE ORAL TABLET (ENGLISH)    IRON ORAL TABLET (ENGLISH)    LASIX (ENGLISH)    METOPROLOL TARTRATE ORAL TABLET (ENGLISH)    NICOTINE TRANSDERMAL PATCH - 24 HOUR (ENGLISH)    OXYCODONE HYDROCHLORIDE, ACETAMINOPHEN ORAL TABLET (ENGLISH)    HEART VALVE SURGERY: REPAIR AND REPLACEMENT, DISCHARGE INSTRUCTIONS FOR (ENGLISH)    EXERCISE AFTER BYPASS SURGERY: START SLOWLY  (ENGLISH)    AFTER BYPASS SURGERY, STARTING A CARDIAC REHAB PROGRAM  (ENGLISH)    PREVENTING DEEP VEIN THROMBOSIS AFTER SURGERY (ENGLISH)      Additional Information       Procedural Sedation  Procedural sedation is medicine to ease discomfort, pain, and anxiety during a procedure. The medicine is often given through an intravenous (IV) line in your arm or hand. In some cases, the medicine may be taken by mouth or inhaled. While you are under sedation, you will likely be awake. But you  may not remember anything afterward.  Why procedural sedation is used  Sedation is used for many types of procedures. The goal is to reduce pain, anxiety, and stressful memories of a procedure. It can also help your health care provider treat you. For example, having a broken bone fixed may be easier if you feel relaxed.  Procedural sedation is used only for short, basic procedures. It is not used for complex surgeries. Some procedures that use this type of sedation include:  · Dental surgery  · Breast biopsy, to take a sample of breast tissue  · Endoscopy, to look at gastrointestinal problems  · Bronchoscopy, to check for lung problems  · Bone or joint realignment, to fix a broken bone or dislocated joint  · Minor foot or skin surgery  · Electrical cardioversion, to restore a normal heart rhythm  · Lumbar puncture, to assess neurological disease  Risks of procedural sedation  Procedural sedation has some risks and possible side effects, such as:  · Headache  · Nausea and vomiting  · Unpleasant memory of the procedure  · Lowered rate of breathing  · Changes in heart rate and blood pressure (rare)  · Inhalation of stomach contents into your lungs (rare)  Side effects will likely go away shortly after the procedure. Your health care team will watch your heart rate and breathing during your sedation. This is to help prevent problems.  Your own risks may vary based on your age and your overall health. They also depend on the type of sedation you are given. Talk with your health care provider about the risks that apply most to you.  Getting ready for procedural sedation  Talk with your health care provider how to get ready for your procedure. Tell him or her about all the medicines you take. This includes over-the-counter medicines such as ibuprofen. It also includes vitamins, herbs, and other supplements. You may need to stop taking some medicines before the procedure, such as blood thinners and aspirin. If you smoke,  you may need to stop. Talk with your health care provider if you need help to stop smoking.  Tell your health care provider if you:  · Have had any problems in the past with sedation or anesthesia  · Have had any recent changes in your health, such as an infection or fever  · Are pregnant or think you may be pregnant  Also, make sure to:  · Ask a family member or friend to take you home after the procedure. You cannot drive on the day you receive sedation.  · Not eat or drink after midnight the night before your procedure, if advised.  · Follow all other instructions from your health care provider.  During your procedural sedation  You may have your procedure in a hospital or a medical clinic. Sedation is done by a trained health care provider. In general, you can expect the following:  · You will be given medicine through an IV line in your arm or hand. Or you may receive a shot. The medicine may also be given by mouth. Or you may inhale it through a mask.  · If you receive medicine through an IV, you may feel the effects very quickly. You will start to feel relaxed and drowsy.  · During the procedure, your heart rate, breathing, and blood pressure will be closely watched. Your breathing and blood pressure may decrease a little. But you will likely not need help with your breathing. You may receive a little extra oxygen through a mask.  · You will probably be awake the entire time. If you do fall asleep, you should be easy to wake up, if needed. You should feel little or no pain.  · When your procedure is over, the sedative medicine will be stopped.  After your procedural sedation  You will begin to feel more awake and aware. But you will likely be drowsy for a while afterward. You will be closely watched as you become more alert. You may have a faint memory of the procedure. Or you may not remember it at all.  You should be able to return home within an hour or two after your procedure. Plan to have someone stay  with you for a few hours. Side effects such as headache and nausea may go away quickly. Tell your health care provider if they continue.  Don’t drive or make any important decisions for at least 24 hours. Be sure to follow all after-care instructions.     When to call your health care provider  Have someone call your health care provider right away if you have any of these:  · Drowsiness that gets worse  · Weakness or dizziness that gets worse  · Repeated vomiting  · You can’t be awakened   © 4838-6633 [employer]. [address] [place] [address]. All rights reserved. This information is not intended as a substitute for professional medical care. Always follow your healthcare professional's instructions.              Pending Labs/Results     Any lab or diagnostic test results that are \"pending\" at time of discharge are listed below. If no results display then none were \"pending\" at time of discharge. Depending on when the test was completed results may be available within 3-5 days. Results can be reviewed with your provider at your next visits or through [employer]. If needed contact the provider office for additional information.          Order Current Status    Type/Screen Preliminary result       Your Opinion Matters To Us  If you receive a patient satisfaction survey in the mail, please complete and return it in the postage-paid envelope.    We truly value and appreciate your feedback.           Argentina Rios        Your To Do List     Future Orders Complete By Expires    SERVICE TO CARDIAC REHAB  As directed     SERVICE TO HOME CARE NURSING/THERAPY  As directed     SERVICE TO HOME CARE NURSING/THERAPY  As directed       Contact your doctor for follow-up appointment if not already scheduled.     Follow up with Eleuterio Belcher MD In 1 month.    Specialty:  Cardiothoracic Surgery    Comments:  office will call to make appointment    Contact information    [address]  [place] [id number] [phone]          Follow up with Eli Ye MD In 3 weeks.    Specialty:  Infectious Diseases    Contact information    [address]  [place] [id number] [phone]          Follow up with WAYNE Riddle In 10 days.    Specialty:  Nurse Practitioner - Family    Contact information    [address]  [place] [address]  [phone]          Follow up with Rojelio Londono MD In 2 weeks.    Specialty:  Internal Medicine - Clinical Cardiac Electrophysiology    Comments:  cardiology follow up     Contact information    [address]  [address]  [place] [id number] [phone]           Argentina Rios           Summary of your Discharge Medications      Take these Medications        Details    Morning Noon Evening Bedtime As needed    albuterol 108 (90 BASE) MCG/ACT inhaler   Commonly known as:  PROAIR HFA    Inhale 2 puffs into the lungs every 4 hours as needed for Shortness of Breath or Wheezing.                               albuterol-ipratropium  MCG/ACT inhaler   Commonly known as:  COMBIVENT RESPIMAT    Inhale 1 puff into the lungs 2 times daily.                                  aspirin 81 MG EC tablet    Take 1 tablet by mouth daily.                               budesonide-formoterol 160-4.5 MCG/ACT inhaler   Commonly known as:  SYMBICORT    Inhale 2 puffs into the lungs 2 times daily. Rinse mouth and throat after each use                                  colchicine 0.6 MG tablet   Commonly known as:  COLCRYS    Take 1 tablet by mouth every 12 hours.                                  ferrous sulfate 325 (65 FE) MG tablet    Take 1 tablet by mouth 2 times daily (with meals).                                  furosemide 40 MG tablet   Commonly known as:  LASIX    Take 1 tablet by mouth 2 times daily.                                  metoPROLOL 25 MG tablet   Commonly known as:  LOPRESSOR    Take 1 tablet by mouth every 12 hours.                                   nicotine 14 MG/24HR patch   Commonly known as:  NICODERM    Place 1 patch onto the skin daily.                               omeprazole 40 MG capsule   Commonly known as:  PRILOSEC    Take 1 capsule by mouth daily.                               oxyCODONE/APAP 5-325 MG per tablet   Commonly known as:  PERCOCET    Take 1-2 tablets by mouth every 4 hours as needed for Pain.                               simvastatin 40 MG tablet   Commonly known as:  ZOCOR    TAKE ONE TABLET BY MOUTH AT BEDTIME                               sodium chloride 0.9 % Solution 100 mL with cefTRIAXone 2 G Recon Soln 2,000 mg    Inject 2,000 mg into the vein daily.                               zolpidem 10 MG tablet   Commonly known as:  AMBIEN    Take 1 tablet by mouth nightly as needed for Sleep.   Comment:  Called to  with message she has to be seen by MD or NP in Jan 2017 for further refills. Pt has appt 1-30-17.                                                             Outpatient Administered Medication List      Notice     You have not been prescribed any facility-administered medications.

## 2024-05-26 NOTE — REVIEW OF SYSTEMS
[Shortness Of Breath] : shortness of breath [SOB on Exertion] : shortness of breath during exertion [Skin Rash] : skin rash [Negative] : Neurological [Fatigue] : no fatigue [Recent Change In Weight] : ~T no recent weight change [Hoarseness] : no hoarseness [FreeTextEntry2] : gained 13 lbs since last visit [FreeTextEntry4] : hoarseness resolved [FreeTextEntry6] : s/p RUL VATS/wedge resection for lung metastases [FreeTextEntry8] :  right clear-cell kidney cancer, s/p right nephrectomy [de-identified] : upper extremities, triggered by high temperatures

## 2024-05-26 NOTE — HISTORY OF PRESENT ILLNESS
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   CASE SYNOPSIS: September 2019-reports gross hematuria, spontaneously resolved.  No other associated  symptoms.  11/1/2019: CT A/P -9.7 x 10 x 7.5 cm partially heterogeneous mass extending off the lower pole of the right kidney with invasion of the lower pole renal collecting system, highly concerning for renal cell carcinoma.  No gross urothelial lesion.  Few scattered pulmonary nodules measuring up to 4 mm, nonspecific, possibly representing postinflammatory benign nodules, but cannot exclude metastasis.  Further evaluation with CT scan of the chest recommended.  1.2 x 1.5 cm low-attenuation lesion of the spleen representing hemangioma.  11/8/2019: CT chest-tree in bud opacities noted within the right lung likely representing mucoid impacted distal small airways.  12/5/2019: Radical nephrectomy (right kidney and proximal ureter) performed at [facility] (); surgical pathology: clear cell renal carcinoma, 10 cm, with extensive necrosis, grade 4 with focal rhabdoid features, no definitive LVI, vascular and ureteral resection margins free of tumor, perinephric adipose tissue negative for malignancy.  Renal parenchyma showed mild nephrosclerosis and patchy interstitial chronic inflammation (pT2, pN0).  3/23/2020: CT A/P-interval right nephrectomy.  No evidence of locally recurrent or metastatic disease.  4/9/2021: CT C/A/P-5 mm RUL pulmonary nodule may reflect metastasis and is new since prior chest CT of 11/8/2019.  Close follow-up recommended.  No evidence for residual or recurrent tumor in the right nephrectomy bed.  8/13/2021: CT chest-new right upper lobe 8 mm peripheral nodule indeterminate; previously measuring 5 mm.  Interval growth suggest metastatic disease.  9/8/2021:referred to Dr. Schmitt (thoracic surgery) for evaluation.  10/29/2021: Right upper lobe VATS/ wedge resection ([facility], Dr. Schmitt) -metastatic clear-cell renal cell carcinoma, R4 lymph node excision negative for malignancy, level 7 lymph node excision negative for malignancy, acid-fast bacilli and fungi.  Both lymph nodes with noncaseating granulomatous inflammation.  11/19/21- cycle # 1 Pembrolizumab  11/30/21- discontinues Multaq (antiarrhythmic) due to interactions with axitinib  12/4/21- started axitinb  1/15/2022-discontinued axitinib temporarily due to mucosal sensitivity  2/9/22: Develops macular rash on both forearms; TSH 90.7.   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  6/11/2022 CT C/A/P-no residual or recurrent disease in the nephrectomy bed.  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  8/12/22 -resumed Pembrolizumab only; axitinib on hold.  9/29/2022 CT C/A/P: 6 status post right nephrectomy with stable exam as compared to 6/11/2022 11/21/22- Pembrolizumab last dose before d/c  5/9 - 5/11/2023-hospitalized at [facility] with left facial droop associated with difficulty speaking; s/p mechanical fall.  MRI head consistent with an acute 2 cm infarct extending from right ganglial-capsular region into ipsilateral corona radiata.  No evidence of hemorrhagic conversion.  Chronic lacunar infarcts left corona radiata and basal ganglia also noted. Started on Aspirin.  TTE-preserved ejection fraction, mild-moderate valvular disease.   6/10/2023-presents to ED at [facility] with hematuria; anticoagulation continued, but aspirin d/c.  CT A/P-allowing for the absence of IV contrast, no significant changes outside of right nephrectomy.   11/20/2023- patient passed out while urinating; in ED at [facility].  4/12/2023 CT C/A/P -status post right nephrectomy. Stable examination compared with prior imaging 9/29/2022.  5/10/2023 MRI brain- IMPRESSION: 1.  Approximate 2 cm acute infarct extending from the right ganglio-capsular region into the ipsilateral corona radiata.  No evidence of hemorrhagic conversion. 2.  Chronic lacunar infarcts left corona radiata and basal ganglia as well as findings most concordant with chronic microvascular ischemic changes  4/5/2024 CT C/A/P-IMPRESSION: Interval enlargement of a 6 mm right upper lobe pulmonary nodule, concerning for metastasis. L1 vertebral compression fracture deformity, new from 6/10/2023.  [de-identified] : Mr. TAM was last seen in the office in March 2024; subsequently had a restaging CT C/A/P on 4/5/2024 that showed an interval enlargement of a 6 mm right upper lobe pulmonary nodule concerning for recurrent metastasis.  He was evaluated by cardiothoracic surgery and is pending restaging CT in 3-month to reevaluate progression of lung disease.  Of note, patient had a similar presentation of a 5 mm RUL pulmonary nodule in April 2021 which was resected 4-month later after close interval follow-up.  Reports no respiratory symptoms.  In interim he continued regular phlebotomies for erythrocytosis.  He has been off systemic immunotherapy with pembrolizumab for a year and a half.  He also follows up with neurology given diagnosis of a right ganglio- capsular acute infarct in May 2023.  Reports no changes in physical status and a good quality of life.  Medication list reviewed.

## 2024-05-26 NOTE — ASSESSMENT
[FreeTextEntry1] : Mr. RAETAM 's questions were answered to his satisfaction.  He  expressed his  understanding and willingness to comply with the above recommendations, and  will return to the office in 3 months.

## 2024-05-28 LAB
ALBUMIN SERPL ELPH-MCNC: 4.5 G/DL
ALP BLD-CCNC: 105 U/L
ALT SERPL-CCNC: 23 U/L
ANION GAP SERPL CALC-SCNC: 22 MMOL/L
AST SERPL-CCNC: 37 U/L
BILIRUB SERPL-MCNC: 2.5 MG/DL
BUN SERPL-MCNC: 19 MG/DL
CALCIUM SERPL-MCNC: 9.7 MG/DL
CHLORIDE SERPL-SCNC: 106 MMOL/L
CHOLEST SERPL-MCNC: 110 MG/DL
CO2 SERPL-SCNC: 16 MMOL/L
CREAT SERPL-MCNC: 1.37 MG/DL
EGFR: 52 ML/MIN/1.73M2
GLUCOSE SERPL-MCNC: 73 MG/DL
HDLC SERPL-MCNC: 42 MG/DL
IRON SERPL-MCNC: 82 UG/DL
LDLC SERPL CALC-MCNC: 45 MG/DL
NONHDLC SERPL-MCNC: 68 MG/DL
POTASSIUM SERPL-SCNC: 5.1 MMOL/L
PROT SERPL-MCNC: 7.2 G/DL
SODIUM SERPL-SCNC: 144 MMOL/L
TRIGL SERPL-MCNC: 132 MG/DL
TSH SERPL-ACNC: 1.73 UIU/ML

## 2024-05-30 ENCOUNTER — APPOINTMENT (OUTPATIENT)
Dept: INTERNAL MEDICINE | Facility: CLINIC | Age: 79
End: 2024-05-30
Payer: MEDICARE

## 2024-05-30 VITALS
HEIGHT: 67 IN | DIASTOLIC BLOOD PRESSURE: 78 MMHG | RESPIRATION RATE: 16 BRPM | WEIGHT: 185 LBS | OXYGEN SATURATION: 98 % | HEART RATE: 75 BPM | BODY MASS INDEX: 29.03 KG/M2 | SYSTOLIC BLOOD PRESSURE: 120 MMHG | TEMPERATURE: 97.4 F

## 2024-05-30 DIAGNOSIS — Z12.5 ENCOUNTER FOR SCREENING FOR MALIGNANT NEOPLASM OF PROSTATE: ICD-10-CM

## 2024-05-30 DIAGNOSIS — N18.9 CHRONIC KIDNEY DISEASE, UNSPECIFIED: ICD-10-CM

## 2024-05-30 DIAGNOSIS — D86.9 SARCOIDOSIS, UNSPECIFIED: ICD-10-CM

## 2024-05-30 DIAGNOSIS — R71.8 OTHER ABNORMALITY OF RED BLOOD CELLS: ICD-10-CM

## 2024-05-30 DIAGNOSIS — I63.50 CEREBRAL INFARCTION DUE TO UNSPECIFIED OCCLUSION OR STENOSIS OF UNSPECIFIED CEREBRAL ARTERY: ICD-10-CM

## 2024-05-30 DIAGNOSIS — R91.8 OTHER NONSPECIFIC ABNORMAL FINDING OF LUNG FIELD: ICD-10-CM

## 2024-05-30 DIAGNOSIS — E78.5 HYPERLIPIDEMIA, UNSPECIFIED: ICD-10-CM

## 2024-05-30 DIAGNOSIS — D75.1 SECONDARY POLYCYTHEMIA: ICD-10-CM

## 2024-05-30 DIAGNOSIS — I48.91 UNSPECIFIED ATRIAL FIBRILLATION: ICD-10-CM

## 2024-05-30 DIAGNOSIS — E03.9 HYPOTHYROIDISM, UNSPECIFIED: ICD-10-CM

## 2024-05-30 DIAGNOSIS — C64.9 MALIGNANT NEOPLASM OF UNSPECIFIED KIDNEY, EXCEPT RENAL PELVIS: ICD-10-CM

## 2024-05-30 PROCEDURE — 99215 OFFICE O/P EST HI 40 MIN: CPT

## 2024-05-30 RX ORDER — LEVOTHYROXINE SODIUM 0.09 MG/1
88 TABLET ORAL DAILY
Refills: 0 | Status: ACTIVE | COMMUNITY

## 2024-05-30 RX ORDER — METOPROLOL SUCCINATE AND HYDROCHLOROTHIAZIDE 25; 12.5 MG/1; MG/1
25-12.5 TABLET ORAL
Refills: 0 | Status: ACTIVE | COMMUNITY

## 2024-05-30 NOTE — HISTORY OF PRESENT ILLNESS
[FreeTextEntry1] : Follow-up [de-identified] : Mr. Johnson presents for follow-up evaluation.  He is an 79-year-old male with a history of clear-cell renal carcinoma status post radical right nephrectomy in 12/19.  He had a right upper lobe wedge resection in 10/21 for an enlarging right upper lobe nodule.  Pathology was consistent with metastatic clear-cell renal carcinoma.  Mr. Johnson was treated with chemotherapy.  Patient had recent surveillance CT scan of the chest on 4/5 which showed interval enlargement of 6 mm right upper lobe nodule.  There was also a new L1 vertebral compression fracture.  Mr. Johnson denies any chest pain, shortness of breath or palpitations.  There are no nocturnal symptoms of cough or dyspnea.  He also presents for review of comprehensive lab work.

## 2024-05-30 NOTE — PLAN
[FreeTextEntry1] : Mr. Johnson presents for follow-up evaluation.  1.  Patient has a history of clear-cell renal carcinoma with metastasis to the lung.  He is status post right radical nephrectomy and had right upper lobe wedge resection in 10/21.  Recent surveillance CAT scan shows a enlarging 6 mm nodule.  He has been seen by Dr. Rolle for cardiothoracic surgery and will have a repeat CAT scan in 3 months.  If there is continued enlargement he may require surgical resection. 2.  Comprehensive lab work was reviewed with the patient. 3.  Mr. Johnson does have persistent polycythemia.  He denies any symptoms of daytime tiredness.  His V[place] sleepiness score is 0/24 and STOP-BANG score is 3 indicating a low risk for obstructive sleep apnea.  His elevated hematocrit could be secondary to renal cell carcinoma.  He continues to follow-up with hematology/oncology and undergo periodic phlebotomy. 4.  Patient will continue on current medication regimen which has been reviewed and revised. 5.  Hematology/oncology follow-up with Dr. Morfin 6.  Will follow-up CAT scan review. 7.  Follow-up in 6 months.

## 2024-06-03 ENCOUNTER — RX RENEWAL (OUTPATIENT)
Age: 79
End: 2024-06-03

## 2024-06-03 RX ORDER — AMLODIPINE BESYLATE 5 MG/1
5 TABLET ORAL
Qty: 90 | Refills: 3 | Status: ACTIVE | COMMUNITY
Start: 2021-04-06 | End: 1900-01-01

## 2024-06-04 ENCOUNTER — RESULT REVIEW (OUTPATIENT)
Age: 79
End: 2024-06-04

## 2024-06-04 ENCOUNTER — APPOINTMENT (OUTPATIENT)
Dept: INFUSION THERAPY | Facility: HOSPITAL | Age: 79
End: 2024-06-04

## 2024-06-04 LAB
BASOPHILS # BLD AUTO: 0.03 K/UL — SIGNIFICANT CHANGE UP (ref 0–0.2)
BASOPHILS NFR BLD AUTO: 0.6 % — SIGNIFICANT CHANGE UP (ref 0–2)
EOSINOPHIL # BLD AUTO: 0.19 K/UL — SIGNIFICANT CHANGE UP (ref 0–0.5)
EOSINOPHIL NFR BLD AUTO: 3.7 % — SIGNIFICANT CHANGE UP (ref 0–6)
HCT VFR BLD CALC: 50.1 % — HIGH (ref 39–50)
HGB BLD-MCNC: 16.6 G/DL — SIGNIFICANT CHANGE UP (ref 13–17)
IMM GRANULOCYTES NFR BLD AUTO: 0.2 % — SIGNIFICANT CHANGE UP (ref 0–0.9)
LYMPHOCYTES # BLD AUTO: 1.08 K/UL — SIGNIFICANT CHANGE UP (ref 1–3.3)
LYMPHOCYTES # BLD AUTO: 21 % — SIGNIFICANT CHANGE UP (ref 13–44)
MCHC RBC-ENTMCNC: 30.5 PG — SIGNIFICANT CHANGE UP (ref 27–34)
MCHC RBC-ENTMCNC: 33.1 G/DL — SIGNIFICANT CHANGE UP (ref 32–36)
MCV RBC AUTO: 92.1 FL — SIGNIFICANT CHANGE UP (ref 80–100)
MONOCYTES # BLD AUTO: 0.51 K/UL — SIGNIFICANT CHANGE UP (ref 0–0.9)
MONOCYTES NFR BLD AUTO: 9.9 % — SIGNIFICANT CHANGE UP (ref 2–14)
NEUTROPHILS # BLD AUTO: 3.32 K/UL — SIGNIFICANT CHANGE UP (ref 1.8–7.4)
NEUTROPHILS NFR BLD AUTO: 64.6 % — SIGNIFICANT CHANGE UP (ref 43–77)
NRBC # BLD: 0 /100 WBCS — SIGNIFICANT CHANGE UP (ref 0–0)
PLATELET # BLD AUTO: 131 K/UL — LOW (ref 150–400)
RBC # BLD: 5.44 M/UL — SIGNIFICANT CHANGE UP (ref 4.2–5.8)
RBC # FLD: 13.7 % — SIGNIFICANT CHANGE UP (ref 10.3–14.5)
WBC # BLD: 5.14 K/UL — SIGNIFICANT CHANGE UP (ref 3.8–10.5)
WBC # FLD AUTO: 5.14 K/UL — SIGNIFICANT CHANGE UP (ref 3.8–10.5)

## 2024-06-05 ENCOUNTER — APPOINTMENT (OUTPATIENT)
Dept: ORTHOPEDIC SURGERY | Facility: CLINIC | Age: 79
End: 2024-06-05
Payer: MEDICARE

## 2024-06-05 VITALS — BODY MASS INDEX: 26.48 KG/M2 | HEIGHT: 70 IN | WEIGHT: 185 LBS

## 2024-06-05 DIAGNOSIS — M41.9 SCOLIOSIS, UNSPECIFIED: ICD-10-CM

## 2024-06-05 PROCEDURE — 99204 OFFICE O/P NEW MOD 45 MIN: CPT

## 2024-06-05 PROCEDURE — 99214 OFFICE O/P EST MOD 30 MIN: CPT

## 2024-06-05 NOTE — HISTORY OF PRESENT ILLNESS
[de-identified] : 79 year old male presents for initial evaluation of lower back pain since Oct 2023. He denies injury. He states he periodically has back pain over the years that resolves on its own. He states most of his pain is in the morning, which improves throughout the day.  Denies radiation of pain down the legs. Denies numbness/tingling. Walking aggravates the pain. Has not tried medications for the pain. Can not take NSAIDs as he is on xarelto for Afib.  He had participated with PT from October to January but did not feel improvement. He had also tried chiropractic care from February to May but also did not feel improvement.  Denies ERNIE. PMHx: HTN, renal cell carcinoma s/p right nephrectomy, with mets to the lung s/p removal. Afib on Xarelto.  No fever, chills, sweats, nausea/vomiting. No bowel or bladder dysfunction, no recent weight loss or gain. No night pain. This history is in addition to the intake form that I personally reviewed. [Stable] : stable

## 2024-06-05 NOTE — PHYSICAL EXAM
[Normal] : Gait: normal [Benjamin's Sign] : negative Benjamin's sign [Pronator Drift] : negative pronator drift [SLR] : negative straight leg raise [de-identified] : 5 out of 5 motor strength, sensation is intact and symmetrical full range of motion flexion extension and rotation, no palpatory tenderness full range of motion of hips knees shoulders and elbows (all four extremities), no atrophy, negative straight leg raise, no pathological reflexes, no swelling, normal ambulation, no apparent distress skin is intact, no palpable lymph nodes, no upper or lower extremity instability, alert and oriented x3 and normal mood. Normal finger-to nose test.  No upper motor neuron findings. [de-identified] : XR Spine 5/9/24 - mild scoliosis, increased kyphosis, disc degenerative disease- reviewed with patient. XR AP Lat Lumbar 10/23 - Lumbar disc degenerative disease- reviewed with patient.

## 2024-06-05 NOTE — DISCUSSION/SUMMARY
[de-identified] : Mild Scolisois. Increased thoracic kyphosis. Lumbar disc degenerative disease. Thoracic degenerative disc disease. Discussed all options. Lumbar MRI referral. Will follow-up once the MRI of his lumbar spine is obtained. F/U after MRI. All options discussed including rest, medicine, home exercise, acupuncture, Chiropractic care, Physical Therapy, Pain management, and last resort surgery. All questions were answered, all alternatives discussed, and the patient is in complete agreement with the treatment plan which the patient contributed to and discussed with me through the shared decision-making process. Follow-up appointment as instructed. Any issues and the patient will call or come in sooner.

## 2024-06-05 NOTE — ADDENDUM
[FreeTextEntry1] : This note was written by Maximo Castaneda on 06/05/2024 acting as scribe for Dr. Dmitri Harding M.D.  I, Dmitri Harding MD, have read and attest that all the information, medical decision making and discharge instructions within are true and accurate.

## 2024-06-11 ENCOUNTER — OUTPATIENT (OUTPATIENT)
Dept: OUTPATIENT SERVICES | Facility: HOSPITAL | Age: 79
LOS: 1 days | End: 2024-06-11
Payer: MEDICARE

## 2024-06-11 ENCOUNTER — APPOINTMENT (OUTPATIENT)
Dept: MRI IMAGING | Facility: CLINIC | Age: 79
End: 2024-06-11
Payer: MEDICARE

## 2024-06-11 DIAGNOSIS — Z98.890 OTHER SPECIFIED POSTPROCEDURAL STATES: Chronic | ICD-10-CM

## 2024-06-11 DIAGNOSIS — Z90.89 ACQUIRED ABSENCE OF OTHER ORGANS: Chronic | ICD-10-CM

## 2024-06-11 DIAGNOSIS — Z90.5 ACQUIRED ABSENCE OF KIDNEY: Chronic | ICD-10-CM

## 2024-06-11 DIAGNOSIS — Z90.49 ACQUIRED ABSENCE OF OTHER SPECIFIED PARTS OF DIGESTIVE TRACT: Chronic | ICD-10-CM

## 2024-06-11 DIAGNOSIS — S39.012A STRAIN OF MUSCLE, FASCIA AND TENDON OF LOWER BACK, INITIAL ENCOUNTER: ICD-10-CM

## 2024-06-11 PROCEDURE — 72148 MRI LUMBAR SPINE W/O DYE: CPT | Mod: 26,MH

## 2024-06-11 PROCEDURE — 72148 MRI LUMBAR SPINE W/O DYE: CPT

## 2024-06-17 ENCOUNTER — APPOINTMENT (OUTPATIENT)
Dept: ORTHOPEDIC SURGERY | Facility: CLINIC | Age: 79
End: 2024-06-17
Payer: MEDICARE

## 2024-06-17 VITALS — WEIGHT: 183 LBS | BODY MASS INDEX: 26.2 KG/M2 | HEIGHT: 70 IN

## 2024-06-17 DIAGNOSIS — M51.36 OTHER INTERVERTEBRAL DISC DEGENERATION, LUMBAR REGION: ICD-10-CM

## 2024-06-17 PROCEDURE — 99214 OFFICE O/P EST MOD 30 MIN: CPT

## 2024-06-17 NOTE — DISCUSSION/SUMMARY
[de-identified] : Mild Scolisois. Increased thoracic kyphosis. Lumbar disc degenerative disease. Thoracic degenerative disc disease. Chronic L1 compression fracture. Discussed all options. Referral for physical therapy. Discussed exercise do's and don'ts. All options discussed including rest, medicine, home exercise, acupuncture, Chiropractic care, Physical Therapy, Pain management, and last resort surgery. All questions were answered, all alternatives discussed, and the patient is in complete agreement with the treatment plan which the patient contributed to and discussed with me through the shared decision-making process. Follow-up appointment as instructed. Any issues and the patient will call or come in sooner.

## 2024-06-17 NOTE — ADDENDUM
[FreeTextEntry1] : This note was written by Maximo Castaneda on 06/17/2024 acting as scribe for Dr. Dmitri Harding M.D.  I, Dmitri Harding MD, have read and attest that all the information, medical decision making and discharge instructions within are true and accurate.

## 2024-06-17 NOTE — PHYSICAL EXAM
[Normal] : Gait: normal [Benjamin's Sign] : negative Benjamin's sign [Pronator Drift] : negative pronator drift [SLR] : negative straight leg raise [de-identified] : 5 out of 5 motor strength, sensation is intact and symmetrical full range of motion flexion extension and rotation, no palpatory tenderness full range of motion of hips knees shoulders and elbows (all four extremities), no atrophy, negative straight leg raise, no pathological reflexes, no swelling, normal ambulation, no apparent distress skin is intact, no palpable lymph nodes, no upper or lower extremity instability, alert and oriented x3 and normal mood. Normal finger-to nose test.  No upper motor neuron findings. [de-identified] : XR Spine 5/9/24 - mild scoliosis, increased kyphosis, disc degenerative disease- reviewed with patient. XR AP Lat Lumbar 10/23 - Lumbar disc degenerative disease- reviewed with patient. I reviewed, interpreted and clinically correlated the following outside imaging studies, EXAM: [id number] - MR SPINE LUMBAR  - ORDERED BY: DAYANNA WILKINSON   PROCEDURE DATE:  06/11/2024    INTERPRETATION:  Exam Type: MR LUMBAR SPINE Exam Date and Time: 6/11/2024 9:07 AM Indication: Lower back pain. Additional Clinical Information: Other Condition see Clinical Info Comparison: No relevant prior studies available for comparison.  TECHNIQUE: Multiplanar multisequence MRI of the lumbar spine was performed.  FINDINGS: OSSEOUS STRUCTURES: Trace leftward curvature of the lumbar spine. Alignment is anatomic without spondylolisthesis. There is mild loss of height of the superior endplate of L1 without surrounding bone marrow edema, reflecting a chronic compression fracture deformity. A superimposed acute Schmorl's node is present along the superior endplate. There is localized Modic type I change at the posterior endplate of L2-L3. Degenerative edematous and fatty changes are also present at L1-L2. Background marrow signal is normal without infiltrative marrow process. No suspicious osseous lesions are identified. The sacroiliac joints are normal.  SPINAL CANAL: The conus is normal in caliber and signal, terminating at the L1 level. There is normal distribution of the nerve roots in the thecal sac.  SOFT TISSUES: There is mild symmetric fatty infiltration of the dorsal paraspinous musculature. The prevertebral and paraspinous soft tissues are otherwise unremarkable. Colonic diverticulosis is present.  DISC SPACES: There is multilevel degenerative disc disease characterized by disc desiccation. Level-by-level analysis demonstrates the following:  T11-T12 (included on sagittal images only): Minimal disc bulge. No spinal canal or neural foraminal narrowing.  T12-L1: Minimal disc bulge. No spinal canal or neural foraminal narrowing.  L1-L2: Disc bulge. Ligamentum flavum thickening. Narrowing of the left greater than right subarticular zones. Mild left foraminal narrowing. No right foraminal narrowing. No spinal canal narrowing.  L2-L3: Disc bulge with posterior annular fissuring. Ligamentum flavum thickening. Narrowing of the bilateral subarticular zones. Mild to moderate left and mild right foraminal narrowing. Effacement of the ventral thecal sac without spinal canal narrowing.  L3-L4: Minimal disc bulge. No spinal canal or neural foraminal narrowing.  L4-L5: Minimal disc bulge. No spinal canal or neural foraminal narrowing.  L5-S1: No disc bulge or herniation. No spinal canal or neural foraminal narrowing.   IMPRESSION: 1.  Mild loss of height of the superior endplate of L1 without surrounding bone marrow edema, reflecting chronic compression fracture deformity. A superimposed acute Schmorl's node is present along the superior endplate. 2.  Multilevel degenerative changes resulting in narrowing of the subarticular zones with up to mild to moderate foraminal narrowing and no spinal canal narrowing as described.  --- End of Report ---

## 2024-06-17 NOTE — HISTORY OF PRESENT ILLNESS
[Stable] : stable [de-identified] : 79 year old male presents for initial evaluation of lower back pain since Oct 2023. He denies injury. He states he periodically has back pain over the years that resolves on its own. He states most of his pain is in the morning, which improves throughout the day.  Denies radiation of pain down the legs. Denies numbness/tingling. Walking aggravates the pain. Has not tried medications for the pain. Can not take NSAIDs as he is on xarelto for Afib.  He had participated with PT from October to January but did not feel improvement. He had also tried chiropractic care from February to May but also did not feel improvement.  Denies ERNIE. Presents today for MRI Lumbar review.  PMHx: HTN, renal cell carcinoma s/p right nephrectomy, with mets to the lung s/p removal. Afib on Xarelto.  No fever, chills, sweats, nausea/vomiting. No bowel or bladder dysfunction, no recent weight loss or gain. No night pain. This history is in addition to the intake form that I personally reviewed.

## 2024-08-01 ENCOUNTER — APPOINTMENT (OUTPATIENT)
Dept: ENDOCRINOLOGY | Facility: CLINIC | Age: 79
End: 2024-08-01
Payer: MEDICARE

## 2024-08-01 VITALS
BODY MASS INDEX: 26.48 KG/M2 | DIASTOLIC BLOOD PRESSURE: 65 MMHG | HEART RATE: 72 BPM | SYSTOLIC BLOOD PRESSURE: 122 MMHG | OXYGEN SATURATION: 97 % | HEIGHT: 70 IN | RESPIRATION RATE: 17 BRPM | WEIGHT: 185 LBS | TEMPERATURE: 97.7 F

## 2024-08-01 DIAGNOSIS — T45.1X5A ADVERSE EFFECT OF ANTINEOPLASTIC AND IMMUNOSUPPRESSIVE DRUGS, INITIAL ENCOUNTER: ICD-10-CM

## 2024-08-01 DIAGNOSIS — E03.9 HYPOTHYROIDISM, UNSPECIFIED: ICD-10-CM

## 2024-08-01 PROCEDURE — 99214 OFFICE O/P EST MOD 30 MIN: CPT

## 2024-08-01 NOTE — ASSESSMENT
[FreeTextEntry1] : Goal TSH in patient's 65 years and older is 4 to 6 but I will also accept a TSH in the normal range.   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   Last TSH was at goal  Last TSH - May 2024  - at goal  Plan: 1. Continue levothyroxine (generic) 100 micrograms po daily. 2. Labs to be done in 6 months - TSH 3. Follow up in 6 months to review results.

## 2024-08-01 NOTE — PHYSICAL EXAM
[de-identified] : General: No distress, well nourished Eyes: Normal Sclera, EOMI, PERRL ENT: Normal appearance of the nose, normal oropharynx Neck/Thyroid: No cervical lymphadenopathy, thyroid gland 20 g in size, no thyroid nodules, non-tender Respiratory: No use of accessory muscles of respiration, vesicular breath sounds heard bilaterally, no crepitations or ronchi Cardiovascular: S1 and S2 heard and normal, no S3 or S4, no murmurs, radial pulse normal bilaterally Abdomen: soft, non-tender, no masses, normal bowel sounds Musculoskeletal: No swelling or deformities of joints of hands, no pedal edema Neurological: Normal range of motion in the hands, Normal brachioradialis reflexes bilaterally Psychiatry: Patient converses normally, good judgement and insight Skin: No rashes in hands, no nodules palpated in hands

## 2024-08-01 NOTE — HISTORY OF PRESENT ILLNESS
[FreeTextEntry1] : Problems: 1. Primary hypothyroidism - due to Axitinb use (a tyrosine kinase inhibitor that was used to treat metastatic renal cell carcinoma) - patient also used  Pembrolizumab which is associated with hypothyroidism.   Note - the patient has atrial fibrillation - he used multaq (dronedarone) in the past and this was discontinued in Dec 2021 due to conflicts with [employer].   Patient was on prednisone for treatment of joint pain but he is no longer on this.    Primary hypothyroidism 1. Diagnosed in Feb 2022. In November 2021, the patient was started on keytruda (Pembrolizumab) for treatment of metastatic renal cell carcinoma. On Dec 2021, he also receive Axitinb and the the patient became hypothyroid in Feb 2022 - TSH 90.7 (0.27 to 4.2). Per the chart, he was on a combination of pembrolizumab/Axitinib. Patient is no longer on the Axitinib - this was stopped on 07/01/2022. In November 2022, the Pembrolizumab was discontinued.  2. No family history of thyroid disorders or thyroid cancer, no personal history of radiation therapy. 3. Meds: Levothyroxine (generic) 100 micrograms po daily - fully compliant and the patient was advised on the appropriate use of levothyroxine.

## 2024-08-02 ENCOUNTER — APPOINTMENT (OUTPATIENT)
Dept: INTERNAL MEDICINE | Facility: CLINIC | Age: 79
End: 2024-08-02
Payer: MEDICARE

## 2024-08-02 VITALS
HEART RATE: 67 BPM | OXYGEN SATURATION: 97 % | SYSTOLIC BLOOD PRESSURE: 110 MMHG | WEIGHT: 189 LBS | BODY MASS INDEX: 27.06 KG/M2 | TEMPERATURE: 97.6 F | RESPIRATION RATE: 16 BRPM | DIASTOLIC BLOOD PRESSURE: 70 MMHG | HEIGHT: 70 IN

## 2024-08-02 DIAGNOSIS — Z78.9 OTHER SPECIFIED HEALTH STATUS: ICD-10-CM

## 2024-08-02 DIAGNOSIS — I48.91 UNSPECIFIED ATRIAL FIBRILLATION: ICD-10-CM

## 2024-08-02 DIAGNOSIS — Z01.818 ENCOUNTER FOR OTHER PREPROCEDURAL EXAMINATION: ICD-10-CM

## 2024-08-02 PROCEDURE — 99214 OFFICE O/P EST MOD 30 MIN: CPT

## 2024-08-02 NOTE — HISTORY OF PRESENT ILLNESS
[Atrial Fibrillation] : atrial fibrillation [Chronic Anticoagulation] : chronic anticoagulation [(Patient denies any chest pain, claudication, dyspnea on exertion, orthopnea, palpitations or syncope)] : Patient denies any chest pain, claudication, dyspnea on exertion, orthopnea, palpitations or syncope [Aortic Stenosis] : no aortic stenosis [Coronary Artery Disease] : no coronary artery disease [Recent Myocardial Infarction] : no recent myocardial infarction [Asthma] : no asthma [Implantable Device/Pacemaker] : no implantable device/pacemaker [COPD] : no COPD [Sleep Apnea] : no sleep apnea [Smoker] : not a smoker [Family Member] : no family member with adverse anesthesia reaction/sudden death [Self] : no previous adverse anesthesia reaction [Chronic Kidney Disease] : no chronic kidney disease [Diabetes] : no diabetes [FreeTextEntry1] : Cataract surgery [FreeTextEntry2] : (R) 8/9, (L) 8/27 [FreeTextEntry3] : Dr. Nguyen [FreeTextEntry4] : Patient is an 79-year-old male with PMH of atrial fibrillation, asthma, HTN, HLD, renal cell carcinoma, sarcoidosis, who presents to office today for MCA.   Patient scheduled to have b/l cataract sx with Dr. Nguyen 8/9, 8/27.  Patient does not have acute complaints today. [FreeTextEntry7] : Dr. Lopes- Cardiology

## 2024-08-02 NOTE — PHYSICAL EXAM
[No Acute Distress] : no acute distress [No Lymphadenopathy] : no lymphadenopathy [Supple] : supple [No Respiratory Distress] : no respiratory distress  [No Accessory Muscle Use] : no accessory muscle use [Clear to Auscultation] : lungs were clear to auscultation bilaterally [Normal S1, S2] : normal S1 and S2 [No Edema] : there was no peripheral edema [Soft] : abdomen soft [Non Tender] : non-tender [Non-distended] : non-distended [Normal Bowel Sounds] : normal bowel sounds [Normal Gait] : normal gait [Normal Affect] : the affect was normal [Alert and Oriented x3] : oriented to person, place, and time [de-identified] : irregular rhythm

## 2024-08-02 NOTE — PLAN
[FreeTextEntry1] : 1. The patient was instructed to hold all Tylenol, Motrin, vitamins, supplements 7 days prior to surgery  2. Close airway monitoring with pulse oximetry required  4. The patient is medically cleared for procedure with no absolute contraindications for surgery in ambulatory setting  4. EKG on file from 2/2024- atrial fibrillation, 74 bpm (chronic)  fax [phone]

## 2024-08-27 ENCOUNTER — OUTPATIENT (OUTPATIENT)
Dept: OUTPATIENT SERVICES | Facility: HOSPITAL | Age: 79
LOS: 1 days | Discharge: HOME | End: 2024-08-27

## 2024-08-27 DIAGNOSIS — Z98.890 OTHER SPECIFIED POSTPROCEDURAL STATES: Chronic | ICD-10-CM

## 2024-08-27 DIAGNOSIS — C64.9 MALIGNANT NEOPLASM OF UNSPECIFIED KIDNEY, EXCEPT RENAL PELVIS: ICD-10-CM

## 2024-08-27 DIAGNOSIS — D75.1 SECONDARY POLYCYTHEMIA: ICD-10-CM

## 2024-08-27 DIAGNOSIS — Z90.49 ACQUIRED ABSENCE OF OTHER SPECIFIED PARTS OF DIGESTIVE TRACT: Chronic | ICD-10-CM

## 2024-08-27 DIAGNOSIS — Z90.5 ACQUIRED ABSENCE OF KIDNEY: Chronic | ICD-10-CM

## 2024-08-27 DIAGNOSIS — Z90.89 ACQUIRED ABSENCE OF OTHER ORGANS: Chronic | ICD-10-CM

## 2024-08-30 ENCOUNTER — NON-APPOINTMENT (OUTPATIENT)
Age: 79
End: 2024-08-30

## 2024-08-30 ENCOUNTER — APPOINTMENT (OUTPATIENT)
Dept: CARDIOLOGY | Facility: CLINIC | Age: 79
End: 2024-08-30
Payer: MEDICARE

## 2024-08-30 VITALS
HEART RATE: 60 BPM | SYSTOLIC BLOOD PRESSURE: 116 MMHG | BODY MASS INDEX: 27.26 KG/M2 | OXYGEN SATURATION: 98 % | DIASTOLIC BLOOD PRESSURE: 70 MMHG | WEIGHT: 190 LBS

## 2024-08-30 DIAGNOSIS — I48.91 UNSPECIFIED ATRIAL FIBRILLATION: ICD-10-CM

## 2024-08-30 DIAGNOSIS — I10 ESSENTIAL (PRIMARY) HYPERTENSION: ICD-10-CM

## 2024-08-30 DIAGNOSIS — I35.1 NONRHEUMATIC AORTIC (VALVE) INSUFFICIENCY: ICD-10-CM

## 2024-08-30 PROCEDURE — 93000 ELECTROCARDIOGRAM COMPLETE: CPT

## 2024-08-30 PROCEDURE — 99214 OFFICE O/P EST MOD 30 MIN: CPT

## 2024-08-30 PROCEDURE — G2211 COMPLEX E/M VISIT ADD ON: CPT

## 2024-08-30 NOTE — HISTORY OF PRESENT ILLNESS
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  He has no new complaints--feels well; had recent cataract surgery.  He is active--no palpitations or angina.  * This visit was conducted as part of ongoing, longitudinal medical care for patient's chronic medical diagnoses and other issues.

## 2024-08-30 NOTE — DISCUSSION/SUMMARY
[With Me] : with me [___ Month(s)] : in [unfilled] month(s) [FreeTextEntry1] :  Atrial fibrillation:  Permanent; continue metoprolol and Xarelto.  Hypertension: Well-controlled; continue amlodipine.  Aortic valve insufficiency: Moderate in severity; repeat TTE in 6 months to assess LV dimensions and re-evaluate severity of AI.

## 2024-08-30 NOTE — PHYSICAL EXAM
[No Acute Distress] : no acute distress [Normal S1, S2] : normal S1, S2 [Clear Lung Fields] : clear lung fields [Normal Gait] : normal gait [No Edema] : no edema [Alert and Oriented] : alert and oriented [Normal Speech] : normal speech [de-identified] : Appears stated age and well [de-identified] : Irregular

## 2024-08-30 NOTE — REVIEW OF SYSTEMS
[Weight Gain (___ Lbs)] : [unfilled] ~Ulb weight gain [Easy Bruising] : a tendency for easy bruising [SOB] : no shortness of breath [Chest Discomfort] : no chest discomfort [Palpitations] : no palpitations [Syncope] : no syncope

## 2024-08-30 NOTE — CARDIOLOGY SUMMARY
[de-identified] : 8/30/24. Atrial fibrillation [de-identified] : 5/11/2023.  Normal left ventricular size and function with estimated ejection fraction 55 to 60%.  Mild to moderate concentric LVH.  Moderate left atrial enlargement.  Moderate aortic regurgitation.  Mild to moderate mitral regurgitation.  Mild to moderate tricuspid regurgitation.

## 2024-09-03 ENCOUNTER — OUTPATIENT (OUTPATIENT)
Dept: OUTPATIENT SERVICES | Facility: HOSPITAL | Age: 79
LOS: 1 days | End: 2024-09-03
Payer: MEDICARE

## 2024-09-03 ENCOUNTER — APPOINTMENT (OUTPATIENT)
Dept: CT IMAGING | Facility: CLINIC | Age: 79
End: 2024-09-03
Payer: MEDICARE

## 2024-09-03 ENCOUNTER — APPOINTMENT (OUTPATIENT)
Dept: CT IMAGING | Facility: CLINIC | Age: 79
End: 2024-09-03

## 2024-09-03 DIAGNOSIS — Z98.890 OTHER SPECIFIED POSTPROCEDURAL STATES: Chronic | ICD-10-CM

## 2024-09-03 DIAGNOSIS — C64.9 MALIGNANT NEOPLASM OF UNSPECIFIED KIDNEY, EXCEPT RENAL PELVIS: ICD-10-CM

## 2024-09-03 DIAGNOSIS — Z90.5 ACQUIRED ABSENCE OF KIDNEY: Chronic | ICD-10-CM

## 2024-09-03 DIAGNOSIS — Z90.49 ACQUIRED ABSENCE OF OTHER SPECIFIED PARTS OF DIGESTIVE TRACT: Chronic | ICD-10-CM

## 2024-09-03 DIAGNOSIS — Z90.89 ACQUIRED ABSENCE OF OTHER ORGANS: Chronic | ICD-10-CM

## 2024-09-03 PROCEDURE — 71260 CT THORAX DX C+: CPT

## 2024-09-03 PROCEDURE — 74178 CT ABD&PLV WO CNTR FLWD CNTR: CPT

## 2024-09-03 PROCEDURE — 74178 CT ABD&PLV WO CNTR FLWD CNTR: CPT | Mod: 26,MH

## 2024-09-03 PROCEDURE — 71260 CT THORAX DX C+: CPT | Mod: 26,MH

## 2024-09-04 ENCOUNTER — LABORATORY RESULT (OUTPATIENT)
Age: 79
End: 2024-09-04

## 2024-09-09 ENCOUNTER — APPOINTMENT (OUTPATIENT)
Dept: HEMATOLOGY ONCOLOGY | Facility: CLINIC | Age: 79
End: 2024-09-09
Payer: MEDICARE

## 2024-09-09 ENCOUNTER — APPOINTMENT (OUTPATIENT)
Dept: INFUSION THERAPY | Facility: HOSPITAL | Age: 79
End: 2024-09-09

## 2024-09-09 DIAGNOSIS — D75.1 SECONDARY POLYCYTHEMIA: ICD-10-CM

## 2024-09-09 DIAGNOSIS — C78.00 SECONDARY MALIGNANT NEOPLASM OF UNSPECIFIED LUNG: ICD-10-CM

## 2024-09-09 DIAGNOSIS — C64.9 SECONDARY MALIGNANT NEOPLASM OF UNSPECIFIED LUNG: ICD-10-CM

## 2024-09-09 PROCEDURE — 99215 OFFICE O/P EST HI 40 MIN: CPT

## 2024-09-09 NOTE — HISTORY OF PRESENT ILLNESS
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   CASE SYNOPSIS: September 2019-reports gross hematuria, spontaneously resolved.  No other associated  symptoms.  11/1/2019: CT A/P -9.7 x 10 x 7.5 cm partially heterogeneous mass extending off the lower pole of the right kidney with invasion of the lower pole renal collecting system, highly concerning for renal cell carcinoma.  No gross urothelial lesion.  Few scattered pulmonary nodules measuring up to 4 mm, nonspecific, possibly representing postinflammatory benign nodules, but cannot exclude metastasis.  Further evaluation with CT scan of the chest recommended.  1.2 x 1.5 cm low-attenuation lesion of the spleen representing hemangioma.  11/8/2019: CT chest-tree in bud opacities noted within the right lung likely representing mucoid impacted distal small airways.  12/5/2019: Radical nephrectomy (right kidney and proximal ureter) performed at [facility] (); surgical pathology: clear cell renal carcinoma, 10 cm, with extensive necrosis, grade 4 with focal rhabdoid features, no definitive LVI, vascular and ureteral resection margins free of tumor, perinephric adipose tissue negative for malignancy.  Renal parenchyma showed mild nephrosclerosis and patchy interstitial chronic inflammation (pT2, pN0).  3/23/2020: CT A/P-interval right nephrectomy.  No evidence of locally recurrent or metastatic disease.  4/9/2021: CT C/A/P-5 mm RUL pulmonary nodule may reflect metastasis and is new since prior chest CT of 11/8/2019.  Close follow-up recommended.  No evidence for residual or recurrent tumor in the right nephrectomy bed.  8/13/2021: CT chest-new right upper lobe 8 mm peripheral nodule indeterminate; previously measuring 5 mm.  Interval growth suggest metastatic disease.  9/8/2021:referred to Dr. Schmitt (thoracic surgery) for evaluation.  10/29/2021: Right upper lobe VATS/ wedge resection ([facility], Dr. Schmitt) -metastatic clear-cell renal cell carcinoma, R4 lymph node excision negative for malignancy, level 7 lymph node excision negative for malignancy, acid-fast bacilli and fungi.  Both lymph nodes with noncaseating granulomatous inflammation.  11/19/21- cycle # 1 Pembrolizumab  11/30/21- discontinues Multaq (antiarrhythmic) due to interactions with axitinib  12/4/21- started axitinb  1/15/2022-discontinued axitinib temporarily due to mucosal sensitivity  2/9/22: Develops macular rash on both forearms; TSH 90.7.   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  6/11/2022 CT C/A/P-no residual or recurrent disease in the nephrectomy bed.  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  8/12/22 -resumed Pembrolizumab only; axitinib on hold.  9/29/2022 CT C/A/P: 6 status post right nephrectomy with stable exam as compared to 6/11/2022 11/21/22- Pembrolizumab last dose before d/c  5/9 - 5/11/2023-hospitalized at [facility] with left facial droop associated with difficulty speaking; s/p mechanical fall.  MRI head consistent with an acute 2 cm infarct extending from right ganglial-capsular region into ipsilateral corona radiata.  No evidence of hemorrhagic conversion.  Chronic lacunar infarcts left corona radiata and basal ganglia also noted. Started on Aspirin.  TTE-preserved ejection fraction, mild-moderate valvular disease.   6/10/2023-presents to ED at [place] with hematuria; anticoagulation continued, but aspirin d/c.  CT A/P-allowing for the absence of IV contrast, no significant changes outside of right nephrectomy.   11/20/2023- patient passed out while urinating; in ED at [place].  4/12/2023 CT C/A/P -status post right nephrectomy. Stable examination compared with prior imaging 9/29/2022.  5/10/2023 MRI brain- IMPRESSION: 1.  Approximate 2 cm acute infarct extending from the right ganglio-capsular region into the ipsilateral corona radiata.  No evidence of hemorrhagic conversion. 2.  Chronic lacunar infarcts left corona radiata and basal ganglia as well as findings most concordant with chronic microvascular ischemic changes  4/5/2024 CT C/A/P-IMPRESSION: Interval enlargement of a 6 mm right upper lobe pulmonary nodule, concerning for metastasis. L1 vertebral compression fracture deformity, new from 6/10/2023.  9/3/2024- CT C/A/P -IMPRESSION: Enlarging right upper lobe nodule highly concerning for neoplasm.  Additional smaller scattered pulmonary nodules are unchanged. Right nephrectomy with postsurgical changes.  [FreeTextEntry1] : expectant surveillance  [de-identified] : Here for follow up  after 4 month since last encounter.  Reports no interval development of new symptoms.  Continues to be physically active ; no changes in appetite or weight.  Minimally symptomatic, patient denies B symptoms. A restaging CT C/A/P was done September 3, 2024 which showed interval enlargement of right upper lobe nodule from 0.6 to 1 cm, concerning for neoplasm with smaller, scattered but stable pulmonary nodules unchanged when compared to the study from April 2024. Hematologic profile consistent with a hemoglobin of 17.8 g/dL. Mr. TAM  did not have a therapeutic phlebotomy since March 2024.  Underwent bilateral cataract surgery 3 months ago.  Medication list reviewed.

## 2024-09-09 NOTE — HISTORY OF PRESENT ILLNESS
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   CASE SYNOPSIS: September 2019-reports gross hematuria, spontaneously resolved.  No other associated  symptoms.  11/1/2019: CT A/P -9.7 x 10 x 7.5 cm partially heterogeneous mass extending off the lower pole of the right kidney with invasion of the lower pole renal collecting system, highly concerning for renal cell carcinoma.  No gross urothelial lesion.  Few scattered pulmonary nodules measuring up to 4 mm, nonspecific, possibly representing postinflammatory benign nodules, but cannot exclude metastasis.  Further evaluation with CT scan of the chest recommended.  1.2 x 1.5 cm low-attenuation lesion of the spleen representing hemangioma.  11/8/2019: CT chest-tree in bud opacities noted within the right lung likely representing mucoid impacted distal small airways.  12/5/2019: Radical nephrectomy (right kidney and proximal ureter) performed at [facility] (); surgical pathology: clear cell renal carcinoma, 10 cm, with extensive necrosis, grade 4 with focal rhabdoid features, no definitive LVI, vascular and ureteral resection margins free of tumor, perinephric adipose tissue negative for malignancy.  Renal parenchyma showed mild nephrosclerosis and patchy interstitial chronic inflammation (pT2, pN0).  3/23/2020: CT A/P-interval right nephrectomy.  No evidence of locally recurrent or metastatic disease.  4/9/2021: CT C/A/P-5 mm RUL pulmonary nodule may reflect metastasis and is new since prior chest CT of 11/8/2019.  Close follow-up recommended.  No evidence for residual or recurrent tumor in the right nephrectomy bed.  8/13/2021: CT chest-new right upper lobe 8 mm peripheral nodule indeterminate; previously measuring 5 mm.  Interval growth suggest metastatic disease.  9/8/2021:referred to Dr. Schmitt (thoracic surgery) for evaluation.  10/29/2021: Right upper lobe VATS/ wedge resection ([facility], Dr. Schmitt) -metastatic clear-cell renal cell carcinoma, R4 lymph node excision negative for malignancy, level 7 lymph node excision negative for malignancy, acid-fast bacilli and fungi.  Both lymph nodes with noncaseating granulomatous inflammation.  11/19/21- cycle # 1 Pembrolizumab  11/30/21- discontinues Multaq (antiarrhythmic) due to interactions with axitinib  12/4/21- started axitinb  1/15/2022-discontinued axitinib temporarily due to mucosal sensitivity  2/9/22: Develops macular rash on both forearms; TSH 90.7.   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  6/11/2022 CT C/A/P-no residual or recurrent disease in the nephrectomy bed.  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  8/12/22 -resumed Pembrolizumab only; axitinib on hold.  9/29/2022 CT C/A/P: 6 status post right nephrectomy with stable exam as compared to 6/11/2022 11/21/22- Pembrolizumab last dose before d/c  5/9 - 5/11/2023-hospitalized at [facility] with left facial droop associated with difficulty speaking; s/p mechanical fall.  MRI head consistent with an acute 2 cm infarct extending from right ganglial-capsular region into ipsilateral corona radiata.  No evidence of hemorrhagic conversion.  Chronic lacunar infarcts left corona radiata and basal ganglia also noted. Started on Aspirin.  TTE-preserved ejection fraction, mild-moderate valvular disease.   6/10/2023-presents to ED at [place] with hematuria; anticoagulation continued, but aspirin d/c.  CT A/P-allowing for the absence of IV contrast, no significant changes outside of right nephrectomy.   11/20/2023- patient passed out while urinating; in ED at [place].  4/12/2023 CT C/A/P -status post right nephrectomy. Stable examination compared with prior imaging 9/29/2022.  5/10/2023 MRI brain- IMPRESSION: 1.  Approximate 2 cm acute infarct extending from the right ganglio-capsular region into the ipsilateral corona radiata.  No evidence of hemorrhagic conversion. 2.  Chronic lacunar infarcts left corona radiata and basal ganglia as well as findings most concordant with chronic microvascular ischemic changes  4/5/2024 CT C/A/P-IMPRESSION: Interval enlargement of a 6 mm right upper lobe pulmonary nodule, concerning for metastasis. L1 vertebral compression fracture deformity, new from 6/10/2023.  9/3/2024- CT C/A/P -IMPRESSION: Enlarging right upper lobe nodule highly concerning for neoplasm.  Additional smaller scattered pulmonary nodules are unchanged. Right nephrectomy with postsurgical changes.  [FreeTextEntry1] : expectant surveillance  [de-identified] : Here for follow up  after 4 month since last encounter.  Reports no interval development of new symptoms.  Continues to be physically active ; no changes in appetite or weight.  Minimally symptomatic, patient denies B symptoms. A restaging CT C/A/P was done September 3, 2024 which showed interval enlargement of right upper lobe nodule from 0.6 to 1 cm, concerning for neoplasm with smaller, scattered but stable pulmonary nodules unchanged when compared to the study from April 2024. Hematologic profile consistent with a hemoglobin of 17.8 g/dL. Mr. TAM  did not have a therapeutic phlebotomy since March 2024.  Underwent bilateral cataract surgery 3 months ago.  Medication list reviewed.

## 2024-09-09 NOTE — REVIEW OF SYSTEMS
[Shortness Of Breath] : shortness of breath [SOB on Exertion] : shortness of breath during exertion [Skin Rash] : skin rash [Negative] : Neurological [Fatigue] : no fatigue [Recent Change In Weight] : ~T no recent weight change [Hoarseness] : no hoarseness [FreeTextEntry2] : gained 13 lbs since last visit [FreeTextEntry4] : hoarseness resolved [FreeTextEntry6] : s/p RUL VATS/wedge resection for lung metastases [FreeTextEntry8] :  right clear-cell kidney cancer, s/p right nephrectomy [de-identified] : upper extremities, triggered by high temperatures

## 2024-09-09 NOTE — REVIEW OF SYSTEMS
[Shortness Of Breath] : shortness of breath [SOB on Exertion] : shortness of breath during exertion [Skin Rash] : skin rash [Negative] : Neurological [Fatigue] : no fatigue [Recent Change In Weight] : ~T no recent weight change [Hoarseness] : no hoarseness [FreeTextEntry2] : gained 13 lbs since last visit [FreeTextEntry4] : hoarseness resolved [FreeTextEntry6] : s/p RUL VATS/wedge resection for lung metastases [FreeTextEntry8] :  right clear-cell kidney cancer, s/p right nephrectomy [de-identified] : upper extremities, triggered by high temperatures

## 2024-09-09 NOTE — PHYSICAL EXAM
[Restricted in physically strenuous activity but ambulatory and able to carry out work of a light or sedentary nature] : Status 1- Restricted in physically strenuous activity but ambulatory and able to carry out work of a light or sedentary nature, e.g., light house work, office work [Thin] : thin [Normal] : full range of motion and no deformities appreciated [de-identified] : scars s/p VATS right chest wall - healed [de-identified] : no residual hematuria [de-identified] : Dry, no rash

## 2024-09-09 NOTE — PHYSICAL EXAM
[Restricted in physically strenuous activity but ambulatory and able to carry out work of a light or sedentary nature] : Status 1- Restricted in physically strenuous activity but ambulatory and able to carry out work of a light or sedentary nature, e.g., light house work, office work [Thin] : thin [Normal] : full range of motion and no deformities appreciated [de-identified] : scars s/p VATS right chest wall - healed [de-identified] : no residual hematuria [de-identified] : Dry, no rash

## 2024-09-20 ENCOUNTER — APPOINTMENT (OUTPATIENT)
Dept: INFUSION THERAPY | Facility: HOSPITAL | Age: 79
End: 2024-09-20

## 2024-09-20 ENCOUNTER — APPOINTMENT (OUTPATIENT)
Dept: HEMATOLOGY ONCOLOGY | Facility: CLINIC | Age: 79
End: 2024-09-20

## 2024-09-23 ENCOUNTER — LABORATORY RESULT (OUTPATIENT)
Age: 79
End: 2024-09-23

## 2024-09-25 ENCOUNTER — APPOINTMENT (OUTPATIENT)
Dept: INFUSION THERAPY | Facility: HOSPITAL | Age: 79
End: 2024-09-25

## 2024-09-25 ENCOUNTER — APPOINTMENT (OUTPATIENT)
Dept: HEMATOLOGY ONCOLOGY | Facility: CLINIC | Age: 79
End: 2024-09-25

## 2024-10-07 ENCOUNTER — LABORATORY RESULT (OUTPATIENT)
Age: 79
End: 2024-10-07

## 2024-10-10 ENCOUNTER — APPOINTMENT (OUTPATIENT)
Dept: INFUSION THERAPY | Facility: HOSPITAL | Age: 79
End: 2024-10-10

## 2024-10-10 ENCOUNTER — APPOINTMENT (OUTPATIENT)
Dept: HEMATOLOGY ONCOLOGY | Facility: CLINIC | Age: 79
End: 2024-10-10

## 2024-11-12 NOTE — PATIENT PROFILE ADULT - .
Detail Level: Detailed Quality 47: Advance Care Plan: Advance Care Planning discussed and documented; advance care plan or surrogate decision maker documented in the medical record. Quality 226: Preventive Care And Screening: Tobacco Use: Screening And Cessation Intervention: Patient screened for tobacco use and is an ex/non-smoker 29-Oct-2021 09:39:47

## 2024-11-25 LAB
ALBUMIN SERPL ELPH-MCNC: 4.4 G/DL
ALP BLD-CCNC: 101 U/L
ALT SERPL-CCNC: 19 U/L
ANION GAP SERPL CALC-SCNC: 13 MMOL/L
AST SERPL-CCNC: 26 U/L
BASOPHILS # BLD AUTO: 0.04 K/UL
BASOPHILS NFR BLD AUTO: 0.7 %
BILIRUB SERPL-MCNC: 2.2 MG/DL
BUN SERPL-MCNC: 16 MG/DL
CALCIUM SERPL-MCNC: 9.7 MG/DL
CHLORIDE SERPL-SCNC: 103 MMOL/L
CHOLEST SERPL-MCNC: 106 MG/DL
CO2 SERPL-SCNC: 25 MMOL/L
CREAT SERPL-MCNC: 1.35 MG/DL
EGFR: 53 ML/MIN/1.73M2
EOSINOPHIL # BLD AUTO: 0.18 K/UL
EOSINOPHIL NFR BLD AUTO: 3.3 %
GLUCOSE SERPL-MCNC: 138 MG/DL
HCT VFR BLD CALC: 51.2 %
HDLC SERPL-MCNC: 45 MG/DL
HGB BLD-MCNC: 15.6 G/DL
IMM GRANULOCYTES NFR BLD AUTO: 0.2 %
IRON SERPL-MCNC: 39 UG/DL
LDLC SERPL CALC-MCNC: 41 MG/DL
LYMPHOCYTES # BLD AUTO: 1.12 K/UL
LYMPHOCYTES NFR BLD AUTO: 20.5 %
MAN DIFF?: NORMAL
MCHC RBC-ENTMCNC: 27.9 PG
MCHC RBC-ENTMCNC: 30.5 G/DL
MCV RBC AUTO: 91.6 FL
MONOCYTES # BLD AUTO: 0.5 K/UL
MONOCYTES NFR BLD AUTO: 9.1 %
NEUTROPHILS # BLD AUTO: 3.62 K/UL
NEUTROPHILS NFR BLD AUTO: 66.2 %
NONHDLC SERPL-MCNC: 61 MG/DL
PLATELET # BLD AUTO: 165 K/UL
POTASSIUM SERPL-SCNC: 4.3 MMOL/L
PROT SERPL-MCNC: 6.9 G/DL
PSA SERPL-MCNC: 2.85 NG/ML
RBC # BLD: 5.59 M/UL
RBC # FLD: 13.5 %
SODIUM SERPL-SCNC: 142 MMOL/L
TRIGL SERPL-MCNC: 110 MG/DL
TSH SERPL-ACNC: 1.82 UIU/ML
WBC # FLD AUTO: 5.47 K/UL

## 2024-11-27 ENCOUNTER — APPOINTMENT (OUTPATIENT)
Dept: INTERNAL MEDICINE | Facility: CLINIC | Age: 79
End: 2024-11-27
Payer: MEDICARE

## 2024-11-27 VITALS
SYSTOLIC BLOOD PRESSURE: 124 MMHG | WEIGHT: 186 LBS | OXYGEN SATURATION: 100 % | HEART RATE: 50 BPM | HEIGHT: 70 IN | BODY MASS INDEX: 26.63 KG/M2 | DIASTOLIC BLOOD PRESSURE: 80 MMHG | TEMPERATURE: 98 F

## 2024-11-27 DIAGNOSIS — Z87.39 PERSONAL HISTORY OF OTHER DISEASES OF THE MUSCULOSKELETAL SYSTEM AND CONNECTIVE TISSUE: ICD-10-CM

## 2024-11-27 DIAGNOSIS — I10 ESSENTIAL (PRIMARY) HYPERTENSION: ICD-10-CM

## 2024-11-27 DIAGNOSIS — R79.89 OTHER SPECIFIED ABNORMAL FINDINGS OF BLOOD CHEMISTRY: ICD-10-CM

## 2024-11-27 DIAGNOSIS — S80.02XA CONTUSION OF LEFT KNEE, INITIAL ENCOUNTER: ICD-10-CM

## 2024-11-27 DIAGNOSIS — S39.012A STRAIN OF MUSCLE, FASCIA AND TENDON OF LOWER BACK, INITIAL ENCOUNTER: ICD-10-CM

## 2024-11-27 DIAGNOSIS — E03.9 HYPOTHYROIDISM, UNSPECIFIED: ICD-10-CM

## 2024-11-27 DIAGNOSIS — I63.50 CEREBRAL INFARCTION DUE TO UNSPECIFIED OCCLUSION OR STENOSIS OF UNSPECIFIED CEREBRAL ARTERY: ICD-10-CM

## 2024-11-27 DIAGNOSIS — Z87.898 PERSONAL HISTORY OF OTHER SPECIFIED CONDITIONS: ICD-10-CM

## 2024-11-27 DIAGNOSIS — I48.91 UNSPECIFIED ATRIAL FIBRILLATION: ICD-10-CM

## 2024-11-27 DIAGNOSIS — I67.2 CEREBRAL ATHEROSCLEROSIS: ICD-10-CM

## 2024-11-27 DIAGNOSIS — I44.2 ATRIOVENTRICULAR BLOCK, COMPLETE: ICD-10-CM

## 2024-11-27 DIAGNOSIS — S70.01XA CONTUSION OF RIGHT HIP, INITIAL ENCOUNTER: ICD-10-CM

## 2024-11-27 DIAGNOSIS — M70.51 OTHER BURSITIS OF KNEE, RIGHT KNEE: ICD-10-CM

## 2024-11-27 DIAGNOSIS — G56.22 LESION OF ULNAR NERVE, LEFT UPPER LIMB: ICD-10-CM

## 2024-11-27 DIAGNOSIS — Z01.812 ENCOUNTER FOR PREPROCEDURAL LABORATORY EXAMINATION: ICD-10-CM

## 2024-11-27 DIAGNOSIS — Z87.2 PERSONAL HISTORY OF DISEASES OF THE SKIN AND SUBCUTANEOUS TISSUE: ICD-10-CM

## 2024-11-27 DIAGNOSIS — Z87.19 PERSONAL HISTORY OF OTHER DISEASES OF THE DIGESTIVE SYSTEM: ICD-10-CM

## 2024-11-27 DIAGNOSIS — D86.9 SARCOIDOSIS, UNSPECIFIED: ICD-10-CM

## 2024-11-27 DIAGNOSIS — M70.62 TROCHANTERIC BURSITIS, LEFT HIP: ICD-10-CM

## 2024-11-27 DIAGNOSIS — T14.8XXA OTHER INJURY OF UNSPECIFIED BODY REGION, INITIAL ENCOUNTER: ICD-10-CM

## 2024-11-27 DIAGNOSIS — I49.3 VENTRICULAR PREMATURE DEPOLARIZATION: ICD-10-CM

## 2024-11-27 DIAGNOSIS — E78.5 HYPERLIPIDEMIA, UNSPECIFIED: ICD-10-CM

## 2024-11-27 DIAGNOSIS — Z01.818 ENCOUNTER FOR OTHER PREPROCEDURAL EXAMINATION: ICD-10-CM

## 2024-11-27 DIAGNOSIS — M17.12 UNILATERAL PRIMARY OSTEOARTHRITIS, LEFT KNEE: ICD-10-CM

## 2024-11-27 DIAGNOSIS — Z23 ENCOUNTER FOR IMMUNIZATION: ICD-10-CM

## 2024-11-27 DIAGNOSIS — R20.0 ANESTHESIA OF SKIN: ICD-10-CM

## 2024-11-27 DIAGNOSIS — R21 RASH AND OTHER NONSPECIFIC SKIN ERUPTION: ICD-10-CM

## 2024-11-27 DIAGNOSIS — J37.0 CHRONIC LARYNGITIS: ICD-10-CM

## 2024-11-27 DIAGNOSIS — M25.532 PAIN IN RIGHT WRIST: ICD-10-CM

## 2024-11-27 DIAGNOSIS — R13.12 DYSPHAGIA, OROPHARYNGEAL PHASE: ICD-10-CM

## 2024-11-27 DIAGNOSIS — D75.1 SECONDARY POLYCYTHEMIA: ICD-10-CM

## 2024-11-27 DIAGNOSIS — I48.0 PAROXYSMAL ATRIAL FIBRILLATION: ICD-10-CM

## 2024-11-27 DIAGNOSIS — R20.2 ANESTHESIA OF SKIN: ICD-10-CM

## 2024-11-27 DIAGNOSIS — B37.89 OTHER SITES OF CANDIDIASIS: ICD-10-CM

## 2024-11-27 DIAGNOSIS — N18.9 CHRONIC KIDNEY DISEASE, UNSPECIFIED: ICD-10-CM

## 2024-11-27 DIAGNOSIS — R71.8 OTHER ABNORMALITY OF RED BLOOD CELLS: ICD-10-CM

## 2024-11-27 DIAGNOSIS — Z95.818 PRESENCE OF OTHER CARDIAC IMPLANTS AND GRAFTS: ICD-10-CM

## 2024-11-27 DIAGNOSIS — Z12.5 ENCOUNTER FOR SCREENING FOR MALIGNANT NEOPLASM OF PROSTATE: ICD-10-CM

## 2024-11-27 DIAGNOSIS — Z11.52 ENCOUNTER FOR PREPROCEDURAL LABORATORY EXAMINATION: ICD-10-CM

## 2024-11-27 DIAGNOSIS — M25.531 PAIN IN RIGHT WRIST: ICD-10-CM

## 2024-11-27 PROCEDURE — 90677 PCV20 VACCINE IM: CPT

## 2024-11-27 PROCEDURE — 99215 OFFICE O/P EST HI 40 MIN: CPT

## 2024-11-27 PROCEDURE — G2211 COMPLEX E/M VISIT ADD ON: CPT

## 2024-11-27 PROCEDURE — G0009: CPT

## 2024-11-29 LAB
APPEARANCE: CLEAR
BACTERIA: NEGATIVE /HPF
BILIRUBIN URINE: NEGATIVE
BLOOD URINE: NEGATIVE
CAST: 0 /LPF
COLOR: NORMAL
EPITHELIAL CELLS: 0 /HPF
ESTIMATED AVERAGE GLUCOSE: 114 MG/DL
GLUCOSE QUALITATIVE U: NEGATIVE MG/DL
HBA1C MFR BLD HPLC: 5.6 %
KETONES URINE: NEGATIVE MG/DL
LEUKOCYTE ESTERASE URINE: NEGATIVE
MICROSCOPIC-UA: NORMAL
NITRITE URINE: NEGATIVE
PH URINE: 7
PROTEIN URINE: 100 MG/DL
RED BLOOD CELLS URINE: 3 /HPF
SPECIFIC GRAVITY URINE: 1.02
UROBILINOGEN URINE: 0.2 MG/DL
WHITE BLOOD CELLS URINE: 0 /HPF

## 2024-12-02 ENCOUNTER — RX RENEWAL (OUTPATIENT)
Age: 79
End: 2024-12-02

## 2024-12-09 ENCOUNTER — OUTPATIENT (OUTPATIENT)
Dept: OUTPATIENT SERVICES | Facility: HOSPITAL | Age: 79
LOS: 1 days | End: 2024-12-09

## 2024-12-09 DIAGNOSIS — Z90.89 ACQUIRED ABSENCE OF OTHER ORGANS: Chronic | ICD-10-CM

## 2024-12-09 DIAGNOSIS — Z98.890 OTHER SPECIFIED POSTPROCEDURAL STATES: Chronic | ICD-10-CM

## 2024-12-09 DIAGNOSIS — Z90.49 ACQUIRED ABSENCE OF OTHER SPECIFIED PARTS OF DIGESTIVE TRACT: Chronic | ICD-10-CM

## 2024-12-09 DIAGNOSIS — Z90.5 ACQUIRED ABSENCE OF KIDNEY: Chronic | ICD-10-CM

## 2024-12-09 DIAGNOSIS — G47.33 OBSTRUCTIVE SLEEP APNEA (ADULT) (PEDIATRIC): ICD-10-CM

## 2024-12-09 PROCEDURE — G0400: CPT | Mod: 26

## 2024-12-09 PROCEDURE — 95800 SLP STDY UNATTENDED: CPT

## 2024-12-16 ENCOUNTER — APPOINTMENT (OUTPATIENT)
Dept: CT IMAGING | Facility: CLINIC | Age: 79
End: 2024-12-16
Payer: MEDICARE

## 2024-12-16 ENCOUNTER — NON-APPOINTMENT (OUTPATIENT)
Age: 79
End: 2024-12-16

## 2024-12-16 ENCOUNTER — OUTPATIENT (OUTPATIENT)
Dept: OUTPATIENT SERVICES | Facility: HOSPITAL | Age: 79
LOS: 1 days | End: 2024-12-16
Payer: MEDICARE

## 2024-12-16 DIAGNOSIS — Z90.49 ACQUIRED ABSENCE OF OTHER SPECIFIED PARTS OF DIGESTIVE TRACT: Chronic | ICD-10-CM

## 2024-12-16 DIAGNOSIS — Z98.890 OTHER SPECIFIED POSTPROCEDURAL STATES: Chronic | ICD-10-CM

## 2024-12-16 DIAGNOSIS — Z90.5 ACQUIRED ABSENCE OF KIDNEY: Chronic | ICD-10-CM

## 2024-12-16 DIAGNOSIS — Z90.89 ACQUIRED ABSENCE OF OTHER ORGANS: Chronic | ICD-10-CM

## 2024-12-16 DIAGNOSIS — C78.00 SECONDARY MALIGNANT NEOPLASM OF UNSPECIFIED LUNG: ICD-10-CM

## 2024-12-16 PROCEDURE — 71260 CT THORAX DX C+: CPT | Mod: 26,MH

## 2024-12-16 PROCEDURE — 71260 CT THORAX DX C+: CPT

## 2025-01-09 ENCOUNTER — OUTPATIENT (OUTPATIENT)
Dept: OUTPATIENT SERVICES | Facility: HOSPITAL | Age: 80
LOS: 1 days | Discharge: HOME | End: 2025-01-09

## 2025-01-09 DIAGNOSIS — C64.9 MALIGNANT NEOPLASM OF UNSPECIFIED KIDNEY, EXCEPT RENAL PELVIS: ICD-10-CM

## 2025-01-09 DIAGNOSIS — D75.1 SECONDARY POLYCYTHEMIA: ICD-10-CM

## 2025-01-09 DIAGNOSIS — Z90.49 ACQUIRED ABSENCE OF OTHER SPECIFIED PARTS OF DIGESTIVE TRACT: Chronic | ICD-10-CM

## 2025-01-09 DIAGNOSIS — Z90.5 ACQUIRED ABSENCE OF KIDNEY: Chronic | ICD-10-CM

## 2025-01-09 DIAGNOSIS — Z90.89 ACQUIRED ABSENCE OF OTHER ORGANS: Chronic | ICD-10-CM

## 2025-01-09 DIAGNOSIS — Z98.890 OTHER SPECIFIED POSTPROCEDURAL STATES: Chronic | ICD-10-CM

## 2025-01-10 ENCOUNTER — APPOINTMENT (OUTPATIENT)
Dept: HEMATOLOGY ONCOLOGY | Facility: CLINIC | Age: 80
End: 2025-01-10
Payer: MEDICARE

## 2025-01-10 ENCOUNTER — RESULT REVIEW (OUTPATIENT)
Age: 80
End: 2025-01-10

## 2025-01-10 ENCOUNTER — APPOINTMENT (OUTPATIENT)
Dept: THORACIC SURGERY | Facility: CLINIC | Age: 80
End: 2025-01-10
Payer: MEDICARE

## 2025-01-10 ENCOUNTER — NON-APPOINTMENT (OUTPATIENT)
Age: 80
End: 2025-01-10

## 2025-01-10 VITALS
OXYGEN SATURATION: 99 % | SYSTOLIC BLOOD PRESSURE: 136 MMHG | WEIGHT: 187 LBS | BODY MASS INDEX: 26.77 KG/M2 | RESPIRATION RATE: 16 BRPM | HEIGHT: 70 IN | DIASTOLIC BLOOD PRESSURE: 78 MMHG

## 2025-01-10 VITALS
BODY MASS INDEX: 26.91 KG/M2 | TEMPERATURE: 98.1 F | DIASTOLIC BLOOD PRESSURE: 78 MMHG | SYSTOLIC BLOOD PRESSURE: 130 MMHG | RESPIRATION RATE: 16 BRPM | WEIGHT: 187.99 LBS | HEART RATE: 76 BPM | HEIGHT: 70 IN

## 2025-01-10 DIAGNOSIS — C64.9 SECONDARY MALIGNANT NEOPLASM OF UNSPECIFIED LUNG: ICD-10-CM

## 2025-01-10 DIAGNOSIS — C78.00 SECONDARY MALIGNANT NEOPLASM OF UNSPECIFIED LUNG: ICD-10-CM

## 2025-01-10 DIAGNOSIS — D75.1 SECONDARY POLYCYTHEMIA: ICD-10-CM

## 2025-01-10 DIAGNOSIS — R91.8 OTHER NONSPECIFIC ABNORMAL FINDING OF LUNG FIELD: ICD-10-CM

## 2025-01-10 LAB
BASOPHILS # BLD AUTO: 0.05 K/UL — SIGNIFICANT CHANGE UP (ref 0–0.2)
BASOPHILS NFR BLD AUTO: 0.8 % — SIGNIFICANT CHANGE UP (ref 0–2)
EOSINOPHIL # BLD AUTO: 0.14 K/UL — SIGNIFICANT CHANGE UP (ref 0–0.5)
EOSINOPHIL NFR BLD AUTO: 2.4 % — SIGNIFICANT CHANGE UP (ref 0–6)
HCT VFR BLD CALC: 50.4 % — HIGH (ref 39–50)
HGB BLD-MCNC: 15.6 G/DL — SIGNIFICANT CHANGE UP (ref 13–17)
IMM GRANULOCYTES NFR BLD AUTO: 0.5 % — SIGNIFICANT CHANGE UP (ref 0–0.9)
LYMPHOCYTES # BLD AUTO: 0.99 K/UL — LOW (ref 1–3.3)
LYMPHOCYTES # BLD AUTO: 16.8 % — SIGNIFICANT CHANGE UP (ref 13–44)
MCHC RBC-ENTMCNC: 26.7 PG — LOW (ref 27–34)
MCHC RBC-ENTMCNC: 31 G/DL — LOW (ref 32–36)
MCV RBC AUTO: 86.2 FL — SIGNIFICANT CHANGE UP (ref 80–100)
MONOCYTES # BLD AUTO: 0.75 K/UL — SIGNIFICANT CHANGE UP (ref 0–0.9)
MONOCYTES NFR BLD AUTO: 12.7 % — SIGNIFICANT CHANGE UP (ref 2–14)
NEUTROPHILS # BLD AUTO: 3.93 K/UL — SIGNIFICANT CHANGE UP (ref 1.8–7.4)
NEUTROPHILS NFR BLD AUTO: 66.8 % — SIGNIFICANT CHANGE UP (ref 43–77)
NRBC # BLD: 0 /100 WBCS — SIGNIFICANT CHANGE UP (ref 0–0)
NRBC BLD-RTO: 0 /100 WBCS — SIGNIFICANT CHANGE UP (ref 0–0)
PLATELET # BLD AUTO: 147 K/UL — LOW (ref 150–400)
RBC # BLD: 5.85 M/UL — HIGH (ref 4.2–5.8)
RBC # FLD: 15 % — HIGH (ref 10.3–14.5)
WBC # BLD: 5.89 K/UL — SIGNIFICANT CHANGE UP (ref 3.8–10.5)
WBC # FLD AUTO: 5.89 K/UL — SIGNIFICANT CHANGE UP (ref 3.8–10.5)

## 2025-01-10 PROCEDURE — 99214 OFFICE O/P EST MOD 30 MIN: CPT

## 2025-01-10 PROCEDURE — 99215 OFFICE O/P EST HI 40 MIN: CPT

## 2025-01-10 RX ORDER — AMLODIPINE BESYLATE 2.5 MG/1
2.5 TABLET ORAL
Refills: 0 | Status: ACTIVE | COMMUNITY

## 2025-01-12 LAB
ALBUMIN SERPL ELPH-MCNC: 4.4 G/DL
ALP BLD-CCNC: 100 U/L
ALT SERPL-CCNC: 19 U/L
ANION GAP SERPL CALC-SCNC: 11 MMOL/L
AST SERPL-CCNC: 22 U/L
BILIRUB SERPL-MCNC: 2.5 MG/DL
BUN SERPL-MCNC: 20 MG/DL
CALCIUM SERPL-MCNC: 9.6 MG/DL
CHLORIDE SERPL-SCNC: 102 MMOL/L
CO2 SERPL-SCNC: 26 MMOL/L
CREAT SERPL-MCNC: 1.46 MG/DL
EGFR: 48 ML/MIN/1.73M2
FERRITIN SERPL-MCNC: 27 NG/ML
GLUCOSE SERPL-MCNC: 89 MG/DL
POTASSIUM SERPL-SCNC: 5.1 MMOL/L
PROT SERPL-MCNC: 6.9 G/DL
SODIUM SERPL-SCNC: 140 MMOL/L

## 2025-01-16 ENCOUNTER — EMERGENCY (EMERGENCY)
Facility: HOSPITAL | Age: 80
LOS: 0 days | Discharge: HOME | End: 2025-01-16
Attending: STUDENT IN AN ORGANIZED HEALTH CARE EDUCATION/TRAINING PROGRAM
Payer: MEDICARE

## 2025-01-16 VITALS — WEIGHT: 188.94 LBS | HEIGHT: 70 IN

## 2025-01-16 VITALS
DIASTOLIC BLOOD PRESSURE: 74 MMHG | SYSTOLIC BLOOD PRESSURE: 145 MMHG | HEART RATE: 94 BPM | OXYGEN SATURATION: 100 % | TEMPERATURE: 98 F | RESPIRATION RATE: 18 BRPM

## 2025-01-16 DIAGNOSIS — Z90.49 ACQUIRED ABSENCE OF OTHER SPECIFIED PARTS OF DIGESTIVE TRACT: Chronic | ICD-10-CM

## 2025-01-16 DIAGNOSIS — R51.9 HEADACHE, UNSPECIFIED: ICD-10-CM

## 2025-01-16 DIAGNOSIS — Z98.890 OTHER SPECIFIED POSTPROCEDURAL STATES: Chronic | ICD-10-CM

## 2025-01-16 DIAGNOSIS — Z90.5 ACQUIRED ABSENCE OF KIDNEY: Chronic | ICD-10-CM

## 2025-01-16 DIAGNOSIS — Z90.89 ACQUIRED ABSENCE OF OTHER ORGANS: Chronic | ICD-10-CM

## 2025-01-16 PROCEDURE — 70450 CT HEAD/BRAIN W/O DYE: CPT | Mod: MC

## 2025-01-16 PROCEDURE — 70450 CT HEAD/BRAIN W/O DYE: CPT | Mod: 26

## 2025-01-16 PROCEDURE — 99284 EMERGENCY DEPT VISIT MOD MDM: CPT

## 2025-01-16 PROCEDURE — 99284 EMERGENCY DEPT VISIT MOD MDM: CPT | Mod: 25

## 2025-01-16 NOTE — ED PROVIDER NOTE - OBJECTIVE STATEMENT
Patient is a 78 yo male with zapping to left side of brain; lasting for seconds; goes away and comes back; no trauma or injury; no chest pain; no sob; no abdominal pain; no difficulty with speech; no blurred vision or double vision; had similar symptoms but would go away; took asa yesterday with no improvement.

## 2025-01-16 NOTE — ED PROVIDER NOTE - CLINICAL SUMMARY MEDICAL DECISION MAKING FREE TEXT BOX
78 yo male with left sided head zapping x days; intermittent; comes and goes; no trauma or injury; CT head; re-eval closely.     Frank DO: CT with no hemorrhage; neuro exam non focal; instructed to f/u with pmd, neurology in 1-2 days without fail; strict return precautions given. Patient aware of CT findings and all results- copy given.

## 2025-01-16 NOTE — ED PROVIDER NOTE - HEME LYMPH, MLM
"I have reviewed the surgical (or preoperative) H&P that is linked to this encounter, and examined the patient. There are no significant changes    Clinical Conditions Present on Arrival:  Clinically Significant Risk Factors Present on Admission                   # Overweight: Estimated body mass index is 25.51 kg/m  as calculated from the following:    Height as of this encounter: 1.6 m (5' 3\").    Weight as of this encounter: 65.3 kg (144 lb).       "
"I have reviewed the surgical (or preoperative) H&P that is linked to this encounter, and examined the patient. There are no significant changes.  Tremayne Crawford MD      Clinical Conditions Present on Arrival:  Clinically Significant Risk Factors Present on Admission                   # Overweight: Estimated body mass index is 25.51 kg/m  as calculated from the following:    Height as of this encounter: 1.6 m (5' 3\").    Weight as of this encounter: 65.3 kg (144 lb).       "
No adenopathy or splenomegaly. No cervical or inguinal lymphadenopathy.

## 2025-01-16 NOTE — ED PROVIDER NOTE - CARE PROVIDER_API CALL
Sloane Jerez  Neurology  [address], [place] [address]  Phone: [phone]  Fax: [phone]  Follow Up Time:

## 2025-01-16 NOTE — ED PROVIDER NOTE - PATIENT PORTAL LINK FT
You can access the FollowMyHealth Patient Portal offered by [facility] by registering at the following website: [email or web address]. By joining [facility]’s FollowMyHealth portal, you will also be able to view your health information using other applications (apps) compatible with our system.

## 2025-01-16 NOTE — ED ADULT TRIAGE NOTE - CHIEF COMPLAINT QUOTE
pt c/o shooting head pain since monday. "it is sharp and happens every 5 minutes". denies vision loss, befast -. pmh: afib on xarelto.

## 2025-01-16 NOTE — ED ADULT NURSE NOTE - OBJECTIVE STATEMENT
Pt is an 80 yo male who presents to the ED c/o of headache. Pt AOx4 and ambulatory. Pt states he began having cold symptoms last week and started having shooting pain in his head. Pt states that the pain radiates from the back to the middle of his head and rates the pain a 4/10 at this time. Pt endorses subjective fevers at home. Pt denies blurred vision, changes in balance/speech, dizziness, n/v/d, chills, chest pain, sob. Pt pending CT scan.

## 2025-01-16 NOTE — ED ADULT NURSE NOTE - NSFALLHARMRISKINTERV_ED_ALL_ED

## 2025-01-21 ENCOUNTER — NON-APPOINTMENT (OUTPATIENT)
Age: 80
End: 2025-01-21

## 2025-01-24 NOTE — ASU PREOP CHECKLIST - PATIENT'S PERSONAL PROPERTY GIVEN TO
on unit/clothings under the stretcher decreased endurance/impaired balance/impaired postural control/decreased ROM/decreased strength

## 2025-01-24 NOTE — ED ADULT NURSE NOTE - NSFALLRISKASMT_ED_ALL_ED_DT
The above referenced H&P was reviewed by Vasiliy Knapp MD on 1/24/2025, the patient was examined and no significant changes have occurred in the patient's condition since the H&P was performed.  Risks, benefits, alternative treatments and consequences of no treatment were discussed.  We will proceed with procedure as planned.      Vasiliy Knapp MD  1/24/2025  9:26 AM    
10-Feliberto-2023 10:37

## 2025-01-27 ENCOUNTER — APPOINTMENT (OUTPATIENT)
Dept: NEUROLOGY | Facility: CLINIC | Age: 80
End: 2025-01-27
Payer: MEDICARE

## 2025-01-27 VITALS
SYSTOLIC BLOOD PRESSURE: 117 MMHG | DIASTOLIC BLOOD PRESSURE: 81 MMHG | TEMPERATURE: 98.1 F | WEIGHT: 185 LBS | HEIGHT: 70 IN | HEART RATE: 76 BPM | BODY MASS INDEX: 26.48 KG/M2

## 2025-01-27 DIAGNOSIS — G56.01 CARPAL TUNNEL SYNDROME, RIGHT UPPER LIMB: ICD-10-CM

## 2025-01-27 DIAGNOSIS — I63.50 CEREBRAL INFARCTION DUE TO UNSPECIFIED OCCLUSION OR STENOSIS OF UNSPECIFIED CEREBRAL ARTERY: ICD-10-CM

## 2025-01-27 DIAGNOSIS — G51.8 OTHER DISORDERS OF FACIAL NERVE: ICD-10-CM

## 2025-01-27 DIAGNOSIS — I67.2 CEREBRAL ATHEROSCLEROSIS: ICD-10-CM

## 2025-01-27 PROCEDURE — 99214 OFFICE O/P EST MOD 30 MIN: CPT

## 2025-01-28 ENCOUNTER — OUTPATIENT (OUTPATIENT)
Dept: OUTPATIENT SERVICES | Facility: HOSPITAL | Age: 80
LOS: 1 days | End: 2025-01-28
Payer: MEDICARE

## 2025-01-28 ENCOUNTER — APPOINTMENT (OUTPATIENT)
Dept: MRI IMAGING | Facility: CLINIC | Age: 80
End: 2025-01-28
Payer: MEDICARE

## 2025-01-28 DIAGNOSIS — Z90.5 ACQUIRED ABSENCE OF KIDNEY: Chronic | ICD-10-CM

## 2025-01-28 DIAGNOSIS — Z98.890 OTHER SPECIFIED POSTPROCEDURAL STATES: Chronic | ICD-10-CM

## 2025-01-28 DIAGNOSIS — Z90.49 ACQUIRED ABSENCE OF OTHER SPECIFIED PARTS OF DIGESTIVE TRACT: Chronic | ICD-10-CM

## 2025-01-28 DIAGNOSIS — G51.8 OTHER DISORDERS OF FACIAL NERVE: ICD-10-CM

## 2025-01-28 DIAGNOSIS — Z90.89 ACQUIRED ABSENCE OF OTHER ORGANS: Chronic | ICD-10-CM

## 2025-01-28 DIAGNOSIS — R90.89 OTHER ABNORMAL FINDINGS ON DIAGNOSTIC IMAGING OF CENTRAL NERVOUS SYSTEM: ICD-10-CM

## 2025-01-28 PROCEDURE — 70551 MRI BRAIN STEM W/O DYE: CPT | Mod: 26

## 2025-01-28 PROCEDURE — 70551 MRI BRAIN STEM W/O DYE: CPT

## 2025-01-29 ENCOUNTER — APPOINTMENT (OUTPATIENT)
Dept: CARDIOLOGY | Facility: CLINIC | Age: 80
End: 2025-01-29
Payer: MEDICARE

## 2025-01-29 ENCOUNTER — NON-APPOINTMENT (OUTPATIENT)
Age: 80
End: 2025-01-29

## 2025-01-29 VITALS
WEIGHT: 186 LBS | OXYGEN SATURATION: 98 % | HEART RATE: 51 BPM | BODY MASS INDEX: 26.63 KG/M2 | SYSTOLIC BLOOD PRESSURE: 118 MMHG | HEIGHT: 70 IN | DIASTOLIC BLOOD PRESSURE: 78 MMHG

## 2025-01-29 DIAGNOSIS — E78.5 HYPERLIPIDEMIA, UNSPECIFIED: ICD-10-CM

## 2025-01-29 DIAGNOSIS — C64.9 SECONDARY MALIGNANT NEOPLASM OF UNSPECIFIED LUNG: ICD-10-CM

## 2025-01-29 DIAGNOSIS — C78.00 SECONDARY MALIGNANT NEOPLASM OF UNSPECIFIED LUNG: ICD-10-CM

## 2025-01-29 DIAGNOSIS — I10 ESSENTIAL (PRIMARY) HYPERTENSION: ICD-10-CM

## 2025-01-29 DIAGNOSIS — Z01.818 ENCOUNTER FOR OTHER PREPROCEDURAL EXAMINATION: ICD-10-CM

## 2025-01-29 PROCEDURE — 93306 TTE W/DOPPLER COMPLETE: CPT

## 2025-01-29 PROCEDURE — 93000 ELECTROCARDIOGRAM COMPLETE: CPT

## 2025-01-29 PROCEDURE — G2211 COMPLEX E/M VISIT ADD ON: CPT

## 2025-01-29 PROCEDURE — 99214 OFFICE O/P EST MOD 30 MIN: CPT

## 2025-01-30 ENCOUNTER — NON-APPOINTMENT (OUTPATIENT)
Age: 80
End: 2025-01-30

## 2025-01-31 ENCOUNTER — OUTPATIENT (OUTPATIENT)
Dept: OUTPATIENT SERVICES | Facility: HOSPITAL | Age: 80
LOS: 1 days | End: 2025-01-31
Payer: MEDICARE

## 2025-01-31 VITALS
OXYGEN SATURATION: 98 % | SYSTOLIC BLOOD PRESSURE: 119 MMHG | WEIGHT: 189.6 LBS | RESPIRATION RATE: 16 BRPM | HEIGHT: 70 IN | TEMPERATURE: 98 F | HEART RATE: 70 BPM | DIASTOLIC BLOOD PRESSURE: 69 MMHG

## 2025-01-31 DIAGNOSIS — Z90.5 ACQUIRED ABSENCE OF KIDNEY: Chronic | ICD-10-CM

## 2025-01-31 DIAGNOSIS — Z98.890 OTHER SPECIFIED POSTPROCEDURAL STATES: Chronic | ICD-10-CM

## 2025-01-31 DIAGNOSIS — H26.9 UNSPECIFIED CATARACT: Chronic | ICD-10-CM

## 2025-01-31 DIAGNOSIS — Z90.49 ACQUIRED ABSENCE OF OTHER SPECIFIED PARTS OF DIGESTIVE TRACT: Chronic | ICD-10-CM

## 2025-01-31 DIAGNOSIS — Z01.818 ENCOUNTER FOR OTHER PREPROCEDURAL EXAMINATION: ICD-10-CM

## 2025-01-31 DIAGNOSIS — R91.8 OTHER NONSPECIFIC ABNORMAL FINDING OF LUNG FIELD: ICD-10-CM

## 2025-01-31 DIAGNOSIS — Z90.89 ACQUIRED ABSENCE OF OTHER ORGANS: Chronic | ICD-10-CM

## 2025-01-31 DIAGNOSIS — Z90.2 ACQUIRED ABSENCE OF LUNG [PART OF]: Chronic | ICD-10-CM

## 2025-01-31 LAB
ANION GAP SERPL CALC-SCNC: 1 MMOL/L — LOW (ref 5–17)
APTT BLD: 44.7 SEC — HIGH (ref 24.5–35.6)
BASOPHILS # BLD AUTO: 0.05 K/UL — SIGNIFICANT CHANGE UP (ref 0–0.2)
BASOPHILS NFR BLD AUTO: 0.7 % — SIGNIFICANT CHANGE UP (ref 0–2)
BLD GP AB SCN SERPL QL: SIGNIFICANT CHANGE UP
BUN SERPL-MCNC: 22 MG/DL — SIGNIFICANT CHANGE UP (ref 7–23)
CALCIUM SERPL-MCNC: 9.4 MG/DL — SIGNIFICANT CHANGE UP (ref 8.5–10.1)
CHLORIDE SERPL-SCNC: 108 MMOL/L — SIGNIFICANT CHANGE UP (ref 96–108)
CO2 SERPL-SCNC: 30 MMOL/L — SIGNIFICANT CHANGE UP (ref 22–31)
CREAT SERPL-MCNC: 1.41 MG/DL — HIGH (ref 0.5–1.3)
EGFR: 50 ML/MIN/1.73M2 — LOW
EOSINOPHIL # BLD AUTO: 0.14 K/UL — SIGNIFICANT CHANGE UP (ref 0–0.5)
EOSINOPHIL NFR BLD AUTO: 2.1 % — SIGNIFICANT CHANGE UP (ref 0–6)
GLUCOSE SERPL-MCNC: 99 MG/DL — SIGNIFICANT CHANGE UP (ref 70–99)
HCT VFR BLD CALC: 52.8 % — HIGH (ref 39–50)
HGB BLD-MCNC: 16.4 G/DL — SIGNIFICANT CHANGE UP (ref 13–17)
IMM GRANULOCYTES NFR BLD AUTO: 0.3 % — SIGNIFICANT CHANGE UP (ref 0–0.9)
INR BLD: 1.76 RATIO — HIGH (ref 0.85–1.16)
LYMPHOCYTES # BLD AUTO: 1.35 K/UL — SIGNIFICANT CHANGE UP (ref 1–3.3)
LYMPHOCYTES # BLD AUTO: 20.2 % — SIGNIFICANT CHANGE UP (ref 13–44)
MCHC RBC-ENTMCNC: 26.5 PG — LOW (ref 27–34)
MCHC RBC-ENTMCNC: 31.1 G/DL — LOW (ref 32–36)
MCV RBC AUTO: 85.3 FL — SIGNIFICANT CHANGE UP (ref 80–100)
MONOCYTES # BLD AUTO: 0.59 K/UL — SIGNIFICANT CHANGE UP (ref 0–0.9)
MONOCYTES NFR BLD AUTO: 8.8 % — SIGNIFICANT CHANGE UP (ref 2–14)
NEUTROPHILS # BLD AUTO: 4.53 K/UL — SIGNIFICANT CHANGE UP (ref 1.8–7.4)
NEUTROPHILS NFR BLD AUTO: 67.9 % — SIGNIFICANT CHANGE UP (ref 43–77)
PLATELET # BLD AUTO: 176 K/UL — SIGNIFICANT CHANGE UP (ref 150–400)
POTASSIUM SERPL-MCNC: 4.4 MMOL/L — SIGNIFICANT CHANGE UP (ref 3.5–5.3)
POTASSIUM SERPL-SCNC: 4.4 MMOL/L — SIGNIFICANT CHANGE UP (ref 3.5–5.3)
PROTHROM AB SERPL-ACNC: 20.6 SEC — HIGH (ref 9.9–13.4)
RBC # BLD: 6.19 M/UL — HIGH (ref 4.2–5.8)
RBC # FLD: 17.3 % — HIGH (ref 10.3–14.5)
SODIUM SERPL-SCNC: 139 MMOL/L — SIGNIFICANT CHANGE UP (ref 135–145)
WBC # BLD: 6.68 K/UL — SIGNIFICANT CHANGE UP (ref 3.8–10.5)
WBC # FLD AUTO: 6.68 K/UL — SIGNIFICANT CHANGE UP (ref 3.8–10.5)

## 2025-01-31 PROCEDURE — 86923 COMPATIBILITY TEST ELECTRIC: CPT

## 2025-01-31 PROCEDURE — 85025 COMPLETE CBC W/AUTO DIFF WBC: CPT

## 2025-01-31 PROCEDURE — 86900 BLOOD TYPING SEROLOGIC ABO: CPT

## 2025-01-31 PROCEDURE — 85610 PROTHROMBIN TIME: CPT

## 2025-01-31 PROCEDURE — 80048 BASIC METABOLIC PNL TOTAL CA: CPT

## 2025-01-31 PROCEDURE — 85730 THROMBOPLASTIN TIME PARTIAL: CPT

## 2025-01-31 PROCEDURE — 86850 RBC ANTIBODY SCREEN: CPT

## 2025-01-31 PROCEDURE — 99214 OFFICE O/P EST MOD 30 MIN: CPT | Mod: 25

## 2025-01-31 PROCEDURE — 36415 COLL VENOUS BLD VENIPUNCTURE: CPT

## 2025-01-31 PROCEDURE — 86901 BLOOD TYPING SEROLOGIC RH(D): CPT

## 2025-01-31 NOTE — H&P PST ADULT - ASSESSMENT
80 year old male who is scheduled to have a right VATS wedge resection.       Plan:  1. PST instructions given ; NPO status/ instructions to be given by ASU   2. Pt instructed to take following meds on day of surgery:   3. Pt instructed to take routine evening medications unless indicated   4. Stop NSAIDS ( Aspirin, Aleve, Motrin, Mobic, Diclofenac), herbal supplements, MVI, Vitamins, fish oil 7 days prior to surgery unless directed by surgeon or cardiologist;   5. Medical Optimization with Dr. Ibarra, cardiac optimization with Dr. Lopes   6. EZ wash instructions given   7. Labs, EKG as per surgeon request     CAPRINI SCORE Version 2013    AGE RELATED RISK FACTORS                                                             [ ] Age 41-60 years             [1 point]  [ ] Age: 61-74 years            [2 points]                 [x] Age 75 years or over     [3 points]             DISEASE RELATED RISK FACTORS                                                       [ ] Current swollen legs (pitting edema of any level)     [1 point]                     [ ] Varicose veins (visible, bulging vein, not spider veins or surgically removed veins)   [1 point]                                 [x] BMI > 25 Kg/m2                       [1 point]  [ ] BMI > 40 kg/m2                       [1 point]                                 [ ] Serious infection (within past month, requiring hospitalization and IV antibiotics)    [1 point]                     [ ] Lung disease ( interstitial: COPD, emphysema, sarcoid, 9-11 illness, NOT asthma)       [1 point]                                                                          [ ] Acute myocardial infarction (within past month)      [1 point]                  [ ] Congestive heart failure (episode within past month or taking CHF meds)                   [1 point]         [ ] Inflammatory bowel disease (Crohns disease or ulcerative colitis, not irritable bowel syndrome)   [1 point]                  [ ] Central venous access, PICC line or Port (within past month)     [2 points]                                                             [ ] Stroke (within past month)     [5 points]    [x] Previous or present malignancy (includes melanoma but not basal cell carcinoma, each incidence of cancer scores 2 points-not metastases)  [2 points]                                                                                                                                                         HEMATOLOGY RELATED FACTORS                                                         [ ] History of DVT or PE (including superficial venous thrombosis)    [3 points]                    [ ] Positive family history for DVT or PE (includes first-, second-, and third-degree relatives)   [3 points]   [ ] Personal or family history of genetic thrombophilia (family history counts only if it has not been confirmed that patient does not have this genetic marker)                       [ ] Prothrombin [id number] mutation                   [3 points]                           [ ] Factor V Leiden                                               [3 points]            [ ] Antithrombin III deficiency                           [3 points]         [ ] Protein C & S deficiency                                [3 points]              [ ] Dysfibrinogenemia                                         [3 points]  [ ] Personal history of acquired thrombophilia                       [ ] Lupus anticoagulant                                       [3 points]                                                                  [ ] Anticardiolipin antibodies                             [3 points]              [ ] Antiphospholipid antibodies                         [3 points]                                                         [ ] High homocysteine in the blood                  [3 points]                                                    [ ] Myeloproliferative disorders (including thrombocytosis)    [3 points]            [ ] HIV                                                                     [3 points]                                                    [ ] Heparin induced thrombocytopenia            [3 points]                                        MOBILITY RELATED FACTORS  [ ] Bed rest or restricted mobility (inability to ambulate 30 feet continuously or removable leg brace) for less than 72 hours    [1 point]  [ ] Nonremovable plaster cast or mold that prevents calf muscle use   [2 points]  [ ] Bed bound  or restricted mobility for more than 72 hours                  [2 points]    GENDER SPECIFIC FACTORS  [ ] Pregnancy or had a baby within the last month   [1 point]  [ ] Hormone therapy  or oral contraception               [1 point]  [ ] Current use of estrogen-like drugs (raloxifine, tamoxifen, anastrozole, letrozole)                                [1 point]  [ ] History of unexplained stillborn infant, premature birth with toxemia or growth-restricted infant   [1 point]  [ ] Recurrent spontaneous abortions (3 or more)      [1 point]    OTHER RISK FACTORS                                         [ ] Current smoker (includes vaping and smoking marijuana)      [1 point]  [ ] Diabetes requiring insulin     [1 point]                   [ ] Chemotherapy (includes methotrexate for rheumatoid arthritis, hydroxyurea for thrombocytosis)    [1 point]  [ ] Blood transfusion(s)               [1 point]    SURGERY RELATED RISK FACTORS  [ ]  section within the last month                    [1 point]  [ ] Minor surgery is planned (less than 45 minutes)     [1 point]  [ ] Past major surgery (longer than 45 minutes) within past month  [2 points]  [x] Planned major surgery lasting more than 45 minutes (includes laparoscopic and arthroscopic; do not add to the "5" for hip and knee replacement)    [2 points]  [x] Length of surgery over 2 hours (includes anesthesia time; do not add to the "5" for hip and knee replacement)   [1 point]  [ ] Elective hip or knee joint replacement surgery         [5 points]                                               TRAUMA RELATED RISK FACTORS  [ ] Fracture of the hip, pelvis, or leg                       [5 points]  [ ] Spinal cord injury resulting in paralysis (within the past month)    [5 points]  [ ] Paralysis  (within the past month)                      [5 points]  [ ] Multiple trauma (within the past month)         [5 Points]    Total Score [    9    ]    Total hip and total knee replacement:  Caprini score 9 or less: LOW risk  Caprini score 10 or highter: HIGH RISK    Caprini score 0-2: Low Risk, NO VTE prophylaxis required for most patients, encourage ambulation  Caprini score 3-6: Moderate Risk , pharmacologic VTE prophylaxis is indicated for most patients (in the absence of contraindications)  Caprini score 7 or higher: High risk, pharmocologic VTE prophylaxis indicated for most patients (in the absence of contraindications)                     80 year old male who is scheduled to have a right VATS wedge resection.     Plan:  1. PST instructions given ; NPO status/ instructions to be given by ASU   2. Pt instructed to take following meds on day of surgery: metoprolol, amlodipine, levothyroxine, famotidine    - Pt instructed to hold lisinopril morning of surgery  3. Pt instructed to take routine evening medications unless indicated   4. Stop NSAIDS ( Aspirin, Aleve, Motrin, Mobic, Diclofenac), herbal supplements, MVI, Vitamins, fish oil 7 days prior to surgery unless directed by surgeon or cardiologist;   -ASA and Xarelto per cardiology.   5. Medical Optimization with Dr. Ibarra, cardiac optimization with Dr. Lopes   6. EZ wash instructions given   7. Labs, EKG as per surgeon request     CAPRINI SCORE Version 2013    AGE RELATED RISK FACTORS                                                             [ ] Age 41-60 years             [1 point]  [ ] Age: 61-74 years            [2 points]                 [x] Age 75 years or over     [3 points]             DISEASE RELATED RISK FACTORS                                                       [ ] Current swollen legs (pitting edema of any level)     [1 point]                     [ ] Varicose veins (visible, bulging vein, not spider veins or surgically removed veins)   [1 point]                                 [x] BMI > 25 Kg/m2                       [1 point]  [ ] BMI > 40 kg/m2                       [1 point]                                 [ ] Serious infection (within past month, requiring hospitalization and IV antibiotics)    [1 point]                     [ ] Lung disease ( interstitial: COPD, emphysema, sarcoid, 9-11 illness, NOT asthma)       [1 point]                                                                          [ ] Acute myocardial infarction (within past month)      [1 point]                  [ ] Congestive heart failure (episode within past month or taking CHF meds)                   [1 point]         [ ] Inflammatory bowel disease (Crohns disease or ulcerative colitis, not irritable bowel syndrome)   [1 point]                  [ ] Central venous access, PICC line or Port (within past month)     [2 points]                                                             [ ] Stroke (within past month)     [5 points]    [x] Previous or present malignancy (includes melanoma but not basal cell carcinoma, each incidence of cancer scores 2 points-not metastases)  [2 points]                                                                                                                                                         HEMATOLOGY RELATED FACTORS                                                         [ ] History of DVT or PE (including superficial venous thrombosis)    [3 points]                    [ ] Positive family history for DVT or PE (includes first-, second-, and third-degree relatives)   [3 points]   [ ] Personal or family history of genetic thrombophilia (family history counts only if it has not been confirmed that patient does not have this genetic marker)                       [ ] Prothrombin [id number] mutation                   [3 points]                           [ ] Factor V Leiden                                               [3 points]            [ ] Antithrombin III deficiency                           [3 points]         [ ] Protein C & S deficiency                                [3 points]              [ ] Dysfibrinogenemia                                         [3 points]  [ ] Personal history of acquired thrombophilia                       [ ] Lupus anticoagulant                                       [3 points]                                                                  [ ] Anticardiolipin antibodies                             [3 points]              [ ] Antiphospholipid antibodies                         [3 points]                                                         [ ] High homocysteine in the blood                  [3 points]                                                    [ ] Myeloproliferative disorders (including thrombocytosis)    [3 points]            [ ] HIV                                                                     [3 points]                                                    [ ] Heparin induced thrombocytopenia            [3 points]                                        MOBILITY RELATED FACTORS  [ ] Bed rest or restricted mobility (inability to ambulate 30 feet continuously or removable leg brace) for less than 72 hours    [1 point]  [ ] Nonremovable plaster cast or mold that prevents calf muscle use   [2 points]  [ ] Bed bound  or restricted mobility for more than 72 hours                  [2 points]    GENDER SPECIFIC FACTORS  [ ] Pregnancy or had a baby within the last month   [1 point]  [ ] Hormone therapy  or oral contraception               [1 point]  [ ] Current use of estrogen-like drugs (raloxifine, tamoxifen, anastrozole, letrozole)                                [1 point]  [ ] History of unexplained stillborn infant, premature birth with toxemia or growth-restricted infant   [1 point]  [ ] Recurrent spontaneous abortions (3 or more)      [1 point]    OTHER RISK FACTORS                                         [ ] Current smoker (includes vaping and smoking marijuana)      [1 point]  [ ] Diabetes requiring insulin     [1 point]                   [ ] Chemotherapy (includes methotrexate for rheumatoid arthritis, hydroxyurea for thrombocytosis)    [1 point]  [ ] Blood transfusion(s)               [1 point]    SURGERY RELATED RISK FACTORS  [ ]  section within the last month                    [1 point]  [ ] Minor surgery is planned (less than 45 minutes)     [1 point]  [ ] Past major surgery (longer than 45 minutes) within past month  [2 points]  [x] Planned major surgery lasting more than 45 minutes (includes laparoscopic and arthroscopic; do not add to the "5" for hip and knee replacement)    [2 points]  [x] Length of surgery over 2 hours (includes anesthesia time; do not add to the "5" for hip and knee replacement)   [1 point]  [ ] Elective hip or knee joint replacement surgery         [5 points]                                               TRAUMA RELATED RISK FACTORS  [ ] Fracture of the hip, pelvis, or leg                       [5 points]  [ ] Spinal cord injury resulting in paralysis (within the past month)    [5 points]  [ ] Paralysis  (within the past month)                      [5 points]  [ ] Multiple trauma (within the past month)         [5 Points]    Total Score [    9    ]    Total hip and total knee replacement:  Caprini score 9 or less: LOW risk  Caprini score 10 or highter: HIGH RISK    Caprini score 0-2: Low Risk, NO VTE prophylaxis required for most patients, encourage ambulation  Caprini score 3-6: Moderate Risk , pharmacologic VTE prophylaxis is indicated for most patients (in the absence of contraindications)  Caprini score 7 or higher: High risk, pharmocologic VTE prophylaxis indicated for most patients (in the absence of contraindications)

## 2025-01-31 NOTE — H&P PST ADULT - NSICDXPASTSURGICALHX_GEN_ALL_CORE_FT
PAST SURGICAL HISTORY:  Cataract     H/O colonoscopy and upper endoscopy 2019    H/O hemorrhoidectomy     History of incisional hernia repair 11/2020 with mesh    History of other surgery Excision of skin cancer    History of right nephrectomy 12/5/2019    S/P cholecystectomy     S/P tonsillectomy     Status post partial removal of lung

## 2025-01-31 NOTE — H&P PST ADULT - NSICDXFAMILYHX_GEN_ALL_CORE_FT
FAMILY HISTORY:  Father  Still living? No  FH: hypertension, Age at diagnosis: Age Unknown    Mother  Still living? No  FH: breast cancer, Age at diagnosis: Age Unknown    Sibling  Still living? No  FH: liver cancer, Age at diagnosis: Age Unknown

## 2025-01-31 NOTE — H&P PST ADULT - ENDOCRINE COMMENTS
?pituitary tumor on recent brain MRI from 1/28/25 - dedicated study to be performed 2/3/25 - Dr. Bishop

## 2025-01-31 NOTE — H&P PST ADULT - HISTORY OF PRESENT ILLNESS
80 year old male with PMHx atrial fibrillation, hypertension, heart valvular disease, CVA (2023), chronic renal insufficiency, and hypothyroidism. Pt explains how he was diagnosed with right renal cell carcinoma s/p radical nephrectomy in 2019 with lung metastasis s/p right upper lobe wedge resection 10/2021. CT chest performed every 6 months with findings of RUL nodule that has been increasing in size over the past few months. Patient denies chest pain, cough, SOB, hemoptysis. Because of his history of lung metastasis it was recommended by Dr. Rolle to have surgical intervention. He is scheduled to have a right VATS wedge resection.  80 year old male with PMHx atrial fibrillation on xarelto, hypertension, heart valvular disease, CVA (2023), chronic renal insufficiency, and hypothyroidism. Pt explains how he was diagnosed with right renal cell carcinoma s/p radical nephrectomy in 2019 with lung metastasis s/p right upper lobe wedge resection 10/2021. CT chest performed every 6 months with findings of RUL nodule that has been increasing in size over the past few months. Patient denies chest pain, cough, SOB, hemoptysis. Because of his history of lung metastasis it was recommended by Dr. Rolle to have surgical intervention. He is scheduled to have a right VATS wedge resection.

## 2025-01-31 NOTE — H&P PST ADULT - ATTENDING COMMENTS
RUL nodule, growing, concern for metastatic lesion  plan for RA R VATS RUL wedge resection  risks, benefits and alternatives d/w patient including but not limited to bleeding, infection, injury to nearby structures, recurrence, missed diagnosis, requirement for subsequent procedures, PE, DVT, MI, CVA, death. Agrees to proceed informed consent obtained and signed, all questions answered.

## 2025-01-31 NOTE — H&P PST ADULT - NSICDXPASTMEDICALHX_GEN_ALL_CORE_FT
PAST MEDICAL HISTORY:  Atrial fibrillation has loop recorder    BPH (benign prostatic hyperplasia)     Colon polyp benign    COVID-19 vaccine series completed Pfizer second dose 4/2021    CVA (cerebrovascular accident)     Deviated septum     Erectile dysfunction     GERD (gastroesophageal reflux disease)     Hematuria     Hemorrhoids     History of gallstones     History of kidney cancer Right - kidney removed    History of skin cancer left hand fourth digit    HTN (hypertension)     Hypothyroid     Incisional hernia     Lung cancer     Nodule of right lung     Polycythemia vera     Renal cancer

## 2025-01-31 NOTE — H&P PST ADULT - HEMATOLOGY/LYMPHATICS COMMENTS
Hx: polycythemia vera managed with phlebotomy - Dr. Morfin Hx: polycythemia vera managed with phlebotomy (last was 3 months ago) - Dr. Morfin

## 2025-02-01 DIAGNOSIS — Z01.818 ENCOUNTER FOR OTHER PREPROCEDURAL EXAMINATION: ICD-10-CM

## 2025-02-01 DIAGNOSIS — R91.8 OTHER NONSPECIFIC ABNORMAL FINDING OF LUNG FIELD: ICD-10-CM

## 2025-02-03 ENCOUNTER — APPOINTMENT (OUTPATIENT)
Dept: MRI IMAGING | Facility: CLINIC | Age: 80
End: 2025-02-03
Payer: MEDICARE

## 2025-02-03 ENCOUNTER — OUTPATIENT (OUTPATIENT)
Dept: OUTPATIENT SERVICES | Facility: HOSPITAL | Age: 80
LOS: 1 days | End: 2025-02-03
Payer: MEDICARE

## 2025-02-03 ENCOUNTER — APPOINTMENT (OUTPATIENT)
Dept: INTERNAL MEDICINE | Facility: CLINIC | Age: 80
End: 2025-02-03

## 2025-02-03 DIAGNOSIS — R90.89 OTHER ABNORMAL FINDINGS ON DIAGNOSTIC IMAGING OF CENTRAL NERVOUS SYSTEM: ICD-10-CM

## 2025-02-03 DIAGNOSIS — Z90.49 ACQUIRED ABSENCE OF OTHER SPECIFIED PARTS OF DIGESTIVE TRACT: Chronic | ICD-10-CM

## 2025-02-03 DIAGNOSIS — Z90.2 ACQUIRED ABSENCE OF LUNG [PART OF]: Chronic | ICD-10-CM

## 2025-02-03 DIAGNOSIS — H26.9 UNSPECIFIED CATARACT: Chronic | ICD-10-CM

## 2025-02-03 DIAGNOSIS — Z98.890 OTHER SPECIFIED POSTPROCEDURAL STATES: Chronic | ICD-10-CM

## 2025-02-03 DIAGNOSIS — Z90.89 ACQUIRED ABSENCE OF OTHER ORGANS: Chronic | ICD-10-CM

## 2025-02-03 DIAGNOSIS — Z90.5 ACQUIRED ABSENCE OF KIDNEY: Chronic | ICD-10-CM

## 2025-02-03 PROBLEM — I63.9 CEREBRAL INFARCTION, UNSPECIFIED: Chronic | Status: ACTIVE | Noted: 2025-01-31

## 2025-02-03 PROBLEM — D45 POLYCYTHEMIA VERA: Chronic | Status: ACTIVE | Noted: 2025-01-31

## 2025-02-03 PROBLEM — E03.9 HYPOTHYROIDISM, UNSPECIFIED: Chronic | Status: ACTIVE | Noted: 2025-01-31

## 2025-02-03 PROBLEM — C34.90 MALIGNANT NEOPLASM OF UNSPECIFIED PART OF UNSPECIFIED BRONCHUS OR LUNG: Chronic | Status: ACTIVE | Noted: 2025-01-31

## 2025-02-03 PROCEDURE — 70553 MRI BRAIN STEM W/O & W/DYE: CPT

## 2025-02-03 PROCEDURE — 70553 MRI BRAIN STEM W/O & W/DYE: CPT | Mod: 26

## 2025-02-03 PROCEDURE — A9585: CPT

## 2025-02-05 ENCOUNTER — APPOINTMENT (OUTPATIENT)
Dept: INTERNAL MEDICINE | Facility: CLINIC | Age: 80
End: 2025-02-05
Payer: MEDICARE

## 2025-02-05 VITALS
TEMPERATURE: 97.9 F | WEIGHT: 186 LBS | OXYGEN SATURATION: 98 % | HEART RATE: 76 BPM | RESPIRATION RATE: 18 BRPM | SYSTOLIC BLOOD PRESSURE: 114 MMHG | HEIGHT: 68 IN | DIASTOLIC BLOOD PRESSURE: 78 MMHG | BODY MASS INDEX: 28.19 KG/M2

## 2025-02-05 DIAGNOSIS — N18.9 CHRONIC KIDNEY DISEASE, UNSPECIFIED: ICD-10-CM

## 2025-02-05 DIAGNOSIS — C64.9 MALIGNANT NEOPLASM OF UNSPECIFIED KIDNEY, EXCEPT RENAL PELVIS: ICD-10-CM

## 2025-02-05 DIAGNOSIS — Z01.818 ENCOUNTER FOR OTHER PREPROCEDURAL EXAMINATION: ICD-10-CM

## 2025-02-05 DIAGNOSIS — Z79.01 LONG TERM (CURRENT) USE OF ANTICOAGULANTS: ICD-10-CM

## 2025-02-05 DIAGNOSIS — R91.1 SOLITARY PULMONARY NODULE: ICD-10-CM

## 2025-02-05 DIAGNOSIS — I48.91 UNSPECIFIED ATRIAL FIBRILLATION: ICD-10-CM

## 2025-02-05 DIAGNOSIS — D75.1 SECONDARY POLYCYTHEMIA: ICD-10-CM

## 2025-02-05 PROCEDURE — 94060 EVALUATION OF WHEEZING: CPT

## 2025-02-05 PROCEDURE — 94727 GAS DIL/WSHOT DETER LNG VOL: CPT

## 2025-02-05 PROCEDURE — ZZZZZ: CPT

## 2025-02-05 PROCEDURE — 94729 DIFFUSING CAPACITY: CPT

## 2025-02-05 PROCEDURE — 99215 OFFICE O/P EST HI 40 MIN: CPT | Mod: 25

## 2025-02-08 ENCOUNTER — APPOINTMENT (OUTPATIENT)
Dept: HEMATOLOGY ONCOLOGY | Facility: CLINIC | Age: 80
End: 2025-02-08

## 2025-02-08 ENCOUNTER — RESULT REVIEW (OUTPATIENT)
Age: 80
End: 2025-02-08

## 2025-02-08 ENCOUNTER — APPOINTMENT (OUTPATIENT)
Dept: INFUSION THERAPY | Facility: HOSPITAL | Age: 80
End: 2025-02-08

## 2025-02-08 LAB
BASOPHILS # BLD AUTO: 0.03 K/UL — SIGNIFICANT CHANGE UP (ref 0–0.2)
BASOPHILS NFR BLD AUTO: 0.5 % — SIGNIFICANT CHANGE UP (ref 0–2)
EOSINOPHIL # BLD AUTO: 0.16 K/UL — SIGNIFICANT CHANGE UP (ref 0–0.5)
EOSINOPHIL NFR BLD AUTO: 2.4 % — SIGNIFICANT CHANGE UP (ref 0–6)
HCT VFR BLD CALC: 49.4 % — SIGNIFICANT CHANGE UP (ref 39–50)
HGB BLD-MCNC: 15.5 G/DL — SIGNIFICANT CHANGE UP (ref 13–17)
IMM GRANULOCYTES NFR BLD AUTO: 0.3 % — SIGNIFICANT CHANGE UP (ref 0–0.9)
LYMPHOCYTES # BLD AUTO: 1.47 K/UL — SIGNIFICANT CHANGE UP (ref 1–3.3)
LYMPHOCYTES # BLD AUTO: 22.3 % — SIGNIFICANT CHANGE UP (ref 13–44)
MCHC RBC-ENTMCNC: 26.6 PG — LOW (ref 27–34)
MCHC RBC-ENTMCNC: 31.4 G/DL — LOW (ref 32–36)
MCV RBC AUTO: 84.9 FL — SIGNIFICANT CHANGE UP (ref 80–100)
MONOCYTES # BLD AUTO: 0.73 K/UL — SIGNIFICANT CHANGE UP (ref 0–0.9)
MONOCYTES NFR BLD AUTO: 11.1 % — SIGNIFICANT CHANGE UP (ref 2–14)
NEUTROPHILS # BLD AUTO: 4.17 K/UL — SIGNIFICANT CHANGE UP (ref 1.8–7.4)
NEUTROPHILS NFR BLD AUTO: 63.4 % — SIGNIFICANT CHANGE UP (ref 43–77)
NRBC # BLD: 0 /100 WBCS — SIGNIFICANT CHANGE UP (ref 0–0)
NRBC BLD-RTO: 0 /100 WBCS — SIGNIFICANT CHANGE UP (ref 0–0)
PLATELET # BLD AUTO: 131 K/UL — LOW (ref 150–400)
RBC # BLD: 5.82 M/UL — HIGH (ref 4.2–5.8)
RBC # FLD: 18.3 % — HIGH (ref 10.3–14.5)
WBC # BLD: 6.58 K/UL — SIGNIFICANT CHANGE UP (ref 3.8–10.5)
WBC # FLD AUTO: 6.58 K/UL — SIGNIFICANT CHANGE UP (ref 3.8–10.5)

## 2025-02-10 ENCOUNTER — APPOINTMENT (OUTPATIENT)
Dept: ENDOCRINOLOGY | Facility: CLINIC | Age: 80
End: 2025-02-10
Payer: MEDICARE

## 2025-02-10 VITALS
HEIGHT: 68 IN | BODY MASS INDEX: 28.04 KG/M2 | DIASTOLIC BLOOD PRESSURE: 68 MMHG | TEMPERATURE: 97.8 F | SYSTOLIC BLOOD PRESSURE: 124 MMHG | RESPIRATION RATE: 18 BRPM | HEART RATE: 85 BPM | WEIGHT: 185 LBS | OXYGEN SATURATION: 98 %

## 2025-02-10 DIAGNOSIS — T45.1X5A ADVERSE EFFECT OF ANTINEOPLASTIC AND IMMUNOSUPPRESSIVE DRUGS, INITIAL ENCOUNTER: ICD-10-CM

## 2025-02-10 DIAGNOSIS — E03.9 HYPOTHYROIDISM, UNSPECIFIED: ICD-10-CM

## 2025-02-10 PROCEDURE — 99214 OFFICE O/P EST MOD 30 MIN: CPT

## 2025-02-11 ENCOUNTER — RESULT REVIEW (OUTPATIENT)
Age: 80
End: 2025-02-11

## 2025-02-11 ENCOUNTER — APPOINTMENT (OUTPATIENT)
Dept: THORACIC SURGERY | Facility: HOSPITAL | Age: 80
End: 2025-02-11

## 2025-02-11 ENCOUNTER — INPATIENT (INPATIENT)
Facility: HOSPITAL | Age: 80
LOS: 4 days | Discharge: HOME | DRG: 204 | End: 2025-02-16
Attending: SURGERY | Admitting: SURGERY
Payer: MEDICARE

## 2025-02-11 VITALS
OXYGEN SATURATION: 100 % | RESPIRATION RATE: 16 BRPM | HEART RATE: 72 BPM | HEIGHT: 70 IN | WEIGHT: 189.6 LBS | TEMPERATURE: 97 F | DIASTOLIC BLOOD PRESSURE: 90 MMHG | SYSTOLIC BLOOD PRESSURE: 132 MMHG

## 2025-02-11 DIAGNOSIS — Z98.890 OTHER SPECIFIED POSTPROCEDURAL STATES: Chronic | ICD-10-CM

## 2025-02-11 DIAGNOSIS — N40.0 BENIGN PROSTATIC HYPERPLASIA WITHOUT LOWER URINARY TRACT SYMPTOMS: ICD-10-CM

## 2025-02-11 DIAGNOSIS — R91.8 OTHER NONSPECIFIC ABNORMAL FINDING OF LUNG FIELD: ICD-10-CM

## 2025-02-11 DIAGNOSIS — C64.1 MALIGNANT NEOPLASM OF RIGHT KIDNEY, EXCEPT RENAL PELVIS: ICD-10-CM

## 2025-02-11 DIAGNOSIS — Z90.2 ACQUIRED ABSENCE OF LUNG [PART OF]: Chronic | ICD-10-CM

## 2025-02-11 DIAGNOSIS — Z79.890 HORMONE REPLACEMENT THERAPY: ICD-10-CM

## 2025-02-11 DIAGNOSIS — H26.9 UNSPECIFIED CATARACT: ICD-10-CM

## 2025-02-11 DIAGNOSIS — I48.91 UNSPECIFIED ATRIAL FIBRILLATION: ICD-10-CM

## 2025-02-11 DIAGNOSIS — I08.3 COMBINED RHEUMATIC DISORDERS OF MITRAL, AORTIC AND TRICUSPID VALVES: ICD-10-CM

## 2025-02-11 DIAGNOSIS — Z90.89 ACQUIRED ABSENCE OF OTHER ORGANS: Chronic | ICD-10-CM

## 2025-02-11 DIAGNOSIS — D45 POLYCYTHEMIA VERA: ICD-10-CM

## 2025-02-11 DIAGNOSIS — Z85.828 PERSONAL HISTORY OF OTHER MALIGNANT NEOPLASM OF SKIN: ICD-10-CM

## 2025-02-11 DIAGNOSIS — D62 ACUTE POSTHEMORRHAGIC ANEMIA: ICD-10-CM

## 2025-02-11 DIAGNOSIS — Z90.5 ACQUIRED ABSENCE OF KIDNEY: Chronic | ICD-10-CM

## 2025-02-11 DIAGNOSIS — J95.830 POSTPROCEDURAL HEMORRHAGE OF A RESPIRATORY SYSTEM ORGAN OR STRUCTURE FOLLOWING A RESPIRATORY SYSTEM PROCEDURE: ICD-10-CM

## 2025-02-11 DIAGNOSIS — E03.9 HYPOTHYROIDISM, UNSPECIFIED: ICD-10-CM

## 2025-02-11 DIAGNOSIS — J98.11 ATELECTASIS: ICD-10-CM

## 2025-02-11 DIAGNOSIS — I95.81 POSTPROCEDURAL HYPOTENSION: ICD-10-CM

## 2025-02-11 DIAGNOSIS — J95.812 POSTPROCEDURAL AIR LEAK: ICD-10-CM

## 2025-02-11 DIAGNOSIS — Z86.73 PERSONAL HISTORY OF TRANSIENT ISCHEMIC ATTACK (TIA), AND CEREBRAL INFARCTION WITHOUT RESIDUAL DEFICITS: ICD-10-CM

## 2025-02-11 DIAGNOSIS — H26.9 UNSPECIFIED CATARACT: Chronic | ICD-10-CM

## 2025-02-11 DIAGNOSIS — K21.9 GASTRO-ESOPHAGEAL REFLUX DISEASE WITHOUT ESOPHAGITIS: ICD-10-CM

## 2025-02-11 DIAGNOSIS — J94.2 HEMOTHORAX: ICD-10-CM

## 2025-02-11 DIAGNOSIS — Z79.01 LONG TERM (CURRENT) USE OF ANTICOAGULANTS: ICD-10-CM

## 2025-02-11 DIAGNOSIS — N18.9 CHRONIC KIDNEY DISEASE, UNSPECIFIED: ICD-10-CM
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
## 2025-02-11 DIAGNOSIS — Z90.49 ACQUIRED ABSENCE OF OTHER SPECIFIED PARTS OF DIGESTIVE TRACT: Chronic | ICD-10-CM

## 2025-02-11 DIAGNOSIS — T81.19XA OTHER POSTPROCEDURAL SHOCK, INITIAL ENCOUNTER: ICD-10-CM

## 2025-02-11 DIAGNOSIS — C78.01 SECONDARY MALIGNANT NEOPLASM OF RIGHT LUNG: ICD-10-CM

## 2025-02-11 DIAGNOSIS — K59.00 CONSTIPATION, UNSPECIFIED: ICD-10-CM

## 2025-02-11 DIAGNOSIS — I12.9 HYPERTENSIVE CHRONIC KIDNEY DISEASE WITH STAGE 1 THROUGH STAGE 4 CHRONIC KIDNEY DISEASE, OR UNSPECIFIED CHRONIC KIDNEY DISEASE: ICD-10-CM

## 2025-02-11 LAB
ANION GAP SERPL CALC-SCNC: 7 MMOL/L — SIGNIFICANT CHANGE UP (ref 5–17)
ANION GAP SERPL CALC-SCNC: 8 MMOL/L — SIGNIFICANT CHANGE UP (ref 5–17)
APTT BLD: 27.7 SEC — SIGNIFICANT CHANGE UP (ref 24.5–35.6)
BLD GP AB SCN SERPL QL: SIGNIFICANT CHANGE UP
BUN SERPL-MCNC: 14 MG/DL — SIGNIFICANT CHANGE UP (ref 7–23)
BUN SERPL-MCNC: 16 MG/DL — SIGNIFICANT CHANGE UP (ref 7–23)
CALCIUM SERPL-MCNC: 7.7 MG/DL — LOW (ref 8.5–10.1)
CALCIUM SERPL-MCNC: 9 MG/DL — SIGNIFICANT CHANGE UP (ref 8.5–10.1)
CHLORIDE SERPL-SCNC: 108 MMOL/L — SIGNIFICANT CHANGE UP (ref 96–108)
CHLORIDE SERPL-SCNC: 109 MMOL/L — HIGH (ref 96–108)
CO2 SERPL-SCNC: 22 MMOL/L — SIGNIFICANT CHANGE UP (ref 22–31)
CO2 SERPL-SCNC: 24 MMOL/L — SIGNIFICANT CHANGE UP (ref 22–31)
CREAT SERPL-MCNC: 1.08 MG/DL — SIGNIFICANT CHANGE UP (ref 0.5–1.3)
CREAT SERPL-MCNC: 1.31 MG/DL — HIGH (ref 0.5–1.3)
EGFR: 54 ML/MIN/1.73M2 — LOW
EGFR: 54 ML/MIN/1.73M2 — LOW
EGFR: 69 ML/MIN/1.73M2 — SIGNIFICANT CHANGE UP
EGFR: 69 ML/MIN/1.73M2 — SIGNIFICANT CHANGE UP
GLUCOSE SERPL-MCNC: 126 MG/DL — HIGH (ref 70–99)
GLUCOSE SERPL-MCNC: 147 MG/DL — HIGH (ref 70–99)
HCT VFR BLD CALC: 43.4 % — SIGNIFICANT CHANGE UP (ref 39–50)
HCT VFR BLD CALC: 49.3 % — SIGNIFICANT CHANGE UP (ref 39–50)
HGB BLD-MCNC: 14.3 G/DL — SIGNIFICANT CHANGE UP (ref 13–17)
HGB BLD-MCNC: 15.2 G/DL — SIGNIFICANT CHANGE UP (ref 13–17)
INR BLD: 1.11 RATIO — SIGNIFICANT CHANGE UP (ref 0.85–1.16)
MCHC RBC-ENTMCNC: 26.1 PG — LOW (ref 27–34)
MCHC RBC-ENTMCNC: 27.4 PG — SIGNIFICANT CHANGE UP (ref 27–34)
MCHC RBC-ENTMCNC: 30.8 G/DL — LOW (ref 32–36)
MCHC RBC-ENTMCNC: 32.9 G/DL — SIGNIFICANT CHANGE UP (ref 32–36)
MCV RBC AUTO: 83.3 FL — SIGNIFICANT CHANGE UP (ref 80–100)
MCV RBC AUTO: 84.6 FL — SIGNIFICANT CHANGE UP (ref 80–100)
NRBC # BLD AUTO: 0 K/UL — SIGNIFICANT CHANGE UP (ref 0–0)
NRBC # BLD AUTO: 0 K/UL — SIGNIFICANT CHANGE UP (ref 0–0)
NRBC # FLD: 0 K/UL — SIGNIFICANT CHANGE UP (ref 0–0)
NRBC # FLD: 0 K/UL — SIGNIFICANT CHANGE UP (ref 0–0)
NRBC BLD AUTO-RTO: 0 /100 WBCS — SIGNIFICANT CHANGE UP (ref 0–0)
NRBC BLD AUTO-RTO: 0 /100 WBCS — SIGNIFICANT CHANGE UP (ref 0–0)
PLATELET # BLD AUTO: 107 K/UL — LOW (ref 150–400)
PLATELET # BLD AUTO: 144 K/UL — LOW (ref 150–400)
PMV BLD: 10.5 FL — SIGNIFICANT CHANGE UP (ref 7–13)
PMV BLD: 10.8 FL — SIGNIFICANT CHANGE UP (ref 7–13)
POTASSIUM SERPL-MCNC: 4.1 MMOL/L — SIGNIFICANT CHANGE UP (ref 3.5–5.3)
POTASSIUM SERPL-MCNC: 4.2 MMOL/L — SIGNIFICANT CHANGE UP (ref 3.5–5.3)
POTASSIUM SERPL-SCNC: 4.1 MMOL/L — SIGNIFICANT CHANGE UP (ref 3.5–5.3)
POTASSIUM SERPL-SCNC: 4.2 MMOL/L — SIGNIFICANT CHANGE UP (ref 3.5–5.3)
PROTHROM AB SERPL-ACNC: 12.7 SEC — SIGNIFICANT CHANGE UP (ref 9.9–13.4)
RBC # BLD: 5.21 M/UL — SIGNIFICANT CHANGE UP (ref 4.2–5.8)
RBC # BLD: 5.83 M/UL — HIGH (ref 4.2–5.8)
RBC # FLD: 16.6 % — HIGH (ref 10.3–14.5)
RBC # FLD: 17.9 % — HIGH (ref 10.3–14.5)
SODIUM SERPL-SCNC: 139 MMOL/L — SIGNIFICANT CHANGE UP (ref 135–145)
SODIUM SERPL-SCNC: 139 MMOL/L — SIGNIFICANT CHANGE UP (ref 135–145)
WBC # BLD: 11.63 K/UL — HIGH (ref 3.8–10.5)
WBC # BLD: 12.45 K/UL — HIGH (ref 3.8–10.5)
WBC # FLD AUTO: 11.63 K/UL — HIGH (ref 3.8–10.5)
WBC # FLD AUTO: 12.45 K/UL — HIGH (ref 3.8–10.5)

## 2025-02-11 PROCEDURE — 88342 IMHCHEM/IMCYTCHM 1ST ANTB: CPT | Mod: 26

## 2025-02-11 PROCEDURE — 32653 THORACOSCOPY REMOV FB/FIBRIN: CPT | Mod: AS,RT

## 2025-02-11 PROCEDURE — 88342 IMHCHEM/IMCYTCHM 1ST ANTB: CPT

## 2025-02-11 PROCEDURE — 80053 COMPREHEN METABOLIC PANEL: CPT

## 2025-02-11 PROCEDURE — 86850 RBC ANTIBODY SCREEN: CPT

## 2025-02-11 PROCEDURE — P9016: CPT

## 2025-02-11 PROCEDURE — 88305 TISSUE EXAM BY PATHOLOGIST: CPT

## 2025-02-11 PROCEDURE — 32666 THORACOSCOPY W/WEDGE RESECT: CPT | Mod: AS,RT,22

## 2025-02-11 PROCEDURE — C1889: CPT

## 2025-02-11 PROCEDURE — 71045 X-RAY EXAM CHEST 1 VIEW: CPT

## 2025-02-11 PROCEDURE — 88333 PATH CONSLTJ SURG CYTO XM 1: CPT | Mod: 26

## 2025-02-11 PROCEDURE — C9399: CPT

## 2025-02-11 PROCEDURE — 97116 GAIT TRAINING THERAPY: CPT | Mod: GP

## 2025-02-11 PROCEDURE — 87640 STAPH A DNA AMP PROBE: CPT

## 2025-02-11 PROCEDURE — P9059: CPT

## 2025-02-11 PROCEDURE — 36415 COLL VENOUS BLD VENIPUNCTURE: CPT

## 2025-02-11 PROCEDURE — 83735 ASSAY OF MAGNESIUM: CPT

## 2025-02-11 PROCEDURE — 86901 BLOOD TYPING SEROLOGIC RH(D): CPT

## 2025-02-11 PROCEDURE — S2900 ROBOTIC SURGICAL SYSTEM: CPT | Mod: NC

## 2025-02-11 PROCEDURE — 88305 TISSUE EXAM BY PATHOLOGIST: CPT | Mod: 26

## 2025-02-11 PROCEDURE — 86900 BLOOD TYPING SEROLOGIC ABO: CPT

## 2025-02-11 PROCEDURE — 36430 TRANSFUSION BLD/BLD COMPNT: CPT

## 2025-02-11 PROCEDURE — 32654 THORACOSCOPY CONTRL BLEEDING: CPT | Mod: AS,RT

## 2025-02-11 PROCEDURE — 32666 THORACOSCOPY W/WEDGE RESECT: CPT | Mod: 22,RT

## 2025-02-11 PROCEDURE — 99291 CRITICAL CARE FIRST HOUR: CPT

## 2025-02-11 PROCEDURE — 32674 THORACOSCOPY LYMPH NODE EXC: CPT | Mod: AS,RT

## 2025-02-11 PROCEDURE — 88307 TISSUE EXAM BY PATHOLOGIST: CPT | Mod: 26

## 2025-02-11 PROCEDURE — 85027 COMPLETE CBC AUTOMATED: CPT

## 2025-02-11 PROCEDURE — 71045 X-RAY EXAM CHEST 1 VIEW: CPT | Mod: 26

## 2025-02-11 PROCEDURE — 32999 UNLISTED PX LUNGS & PLEURA: CPT | Mod: 78

## 2025-02-11 PROCEDURE — 85730 THROMBOPLASTIN TIME PARTIAL: CPT

## 2025-02-11 PROCEDURE — 97162 PT EVAL MOD COMPLEX 30 MIN: CPT | Mod: GP

## 2025-02-11 PROCEDURE — 88333 PATH CONSLTJ SURG CYTO XM 1: CPT

## 2025-02-11 PROCEDURE — 85610 PROTHROMBIN TIME: CPT

## 2025-02-11 PROCEDURE — 32674 THORACOSCOPY LYMPH NODE EXC: CPT | Mod: RT

## 2025-02-11 PROCEDURE — 80048 BASIC METABOLIC PNL TOTAL CA: CPT

## 2025-02-11 PROCEDURE — S2900: CPT

## 2025-02-11 PROCEDURE — 87641 MR-STAPH DNA AMP PROBE: CPT

## 2025-02-11 PROCEDURE — 88307 TISSUE EXAM BY PATHOLOGIST: CPT

## 2025-02-11 RX ORDER — LEVOTHYROXINE SODIUM 25 UG/1
1 TABLET ORAL
Refills: 0 | DISCHARGE

## 2025-02-11 RX ORDER — HYDROMORPHONE/SOD CHLOR,ISO/PF 2 MG/10 ML
0.5 SYRINGE (ML) INJECTION
Refills: 0 | Status: DISCONTINUED | OUTPATIENT
Start: 2025-02-11 | End: 2025-02-11

## 2025-02-11 RX ORDER — CEFAZOLIN SODIUM IN 0.9 % NACL 3 G/100 ML
2000 INTRAVENOUS SOLUTION, PIGGYBACK (ML) INTRAVENOUS EVERY 8 HOURS
Refills: 0 | Status: DISCONTINUED | OUTPATIENT
Start: 2025-02-11 | End: 2025-02-11

## 2025-02-11 RX ORDER — LEVOTHYROXINE SODIUM 300 MCG
100 TABLET ORAL DAILY
Refills: 0 | Status: DISCONTINUED | OUTPATIENT
Start: 2025-02-11 | End: 2025-02-16

## 2025-02-11 RX ORDER — SODIUM CHLORIDE 9 G/1000ML
1000 INJECTION, SOLUTION INTRAVENOUS
Refills: 0 | Status: DISCONTINUED | OUTPATIENT
Start: 2025-02-11 | End: 2025-02-11

## 2025-02-11 RX ORDER — ACETAMINOPHEN 500 MG/5ML
1000 LIQUID (ML) ORAL ONCE
Refills: 0 | Status: COMPLETED | OUTPATIENT
Start: 2025-02-11 | End: 2025-02-11

## 2025-02-11 RX ORDER — FENTANYL CITRATE-0.9 % NACL/PF 100MCG/2ML
50 SYRINGE (ML) INTRAVENOUS
Refills: 0 | Status: DISCONTINUED | OUTPATIENT
Start: 2025-02-11 | End: 2025-02-11

## 2025-02-11 RX ORDER — ACETAMINOPHEN 500 MG/5ML
975 LIQUID (ML) ORAL EVERY 8 HOURS
Refills: 0 | Status: COMPLETED | OUTPATIENT
Start: 2025-02-11 | End: 2025-02-13

## 2025-02-11 RX ORDER — METOPROLOL SUCCINATE 50 MG/1
25 TABLET, EXTENDED RELEASE ORAL DAILY
Refills: 0 | Status: DISCONTINUED | OUTPATIENT
Start: 2025-02-11 | End: 2025-02-16

## 2025-02-11 RX ORDER — OXYCODONE HYDROCHLORIDE 30 MG/1
7.5 TABLET ORAL EVERY 4 HOURS
Refills: 0 | Status: DISCONTINUED | OUTPATIENT
Start: 2025-02-11 | End: 2025-02-14

## 2025-02-11 RX ORDER — HEPARIN SODIUM 1000 [USP'U]/ML
5000 INJECTION INTRAVENOUS; SUBCUTANEOUS EVERY 12 HOURS
Refills: 0 | Status: DISCONTINUED | OUTPATIENT
Start: 2025-02-11 | End: 2025-02-11

## 2025-02-11 RX ORDER — ASPIRIN 325 MG
81 TABLET ORAL DAILY
Refills: 0 | Status: DISCONTINUED | OUTPATIENT
Start: 2025-02-11 | End: 2025-02-11

## 2025-02-11 RX ORDER — OXYCODONE HYDROCHLORIDE 30 MG/1
5 TABLET ORAL ONCE
Refills: 0 | Status: DISCONTINUED | OUTPATIENT
Start: 2025-02-11 | End: 2025-02-11

## 2025-02-11 RX ORDER — ATORVASTATIN CALCIUM 80 MG/1
80 TABLET, FILM COATED ORAL AT BEDTIME
Refills: 0 | Status: DISCONTINUED | OUTPATIENT
Start: 2025-02-11 | End: 2025-02-16

## 2025-02-11 RX ORDER — LIDOCAINE HYDROCHLORIDE 20 MG/ML
1 JELLY TOPICAL DAILY
Refills: 0 | Status: DISCONTINUED | OUTPATIENT
Start: 2025-02-11 | End: 2025-02-16

## 2025-02-11 RX ORDER — AMLODIPINE BESYLATE 10 MG/1
2.5 TABLET ORAL DAILY
Refills: 0 | Status: DISCONTINUED | OUTPATIENT
Start: 2025-02-11 | End: 2025-02-16

## 2025-02-11 RX ORDER — ONDANSETRON HCL/PF 4 MG/2 ML
4 VIAL (ML) INJECTION ONCE
Refills: 0 | Status: DISCONTINUED | OUTPATIENT
Start: 2025-02-11 | End: 2025-02-11

## 2025-02-11 RX ORDER — CEFAZOLIN SODIUM IN 0.9 % NACL 3 G/100 ML
2000 INTRAVENOUS SOLUTION, PIGGYBACK (ML) INTRAVENOUS EVERY 8 HOURS
Refills: 0 | Status: COMPLETED | OUTPATIENT
Start: 2025-02-11 | End: 2025-02-12

## 2025-02-11 RX ORDER — LISINOPRIL 5 MG/1
40 TABLET ORAL DAILY
Refills: 0 | Status: DISCONTINUED | OUTPATIENT
Start: 2025-02-11 | End: 2025-02-16

## 2025-02-11 RX ORDER — OXYCODONE HYDROCHLORIDE 30 MG/1
2.5 TABLET ORAL EVERY 4 HOURS
Refills: 0 | Status: DISCONTINUED | OUTPATIENT
Start: 2025-02-11 | End: 2025-02-16

## 2025-02-11 RX ORDER — OXYCODONE HYDROCHLORIDE 30 MG/1
5 TABLET ORAL EVERY 4 HOURS
Refills: 0 | Status: DISCONTINUED | OUTPATIENT
Start: 2025-02-11 | End: 2025-02-16

## 2025-02-11 RX ORDER — ACETAMINOPHEN 500 MG/5ML
650 LIQUID (ML) ORAL EVERY 4 HOURS
Refills: 0 | Status: DISCONTINUED | OUTPATIENT
Start: 2025-02-11 | End: 2025-02-11

## 2025-02-11 RX ADMIN — Medication 400 MILLIGRAM(S): at 19:20

## 2025-02-11 RX ADMIN — OXYCODONE HYDROCHLORIDE 5 MILLIGRAM(S): 30 TABLET ORAL at 22:17

## 2025-02-11 RX ADMIN — OXYCODONE HYDROCHLORIDE 5 MILLIGRAM(S): 30 TABLET ORAL at 22:47

## 2025-02-11 RX ADMIN — SODIUM CHLORIDE 75 MILLILITER(S): 9 INJECTION, SOLUTION INTRAVENOUS at 12:15

## 2025-02-11 RX ADMIN — Medication 2000 MILLIGRAM(S): at 22:16

## 2025-02-12 DIAGNOSIS — I10 ESSENTIAL (PRIMARY) HYPERTENSION: ICD-10-CM

## 2025-02-12 DIAGNOSIS — Z98.890 OTHER SPECIFIED POSTPROCEDURAL STATES: ICD-10-CM

## 2025-02-12 DIAGNOSIS — D75.1 SECONDARY POLYCYTHEMIA: ICD-10-CM

## 2025-02-12 DIAGNOSIS — I48.20 CHRONIC ATRIAL FIBRILLATION, UNSPECIFIED: ICD-10-CM

## 2025-02-12 DIAGNOSIS — Z96.89 PRESENCE OF OTHER SPECIFIED FUNCTIONAL IMPLANTS: ICD-10-CM

## 2025-02-12 DIAGNOSIS — J95.89 OTHER POSTPROCEDURAL COMPLICATIONS AND DISORDERS OF RESPIRATORY SYSTEM, NOT ELSEWHERE CLASSIFIED: ICD-10-CM

## 2025-02-12 DIAGNOSIS — C64.9 MALIGNANT NEOPLASM OF UNSPECIFIED KIDNEY, EXCEPT RENAL PELVIS: ICD-10-CM

## 2025-02-12 DIAGNOSIS — I38 ENDOCARDITIS, VALVE UNSPECIFIED: ICD-10-CM

## 2025-02-12 LAB
ANION GAP SERPL CALC-SCNC: 7 MMOL/L — SIGNIFICANT CHANGE UP (ref 5–17)
BUN SERPL-MCNC: 18 MG/DL — SIGNIFICANT CHANGE UP (ref 7–23)
CALCIUM SERPL-MCNC: 8.1 MG/DL — LOW (ref 8.5–10.1)
CHLORIDE SERPL-SCNC: 109 MMOL/L — HIGH (ref 96–108)
CO2 SERPL-SCNC: 22 MMOL/L — SIGNIFICANT CHANGE UP (ref 22–31)
CREAT SERPL-MCNC: 1.08 MG/DL — SIGNIFICANT CHANGE UP (ref 0.5–1.3)
EGFR: 69 ML/MIN/1.73M2 — SIGNIFICANT CHANGE UP
EGFR: 69 ML/MIN/1.73M2 — SIGNIFICANT CHANGE UP
GLUCOSE SERPL-MCNC: 121 MG/DL — HIGH (ref 70–99)
HCT VFR BLD CALC: 40.7 % — SIGNIFICANT CHANGE UP (ref 39–50)
HGB BLD-MCNC: 13.4 G/DL — SIGNIFICANT CHANGE UP (ref 13–17)
MAGNESIUM SERPL-MCNC: 1.8 MG/DL — SIGNIFICANT CHANGE UP (ref 1.6–2.6)
MCHC RBC-ENTMCNC: 27.3 PG — SIGNIFICANT CHANGE UP (ref 27–34)
MCHC RBC-ENTMCNC: 32.9 G/DL — SIGNIFICANT CHANGE UP (ref 32–36)
MCV RBC AUTO: 82.9 FL — SIGNIFICANT CHANGE UP (ref 80–100)
NRBC # BLD AUTO: 0 K/UL — SIGNIFICANT CHANGE UP (ref 0–0)
NRBC # FLD: 0 K/UL — SIGNIFICANT CHANGE UP (ref 0–0)
NRBC BLD AUTO-RTO: 0 /100 WBCS — SIGNIFICANT CHANGE UP (ref 0–0)
PLATELET # BLD AUTO: 104 K/UL — LOW (ref 150–400)
PMV BLD: 11.2 FL — SIGNIFICANT CHANGE UP (ref 7–13)
POTASSIUM SERPL-MCNC: 4.2 MMOL/L — SIGNIFICANT CHANGE UP (ref 3.5–5.3)
POTASSIUM SERPL-SCNC: 4.2 MMOL/L — SIGNIFICANT CHANGE UP (ref 3.5–5.3)
RBC # BLD: 4.91 M/UL — SIGNIFICANT CHANGE UP (ref 4.2–5.8)
RBC # FLD: 16.9 % — HIGH (ref 10.3–14.5)
SODIUM SERPL-SCNC: 138 MMOL/L — SIGNIFICANT CHANGE UP (ref 135–145)
WBC # BLD: 10.31 K/UL — SIGNIFICANT CHANGE UP (ref 3.8–10.5)
WBC # FLD AUTO: 10.31 K/UL — SIGNIFICANT CHANGE UP (ref 3.8–10.5)

## 2025-02-12 PROCEDURE — 99024 POSTOP FOLLOW-UP VISIT: CPT

## 2025-02-12 PROCEDURE — 99232 SBSQ HOSP IP/OBS MODERATE 35: CPT

## 2025-02-12 PROCEDURE — 99223 1ST HOSP IP/OBS HIGH 75: CPT

## 2025-02-12 PROCEDURE — 71045 X-RAY EXAM CHEST 1 VIEW: CPT | Mod: 26

## 2025-02-12 PROCEDURE — 99223 1ST HOSP IP/OBS HIGH 75: CPT | Mod: FS

## 2025-02-12 RX ORDER — SODIUM CHLORIDE 9 G/1000ML
1000 INJECTION, SOLUTION INTRAVENOUS
Refills: 0 | Status: DISCONTINUED | OUTPATIENT
Start: 2025-02-12 | End: 2025-02-12

## 2025-02-12 RX ORDER — ASPIRIN 325 MG
81 TABLET ORAL DAILY
Refills: 0 | Status: DISCONTINUED | OUTPATIENT
Start: 2025-02-12 | End: 2025-02-16

## 2025-02-12 RX ADMIN — Medication 81 MILLIGRAM(S): at 16:05

## 2025-02-12 RX ADMIN — Medication 975 MILLIGRAM(S): at 23:53

## 2025-02-12 RX ADMIN — Medication 2000 MILLIGRAM(S): at 05:52

## 2025-02-12 RX ADMIN — AMLODIPINE BESYLATE 2.5 MILLIGRAM(S): 10 TABLET ORAL at 10:09

## 2025-02-12 RX ADMIN — Medication 20 MILLIGRAM(S): at 22:53

## 2025-02-12 RX ADMIN — Medication 975 MILLIGRAM(S): at 16:05

## 2025-02-12 RX ADMIN — LISINOPRIL 40 MILLIGRAM(S): 5 TABLET ORAL at 10:11

## 2025-02-12 RX ADMIN — Medication 100 MICROGRAM(S): at 05:51

## 2025-02-12 RX ADMIN — Medication 975 MILLIGRAM(S): at 05:52

## 2025-02-12 RX ADMIN — LIDOCAINE HYDROCHLORIDE 1 PATCH: 20 JELLY TOPICAL at 10:10

## 2025-02-12 RX ADMIN — Medication 975 MILLIGRAM(S): at 22:53

## 2025-02-12 RX ADMIN — SODIUM CHLORIDE 75 MILLILITER(S): 9 INJECTION, SOLUTION INTRAVENOUS at 05:51

## 2025-02-12 RX ADMIN — ATORVASTATIN CALCIUM 80 MILLIGRAM(S): 80 TABLET, FILM COATED ORAL at 22:53

## 2025-02-12 RX ADMIN — METOPROLOL SUCCINATE 25 MILLIGRAM(S): 50 TABLET, EXTENDED RELEASE ORAL at 10:10

## 2025-02-12 RX ADMIN — LIDOCAINE HYDROCHLORIDE 1 PATCH: 20 JELLY TOPICAL at 20:11

## 2025-02-12 RX ADMIN — Medication 20 MILLIGRAM(S): at 10:10

## 2025-02-12 RX ADMIN — LIDOCAINE HYDROCHLORIDE 1 PATCH: 20 JELLY TOPICAL at 22:10

## 2025-02-12 RX ADMIN — OXYCODONE HYDROCHLORIDE 7.5 MILLIGRAM(S): 30 TABLET ORAL at 10:09

## 2025-02-13 LAB
ANION GAP SERPL CALC-SCNC: 5 MMOL/L — SIGNIFICANT CHANGE UP (ref 5–17)
BUN SERPL-MCNC: 24 MG/DL — HIGH (ref 7–23)
CALCIUM SERPL-MCNC: 8.4 MG/DL — LOW (ref 8.5–10.1)
CHLORIDE SERPL-SCNC: 110 MMOL/L — HIGH (ref 96–108)
CO2 SERPL-SCNC: 23 MMOL/L — SIGNIFICANT CHANGE UP (ref 22–31)
CREAT SERPL-MCNC: 1.21 MG/DL — SIGNIFICANT CHANGE UP (ref 0.5–1.3)
EGFR: 60 ML/MIN/1.73M2 — SIGNIFICANT CHANGE UP
EGFR: 60 ML/MIN/1.73M2 — SIGNIFICANT CHANGE UP
GLUCOSE SERPL-MCNC: 132 MG/DL — HIGH (ref 70–99)
HCT VFR BLD CALC: 40 % — SIGNIFICANT CHANGE UP (ref 39–50)
HGB BLD-MCNC: 12.9 G/DL — LOW (ref 13–17)
MCHC RBC-ENTMCNC: 27.1 PG — SIGNIFICANT CHANGE UP (ref 27–34)
MCHC RBC-ENTMCNC: 32.3 G/DL — SIGNIFICANT CHANGE UP (ref 32–36)
MCV RBC AUTO: 84 FL — SIGNIFICANT CHANGE UP (ref 80–100)
MRSA PCR RESULT.: SIGNIFICANT CHANGE UP
NRBC # BLD AUTO: 0 K/UL — SIGNIFICANT CHANGE UP (ref 0–0)
NRBC # FLD: 0 K/UL — SIGNIFICANT CHANGE UP (ref 0–0)
NRBC BLD AUTO-RTO: 0 /100 WBCS — SIGNIFICANT CHANGE UP (ref 0–0)
PLATELET # BLD AUTO: 101 K/UL — LOW (ref 150–400)
PMV BLD: 11 FL — SIGNIFICANT CHANGE UP (ref 7–13)
POTASSIUM SERPL-MCNC: 3.7 MMOL/L — SIGNIFICANT CHANGE UP (ref 3.5–5.3)
POTASSIUM SERPL-SCNC: 3.7 MMOL/L — SIGNIFICANT CHANGE UP (ref 3.5–5.3)
RBC # BLD: 4.76 M/UL — SIGNIFICANT CHANGE UP (ref 4.2–5.8)
RBC # FLD: 17.4 % — HIGH (ref 10.3–14.5)
S AUREUS DNA NOSE QL NAA+PROBE: SIGNIFICANT CHANGE UP
SODIUM SERPL-SCNC: 138 MMOL/L — SIGNIFICANT CHANGE UP (ref 135–145)
WBC # BLD: 7.25 K/UL — SIGNIFICANT CHANGE UP (ref 3.8–10.5)
WBC # FLD AUTO: 7.25 K/UL — SIGNIFICANT CHANGE UP (ref 3.8–10.5)

## 2025-02-13 PROCEDURE — 71045 X-RAY EXAM CHEST 1 VIEW: CPT | Mod: 26

## 2025-02-13 PROCEDURE — 99233 SBSQ HOSP IP/OBS HIGH 50: CPT | Mod: FS

## 2025-02-13 PROCEDURE — 99232 SBSQ HOSP IP/OBS MODERATE 35: CPT

## 2025-02-13 PROCEDURE — 99024 POSTOP FOLLOW-UP VISIT: CPT

## 2025-02-13 RX ORDER — SENNA 187 MG
2 TABLET ORAL AT BEDTIME
Refills: 0 | Status: DISCONTINUED | OUTPATIENT
Start: 2025-02-13 | End: 2025-02-16

## 2025-02-13 RX ORDER — POLYETHYLENE GLYCOL 3350 17 G/17G
17 POWDER, FOR SOLUTION ORAL DAILY
Refills: 0 | Status: DISCONTINUED | OUTPATIENT
Start: 2025-02-13 | End: 2025-02-16

## 2025-02-13 RX ORDER — PSYLLIUM SEED (WITH DEXTROSE)
1 POWDER (GRAM) ORAL DAILY
Refills: 0 | Status: DISCONTINUED | OUTPATIENT
Start: 2025-02-13 | End: 2025-02-16

## 2025-02-13 RX ADMIN — METOPROLOL SUCCINATE 25 MILLIGRAM(S): 50 TABLET, EXTENDED RELEASE ORAL at 10:50

## 2025-02-13 RX ADMIN — AMLODIPINE BESYLATE 2.5 MILLIGRAM(S): 10 TABLET ORAL at 10:50

## 2025-02-13 RX ADMIN — Medication 20 MILLIGRAM(S): at 21:26

## 2025-02-13 RX ADMIN — LIDOCAINE HYDROCHLORIDE 1 PATCH: 20 JELLY TOPICAL at 18:20

## 2025-02-13 RX ADMIN — ATORVASTATIN CALCIUM 80 MILLIGRAM(S): 80 TABLET, FILM COATED ORAL at 21:26

## 2025-02-13 RX ADMIN — LIDOCAINE HYDROCHLORIDE 1 PATCH: 20 JELLY TOPICAL at 22:00

## 2025-02-13 RX ADMIN — Medication 975 MILLIGRAM(S): at 16:01

## 2025-02-13 RX ADMIN — Medication 975 MILLIGRAM(S): at 05:06

## 2025-02-13 RX ADMIN — Medication 81 MILLIGRAM(S): at 10:50

## 2025-02-13 RX ADMIN — POLYETHYLENE GLYCOL 3350 17 GRAM(S): 17 POWDER, FOR SOLUTION ORAL at 15:00

## 2025-02-13 RX ADMIN — OXYCODONE HYDROCHLORIDE 2.5 MILLIGRAM(S): 30 TABLET ORAL at 18:20

## 2025-02-13 RX ADMIN — Medication 100 MICROGRAM(S): at 05:05

## 2025-02-13 RX ADMIN — Medication 975 MILLIGRAM(S): at 14:58

## 2025-02-13 RX ADMIN — OXYCODONE HYDROCHLORIDE 2.5 MILLIGRAM(S): 30 TABLET ORAL at 23:53

## 2025-02-13 RX ADMIN — LISINOPRIL 40 MILLIGRAM(S): 5 TABLET ORAL at 10:50

## 2025-02-13 RX ADMIN — Medication 20 MILLIGRAM(S): at 10:49

## 2025-02-13 RX ADMIN — LIDOCAINE HYDROCHLORIDE 1 PATCH: 20 JELLY TOPICAL at 10:49

## 2025-02-13 RX ADMIN — Medication 2 TABLET(S): at 21:25

## 2025-02-14 ENCOUNTER — TRANSCRIPTION ENCOUNTER (OUTPATIENT)
Age: 80
End: 2025-02-14

## 2025-02-14 LAB
ALBUMIN SERPL ELPH-MCNC: 2.7 G/DL — LOW (ref 3.3–5)
ALP SERPL-CCNC: 63 U/L — SIGNIFICANT CHANGE UP (ref 40–120)
ALT FLD-CCNC: 24 U/L — SIGNIFICANT CHANGE UP (ref 12–78)
ANION GAP SERPL CALC-SCNC: 3 MMOL/L — LOW (ref 5–17)
AST SERPL-CCNC: 33 U/L — SIGNIFICANT CHANGE UP (ref 15–37)
BILIRUB SERPL-MCNC: 1.6 MG/DL — HIGH (ref 0.2–1.2)
BUN SERPL-MCNC: 22 MG/DL — SIGNIFICANT CHANGE UP (ref 7–23)
CALCIUM SERPL-MCNC: 8.8 MG/DL — SIGNIFICANT CHANGE UP (ref 8.5–10.1)
CHLORIDE SERPL-SCNC: 111 MMOL/L — HIGH (ref 96–108)
CO2 SERPL-SCNC: 26 MMOL/L — SIGNIFICANT CHANGE UP (ref 22–31)
CREAT SERPL-MCNC: 1.08 MG/DL — SIGNIFICANT CHANGE UP (ref 0.5–1.3)
EGFR: 69 ML/MIN/1.73M2 — SIGNIFICANT CHANGE UP
EGFR: 69 ML/MIN/1.73M2 — SIGNIFICANT CHANGE UP
GLUCOSE SERPL-MCNC: 104 MG/DL — HIGH (ref 70–99)
HCT VFR BLD CALC: 44.7 % — SIGNIFICANT CHANGE UP (ref 39–50)
HGB BLD-MCNC: 14.3 G/DL — SIGNIFICANT CHANGE UP (ref 13–17)
MCHC RBC-ENTMCNC: 27.6 PG — SIGNIFICANT CHANGE UP (ref 27–34)
MCHC RBC-ENTMCNC: 32 G/DL — SIGNIFICANT CHANGE UP (ref 32–36)
MCV RBC AUTO: 86.1 FL — SIGNIFICANT CHANGE UP (ref 80–100)
NRBC # BLD AUTO: 0 K/UL — SIGNIFICANT CHANGE UP (ref 0–0)
NRBC # FLD: 0 K/UL — SIGNIFICANT CHANGE UP (ref 0–0)
NRBC BLD AUTO-RTO: 0 /100 WBCS — SIGNIFICANT CHANGE UP (ref 0–0)
PLATELET # BLD AUTO: 118 K/UL — LOW (ref 150–400)
PMV BLD: 10.8 FL — SIGNIFICANT CHANGE UP (ref 7–13)
POTASSIUM SERPL-MCNC: 4.4 MMOL/L — SIGNIFICANT CHANGE UP (ref 3.5–5.3)
POTASSIUM SERPL-SCNC: 4.4 MMOL/L — SIGNIFICANT CHANGE UP (ref 3.5–5.3)
PROT SERPL-MCNC: 5.7 GM/DL — LOW (ref 6–8.3)
RBC # BLD: 5.19 M/UL — SIGNIFICANT CHANGE UP (ref 4.2–5.8)
RBC # FLD: 17.8 % — HIGH (ref 10.3–14.5)
SODIUM SERPL-SCNC: 140 MMOL/L — SIGNIFICANT CHANGE UP (ref 135–145)
WBC # BLD: 7.76 K/UL — SIGNIFICANT CHANGE UP (ref 3.8–10.5)
WBC # FLD AUTO: 7.76 K/UL — SIGNIFICANT CHANGE UP (ref 3.8–10.5)

## 2025-02-14 PROCEDURE — 99233 SBSQ HOSP IP/OBS HIGH 50: CPT | Mod: FS

## 2025-02-14 PROCEDURE — 99024 POSTOP FOLLOW-UP VISIT: CPT

## 2025-02-14 PROCEDURE — 71045 X-RAY EXAM CHEST 1 VIEW: CPT | Mod: 26

## 2025-02-14 RX ORDER — SENNA 187 MG
2 TABLET ORAL
Qty: 0 | Refills: 0 | DISCHARGE
Start: 2025-02-14

## 2025-02-14 RX ORDER — RIVAROXABAN 10 MG/1
20 TABLET, FILM COATED ORAL
Refills: 0 | Status: DISCONTINUED | OUTPATIENT
Start: 2025-02-14 | End: 2025-02-16

## 2025-02-14 RX ORDER — ACETAMINOPHEN 500 MG/5ML
650 LIQUID (ML) ORAL EVERY 6 HOURS
Refills: 0 | Status: DISCONTINUED | OUTPATIENT
Start: 2025-02-14 | End: 2025-02-16

## 2025-02-14 RX ORDER — BISACODYL 5 MG
10 TABLET, DELAYED RELEASE (ENTERIC COATED) ORAL ONCE
Refills: 0 | Status: COMPLETED | OUTPATIENT
Start: 2025-02-14 | End: 2025-02-15

## 2025-02-14 RX ORDER — OXYCODONE HYDROCHLORIDE 30 MG/1
1 TABLET ORAL
Qty: 28 | Refills: 0
Start: 2025-02-14

## 2025-02-14 RX ORDER — CALCIUM CARBONATE/VITAMIN D3 600 MG-10
1 TABLET ORAL
Refills: 0 | DISCHARGE

## 2025-02-14 RX ORDER — ACETAMINOPHEN 500 MG/5ML
2 LIQUID (ML) ORAL
Qty: 0 | Refills: 0 | DISCHARGE
Start: 2025-02-14

## 2025-02-14 RX ORDER — CHLORHEXIDINE GLUCONATE 4 %
0 LIQUID (ML) TOPICAL
Refills: 0 | DISCHARGE

## 2025-02-14 RX ORDER — FUROSEMIDE 10 MG/ML
10 INJECTION INTRAMUSCULAR; INTRAVENOUS ONCE
Refills: 0 | Status: COMPLETED | OUTPATIENT
Start: 2025-02-14 | End: 2025-02-14

## 2025-02-14 RX ADMIN — Medication 20 MILLIGRAM(S): at 21:35

## 2025-02-14 RX ADMIN — POLYETHYLENE GLYCOL 3350 17 GRAM(S): 17 POWDER, FOR SOLUTION ORAL at 11:39

## 2025-02-14 RX ADMIN — RIVAROXABAN 20 MILLIGRAM(S): 10 TABLET, FILM COATED ORAL at 17:50

## 2025-02-14 RX ADMIN — Medication 1 PACKET(S): at 17:51

## 2025-02-14 RX ADMIN — Medication 81 MILLIGRAM(S): at 09:49

## 2025-02-14 RX ADMIN — METOPROLOL SUCCINATE 25 MILLIGRAM(S): 50 TABLET, EXTENDED RELEASE ORAL at 09:49

## 2025-02-14 RX ADMIN — Medication 100 MICROGRAM(S): at 06:30

## 2025-02-14 RX ADMIN — Medication 2 TABLET(S): at 21:33

## 2025-02-14 RX ADMIN — AMLODIPINE BESYLATE 2.5 MILLIGRAM(S): 10 TABLET ORAL at 09:49

## 2025-02-14 RX ADMIN — LISINOPRIL 40 MILLIGRAM(S): 5 TABLET ORAL at 09:49

## 2025-02-14 RX ADMIN — Medication 20 MILLIGRAM(S): at 09:49

## 2025-02-14 RX ADMIN — FUROSEMIDE 10 MILLIGRAM(S): 10 INJECTION INTRAMUSCULAR; INTRAVENOUS at 11:39

## 2025-02-14 RX ADMIN — ATORVASTATIN CALCIUM 80 MILLIGRAM(S): 80 TABLET, FILM COATED ORAL at 21:33

## 2025-02-14 RX ADMIN — OXYCODONE HYDROCHLORIDE 2.5 MILLIGRAM(S): 30 TABLET ORAL at 00:20

## 2025-02-15 LAB
HCT VFR BLD CALC: 43.5 % — SIGNIFICANT CHANGE UP (ref 39–50)
HGB BLD-MCNC: 13.9 G/DL — SIGNIFICANT CHANGE UP (ref 13–17)
MCHC RBC-ENTMCNC: 27.4 PG — SIGNIFICANT CHANGE UP (ref 27–34)
MCHC RBC-ENTMCNC: 32 G/DL — SIGNIFICANT CHANGE UP (ref 32–36)
MCV RBC AUTO: 85.8 FL — SIGNIFICANT CHANGE UP (ref 80–100)
NRBC # BLD AUTO: 0 K/UL — SIGNIFICANT CHANGE UP (ref 0–0)
NRBC # FLD: 0 K/UL — SIGNIFICANT CHANGE UP (ref 0–0)
NRBC BLD AUTO-RTO: 0 /100 WBCS — SIGNIFICANT CHANGE UP (ref 0–0)
PLATELET # BLD AUTO: 120 K/UL — LOW (ref 150–400)
PMV BLD: 10.8 FL — SIGNIFICANT CHANGE UP (ref 7–13)
RBC # BLD: 5.07 M/UL — SIGNIFICANT CHANGE UP (ref 4.2–5.8)
RBC # FLD: 17.6 % — HIGH (ref 10.3–14.5)
WBC # BLD: 7.63 K/UL — SIGNIFICANT CHANGE UP (ref 3.8–10.5)
WBC # FLD AUTO: 7.63 K/UL — SIGNIFICANT CHANGE UP (ref 3.8–10.5)

## 2025-02-15 PROCEDURE — 71045 X-RAY EXAM CHEST 1 VIEW: CPT | Mod: 26

## 2025-02-15 PROCEDURE — 71045 X-RAY EXAM CHEST 1 VIEW: CPT | Mod: 26,77

## 2025-02-15 RX ADMIN — Medication 1 PACKET(S): at 10:13

## 2025-02-15 RX ADMIN — Medication 20 MILLIGRAM(S): at 10:12

## 2025-02-15 RX ADMIN — ATORVASTATIN CALCIUM 80 MILLIGRAM(S): 80 TABLET, FILM COATED ORAL at 22:38

## 2025-02-15 RX ADMIN — Medication 100 MICROGRAM(S): at 06:38

## 2025-02-15 RX ADMIN — Medication 20 MILLIGRAM(S): at 22:37

## 2025-02-15 RX ADMIN — Medication 2 TABLET(S): at 22:39

## 2025-02-15 RX ADMIN — METOPROLOL SUCCINATE 25 MILLIGRAM(S): 50 TABLET, EXTENDED RELEASE ORAL at 10:13

## 2025-02-15 RX ADMIN — Medication 81 MILLIGRAM(S): at 10:13

## 2025-02-15 RX ADMIN — AMLODIPINE BESYLATE 2.5 MILLIGRAM(S): 10 TABLET ORAL at 10:13

## 2025-02-15 RX ADMIN — POLYETHYLENE GLYCOL 3350 17 GRAM(S): 17 POWDER, FOR SOLUTION ORAL at 10:13

## 2025-02-15 RX ADMIN — LISINOPRIL 40 MILLIGRAM(S): 5 TABLET ORAL at 10:13

## 2025-02-15 RX ADMIN — RIVAROXABAN 20 MILLIGRAM(S): 10 TABLET, FILM COATED ORAL at 18:14

## 2025-02-15 RX ADMIN — Medication 10 MILLIGRAM(S): at 11:14

## 2025-02-16 VITALS — TEMPERATURE: 98 F

## 2025-02-16 PROCEDURE — 71045 X-RAY EXAM CHEST 1 VIEW: CPT | Mod: 26

## 2025-02-16 RX ADMIN — METOPROLOL SUCCINATE 25 MILLIGRAM(S): 50 TABLET, EXTENDED RELEASE ORAL at 10:17

## 2025-02-16 RX ADMIN — LISINOPRIL 40 MILLIGRAM(S): 5 TABLET ORAL at 10:17

## 2025-02-16 RX ADMIN — Medication 81 MILLIGRAM(S): at 10:17

## 2025-02-16 RX ADMIN — Medication 1 PACKET(S): at 10:16

## 2025-02-16 RX ADMIN — Medication 20 MILLIGRAM(S): at 10:17

## 2025-02-16 RX ADMIN — AMLODIPINE BESYLATE 2.5 MILLIGRAM(S): 10 TABLET ORAL at 10:17

## 2025-02-16 RX ADMIN — Medication 100 MICROGRAM(S): at 07:18

## 2025-02-17 LAB — SURGICAL PATHOLOGY STUDY: SIGNIFICANT CHANGE UP

## 2025-02-21 ENCOUNTER — APPOINTMENT (OUTPATIENT)
Dept: THORACIC SURGERY | Facility: CLINIC | Age: 80
End: 2025-02-21

## 2025-02-28 ENCOUNTER — OUTPATIENT (OUTPATIENT)
Dept: OUTPATIENT SERVICES | Facility: HOSPITAL | Age: 80
LOS: 1 days | End: 2025-02-28
Payer: MEDICARE

## 2025-02-28 ENCOUNTER — APPOINTMENT (OUTPATIENT)
Dept: THORACIC SURGERY | Facility: CLINIC | Age: 80
End: 2025-02-28
Payer: MEDICARE

## 2025-02-28 ENCOUNTER — EMERGENCY (EMERGENCY)
Facility: HOSPITAL | Age: 80
LOS: 0 days | Discharge: HOME | End: 2025-03-01
Attending: STUDENT IN AN ORGANIZED HEALTH CARE EDUCATION/TRAINING PROGRAM
Payer: MEDICARE

## 2025-02-28 VITALS
BODY MASS INDEX: 27.28 KG/M2 | DIASTOLIC BLOOD PRESSURE: 79 MMHG | WEIGHT: 180 LBS | SYSTOLIC BLOOD PRESSURE: 114 MMHG | HEIGHT: 68 IN | HEART RATE: 90 BPM | OXYGEN SATURATION: 100 %

## 2025-02-28 VITALS — HEIGHT: 70 IN | WEIGHT: 179.9 LBS

## 2025-02-28 DIAGNOSIS — Z90.5 ACQUIRED ABSENCE OF KIDNEY: Chronic | ICD-10-CM

## 2025-02-28 DIAGNOSIS — Z98.890 OTHER SPECIFIED POSTPROCEDURAL STATES: Chronic | ICD-10-CM

## 2025-02-28 DIAGNOSIS — Z86.73 PERSONAL HISTORY OF TRANSIENT ISCHEMIC ATTACK (TIA), AND CEREBRAL INFARCTION WITHOUT RESIDUAL DEFICITS: ICD-10-CM

## 2025-02-28 DIAGNOSIS — N40.0 BENIGN PROSTATIC HYPERPLASIA WITHOUT LOWER URINARY TRACT SYMPTOMS: ICD-10-CM

## 2025-02-28 DIAGNOSIS — Z90.89 ACQUIRED ABSENCE OF OTHER ORGANS: Chronic | ICD-10-CM

## 2025-02-28 DIAGNOSIS — Z85.118 PERSONAL HISTORY OF OTHER MALIGNANT NEOPLASM OF BRONCHUS AND LUNG: ICD-10-CM

## 2025-02-28 DIAGNOSIS — Z90.49 ACQUIRED ABSENCE OF OTHER SPECIFIED PARTS OF DIGESTIVE TRACT: Chronic | ICD-10-CM

## 2025-02-28 DIAGNOSIS — H26.9 UNSPECIFIED CATARACT: Chronic | ICD-10-CM

## 2025-02-28 DIAGNOSIS — L76.22 POSTPROCEDURAL HEMORRHAGE OF SKIN AND SUBCUTANEOUS TISSUE FOLLOWING OTHER PROCEDURE: ICD-10-CM

## 2025-02-28 DIAGNOSIS — Z90.2 ACQUIRED ABSENCE OF LUNG [PART OF]: Chronic | ICD-10-CM

## 2025-02-28 DIAGNOSIS — I48.91 UNSPECIFIED ATRIAL FIBRILLATION: ICD-10-CM

## 2025-02-28 DIAGNOSIS — Z85.528 PERSONAL HISTORY OF OTHER MALIGNANT NEOPLASM OF KIDNEY: ICD-10-CM

## 2025-02-28 DIAGNOSIS — C78.00 SECONDARY MALIGNANT NEOPLASM OF UNSPECIFIED LUNG: ICD-10-CM

## 2025-02-28 PROCEDURE — 71046 X-RAY EXAM CHEST 2 VIEWS: CPT | Mod: 26

## 2025-02-28 PROCEDURE — 71046 X-RAY EXAM CHEST 2 VIEWS: CPT

## 2025-02-28 PROCEDURE — 99024 POSTOP FOLLOW-UP VISIT: CPT

## 2025-02-28 PROCEDURE — 71046 X-RAY EXAM CHEST 2 VIEWS: CPT | Mod: 26,76

## 2025-02-28 PROCEDURE — 99285 EMERGENCY DEPT VISIT HI MDM: CPT | Mod: FS

## 2025-02-28 PROCEDURE — 99284 EMERGENCY DEPT VISIT MOD MDM: CPT | Mod: 25

## 2025-02-28 NOTE — ED STATDOCS - INTERNATIONAL TRAVEL
[FreeTextEntry1] : Ms. Che is a 68 year old woman with a history of valvular heart disease who underwent mechanical mitral and aortic valve replacement with tricuspid annuloplasty ring in 2004. 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 She is currently thought to have compensated cirrhosis and has no evidence of esophageal varices. In 1975 she had a cholecystectomy. She underwent gastric sleeve surgery for obesity in 2016, but has since gained back the weight. She suffers from obstructive sleep apnea, and has used CPAP for about 10 years. \par \par She was seen for initial visit By Dr. Fox on 8/28, and suggested to decrease her fluid intake to 50 oz daily, which she has done.  She does not note any significant change in symptoms. She is easily fatigued, was able to walk the length of the hallway to the clinic.  States needing help with housework and cooking. She has no PND or orthopnea. She has noted chronic LE edema which comes and goes, but has never been hospitalized due to shortness of breath or volume overload. n. She denies any joint swelling or rashes. She has no headaches. Several times a week she is only taking the PM dose or torsemide and does not take her diuretics regularly, due to having appointments or is travelling.  She is likely only taking the torsemide twice daily a few times a week.\par \par 8/28  a 6 minute walk test during which she achieved 281 meters. Pre-test her BP, HR, and oxygen saturation were 113/76, 77, and 97%, respectively. Following the test her BP, HR, and oxygen saturation were 129/75, 74, and 98%, respectively. This indicates moderate reduction in exercise capacity. Notably in January of 2016 she walked 384 meters. \par \par EKG from July 18, 2019, showing atrial fibrillation at a rate of 87 bpm. The axis was normal and there were no ischemic changes. QRS was not prolonged. \par \par Echocardiogram report from February 27, 2019, showing a nondilated left ventricular cavity size, septal wall thickness was 1.13 cm and posterior wall thickness was 1.06 cm. LVOT VTI was 22 cm. The LVEF was estimated to be 60-65% with no wall motion abnormalities. There was a mechanical aortic valve without abnormal gradients. The left atrium was severely enlarged. There was a prosthetic mitral valve with normal gradients.The RV was of normal size and function. The PASP was 26 mmHg, based on a right atrial pressure estimate of 5 mmHg. \par \par Coronary angiography from June 9th, 2015 showing minor luminal irregularities with no flow limiting lesions in all major vessels. Fluoroscopy showed normal functioning of the bileaflet aortic and mitral valves. RAP was 19, PAP was 58/33 with mean of 40. PCWP was 29. PA sat was 65% with CO/CI of 6.3/3.0.\par \par Chest X-ray report from April 2019, showing normal lung parenchyma without effusions and moderate cardiomegaly. \par \par CT of the chest and abdomen from 2015 showing cirrhotic appearing liver without evidence of HCC. There were multiple prominent periportal and retroperitoneal lymph nodes. \par \par Lab work from August 2019, showing sodium 140, BUN 22, creatinine 0.8, AST 34, ALT 19, HDL 68, LDL 97, hsCRP 4., WBC 7.6, Hct 37%, HbA1c 4.7%, TSH 3.1. 
No

## 2025-02-28 NOTE — ED STATDOCS - NSICDXPASTSURGICALHX_GEN_ALL_CORE_FT
PHYSICIAN NEXT STEPS:  Review Only    CHIEF COMPLAINT:  Chief Complaint/Protocol Used: Cough - Acute Non-Productive  Onset: Friday      ASSESSMENT:  ? Onset: Friday  ? Normal True  ? Onset: Friday  ? Severity: not a bad cough  ? Respiratory Distress: can breathe but hurts when taking deeper breaths  ? Fever: 99.5 but normal temp is 97.3  ? Treatment: Mucinex  ? Other Symptoms: denies chest pain  -------------------------------------------------------    DISPOSITION:  Disposition Recommendation: See PCP When Office is Open (within 3 days)  Questions that led to disposition:  ? Taking an ACE Inhibitor medication   (e.g., benazepril/LOTENSIN, captopril/CAPOTEN, enalapril/VASOTEC, lisinopril/ZESTRIL)  Patient Directed To: Unspecified  Patient Intended Action: Seek care in the doctor's office       CALL NOTES:  05/06/2019 at 9:02 AM by Grisel Cooney  ? Appt scheduled for today at 10:15am with Dr. Sethi.   ? Cough, hurts to breathe    DISPOSITION OVERRIDE/PROVIDER CONSULT:  Disposition Override: N/A  Override Source: Unspecified  Consulted with PCP: No  Consulted with On-Call Physician: No    CALLER CONTACT INFO:  Name: Palak De La Cruz (Self)  Phone 1: [phone] (Work Phone)  Phone 2: [phone] (Home Phone)  Phone 3: [phone] (Mobile) - Preferred      ENCOUNTER STARTED:  05/06/19 08:55:06 AM  ENCOUNTER ASSIGNED TO/CLOSED BY:  Grisel Cooney @ 05/06/19 09:03:08 AM      -------------------------------------------------------    CARE ADVICE given per Cough - Acute Non-Productive guideline.  SEE PCP WITHIN 3 DAYS:  * You need to be seen within 2 or 3 days. Call your doctor during regular office hours and make an appointment. An urgent care center is often the best source of care if your doctor's office is closed or you can't get an appointment. NOTE: If office will   be open tomorrow, tell caller to call then, not in 3 days.   * IF PATIENT HAS NO PCP: An urgent care center is often the best source of care if you do  not have a regular doctor you can see in the next couple days. NOTE: Try to help caller find a doctor. Is there a physician referral line or other resource? Having   a PCP or 'medical home' means better long-term care.?; ACE INHIBITORS:    * Medications in this class of drugs can sometimes cause an unexplained cough    * Medications in this class of drugs are used to treat high blood pressure or CHF    * Examples of ACE Inhibitors include: captopril, enalapril, lisonopril    * Your doctor might choose to stop the medication.; CALL BACK IF:    * Difficulty breathing occurs   * You become worse.      UNDERSTANDS CARE ADVICE: No    AGREES WITH CARE ADVICE: No    WILL FOLLOW CARE ADVICE: No    -------------------------------------------------------   PAST SURGICAL HISTORY:  Cataract     H/O colonoscopy and upper endoscopy 2019    H/O hemorrhoidectomy     History of incisional hernia repair 11/2020 with mesh    History of other surgery Excision of skin cancer    History of right nephrectomy 12/5/2019    S/P cholecystectomy     S/P tonsillectomy     Status post partial removal of lung

## 2025-02-28 NOTE — ED STATDOCS - PATIENT PORTAL LINK FT
You can access the FollowMyHealth Patient Portal offered by [facility] by registering at the following website: [email or web address]. By joining [employer]’s FollowMyHealth portal, you will also be able to view your health information using other applications (apps) compatible with our system.

## 2025-02-28 NOTE — ED ADULT TRIAGE NOTE - CHIEF COMPLAINT QUOTE
Pt ambulatory to ED w c/o "leaking from stitch removal site" on R ribcage. states he has stiches removed today at ED and when he got home site began to have blood leaking and would not stop, pt covered site with guaze and came to  for site check. no other concerns at this time.

## 2025-02-28 NOTE — ED STATDOCS - CLINICAL SUMMARY MEDICAL DECISION MAKING FREE TEXT BOX
80 year old male w PMHx lung cancer, CVA, BPH, AFIB, kidney cancer with right kidney removal presents to the ED c/o wound check  s/p stich removal. Pt was at the ED earlier for stich removal but is now leaking. Pt had surgery a few weeks ago (2/11/25) to remove tumor on his right lung. Thoracic surgeon Dr. Stubbs did surgery and removed stiches. Denies pain.    pt with right thoracic wound leak post stitch removal. no pain, no fevers, no SOB,stitch removed by  Dr. torres in outpatient. Will get thoracic consult, X-ray, reassess. 80 year old male w PMHx lung cancer, CVA, BPH, AFIB, kidney cancer with right kidney removal presents to the ED c/o leaking wound s/p stich removal x today. Pt had s lung resection a few weeks ago (2/11/25) to remove tumor on his right lung. Thoracic surgeon Dr. Stubbs did surgery and removed stiches today in OP office. Denies pain.    Will get thoracic consult, X-ray, reassess.    Spoke with ICU PA who is covering for thoracic, will contact dr. keane for further recs

## 2025-02-28 NOTE — ED STATDOCS - PROGRESS NOTE DETAILS
80 year old male w PMHx lung cancer, CVA, BPH, AFIB, kidney cancer with right kidney removal presents to the ED c/o wound check  s/p stich removal. Pt was at the ED earlier for stich removal but is now leaking. Pt had surgery a few weeks ago (2/11/25) to remove tumor on his right lung. Thoracic surgeon Dr. Stubbs did surgery and removed stiches. Denies pain.    Plan: CXR, CT Surgery consult  Harleen Nunez DNP Dressing changed by CT Sx, will keep follow up as out patient. Pt is well appearing walking with steady gait, stable for discharge and follow up without fail with medical doctor. I had a detailed discussion with the patient and/or guardian regarding the historical points, exam findings, and any diagnostic results supporting the discharge diagnosis. Pt educated on care and need for follow up. Strict return instructions and red flag signs and symptoms discussed with patient. Questions answered. Pt shows understanding of discharge information and agrees to follow. Patient with soft BP. Reports taking sildenafil tonight which he doesn't normally take. No signs of infection, lightheadedness. No pain. Lower BP, improving since arrival, likely 2/2 medication use.

## 2025-02-28 NOTE — ED STATDOCS - WR ORDER ID 1
Patient comes in for repeat EKG.  Patient's EKG continues to be abnormal.  Normal sinus rhythm.  Left axis deviation.  No ST elevation or depression.  Patient denies any cardiac history.  He does not take any prescription medication.  He denies any family history of cardiac issues.  He denies history of illicit drug use, alcohol abuse, and cigarette smoking.  He does smoke cigars occasionally.  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  He denies chest pain, shortness of breath, orthopnea, PND, leg swelling.  Patient has had an EKG in the past but not in our system.  No available EKGs to compare to.  
[id number]

## 2025-02-28 NOTE — ED STATDOCS - PHYSICAL EXAMINATION
Constitutional: NAD, alert, verbal  HEENT: NCAT, EOMi, PERRL  Cardiac: RRR no MRG  Resp: clear, no wheezing or crackles  GI: ab soft ntnd, no r/g  MSK/Ext: no edema  Neuro: SWANSON  Skin: No rashes Constitutional: NAD, alert, verbal  Cardiac: RRR no MRG  Resp: clear, no wheezing or crackles  GI: ab soft ntnd, no r/g  MSK/Ext: no edema  Neuro: SWANSON  Skin: right lateral chest wall wound site leaking clear yellow sanguinous fluid, no tenderness

## 2025-03-01 ENCOUNTER — EMERGENCY (EMERGENCY)
Facility: HOSPITAL | Age: 80
LOS: 0 days | Discharge: HOME | End: 2025-03-01
Attending: EMERGENCY MEDICINE
Payer: MEDICARE

## 2025-03-01 VITALS
SYSTOLIC BLOOD PRESSURE: 127 MMHG | RESPIRATION RATE: 20 BRPM | OXYGEN SATURATION: 97 % | TEMPERATURE: 98 F | HEART RATE: 56 BPM | DIASTOLIC BLOOD PRESSURE: 74 MMHG

## 2025-03-01 VITALS — DIASTOLIC BLOOD PRESSURE: 60 MMHG | SYSTOLIC BLOOD PRESSURE: 103 MMHG

## 2025-03-01 VITALS — HEIGHT: 70 IN

## 2025-03-01 DIAGNOSIS — K21.9 GASTRO-ESOPHAGEAL REFLUX DISEASE WITHOUT ESOPHAGITIS: ICD-10-CM

## 2025-03-01 DIAGNOSIS — T81.31XA DISRUPTION OF EXTERNAL OPERATION (SURGICAL) WOUND, NOT ELSEWHERE CLASSIFIED, INITIAL ENCOUNTER: ICD-10-CM

## 2025-03-01 DIAGNOSIS — Z90.5 ACQUIRED ABSENCE OF KIDNEY: Chronic | ICD-10-CM

## 2025-03-01 DIAGNOSIS — I48.91 UNSPECIFIED ATRIAL FIBRILLATION: ICD-10-CM

## 2025-03-01 DIAGNOSIS — N40.0 BENIGN PROSTATIC HYPERPLASIA WITHOUT LOWER URINARY TRACT SYMPTOMS: ICD-10-CM

## 2025-03-01 DIAGNOSIS — Z86.73 PERSONAL HISTORY OF TRANSIENT ISCHEMIC ATTACK (TIA), AND CEREBRAL INFARCTION WITHOUT RESIDUAL DEFICITS: ICD-10-CM

## 2025-03-01 DIAGNOSIS — Z90.2 ACQUIRED ABSENCE OF LUNG [PART OF]: Chronic | ICD-10-CM

## 2025-03-01 DIAGNOSIS — E03.9 HYPOTHYROIDISM, UNSPECIFIED: ICD-10-CM

## 2025-03-01 DIAGNOSIS — I12.9 HYPERTENSIVE CHRONIC KIDNEY DISEASE WITH STAGE 1 THROUGH STAGE 4 CHRONIC KIDNEY DISEASE, OR UNSPECIFIED CHRONIC KIDNEY DISEASE: ICD-10-CM

## 2025-03-01 DIAGNOSIS — Z98.890 OTHER SPECIFIED POSTPROCEDURAL STATES: Chronic | ICD-10-CM

## 2025-03-01 DIAGNOSIS — C78.00 SECONDARY MALIGNANT NEOPLASM OF UNSPECIFIED LUNG: ICD-10-CM

## 2025-03-01 PROCEDURE — 71045 X-RAY EXAM CHEST 1 VIEW: CPT | Mod: 26

## 2025-03-01 PROCEDURE — 99284 EMERGENCY DEPT VISIT MOD MDM: CPT | Mod: FS

## 2025-03-01 RX ORDER — LIDOCAINE HYDROCHLORIDE 10 MG/ML
10 INJECTION EPIDURAL; INFILTRATION; INTRACAUDAL ONCE
Refills: 0 | Status: DISCONTINUED | OUTPATIENT
Start: 2025-03-01 | End: 2025-03-01

## 2025-03-01 NOTE — CONSULT NOTE ADULT - ASSESSMENT
80 year old male PMHX AF (on DOAC), HTN, VHD, CVA (2023), CKD, Hypothyroid.  R Renal Cell Ca ( s/p R radical nephrectomy 2019)  Lung Mets ( s/p R UL Wedge rxn 10/2021).  Being Followed for RUL Nodule which has been increasing in size over past 6 months.   2/11/2025 Had Right Robotic VATS and RUL Wedge Rxn.  Post-op course com-0plicted by Massive Bleeding and hemorrhagic shock.  Returned to OR for R VATS and control of intercostal bleed.      Seen in Dr Rolle's  Office today and had his last stich at the CT site removed.  CXR then and now without PTX or effusion.  CT site has been weeping serous fluid since.    Attempted Pressure dressing, then Steri strips with Benzoin and Large tegraderm covering folded 2x2 - both without success    2 Sutures placed with 4-0 Ethilon under normal sterile procedure.  See sperate procedure note.  - this was successful at stopping serous weeping.    Sterile dressing applied.    Patient instructed to FU with Surgeon.     Spoke with ED Dr vizcaino able to DC home with FU with Dr Rolle.

## 2025-03-01 NOTE — CONSULT NOTE ADULT - SUBJECTIVE AND OBJECTIVE BOX
Patient is a 80y old  Male who presents with a chief complaint of     BRIEF HOSPITAL COURSE: 80 year old male PMHX AF (on DOAC), HTN, VHD, CVA (2023), CKD, Hypothyroid.  R Renal Cell Ca ( s/p R radical nephrectomy 2019)  Lung Mets ( s/p R UL Wedge rxn 10/2021).  Being Followed for RUL Nodule which has been increasing in size over past 6 months.   2/11/2025 Had Right Robotic VATS and RUL Wedge Rxn.  Post-op course com-0plicted by Massive Bleeding and hemorrhagic shock.  Returned to OR for R VATS and control of intercostal bleed.      Events last 24 hours: Seen in Dr Rolle's  Office today and had his last stich at the CT site removed.  CXR then and now without PTX or effusion.  CT site has been weeping serous fluid since.  Presents to ED with weeping CT site.   Patient has no complaints and denies any SOB or pain.     PAST MEDICAL & SURGICAL HISTORY:  Atrial fibrillation  has loop recorder  HTN (hypertension)  GERD (gastroesophageal reflux disease)  Hematuria  BPH (benign prostatic hyperplasia)  Colon polyp  benign  Incisional hernia  Hemorrhoids  History of gallstones  History of kidney cancer  Right - kidney removed  History of skin cancer  left hand fourth digit  Erectile dysfunction  Renal cancer  Deviated septum  Nodule of right lung  COVID-19 vaccine series completed  [employer] second dose 4/2021  Hypothyroid  Lung cancer  Polycythemia vera  CVA (cerebrovascular accident)  S/P cholecystectomy  H/O hemorrhoidectomy  S/P tonsillectomy  H/O colonoscopy  and upper endoscopy 2019  History of right nephrectomy  12/5/2019  History of other surgery  Excision of skin cancer  History of incisional hernia repair  11/2020 with mesh  Cataract  Status post partial removal of lung          Review of Systems:  CONSTITUTIONAL: No fever, chills, or fatigue  EYES: No eye pain, visual disturbances, or discharge  ENMT:  No difficulty hearing, tinnitus, vertigo; No sinus or throat pain  NECK: No pain or stiffness  RESPIRATORY: No cough, wheezing, chills or hemoptysis; No shortness of breath  CARDIOVASCULAR: No chest pain, palpitations, dizziness, or leg swelling  GASTROINTESTINAL: No abdominal or epigastric pain. No nausea, vomiting, or hematemesis; No diarrhea or constipation. No melena or hematochezia.  GENITOURINARY: No dysuria, frequency, hematuria, or incontinence  NEUROLOGICAL: No headaches, memory loss, loss of strength, numbness, or tremors  SKIN: No itching, burning, rashes, or lesions   MUSCULOSKELETAL: No joint pain or swelling; No muscle, back, or extremity pain  PSYCHIATRIC: No depression, anxiety, mood swings, or difficulty sleeping      Medications:  lidocaine 2% Injectable 10 milliLiter(s) Local Injection Once          ICU Vital Signs Last 24 Hrs  T(C): 36.6 (01 Mar 2025 00:08), Max: 36.6 (28 Feb 2025 22:26)  T(F): 97.9 (01 Mar 2025 00:08), Max: 97.9 (01 Mar 2025 00:08)  HR: 66 (01 Mar 2025 00:08) (66 - 68)  BP: 103/60 (01 Mar 2025 00:46) (87/73 - 103/60)  BP(mean): 75 (01 Mar 2025 00:08) (75 - 79)  RR: 20 (01 Mar 2025 00:08) (18 - 20)  SpO2: 97% (01 Mar 2025 00:08) (97% - 97%)    O2 Parameters below as of 01 Mar 2025 00:08  Patient On (Oxygen Delivery Method): room air          Physical Examination:    General:  Awake.  Alert, oriented, interactive, nonfocal    HEENT: Pupils equal, reactive to light.  Symmetric. No JVD.    PULM: Clear to auscultation bilaterally, no significant sputum production< Ct CT site Anterior axillay line 5th intercostal site with slightly open site with serous drainage.  No Crepitus     CVS: Regular rate and rhythm, no murmurs, rubs, or gallops    ABD: Soft, nondistended, nontender, normoactive bowel sounds, no masses    EXT: No edema, nontender    SKIN: Warm and well perfused, no rashes noted.        RADIOLOGY: Official read Pend - Crossly clear, no PTX    TIME SPENT: 55 minutes   (Assessing presenting problems of acute illness, which pose high probability of life threatening deterioration or end organ damage/dysfunction, as well as medical decision making including initiating plan of care, reviewing data, reviewing radiologic exams, discussing with multidisciplinary team,  discussing goals of care with patient/family, and writing this note.  Non-inclusive of procedures performed)    Date of entry of this note is equal to the date of services rendered.

## 2025-03-04 ENCOUNTER — APPOINTMENT (OUTPATIENT)
Dept: THORACIC SURGERY | Facility: CLINIC | Age: 80
End: 2025-03-04

## 2025-03-04 VITALS
WEIGHT: 180 LBS | HEART RATE: 98 BPM | DIASTOLIC BLOOD PRESSURE: 56 MMHG | SYSTOLIC BLOOD PRESSURE: 133 MMHG | OXYGEN SATURATION: 100 % | HEIGHT: 68 IN | RESPIRATION RATE: 16 BRPM | BODY MASS INDEX: 27.28 KG/M2

## 2025-03-04 DIAGNOSIS — R91.8 OTHER NONSPECIFIC ABNORMAL FINDING OF LUNG FIELD: ICD-10-CM

## 2025-03-04 DIAGNOSIS — C64.9 SECONDARY MALIGNANT NEOPLASM OF UNSPECIFIED LUNG: ICD-10-CM

## 2025-03-04 DIAGNOSIS — C78.00 SECONDARY MALIGNANT NEOPLASM OF UNSPECIFIED LUNG: ICD-10-CM

## 2025-03-04 PROCEDURE — 99024 POSTOP FOLLOW-UP VISIT: CPT

## 2025-03-17 ENCOUNTER — OUTPATIENT (OUTPATIENT)
Dept: OUTPATIENT SERVICES | Facility: HOSPITAL | Age: 80
LOS: 1 days | Discharge: HOME | End: 2025-03-17

## 2025-03-17 DIAGNOSIS — Z90.2 ACQUIRED ABSENCE OF LUNG [PART OF]: Chronic | ICD-10-CM

## 2025-03-17 DIAGNOSIS — C64.9 MALIGNANT NEOPLASM OF UNSPECIFIED KIDNEY, EXCEPT RENAL PELVIS: ICD-10-CM

## 2025-03-17 DIAGNOSIS — H26.9 UNSPECIFIED CATARACT: Chronic | ICD-10-CM

## 2025-03-17 DIAGNOSIS — D75.1 SECONDARY POLYCYTHEMIA: ICD-10-CM

## 2025-03-17 DIAGNOSIS — Z98.890 OTHER SPECIFIED POSTPROCEDURAL STATES: Chronic | ICD-10-CM

## 2025-03-17 DIAGNOSIS — Z90.49 ACQUIRED ABSENCE OF OTHER SPECIFIED PARTS OF DIGESTIVE TRACT: Chronic | ICD-10-CM

## 2025-03-17 DIAGNOSIS — Z90.89 ACQUIRED ABSENCE OF OTHER ORGANS: Chronic | ICD-10-CM

## 2025-03-17 DIAGNOSIS — Z90.5 ACQUIRED ABSENCE OF KIDNEY: Chronic | ICD-10-CM

## 2025-03-18 ENCOUNTER — RESULT REVIEW (OUTPATIENT)
Age: 80
End: 2025-03-18

## 2025-03-18 ENCOUNTER — APPOINTMENT (OUTPATIENT)
Dept: HEMATOLOGY ONCOLOGY | Facility: CLINIC | Age: 80
End: 2025-03-18
Payer: MEDICARE

## 2025-03-18 DIAGNOSIS — C64.9 SECONDARY MALIGNANT NEOPLASM OF UNSPECIFIED LUNG: ICD-10-CM

## 2025-03-18 DIAGNOSIS — C78.00 SECONDARY MALIGNANT NEOPLASM OF UNSPECIFIED LUNG: ICD-10-CM

## 2025-03-18 LAB
BASOPHILS # BLD AUTO: 0.04 K/UL — SIGNIFICANT CHANGE UP (ref 0–0.2)
BASOPHILS NFR BLD AUTO: 0.6 % — SIGNIFICANT CHANGE UP (ref 0–2)
EOSINOPHIL # BLD AUTO: 0.18 K/UL — SIGNIFICANT CHANGE UP (ref 0–0.5)
EOSINOPHIL NFR BLD AUTO: 2.8 % — SIGNIFICANT CHANGE UP (ref 0–6)
HCT VFR BLD CALC: 48.5 % — SIGNIFICANT CHANGE UP (ref 39–50)
HGB BLD-MCNC: 15.5 G/DL — SIGNIFICANT CHANGE UP (ref 13–17)
IMM GRANULOCYTES NFR BLD AUTO: 0.5 % — SIGNIFICANT CHANGE UP (ref 0–0.9)
LYMPHOCYTES # BLD AUTO: 0.85 K/UL — LOW (ref 1–3.3)
LYMPHOCYTES # BLD AUTO: 13.1 % — SIGNIFICANT CHANGE UP (ref 13–44)
MCHC RBC-ENTMCNC: 28 PG — SIGNIFICANT CHANGE UP (ref 27–34)
MCHC RBC-ENTMCNC: 32 G/DL — SIGNIFICANT CHANGE UP (ref 32–36)
MCV RBC AUTO: 87.5 FL — SIGNIFICANT CHANGE UP (ref 80–100)
MONOCYTES # BLD AUTO: 0.64 K/UL — SIGNIFICANT CHANGE UP (ref 0–0.9)
MONOCYTES NFR BLD AUTO: 9.8 % — SIGNIFICANT CHANGE UP (ref 2–14)
NEUTROPHILS # BLD AUTO: 4.77 K/UL — SIGNIFICANT CHANGE UP (ref 1.8–7.4)
NEUTROPHILS NFR BLD AUTO: 73.2 % — SIGNIFICANT CHANGE UP (ref 43–77)
NRBC BLD AUTO-RTO: 0 /100 WBCS — SIGNIFICANT CHANGE UP (ref 0–0)
PLATELET # BLD AUTO: 142 K/UL — LOW (ref 150–400)
RBC # BLD: 5.54 M/UL — SIGNIFICANT CHANGE UP (ref 4.2–5.8)
RBC # FLD: 17.2 % — HIGH (ref 10.3–14.5)
WBC # BLD: 6.51 K/UL — SIGNIFICANT CHANGE UP (ref 3.8–10.5)
WBC # FLD AUTO: 6.51 K/UL — SIGNIFICANT CHANGE UP (ref 3.8–10.5)

## 2025-03-18 PROCEDURE — 99215 OFFICE O/P EST HI 40 MIN: CPT

## 2025-03-19 ENCOUNTER — NON-APPOINTMENT (OUTPATIENT)
Age: 80
End: 2025-03-19

## 2025-03-19 ENCOUNTER — APPOINTMENT (OUTPATIENT)
Dept: CARDIOLOGY | Facility: CLINIC | Age: 80
End: 2025-03-19
Payer: MEDICARE

## 2025-03-19 VITALS
OXYGEN SATURATION: 80 % | HEIGHT: 68 IN | BODY MASS INDEX: 27.89 KG/M2 | SYSTOLIC BLOOD PRESSURE: 100 MMHG | WEIGHT: 184 LBS | HEART RATE: 94 BPM | DIASTOLIC BLOOD PRESSURE: 70 MMHG

## 2025-03-19 VITALS — DIASTOLIC BLOOD PRESSURE: 74 MMHG | SYSTOLIC BLOOD PRESSURE: 110 MMHG

## 2025-03-19 DIAGNOSIS — I35.1 NONRHEUMATIC AORTIC (VALVE) INSUFFICIENCY: ICD-10-CM

## 2025-03-19 DIAGNOSIS — I48.91 UNSPECIFIED ATRIAL FIBRILLATION: ICD-10-CM

## 2025-03-19 DIAGNOSIS — I63.81 OTHER CEREBRAL INFARCTION DUE TO OCCLUSION OR STENOSIS OF SMALL ARTERY: ICD-10-CM

## 2025-03-19 DIAGNOSIS — Z86.73 PERSONAL HISTORY OF TRANSIENT ISCHEMIC ATTACK (TIA), AND CEREBRAL INFARCTION W/OUT RESIDUAL DEFICITS: ICD-10-CM

## 2025-03-19 DIAGNOSIS — I48.19 OTHER PERSISTENT ATRIAL FIBRILLATION: ICD-10-CM

## 2025-03-19 DIAGNOSIS — I10 ESSENTIAL (PRIMARY) HYPERTENSION: ICD-10-CM

## 2025-03-19 PROCEDURE — 99214 OFFICE O/P EST MOD 30 MIN: CPT

## 2025-03-19 PROCEDURE — G2211 COMPLEX E/M VISIT ADD ON: CPT

## 2025-03-19 PROCEDURE — 93000 ELECTROCARDIOGRAM COMPLETE: CPT

## 2025-03-19 RX ORDER — CABOZANTINIB 40 MG/1
40 TABLET ORAL DAILY
Qty: 30 | Refills: 5 | Status: ACTIVE | COMMUNITY
Start: 2025-03-19 | End: 1900-01-01

## 2025-03-21 ENCOUNTER — NON-APPOINTMENT (OUTPATIENT)
Age: 80
End: 2025-03-21

## 2025-03-21 ENCOUNTER — APPOINTMENT (OUTPATIENT)
Dept: THORACIC SURGERY | Facility: CLINIC | Age: 80
End: 2025-03-21
Payer: MEDICARE

## 2025-03-21 VITALS
DIASTOLIC BLOOD PRESSURE: 75 MMHG | OXYGEN SATURATION: 100 % | WEIGHT: 180 LBS | SYSTOLIC BLOOD PRESSURE: 131 MMHG | HEART RATE: 80 BPM | BODY MASS INDEX: 27.28 KG/M2 | RESPIRATION RATE: 16 BRPM | HEIGHT: 68 IN

## 2025-03-21 DIAGNOSIS — R91.1 SOLITARY PULMONARY NODULE: ICD-10-CM

## 2025-03-21 DIAGNOSIS — C64.9 MALIGNANT NEOPLASM OF UNSPECIFIED KIDNEY, EXCEPT RENAL PELVIS: ICD-10-CM

## 2025-03-21 PROCEDURE — 99024 POSTOP FOLLOW-UP VISIT: CPT

## 2025-03-28 NOTE — ED STATDOCS - NS ED MD DISPO DISCHARGE CCDA
[address]  [address]                            OPERATIVE REPORT      PATIENT NAME: TOYA JONES              : 1982  MED REC NO: [id number]                       ROOM: OR  ACCOUNT NO: [id number]                       ADMIT DATE: 2025  PROVIDER: Rio Green MD    DATE OF SERVICE:  2025    PREOPERATIVE DIAGNOSES:  End-stage renal disease.    POSTOPERATIVE DIAGNOSES:  End-stage renal disease.    PROCEDURES PERFORMED:  Right arm basilic vein transposition fistula creation.    SURGEON:  Rio Green MD    ASSISTANT:  Irina.    ANESTHESIA:  Regional with sedation.    ESTIMATED BLOOD LOSS:  Minimal.    SPECIMENS REMOVED:  None.    INTRAOPERATIVE FINDINGS:  See below.     COMPLICATIONS:  None.    IMPLANTS:  None.    INDICATIONS:  The patient is a 42-year-old mentally disabled gentleman in need of permanent dialysis access.  Preoperative workup revealed the only usable vein is a basilic vein, which was noted to be very deep in a very large arm, but given his age, we felt this would be the best option for him.  Risks and benefits of procedure were discussed with his mother, who is the legal guardian.  She voiced understanding and wished to proceed.    DESCRIPTION OF PROCEDURE:  The patient was placed supine on the operating room table and regional analgesia has been established by the Anesthesia Department.  The right arm was prepped and draped in the standard surgical fashion.  A longitudinal incision was made above the elbow crease, taken down through the skin.  A large layer of subcutaneous tissue and the basilic vein was identified.  Branches were divided and ligated with silk ties.  This is a difficult procedure due to the depth of the vein and the thickness of the arm.  Another skipped incision was made proximally and the vein was dissected out to the axilla.  Another skipped incision was made below the elbow  Patient/Caregiver provided printed discharge information.

## 2025-04-01 ENCOUNTER — APPOINTMENT (OUTPATIENT)
Dept: INFUSION THERAPY | Facility: HOSPITAL | Age: 80
End: 2025-04-01

## 2025-04-01 ENCOUNTER — RESULT REVIEW (OUTPATIENT)
Age: 80
End: 2025-04-01

## 2025-04-01 ENCOUNTER — NON-APPOINTMENT (OUTPATIENT)
Age: 80
End: 2025-04-01

## 2025-04-02 DIAGNOSIS — Z51.11 ENCOUNTER FOR ANTINEOPLASTIC CHEMOTHERAPY: ICD-10-CM

## 2025-04-02 LAB
HBV CORE AB SER-ACNC: SIGNIFICANT CHANGE UP
HBV SURFACE AB SER-ACNC: ABNORMAL
HBV SURFACE AG SER-ACNC: SIGNIFICANT CHANGE UP

## 2025-04-29 ENCOUNTER — RESULT REVIEW (OUTPATIENT)
Age: 80
End: 2025-04-29

## 2025-04-29 ENCOUNTER — APPOINTMENT (OUTPATIENT)
Dept: INFUSION THERAPY | Facility: HOSPITAL | Age: 80
End: 2025-04-29

## 2025-04-29 LAB — TSH SERPL-MCNC: 2.35 UIU/ML — SIGNIFICANT CHANGE UP (ref 0.27–4.2)

## 2025-05-05 RX ORDER — ONDANSETRON 8 MG/1
8 TABLET, ORALLY DISINTEGRATING ORAL EVERY 8 HOURS
Qty: 24 | Refills: 3 | Status: ACTIVE | COMMUNITY
Start: 2025-05-05 | End: 1900-01-01

## 2025-05-25 ENCOUNTER — OUTPATIENT (OUTPATIENT)
Dept: OUTPATIENT SERVICES | Facility: HOSPITAL | Age: 80
LOS: 1 days | Discharge: HOME | End: 2025-05-25
Payer: MEDICARE

## 2025-05-25 DIAGNOSIS — D75.1 SECONDARY POLYCYTHEMIA: ICD-10-CM

## 2025-05-25 DIAGNOSIS — Z98.890 OTHER SPECIFIED POSTPROCEDURAL STATES: Chronic | ICD-10-CM

## 2025-05-25 DIAGNOSIS — Z90.5 ACQUIRED ABSENCE OF KIDNEY: Chronic | ICD-10-CM

## 2025-05-25 DIAGNOSIS — C64.9 MALIGNANT NEOPLASM OF UNSPECIFIED KIDNEY, EXCEPT RENAL PELVIS: ICD-10-CM

## 2025-05-25 DIAGNOSIS — H26.9 UNSPECIFIED CATARACT: Chronic | ICD-10-CM

## 2025-05-25 DIAGNOSIS — Z90.2 ACQUIRED ABSENCE OF LUNG [PART OF]: Chronic | ICD-10-CM

## 2025-05-25 DIAGNOSIS — Z90.49 ACQUIRED ABSENCE OF OTHER SPECIFIED PARTS OF DIGESTIVE TRACT: Chronic | ICD-10-CM

## 2025-05-25 DIAGNOSIS — Z90.89 ACQUIRED ABSENCE OF OTHER ORGANS: Chronic | ICD-10-CM

## 2025-05-29 ENCOUNTER — APPOINTMENT (OUTPATIENT)
Dept: HEMATOLOGY ONCOLOGY | Facility: CLINIC | Age: 80
End: 2025-05-29
Payer: MEDICARE

## 2025-05-29 ENCOUNTER — APPOINTMENT (OUTPATIENT)
Dept: INFUSION THERAPY | Facility: HOSPITAL | Age: 80
End: 2025-05-29

## 2025-05-29 VITALS
WEIGHT: 170 LBS | DIASTOLIC BLOOD PRESSURE: 78 MMHG | BODY MASS INDEX: 25.85 KG/M2 | TEMPERATURE: 97.2 F | HEART RATE: 43 BPM | SYSTOLIC BLOOD PRESSURE: 122 MMHG | OXYGEN SATURATION: 99 % | RESPIRATION RATE: 16 BRPM

## 2025-05-29 PROCEDURE — 99215 OFFICE O/P EST HI 40 MIN: CPT

## 2025-05-29 PROCEDURE — 93010 ELECTROCARDIOGRAM REPORT: CPT

## 2025-05-30 ENCOUNTER — APPOINTMENT (OUTPATIENT)
Dept: THORACIC SURGERY | Facility: CLINIC | Age: 80
End: 2025-05-30

## 2025-05-30 ENCOUNTER — APPOINTMENT (OUTPATIENT)
Dept: CARDIOLOGY | Facility: CLINIC | Age: 80
End: 2025-05-30
Payer: MEDICARE

## 2025-05-30 VITALS
OXYGEN SATURATION: 99 % | WEIGHT: 170 LBS | SYSTOLIC BLOOD PRESSURE: 128 MMHG | HEIGHT: 68 IN | BODY MASS INDEX: 25.76 KG/M2 | HEART RATE: 79 BPM | DIASTOLIC BLOOD PRESSURE: 80 MMHG

## 2025-05-30 DIAGNOSIS — R00.1 BRADYCARDIA, UNSPECIFIED: ICD-10-CM

## 2025-05-30 DIAGNOSIS — Z86.79 PERSONAL HISTORY OF OTHER DISEASES OF THE CIRCULATORY SYSTEM: ICD-10-CM

## 2025-05-30 DIAGNOSIS — I10 ESSENTIAL (PRIMARY) HYPERTENSION: ICD-10-CM

## 2025-05-30 PROCEDURE — 99214 OFFICE O/P EST MOD 30 MIN: CPT

## 2025-05-30 PROCEDURE — G2211 COMPLEX E/M VISIT ADD ON: CPT

## 2025-06-02 ENCOUNTER — APPOINTMENT (OUTPATIENT)
Dept: INTERNAL MEDICINE | Facility: CLINIC | Age: 80
End: 2025-06-02
Payer: MEDICARE

## 2025-06-02 VITALS
OXYGEN SATURATION: 96 % | TEMPERATURE: 97.5 F | HEART RATE: 71 BPM | RESPIRATION RATE: 16 BRPM | BODY MASS INDEX: 26.07 KG/M2 | WEIGHT: 172 LBS | SYSTOLIC BLOOD PRESSURE: 106 MMHG | DIASTOLIC BLOOD PRESSURE: 76 MMHG | HEIGHT: 68 IN

## 2025-06-02 DIAGNOSIS — N52.9 MALE ERECTILE DYSFUNCTION, UNSPECIFIED: ICD-10-CM

## 2025-06-02 DIAGNOSIS — D86.9 SARCOIDOSIS, UNSPECIFIED: ICD-10-CM

## 2025-06-02 DIAGNOSIS — R91.1 SOLITARY PULMONARY NODULE: ICD-10-CM

## 2025-06-02 DIAGNOSIS — I48.91 UNSPECIFIED ATRIAL FIBRILLATION: ICD-10-CM

## 2025-06-02 DIAGNOSIS — C64.9 SECONDARY MALIGNANT NEOPLASM OF UNSPECIFIED LUNG: ICD-10-CM

## 2025-06-02 DIAGNOSIS — E03.9 HYPOTHYROIDISM, UNSPECIFIED: ICD-10-CM

## 2025-06-02 DIAGNOSIS — Z01.818 ENCOUNTER FOR OTHER PREPROCEDURAL EXAMINATION: ICD-10-CM

## 2025-06-02 DIAGNOSIS — C78.00 SECONDARY MALIGNANT NEOPLASM OF UNSPECIFIED LUNG: ICD-10-CM

## 2025-06-02 DIAGNOSIS — Z86.73 PERSONAL HISTORY OF TRANSIENT ISCHEMIC ATTACK (TIA), AND CEREBRAL INFARCTION W/OUT RESIDUAL DEFICITS: ICD-10-CM

## 2025-06-02 DIAGNOSIS — Z85.528 PERSONAL HISTORY OF OTHER MALIGNANT NEOPLASM OF KIDNEY: ICD-10-CM

## 2025-06-02 DIAGNOSIS — R55 SYNCOPE AND COLLAPSE: ICD-10-CM

## 2025-06-02 DIAGNOSIS — M51.369: ICD-10-CM

## 2025-06-02 DIAGNOSIS — Z79.01 LONG TERM (CURRENT) USE OF ANTICOAGULANTS: ICD-10-CM

## 2025-06-02 DIAGNOSIS — I63.50 CEREBRAL INFARCTION DUE TO UNSPECIFIED OCCLUSION OR STENOSIS OF UNSPECIFIED CEREBRAL ARTERY: ICD-10-CM

## 2025-06-02 DIAGNOSIS — Z86.69 PERSONAL HISTORY OF OTHER DISEASES OF THE NERVOUS SYSTEM AND SENSE ORGANS: ICD-10-CM

## 2025-06-02 DIAGNOSIS — C64.9 MALIGNANT NEOPLASM OF UNSPECIFIED KIDNEY, EXCEPT RENAL PELVIS: ICD-10-CM

## 2025-06-02 PROCEDURE — G2211 COMPLEX E/M VISIT ADD ON: CPT

## 2025-06-02 PROCEDURE — 99214 OFFICE O/P EST MOD 30 MIN: CPT

## 2025-06-02 RX ORDER — PSYLLIUM SEED
PACKET (EA) ORAL
Refills: 0 | Status: ACTIVE | COMMUNITY

## 2025-06-02 RX ORDER — MV-MIN/FOLIC/K1/LYCOPEN/LUTEIN 300-60 MCG
TABLET ORAL DAILY
Refills: 0 | Status: ACTIVE | COMMUNITY

## 2025-06-02 RX ORDER — NIVOLUMAB AND HYALURONIDASE-NVHY 2000; 120 U/ML; MG/ML
INJECTION, SOLUTION SUBCUTANEOUS
Refills: 0 | Status: ACTIVE | COMMUNITY

## 2025-06-02 RX ORDER — VIT C/E/ZN/COPPR/LUTEIN/ZEAXAN 250MG-90MG
CAPSULE ORAL DAILY
Refills: 0 | Status: ACTIVE | COMMUNITY

## 2025-06-24 ENCOUNTER — LABORATORY RESULT (OUTPATIENT)
Age: 80
End: 2025-06-24

## 2025-06-25 LAB
ALBUMIN SERPL ELPH-MCNC: 4.2 G/DL
ALP BLD-CCNC: 123 U/L
ALT SERPL-CCNC: 43 U/L
ANION GAP SERPL CALC-SCNC: 13 MMOL/L
AST SERPL-CCNC: 42 U/L
BILIRUB SERPL-MCNC: 2.3 MG/DL
BUN SERPL-MCNC: 22 MG/DL
CALCIUM SERPL-MCNC: 9.3 MG/DL
CHLORIDE SERPL-SCNC: 105 MMOL/L
CO2 SERPL-SCNC: 22 MMOL/L
CREAT SERPL-MCNC: 1.18 MG/DL
EGFRCR SERPLBLD CKD-EPI 2021: 62 ML/MIN/1.73M2
FERRITIN SERPL-MCNC: 28 NG/ML
GLUCOSE SERPL-MCNC: 99 MG/DL
POTASSIUM SERPL-SCNC: 4.7 MMOL/L
PROT SERPL-MCNC: 6.5 G/DL
SODIUM SERPL-SCNC: 140 MMOL/L
TSH SERPL-ACNC: 1.98 UIU/ML

## 2025-06-30 ENCOUNTER — APPOINTMENT (OUTPATIENT)
Dept: INFUSION THERAPY | Facility: HOSPITAL | Age: 80
End: 2025-06-30

## 2025-07-01 DIAGNOSIS — Z51.11 ENCOUNTER FOR ANTINEOPLASTIC CHEMOTHERAPY: ICD-10-CM

## 2025-07-18 ENCOUNTER — APPOINTMENT (OUTPATIENT)
Dept: CARDIOLOGY | Facility: CLINIC | Age: 80
End: 2025-07-18
Payer: MEDICARE

## 2025-07-18 VITALS
OXYGEN SATURATION: 100 % | HEART RATE: 68 BPM | SYSTOLIC BLOOD PRESSURE: 110 MMHG | HEIGHT: 68 IN | WEIGHT: 178 LBS | BODY MASS INDEX: 26.98 KG/M2 | DIASTOLIC BLOOD PRESSURE: 68 MMHG

## 2025-07-18 PROBLEM — Z01.810 PREPROCEDURAL CARDIOVASCULAR EXAMINATION: Status: ACTIVE | Noted: 2019-01-15

## 2025-07-18 PROCEDURE — 99214 OFFICE O/P EST MOD 30 MIN: CPT

## 2025-07-18 PROCEDURE — G2211 COMPLEX E/M VISIT ADD ON: CPT

## 2025-07-18 PROCEDURE — 93000 ELECTROCARDIOGRAM COMPLETE: CPT | Mod: NC

## 2025-07-28 ENCOUNTER — RESULT REVIEW (OUTPATIENT)
Age: 80
End: 2025-07-28

## 2025-07-28 ENCOUNTER — APPOINTMENT (OUTPATIENT)
Dept: INFUSION THERAPY | Facility: HOSPITAL | Age: 80
End: 2025-07-28

## 2025-07-28 ENCOUNTER — NON-APPOINTMENT (OUTPATIENT)
Age: 80
End: 2025-07-28

## 2025-07-28 ENCOUNTER — APPOINTMENT (OUTPATIENT)
Dept: HEMATOLOGY ONCOLOGY | Facility: CLINIC | Age: 80
End: 2025-07-28
Payer: MEDICARE

## 2025-07-28 DIAGNOSIS — C78.00 SECONDARY MALIGNANT NEOPLASM OF UNSPECIFIED LUNG: ICD-10-CM

## 2025-07-28 DIAGNOSIS — C64.9 SECONDARY MALIGNANT NEOPLASM OF UNSPECIFIED LUNG: ICD-10-CM

## 2025-07-28 DIAGNOSIS — D75.1 SECONDARY POLYCYTHEMIA: ICD-10-CM

## 2025-07-28 PROCEDURE — 99214 OFFICE O/P EST MOD 30 MIN: CPT

## 2025-08-11 ENCOUNTER — APPOINTMENT (OUTPATIENT)
Dept: ENDOCRINOLOGY | Facility: CLINIC | Age: 80
End: 2025-08-11
Payer: MEDICARE

## 2025-08-11 VITALS
HEIGHT: 68 IN | BODY MASS INDEX: 26.98 KG/M2 | DIASTOLIC BLOOD PRESSURE: 70 MMHG | OXYGEN SATURATION: 99 % | SYSTOLIC BLOOD PRESSURE: 111 MMHG | WEIGHT: 178 LBS | HEART RATE: 86 BPM | TEMPERATURE: 97.5 F

## 2025-08-11 DIAGNOSIS — E03.9 HYPOTHYROIDISM, UNSPECIFIED: ICD-10-CM

## 2025-08-11 DIAGNOSIS — Z92.89 PERSONAL HISTORY OF OTHER MEDICAL TREATMENT: ICD-10-CM

## 2025-08-11 PROCEDURE — 99214 OFFICE O/P EST MOD 30 MIN: CPT

## 2025-08-26 ENCOUNTER — RESULT REVIEW (OUTPATIENT)
Age: 80
End: 2025-08-26

## 2025-08-26 ENCOUNTER — APPOINTMENT (OUTPATIENT)
Dept: INFUSION THERAPY | Facility: HOSPITAL | Age: 80
End: 2025-08-26

## 2025-08-27 ENCOUNTER — INPATIENT (INPATIENT)
Facility: HOSPITAL | Age: 80
LOS: 2 days | Discharge: HOME | DRG: 229 | End: 2025-08-30
Attending: SURGERY | Admitting: SURGERY
Payer: MEDICARE

## 2025-08-27 VITALS
DIASTOLIC BLOOD PRESSURE: 84 MMHG | SYSTOLIC BLOOD PRESSURE: 148 MMHG | TEMPERATURE: 98 F | HEART RATE: 84 BPM | OXYGEN SATURATION: 94 % | RESPIRATION RATE: 16 BRPM

## 2025-08-27 DIAGNOSIS — Z98.890 OTHER SPECIFIED POSTPROCEDURAL STATES: Chronic | ICD-10-CM

## 2025-08-27 DIAGNOSIS — Z90.5 ACQUIRED ABSENCE OF KIDNEY: Chronic | ICD-10-CM

## 2025-08-27 DIAGNOSIS — Z90.89 ACQUIRED ABSENCE OF OTHER ORGANS: Chronic | ICD-10-CM

## 2025-08-27 DIAGNOSIS — Z90.49 ACQUIRED ABSENCE OF OTHER SPECIFIED PARTS OF DIGESTIVE TRACT: Chronic | ICD-10-CM

## 2025-08-27 DIAGNOSIS — Z90.2 ACQUIRED ABSENCE OF LUNG [PART OF]: Chronic | ICD-10-CM

## 2025-08-27 DIAGNOSIS — H26.9 UNSPECIFIED CATARACT: Chronic | ICD-10-CM

## 2025-08-27 DIAGNOSIS — S22.39XA FRACTURE OF ONE RIB, UNSPECIFIED SIDE, INITIAL ENCOUNTER FOR CLOSED FRACTURE: ICD-10-CM

## 2025-08-27 LAB
ALBUMIN SERPL ELPH-MCNC: 3.6 G/DL — SIGNIFICANT CHANGE UP (ref 3.3–5)
ALP SERPL-CCNC: 116 U/L — SIGNIFICANT CHANGE UP (ref 40–120)
ALT FLD-CCNC: 42 U/L — SIGNIFICANT CHANGE UP (ref 12–78)
ANION GAP SERPL CALC-SCNC: 2 MMOL/L — LOW (ref 5–17)
APTT BLD: 41.3 SEC — HIGH (ref 26.1–36.8)
AST SERPL-CCNC: 41 U/L — HIGH (ref 15–37)
BASOPHILS # BLD AUTO: 0.05 K/UL — SIGNIFICANT CHANGE UP (ref 0–0.2)
BASOPHILS NFR BLD AUTO: 0.6 % — SIGNIFICANT CHANGE UP (ref 0–2)
BILIRUB SERPL-MCNC: 2.1 MG/DL — HIGH (ref 0.2–1.2)
BLD GP AB SCN SERPL QL: SIGNIFICANT CHANGE UP
BUN SERPL-MCNC: 20 MG/DL — SIGNIFICANT CHANGE UP (ref 7–23)
CALCIUM SERPL-MCNC: 8.8 MG/DL — SIGNIFICANT CHANGE UP (ref 8.5–10.1)
CHLORIDE SERPL-SCNC: 110 MMOL/L — HIGH (ref 96–108)
CO2 SERPL-SCNC: 29 MMOL/L — SIGNIFICANT CHANGE UP (ref 22–31)
CREAT SERPL-MCNC: 1.13 MG/DL — SIGNIFICANT CHANGE UP (ref 0.5–1.3)
EGFR: 65 ML/MIN/1.73M2 — SIGNIFICANT CHANGE UP
EGFR: 65 ML/MIN/1.73M2 — SIGNIFICANT CHANGE UP
EOSINOPHIL # BLD AUTO: 0.16 K/UL — SIGNIFICANT CHANGE UP (ref 0–0.5)
EOSINOPHIL NFR BLD AUTO: 1.8 % — SIGNIFICANT CHANGE UP (ref 0–6)
GLUCOSE SERPL-MCNC: 89 MG/DL — SIGNIFICANT CHANGE UP (ref 70–99)
HCT VFR BLD CALC: 52.4 % — HIGH (ref 39–50)
HGB BLD-MCNC: 16.5 G/DL — SIGNIFICANT CHANGE UP (ref 13–17)
IMM GRANULOCYTES # BLD AUTO: 0.03 K/UL — SIGNIFICANT CHANGE UP (ref 0–0.07)
IMM GRANULOCYTES NFR BLD AUTO: 0.3 % — SIGNIFICANT CHANGE UP (ref 0–0.9)
INR BLD: 2.01 RATIO — HIGH (ref 0.85–1.16)
LYMPHOCYTES # BLD AUTO: 1.11 K/UL — SIGNIFICANT CHANGE UP (ref 1–3.3)
LYMPHOCYTES NFR BLD AUTO: 12.3 % — LOW (ref 13–44)
MCHC RBC-ENTMCNC: 29 PG — SIGNIFICANT CHANGE UP (ref 27–34)
MCHC RBC-ENTMCNC: 31.5 G/DL — LOW (ref 32–36)
MCV RBC AUTO: 92.1 FL — SIGNIFICANT CHANGE UP (ref 80–100)
MONOCYTES # BLD AUTO: 0.88 K/UL — SIGNIFICANT CHANGE UP (ref 0–0.9)
MONOCYTES NFR BLD AUTO: 9.7 % — SIGNIFICANT CHANGE UP (ref 2–14)
NEUTROPHILS # BLD AUTO: 6.81 K/UL — SIGNIFICANT CHANGE UP (ref 1.8–7.4)
NEUTROPHILS NFR BLD AUTO: 75.3 % — SIGNIFICANT CHANGE UP (ref 43–77)
NRBC # BLD AUTO: 0 K/UL — SIGNIFICANT CHANGE UP (ref 0–0)
NRBC # FLD: 0 K/UL — SIGNIFICANT CHANGE UP (ref 0–0)
NRBC BLD AUTO-RTO: 0 /100 WBCS — SIGNIFICANT CHANGE UP (ref 0–0)
PLATELET # BLD AUTO: 153 K/UL — SIGNIFICANT CHANGE UP (ref 150–400)
PMV BLD: 10.1 FL — SIGNIFICANT CHANGE UP (ref 7–13)
POTASSIUM SERPL-MCNC: 4.3 MMOL/L — SIGNIFICANT CHANGE UP (ref 3.5–5.3)
POTASSIUM SERPL-SCNC: 4.3 MMOL/L — SIGNIFICANT CHANGE UP (ref 3.5–5.3)
PROT SERPL-MCNC: 7.2 GM/DL — SIGNIFICANT CHANGE UP (ref 6–8.3)
PROTHROM AB SERPL-ACNC: 23.1 SEC — HIGH (ref 9.9–13.4)
RBC # BLD: 5.69 M/UL — SIGNIFICANT CHANGE UP (ref 4.2–5.8)
RBC # FLD: 16 % — HIGH (ref 10.3–14.5)
SODIUM SERPL-SCNC: 141 MMOL/L — SIGNIFICANT CHANGE UP (ref 135–145)
TROPONIN I, HIGH SENSITIVITY RESULT: 11.87 NG/L — SIGNIFICANT CHANGE UP
WBC # BLD: 9.04 K/UL — SIGNIFICANT CHANGE UP (ref 3.8–10.5)
WBC # FLD AUTO: 9.04 K/UL — SIGNIFICANT CHANGE UP (ref 3.8–10.5)

## 2025-08-27 PROCEDURE — 74176 CT ABD & PELVIS W/O CONTRAST: CPT | Mod: 26

## 2025-08-27 PROCEDURE — 80076 HEPATIC FUNCTION PANEL: CPT

## 2025-08-27 PROCEDURE — 81003 URINALYSIS AUTO W/O SCOPE: CPT

## 2025-08-27 PROCEDURE — 93005 ELECTROCARDIOGRAM TRACING: CPT

## 2025-08-27 PROCEDURE — 71045 X-RAY EXAM CHEST 1 VIEW: CPT

## 2025-08-27 PROCEDURE — C1887: CPT

## 2025-08-27 PROCEDURE — 36415 COLL VENOUS BLD VENIPUNCTURE: CPT

## 2025-08-27 PROCEDURE — 84100 ASSAY OF PHOSPHORUS: CPT

## 2025-08-27 PROCEDURE — 85730 THROMBOPLASTIN TIME PARTIAL: CPT

## 2025-08-27 PROCEDURE — 85610 PROTHROMBIN TIME: CPT

## 2025-08-27 PROCEDURE — 83735 ASSAY OF MAGNESIUM: CPT

## 2025-08-27 PROCEDURE — 72125 CT NECK SPINE W/O DYE: CPT | Mod: 26

## 2025-08-27 PROCEDURE — 97116 GAIT TRAINING THERAPY: CPT | Mod: GP

## 2025-08-27 PROCEDURE — 85027 COMPLETE CBC AUTOMATED: CPT

## 2025-08-27 PROCEDURE — 71250 CT THORAX DX C-: CPT | Mod: 26

## 2025-08-27 PROCEDURE — 33274 TCAT INSJ/RPL PERM LDLS PM: CPT

## 2025-08-27 PROCEDURE — C1889: CPT | Mod: Q0

## 2025-08-27 PROCEDURE — 97530 THERAPEUTIC ACTIVITIES: CPT | Mod: GP

## 2025-08-27 PROCEDURE — 73060 X-RAY EXAM OF HUMERUS: CPT | Mod: 26,RT

## 2025-08-27 PROCEDURE — C1894: CPT

## 2025-08-27 PROCEDURE — C1786: CPT

## 2025-08-27 PROCEDURE — 99285 EMERGENCY DEPT VISIT HI MDM: CPT

## 2025-08-27 PROCEDURE — 80048 BASIC METABOLIC PNL TOTAL CA: CPT

## 2025-08-27 PROCEDURE — 73030 X-RAY EXAM OF SHOULDER: CPT | Mod: 26,RT

## 2025-08-27 PROCEDURE — 93306 TTE W/DOPPLER COMPLETE: CPT

## 2025-08-27 PROCEDURE — 80053 COMPREHEN METABOLIC PANEL: CPT

## 2025-08-27 PROCEDURE — 70450 CT HEAD/BRAIN W/O DYE: CPT | Mod: 26

## 2025-08-27 PROCEDURE — 97162 PT EVAL MOD COMPLEX 30 MIN: CPT | Mod: GP

## 2025-08-27 RX ORDER — ACETAMINOPHEN 500 MG/5ML
1000 LIQUID (ML) ORAL ONCE
Refills: 0 | Status: COMPLETED | OUTPATIENT
Start: 2025-08-27 | End: 2025-08-27

## 2025-08-27 RX ADMIN — Medication 2000 MILLILITER(S): at 19:55

## 2025-08-27 RX ADMIN — Medication 1000 MILLIGRAM(S): at 19:33

## 2025-08-27 RX ADMIN — Medication 1000 MILLIGRAM(S): at 20:59

## 2025-08-28 ENCOUNTER — RESULT REVIEW (OUTPATIENT)
Age: 80
End: 2025-08-28

## 2025-08-28 ENCOUNTER — TRANSCRIPTION ENCOUNTER (OUTPATIENT)
Age: 80
End: 2025-08-28

## 2025-08-28 DIAGNOSIS — I38 ENDOCARDITIS, VALVE UNSPECIFIED: ICD-10-CM

## 2025-08-28 DIAGNOSIS — I48.20 CHRONIC ATRIAL FIBRILLATION, UNSPECIFIED: ICD-10-CM

## 2025-08-28 DIAGNOSIS — R00.1 BRADYCARDIA, UNSPECIFIED: ICD-10-CM
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
## 2025-08-28 LAB
ALBUMIN SERPL ELPH-MCNC: 3.1 G/DL — LOW (ref 3.3–5)
ALP SERPL-CCNC: 105 U/L — SIGNIFICANT CHANGE UP (ref 40–120)
ALT FLD-CCNC: 36 U/L — SIGNIFICANT CHANGE UP (ref 12–78)
ANION GAP SERPL CALC-SCNC: 6 MMOL/L — SIGNIFICANT CHANGE UP (ref 5–17)
AST SERPL-CCNC: 33 U/L — SIGNIFICANT CHANGE UP (ref 15–37)
BILIRUB SERPL-MCNC: 2.4 MG/DL — HIGH (ref 0.2–1.2)
BUN SERPL-MCNC: 15 MG/DL — SIGNIFICANT CHANGE UP (ref 7–23)
CALCIUM SERPL-MCNC: 8.2 MG/DL — LOW (ref 8.5–10.1)
CHLORIDE SERPL-SCNC: 109 MMOL/L — HIGH (ref 96–108)
CO2 SERPL-SCNC: 23 MMOL/L — SIGNIFICANT CHANGE UP (ref 22–31)
CREAT SERPL-MCNC: 0.9 MG/DL — SIGNIFICANT CHANGE UP (ref 0.5–1.3)
EGFR: 85 ML/MIN/1.73M2 — SIGNIFICANT CHANGE UP
EGFR: 85 ML/MIN/1.73M2 — SIGNIFICANT CHANGE UP
GLUCOSE SERPL-MCNC: 101 MG/DL — HIGH (ref 70–99)
HCT VFR BLD CALC: 48.2 % — SIGNIFICANT CHANGE UP (ref 39–50)
HGB BLD-MCNC: 15.6 G/DL — SIGNIFICANT CHANGE UP (ref 13–17)
MAGNESIUM SERPL-MCNC: 2 MG/DL — SIGNIFICANT CHANGE UP (ref 1.6–2.6)
MCHC RBC-ENTMCNC: 28.9 PG — SIGNIFICANT CHANGE UP (ref 27–34)
MCHC RBC-ENTMCNC: 32.4 G/DL — SIGNIFICANT CHANGE UP (ref 32–36)
MCV RBC AUTO: 89.4 FL — SIGNIFICANT CHANGE UP (ref 80–100)
NRBC # BLD AUTO: 0 K/UL — SIGNIFICANT CHANGE UP (ref 0–0)
NRBC # FLD: 0 K/UL — SIGNIFICANT CHANGE UP (ref 0–0)
NRBC BLD AUTO-RTO: 0 /100 WBCS — SIGNIFICANT CHANGE UP (ref 0–0)
PHOSPHATE SERPL-MCNC: 3.1 MG/DL — SIGNIFICANT CHANGE UP (ref 2.5–4.5)
PLATELET # BLD AUTO: 138 K/UL — LOW (ref 150–400)
PMV BLD: 10.6 FL — SIGNIFICANT CHANGE UP (ref 7–13)
POTASSIUM SERPL-MCNC: 3.5 MMOL/L — SIGNIFICANT CHANGE UP (ref 3.5–5.3)
POTASSIUM SERPL-SCNC: 3.5 MMOL/L — SIGNIFICANT CHANGE UP (ref 3.5–5.3)
PROT SERPL-MCNC: 6.2 GM/DL — SIGNIFICANT CHANGE UP (ref 6–8.3)
RBC # BLD: 5.39 M/UL — SIGNIFICANT CHANGE UP (ref 4.2–5.8)
RBC # FLD: 15.9 % — HIGH (ref 10.3–14.5)
SODIUM SERPL-SCNC: 138 MMOL/L — SIGNIFICANT CHANGE UP (ref 135–145)
WBC # BLD: 6.87 K/UL — SIGNIFICANT CHANGE UP (ref 3.8–10.5)
WBC # FLD AUTO: 6.87 K/UL — SIGNIFICANT CHANGE UP (ref 3.8–10.5)

## 2025-08-28 PROCEDURE — 99232 SBSQ HOSP IP/OBS MODERATE 35: CPT | Mod: FS

## 2025-08-28 PROCEDURE — 93010 ELECTROCARDIOGRAM REPORT: CPT

## 2025-08-28 PROCEDURE — 71045 X-RAY EXAM CHEST 1 VIEW: CPT | Mod: 26

## 2025-08-28 PROCEDURE — 93306 TTE W/DOPPLER COMPLETE: CPT | Mod: 26

## 2025-08-28 PROCEDURE — 99223 1ST HOSP IP/OBS HIGH 75: CPT | Mod: FS

## 2025-08-28 PROCEDURE — 99223 1ST HOSP IP/OBS HIGH 75: CPT

## 2025-08-28 RX ORDER — B1/B2/B3/B5/B6/B12/VIT C/FOLIC 500-0.5 MG
1 TABLET ORAL DAILY
Refills: 0 | Status: DISCONTINUED | OUTPATIENT
Start: 2025-08-28 | End: 2025-08-30

## 2025-08-28 RX ORDER — CALCIUM CARBONATE/VITAMIN D3 500MG-5MCG
1 TABLET ORAL
Refills: 0 | DISCHARGE

## 2025-08-28 RX ORDER — PSYLLIUM SEED (WITH DEXTROSE)
1 POWDER (GRAM) ORAL
Refills: 0 | DISCHARGE

## 2025-08-28 RX ORDER — LISINOPRIL 5 MG/1
40 TABLET ORAL DAILY
Refills: 0 | Status: DISCONTINUED | OUTPATIENT
Start: 2025-08-28 | End: 2025-08-30

## 2025-08-28 RX ORDER — METOPROLOL SUCCINATE 50 MG/1
25 TABLET, EXTENDED RELEASE ORAL DAILY
Refills: 0 | Status: DISCONTINUED | OUTPATIENT
Start: 2025-08-28 | End: 2025-08-28

## 2025-08-28 RX ORDER — ATORVASTATIN CALCIUM 80 MG/1
80 TABLET, FILM COATED ORAL AT BEDTIME
Refills: 0 | Status: DISCONTINUED | OUTPATIENT
Start: 2025-08-28 | End: 2025-08-30

## 2025-08-28 RX ORDER — HEPARIN SODIUM 1000 [USP'U]/ML
5000 INJECTION INTRAVENOUS; SUBCUTANEOUS EVERY 8 HOURS
Refills: 0 | Status: COMPLETED | OUTPATIENT
Start: 2025-08-28 | End: 2025-08-28

## 2025-08-28 RX ORDER — ASPIRIN 325 MG
81 TABLET ORAL DAILY
Refills: 0 | Status: DISCONTINUED | OUTPATIENT
Start: 2025-08-28 | End: 2025-08-28

## 2025-08-28 RX ORDER — ACETAMINOPHEN 500 MG/5ML
1000 LIQUID (ML) ORAL ONCE
Refills: 0 | Status: DISCONTINUED | OUTPATIENT
Start: 2025-08-28 | End: 2025-08-30

## 2025-08-28 RX ORDER — OXYCODONE HYDROCHLORIDE 30 MG/1
5 TABLET ORAL EVERY 4 HOURS
Refills: 0 | Status: DISCONTINUED | OUTPATIENT
Start: 2025-08-28 | End: 2025-08-30

## 2025-08-28 RX ORDER — LEVOTHYROXINE SODIUM 300 MCG
100 TABLET ORAL ONCE
Refills: 0 | Status: COMPLETED | OUTPATIENT
Start: 2025-08-28 | End: 2025-08-28

## 2025-08-28 RX ORDER — RIVAROXABAN 10 MG/1
20 TABLET, FILM COATED ORAL
Refills: 0 | Status: DISCONTINUED | OUTPATIENT
Start: 2025-08-28 | End: 2025-08-28

## 2025-08-28 RX ORDER — ACETAMINOPHEN 500 MG/5ML
975 LIQUID (ML) ORAL EVERY 6 HOURS
Refills: 0 | Status: DISCONTINUED | OUTPATIENT
Start: 2025-08-28 | End: 2025-08-28

## 2025-08-28 RX ORDER — LEVOTHYROXINE SODIUM 300 MCG
100 TABLET ORAL DAILY
Refills: 0 | Status: DISCONTINUED | OUTPATIENT
Start: 2025-08-28 | End: 2025-08-30

## 2025-08-28 RX ORDER — METOPROLOL SUCCINATE 50 MG/1
25 TABLET, EXTENDED RELEASE ORAL AT BEDTIME
Refills: 0 | Status: DISCONTINUED | OUTPATIENT
Start: 2025-08-28 | End: 2025-08-28

## 2025-08-28 RX ORDER — LEVOTHYROXINE SODIUM 300 MCG
1 TABLET ORAL
Refills: 0 | DISCHARGE

## 2025-08-28 RX ORDER — OXYCODONE HYDROCHLORIDE 30 MG/1
2.5 TABLET ORAL EVERY 4 HOURS
Refills: 0 | Status: DISCONTINUED | OUTPATIENT
Start: 2025-08-28 | End: 2025-08-30

## 2025-08-28 RX ORDER — PSYLLIUM SEED (WITH DEXTROSE)
1 POWDER (GRAM) ORAL DAILY
Refills: 0 | Status: DISCONTINUED | OUTPATIENT
Start: 2025-08-28 | End: 2025-08-30

## 2025-08-28 RX ORDER — OXYCODONE HYDROCHLORIDE 30 MG/1
5 TABLET ORAL EVERY 6 HOURS
Refills: 0 | Status: DISCONTINUED | OUTPATIENT
Start: 2025-08-28 | End: 2025-08-28

## 2025-08-28 RX ORDER — METOPROLOL SUCCINATE 50 MG/1
25 TABLET, EXTENDED RELEASE ORAL ONCE
Refills: 0 | Status: COMPLETED | OUTPATIENT
Start: 2025-08-28 | End: 2025-08-28

## 2025-08-28 RX ORDER — SODIUM CHLORIDE 9 G/1000ML
1000 INJECTION, SOLUTION INTRAVENOUS
Refills: 0 | Status: DISCONTINUED | OUTPATIENT
Start: 2025-08-28 | End: 2025-08-30

## 2025-08-28 RX ORDER — AMLODIPINE BESYLATE 10 MG/1
2.5 TABLET ORAL DAILY
Refills: 0 | Status: DISCONTINUED | OUTPATIENT
Start: 2025-08-28 | End: 2025-08-30

## 2025-08-28 RX ORDER — GABAPENTIN 400 MG/1
100 CAPSULE ORAL EVERY 8 HOURS
Refills: 0 | Status: DISCONTINUED | OUTPATIENT
Start: 2025-08-28 | End: 2025-08-30

## 2025-08-28 RX ORDER — SENNA 187 MG
2 TABLET ORAL AT BEDTIME
Refills: 0 | Status: DISCONTINUED | OUTPATIENT
Start: 2025-08-28 | End: 2025-08-30

## 2025-08-28 RX ORDER — LIDOCAINE HYDROCHLORIDE 20 MG/ML
1 JELLY TOPICAL EVERY 24 HOURS
Refills: 0 | Status: DISCONTINUED | OUTPATIENT
Start: 2025-08-28 | End: 2025-08-30

## 2025-08-28 RX ORDER — METOPROLOL SUCCINATE 50 MG/1
12.5 TABLET, EXTENDED RELEASE ORAL DAILY
Refills: 0 | Status: DISCONTINUED | OUTPATIENT
Start: 2025-08-28 | End: 2025-08-28

## 2025-08-28 RX ORDER — CYCLOBENZAPRINE HYDROCHLORIDE 15 MG/1
5 CAPSULE, EXTENDED RELEASE ORAL THREE TIMES A DAY
Refills: 0 | Status: DISCONTINUED | OUTPATIENT
Start: 2025-08-28 | End: 2025-08-30

## 2025-08-28 RX ORDER — HEPARIN SODIUM 1000 [USP'U]/ML
5000 INJECTION INTRAVENOUS; SUBCUTANEOUS EVERY 8 HOURS
Refills: 0 | Status: DISCONTINUED | OUTPATIENT
Start: 2025-08-28 | End: 2025-08-28

## 2025-08-28 RX ORDER — ACETAMINOPHEN 500 MG/5ML
500 LIQUID (ML) ORAL EVERY 6 HOURS
Refills: 0 | Status: DISCONTINUED | OUTPATIENT
Start: 2025-08-28 | End: 2025-08-30

## 2025-08-28 RX ORDER — NIVOLUMAB 10 MG/ML
240 INJECTION INTRAVENOUS
Refills: 0 | DISCHARGE

## 2025-08-28 RX ADMIN — GABAPENTIN 100 MILLIGRAM(S): 400 CAPSULE ORAL at 22:24

## 2025-08-28 RX ADMIN — LIDOCAINE HYDROCHLORIDE 1 PATCH: 20 JELLY TOPICAL at 01:14

## 2025-08-28 RX ADMIN — HEPARIN SODIUM 5000 UNIT(S): 1000 INJECTION INTRAVENOUS; SUBCUTANEOUS at 18:14

## 2025-08-28 RX ADMIN — LISINOPRIL 40 MILLIGRAM(S): 5 TABLET ORAL at 10:14

## 2025-08-28 RX ADMIN — OXYCODONE HYDROCHLORIDE 5 MILLIGRAM(S): 30 TABLET ORAL at 10:26

## 2025-08-28 RX ADMIN — OXYCODONE HYDROCHLORIDE 5 MILLIGRAM(S): 30 TABLET ORAL at 15:20

## 2025-08-28 RX ADMIN — HEPARIN SODIUM 5000 UNIT(S): 1000 INJECTION INTRAVENOUS; SUBCUTANEOUS at 22:28

## 2025-08-28 RX ADMIN — HEPARIN SODIUM 5000 UNIT(S): 1000 INJECTION INTRAVENOUS; SUBCUTANEOUS at 06:37

## 2025-08-28 RX ADMIN — Medication 975 MILLIGRAM(S): at 08:00

## 2025-08-28 RX ADMIN — Medication 100 MICROGRAM(S): at 01:14

## 2025-08-28 RX ADMIN — Medication 2 TABLET(S): at 22:24

## 2025-08-28 RX ADMIN — OXYCODONE HYDROCHLORIDE 5 MILLIGRAM(S): 30 TABLET ORAL at 16:15

## 2025-08-28 RX ADMIN — Medication 975 MILLIGRAM(S): at 06:36

## 2025-08-28 RX ADMIN — Medication 20 MILLIGRAM(S): at 10:12

## 2025-08-28 RX ADMIN — Medication 1 TABLET(S): at 10:12

## 2025-08-28 RX ADMIN — Medication 500 MILLIGRAM(S): at 18:14

## 2025-08-28 RX ADMIN — ATORVASTATIN CALCIUM 80 MILLIGRAM(S): 80 TABLET, FILM COATED ORAL at 22:25

## 2025-08-28 RX ADMIN — METOPROLOL SUCCINATE 25 MILLIGRAM(S): 50 TABLET, EXTENDED RELEASE ORAL at 01:14

## 2025-08-29 LAB
ADD ON TEST-SPECIMEN IN LAB: SIGNIFICANT CHANGE UP
ALBUMIN SERPL ELPH-MCNC: 2.9 G/DL — LOW (ref 3.3–5)
ALP SERPL-CCNC: 100 U/L — SIGNIFICANT CHANGE UP (ref 40–120)
ALT FLD-CCNC: 30 U/L — SIGNIFICANT CHANGE UP (ref 12–78)
ANION GAP SERPL CALC-SCNC: 5 MMOL/L — SIGNIFICANT CHANGE UP (ref 5–17)
APPEARANCE UR: CLEAR — SIGNIFICANT CHANGE UP
APTT BLD: 34.8 SEC — SIGNIFICANT CHANGE UP (ref 26.1–36.8)
AST SERPL-CCNC: 29 U/L — SIGNIFICANT CHANGE UP (ref 15–37)
BILIRUB DIRECT SERPL-MCNC: 0.4 MG/DL — HIGH (ref 0–0.3)
BILIRUB INDIRECT FLD-MCNC: 1.6 MG/DL — HIGH (ref 0.2–1)
BILIRUB SERPL-MCNC: 2 MG/DL — HIGH (ref 0.2–1.2)
BILIRUB UR-MCNC: NEGATIVE — SIGNIFICANT CHANGE UP
BUN SERPL-MCNC: 18 MG/DL — SIGNIFICANT CHANGE UP (ref 7–23)
CALCIUM SERPL-MCNC: 8.5 MG/DL — SIGNIFICANT CHANGE UP (ref 8.5–10.1)
CHLORIDE SERPL-SCNC: 111 MMOL/L — HIGH (ref 96–108)
CO2 SERPL-SCNC: 25 MMOL/L — SIGNIFICANT CHANGE UP (ref 22–31)
COLOR SPEC: YELLOW — SIGNIFICANT CHANGE UP
CREAT SERPL-MCNC: 1.07 MG/DL — SIGNIFICANT CHANGE UP (ref 0.5–1.3)
DIFF PNL FLD: NEGATIVE — SIGNIFICANT CHANGE UP
EGFR: 69 ML/MIN/1.73M2 — SIGNIFICANT CHANGE UP
EGFR: 69 ML/MIN/1.73M2 — SIGNIFICANT CHANGE UP
GLUCOSE SERPL-MCNC: 103 MG/DL — HIGH (ref 70–99)
GLUCOSE UR QL: NEGATIVE MG/DL — SIGNIFICANT CHANGE UP
HCT VFR BLD CALC: 50.5 % — HIGH (ref 39–50)
HGB BLD-MCNC: 15.8 G/DL — SIGNIFICANT CHANGE UP (ref 13–17)
INR BLD: 1.15 RATIO — SIGNIFICANT CHANGE UP (ref 0.85–1.16)
KETONES UR QL: NEGATIVE MG/DL — SIGNIFICANT CHANGE UP
LEUKOCYTE ESTERASE UR-ACNC: NEGATIVE — SIGNIFICANT CHANGE UP
MAGNESIUM SERPL-MCNC: 2.1 MG/DL — SIGNIFICANT CHANGE UP (ref 1.6–2.6)
MCHC RBC-ENTMCNC: 28.5 PG — SIGNIFICANT CHANGE UP (ref 27–34)
MCHC RBC-ENTMCNC: 31.3 G/DL — LOW (ref 32–36)
MCV RBC AUTO: 91 FL — SIGNIFICANT CHANGE UP (ref 80–100)
NITRITE UR-MCNC: NEGATIVE — SIGNIFICANT CHANGE UP
NRBC # BLD AUTO: 0 K/UL — SIGNIFICANT CHANGE UP (ref 0–0)
NRBC # FLD: 0 K/UL — SIGNIFICANT CHANGE UP (ref 0–0)
NRBC BLD AUTO-RTO: 0 /100 WBCS — SIGNIFICANT CHANGE UP (ref 0–0)
PH UR: 7 — SIGNIFICANT CHANGE UP (ref 5–8)
PHOSPHATE SERPL-MCNC: 3.2 MG/DL — SIGNIFICANT CHANGE UP (ref 2.5–4.5)
PLATELET # BLD AUTO: 146 K/UL — LOW (ref 150–400)
PMV BLD: 11.2 FL — SIGNIFICANT CHANGE UP (ref 7–13)
POTASSIUM SERPL-MCNC: 3.8 MMOL/L — SIGNIFICANT CHANGE UP (ref 3.5–5.3)
POTASSIUM SERPL-SCNC: 3.8 MMOL/L — SIGNIFICANT CHANGE UP (ref 3.5–5.3)
PROT SERPL-MCNC: 6 GM/DL — SIGNIFICANT CHANGE UP (ref 6–8.3)
PROT UR-MCNC: SIGNIFICANT CHANGE UP MG/DL
PROTHROM AB SERPL-ACNC: 13.3 SEC — SIGNIFICANT CHANGE UP (ref 9.9–13.4)
RBC # BLD: 5.55 M/UL — SIGNIFICANT CHANGE UP (ref 4.2–5.8)
RBC # FLD: 15.9 % — HIGH (ref 10.3–14.5)
SODIUM SERPL-SCNC: 141 MMOL/L — SIGNIFICANT CHANGE UP (ref 135–145)
SP GR SPEC: 1.02 — SIGNIFICANT CHANGE UP (ref 1–1.03)
UROBILINOGEN FLD QL: 0.2 MG/DL — SIGNIFICANT CHANGE UP (ref 0.2–1)
WBC # BLD: 6.65 K/UL — SIGNIFICANT CHANGE UP (ref 3.8–10.5)
WBC # FLD AUTO: 6.65 K/UL — SIGNIFICANT CHANGE UP (ref 3.8–10.5)

## 2025-08-29 PROCEDURE — 93010 ELECTROCARDIOGRAM REPORT: CPT

## 2025-08-29 PROCEDURE — 99233 SBSQ HOSP IP/OBS HIGH 50: CPT

## 2025-08-29 PROCEDURE — 71045 X-RAY EXAM CHEST 1 VIEW: CPT | Mod: 26

## 2025-08-29 PROCEDURE — 99232 SBSQ HOSP IP/OBS MODERATE 35: CPT | Mod: FS

## 2025-08-29 PROCEDURE — 99233 SBSQ HOSP IP/OBS HIGH 50: CPT | Mod: FS

## 2025-08-29 RX ORDER — RIVAROXABAN 10 MG/1
20 TABLET, FILM COATED ORAL
Refills: 0 | Status: DISCONTINUED | OUTPATIENT
Start: 2025-08-29 | End: 2025-08-29

## 2025-08-29 RX ORDER — RIVAROXABAN 10 MG/1
20 TABLET, FILM COATED ORAL
Refills: 0 | Status: DISCONTINUED | OUTPATIENT
Start: 2025-08-29 | End: 2025-08-30

## 2025-08-29 RX ADMIN — AMLODIPINE BESYLATE 2.5 MILLIGRAM(S): 10 TABLET ORAL at 13:20

## 2025-08-29 RX ADMIN — GABAPENTIN 100 MILLIGRAM(S): 400 CAPSULE ORAL at 21:14

## 2025-08-29 RX ADMIN — Medication 1 TABLET(S): at 13:21

## 2025-08-29 RX ADMIN — Medication 20 MILLIGRAM(S): at 13:20

## 2025-08-29 RX ADMIN — LIDOCAINE HYDROCHLORIDE 1 PATCH: 20 JELLY TOPICAL at 00:35

## 2025-08-29 RX ADMIN — Medication 500 MILLIGRAM(S): at 00:35

## 2025-08-29 RX ADMIN — RIVAROXABAN 20 MILLIGRAM(S): 10 TABLET, FILM COATED ORAL at 16:41

## 2025-08-29 RX ADMIN — SODIUM CHLORIDE 75 MILLILITER(S): 9 INJECTION, SOLUTION INTRAVENOUS at 21:08

## 2025-08-29 RX ADMIN — Medication 500 MILLIGRAM(S): at 20:56

## 2025-08-29 RX ADMIN — LISINOPRIL 40 MILLIGRAM(S): 5 TABLET ORAL at 13:23

## 2025-08-29 RX ADMIN — LIDOCAINE HYDROCHLORIDE 1 PATCH: 20 JELLY TOPICAL at 14:15

## 2025-08-29 RX ADMIN — ATORVASTATIN CALCIUM 80 MILLIGRAM(S): 80 TABLET, FILM COATED ORAL at 21:14

## 2025-08-29 RX ADMIN — SODIUM CHLORIDE 75 MILLILITER(S): 9 INJECTION, SOLUTION INTRAVENOUS at 00:55

## 2025-08-29 RX ADMIN — Medication 500 MILLIGRAM(S): at 13:20

## 2025-08-29 RX ADMIN — Medication 2 TABLET(S): at 21:13

## 2025-08-29 RX ADMIN — Medication 500 MILLIGRAM(S): at 19:39

## 2025-08-29 RX ADMIN — GABAPENTIN 100 MILLIGRAM(S): 400 CAPSULE ORAL at 13:26

## 2025-08-29 RX ADMIN — Medication 500 MILLIGRAM(S): at 14:30

## 2025-08-30 ENCOUNTER — TRANSCRIPTION ENCOUNTER (OUTPATIENT)
Age: 80
End: 2025-08-30

## 2025-08-30 VITALS
HEART RATE: 67 BPM | TEMPERATURE: 96 F | SYSTOLIC BLOOD PRESSURE: 108 MMHG | DIASTOLIC BLOOD PRESSURE: 57 MMHG | OXYGEN SATURATION: 95 % | RESPIRATION RATE: 18 BRPM

## 2025-08-30 LAB
ANION GAP SERPL CALC-SCNC: 3 MMOL/L — LOW (ref 5–17)
BUN SERPL-MCNC: 16 MG/DL — SIGNIFICANT CHANGE UP (ref 7–23)
CALCIUM SERPL-MCNC: 8.4 MG/DL — LOW (ref 8.5–10.1)
CHLORIDE SERPL-SCNC: 110 MMOL/L — HIGH (ref 96–108)
CO2 SERPL-SCNC: 27 MMOL/L — SIGNIFICANT CHANGE UP (ref 22–31)
CREAT SERPL-MCNC: 0.99 MG/DL — SIGNIFICANT CHANGE UP (ref 0.5–1.3)
EGFR: 76 ML/MIN/1.73M2 — SIGNIFICANT CHANGE UP
EGFR: 76 ML/MIN/1.73M2 — SIGNIFICANT CHANGE UP
GLUCOSE SERPL-MCNC: 100 MG/DL — HIGH (ref 70–99)
HCT VFR BLD CALC: 47.5 % — SIGNIFICANT CHANGE UP (ref 39–50)
HGB BLD-MCNC: 15.4 G/DL — SIGNIFICANT CHANGE UP (ref 13–17)
MAGNESIUM SERPL-MCNC: 1.9 MG/DL — SIGNIFICANT CHANGE UP (ref 1.6–2.6)
MCHC RBC-ENTMCNC: 29.4 PG — SIGNIFICANT CHANGE UP (ref 27–34)
MCHC RBC-ENTMCNC: 32.4 G/DL — SIGNIFICANT CHANGE UP (ref 32–36)
MCV RBC AUTO: 90.6 FL — SIGNIFICANT CHANGE UP (ref 80–100)
NRBC # BLD AUTO: 0 K/UL — SIGNIFICANT CHANGE UP (ref 0–0)
NRBC # FLD: 0 K/UL — SIGNIFICANT CHANGE UP (ref 0–0)
NRBC BLD AUTO-RTO: 0 /100 WBCS — SIGNIFICANT CHANGE UP (ref 0–0)
PHOSPHATE SERPL-MCNC: 3.6 MG/DL — SIGNIFICANT CHANGE UP (ref 2.5–4.5)
PLATELET # BLD AUTO: 126 K/UL — LOW (ref 150–400)
PMV BLD: 10.1 FL — SIGNIFICANT CHANGE UP (ref 7–13)
POTASSIUM SERPL-MCNC: 3.9 MMOL/L — SIGNIFICANT CHANGE UP (ref 3.5–5.3)
POTASSIUM SERPL-SCNC: 3.9 MMOL/L — SIGNIFICANT CHANGE UP (ref 3.5–5.3)
RBC # BLD: 5.24 M/UL — SIGNIFICANT CHANGE UP (ref 4.2–5.8)
RBC # FLD: 15.7 % — HIGH (ref 10.3–14.5)
SODIUM SERPL-SCNC: 140 MMOL/L — SIGNIFICANT CHANGE UP (ref 135–145)
WBC # BLD: 5.61 K/UL — SIGNIFICANT CHANGE UP (ref 3.8–10.5)
WBC # FLD AUTO: 5.61 K/UL — SIGNIFICANT CHANGE UP (ref 3.8–10.5)

## 2025-08-30 PROCEDURE — 99239 HOSP IP/OBS DSCHRG MGMT >30: CPT

## 2025-08-30 PROCEDURE — 99232 SBSQ HOSP IP/OBS MODERATE 35: CPT

## 2025-08-30 PROCEDURE — 99232 SBSQ HOSP IP/OBS MODERATE 35: CPT | Mod: FS

## 2025-08-30 PROCEDURE — 93010 ELECTROCARDIOGRAM REPORT: CPT

## 2025-08-30 RX ORDER — LIDOCAINE HYDROCHLORIDE 20 MG/ML
1 JELLY TOPICAL
Qty: 10 | Refills: 0
Start: 2025-08-30 | End: 2025-09-28

## 2025-08-30 RX ORDER — CYCLOBENZAPRINE HYDROCHLORIDE 15 MG/1
1 CAPSULE, EXTENDED RELEASE ORAL
Qty: 15 | Refills: 0
Start: 2025-08-30 | End: 2025-09-03

## 2025-08-30 RX ORDER — OXYCODONE HYDROCHLORIDE 30 MG/1
1 TABLET ORAL
Qty: 20 | Refills: 0
Start: 2025-08-30 | End: 2025-09-03

## 2025-08-30 RX ORDER — GABAPENTIN 400 MG/1
1 CAPSULE ORAL
Qty: 42 | Refills: 0
Start: 2025-08-30 | End: 2025-09-12

## 2025-08-30 RX ORDER — NYSTATIN 100000 [USP'U]/G
1 CREAM TOPICAL
Refills: 0 | Status: DISCONTINUED | OUTPATIENT
Start: 2025-08-30 | End: 2025-08-30

## 2025-08-30 RX ADMIN — Medication 500 MILLIGRAM(S): at 01:09

## 2025-08-30 RX ADMIN — LISINOPRIL 40 MILLIGRAM(S): 5 TABLET ORAL at 09:19

## 2025-08-30 RX ADMIN — GABAPENTIN 100 MILLIGRAM(S): 400 CAPSULE ORAL at 14:48

## 2025-08-30 RX ADMIN — LIDOCAINE HYDROCHLORIDE 1 PATCH: 20 JELLY TOPICAL at 01:10

## 2025-08-30 RX ADMIN — GABAPENTIN 100 MILLIGRAM(S): 400 CAPSULE ORAL at 06:13

## 2025-08-30 RX ADMIN — NYSTATIN 1 APPLICATION(S): 100000 CREAM TOPICAL at 14:49

## 2025-08-30 RX ADMIN — Medication 500 MILLIGRAM(S): at 06:12

## 2025-08-30 RX ADMIN — Medication 500 MILLIGRAM(S): at 01:56

## 2025-08-30 RX ADMIN — AMLODIPINE BESYLATE 2.5 MILLIGRAM(S): 10 TABLET ORAL at 06:13

## 2025-08-30 RX ADMIN — Medication 1 TABLET(S): at 09:19

## 2025-08-30 RX ADMIN — Medication 20 MILLIGRAM(S): at 09:19

## 2025-08-30 RX ADMIN — Medication 500 MILLIGRAM(S): at 12:49

## 2025-08-30 RX ADMIN — Medication 100 MICROGRAM(S): at 06:13

## 2025-08-30 RX ADMIN — OXYCODONE HYDROCHLORIDE 5 MILLIGRAM(S): 30 TABLET ORAL at 09:20

## 2025-09-09 ENCOUNTER — APPOINTMENT (OUTPATIENT)
Dept: CT IMAGING | Facility: CLINIC | Age: 80
End: 2025-09-09
Payer: MEDICARE

## 2025-09-09 DIAGNOSIS — Z85.828 PERSONAL HISTORY OF OTHER MALIGNANT NEOPLASM OF SKIN: ICD-10-CM

## 2025-09-09 DIAGNOSIS — I10 ESSENTIAL (PRIMARY) HYPERTENSION: ICD-10-CM

## 2025-09-09 DIAGNOSIS — D45 POLYCYTHEMIA VERA: ICD-10-CM

## 2025-09-09 DIAGNOSIS — N40.0 BENIGN PROSTATIC HYPERPLASIA WITHOUT LOWER URINARY TRACT SYMPTOMS: ICD-10-CM

## 2025-09-09 DIAGNOSIS — Z79.82 LONG TERM (CURRENT) USE OF ASPIRIN: ICD-10-CM

## 2025-09-09 DIAGNOSIS — Z80.0 FAMILY HISTORY OF MALIGNANT NEOPLASM OF DIGESTIVE ORGANS: ICD-10-CM

## 2025-09-09 DIAGNOSIS — Z85.528 PERSONAL HISTORY OF OTHER MALIGNANT NEOPLASM OF KIDNEY: ICD-10-CM

## 2025-09-09 DIAGNOSIS — Z80.3 FAMILY HISTORY OF MALIGNANT NEOPLASM OF BREAST: ICD-10-CM

## 2025-09-09 DIAGNOSIS — Z79.890 HORMONE REPLACEMENT THERAPY: ICD-10-CM

## 2025-09-09 DIAGNOSIS — G89.11 ACUTE PAIN DUE TO TRAUMA: ICD-10-CM

## 2025-09-09 DIAGNOSIS — N52.9 MALE ERECTILE DYSFUNCTION, UNSPECIFIED: ICD-10-CM

## 2025-09-09 DIAGNOSIS — Z95.818 PRESENCE OF OTHER CARDIAC IMPLANTS AND GRAFTS: ICD-10-CM

## 2025-09-09 DIAGNOSIS — Z79.01 LONG TERM (CURRENT) USE OF ANTICOAGULANTS: ICD-10-CM

## 2025-09-09 DIAGNOSIS — R00.1 BRADYCARDIA, UNSPECIFIED: ICD-10-CM

## 2025-09-09 DIAGNOSIS — E03.9 HYPOTHYROIDISM, UNSPECIFIED: ICD-10-CM

## 2025-09-09 DIAGNOSIS — Z85.118 PERSONAL HISTORY OF OTHER MALIGNANT NEOPLASM OF BRONCHUS AND LUNG: ICD-10-CM

## 2025-09-09 DIAGNOSIS — Z90.5 ACQUIRED ABSENCE OF KIDNEY: ICD-10-CM
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
## 2025-09-09 DIAGNOSIS — E86.0 DEHYDRATION: ICD-10-CM

## 2025-09-09 DIAGNOSIS — Z86.73 PERSONAL HISTORY OF TRANSIENT ISCHEMIC ATTACK (TIA), AND CEREBRAL INFARCTION WITHOUT RESIDUAL DEFICITS: ICD-10-CM

## 2025-09-09 DIAGNOSIS — K21.9 GASTRO-ESOPHAGEAL REFLUX DISEASE WITHOUT ESOPHAGITIS: ICD-10-CM

## 2025-09-09 DIAGNOSIS — R91.1 SOLITARY PULMONARY NODULE: ICD-10-CM

## 2025-09-09 DIAGNOSIS — I08.3 COMBINED RHEUMATIC DISORDERS OF MITRAL, AORTIC AND TRICUSPID VALVES: ICD-10-CM

## 2025-09-09 DIAGNOSIS — Z00.6 ENCOUNTER FOR EXAMINATION FOR NORMAL COMPARISON AND CONTROL IN CLINICAL RESEARCH PROGRAM: ICD-10-CM

## 2025-09-09 DIAGNOSIS — S22.41XA MULTIPLE FRACTURES OF RIBS, RIGHT SIDE, INITIAL ENCOUNTER FOR CLOSED FRACTURE: ICD-10-CM

## 2025-09-09 DIAGNOSIS — Z87.19 PERSONAL HISTORY OF OTHER DISEASES OF THE DIGESTIVE SYSTEM: ICD-10-CM

## 2025-09-09 PROCEDURE — 74160 CT ABDOMEN W/CONTRAST: CPT | Mod: 26

## 2025-09-11 ENCOUNTER — NON-APPOINTMENT (OUTPATIENT)
Age: 80
End: 2025-09-11

## 2025-09-11 ENCOUNTER — APPOINTMENT (OUTPATIENT)
Dept: ELECTROPHYSIOLOGY | Facility: CLINIC | Age: 80
End: 2025-09-11

## 2025-09-11 VITALS
WEIGHT: 176 LBS | DIASTOLIC BLOOD PRESSURE: 79 MMHG | BODY MASS INDEX: 26.67 KG/M2 | HEART RATE: 81 BPM | HEIGHT: 68 IN | SYSTOLIC BLOOD PRESSURE: 143 MMHG | OXYGEN SATURATION: 100 %

## 2025-09-11 PROCEDURE — 93279 PRGRMG DEV EVAL PM/LDLS PM: CPT

## 2025-09-16 ENCOUNTER — APPOINTMENT (OUTPATIENT)
Dept: CARDIOLOGY | Facility: CLINIC | Age: 80
End: 2025-09-16
Payer: MEDICARE

## 2025-09-16 VITALS
HEART RATE: 72 BPM | SYSTOLIC BLOOD PRESSURE: 126 MMHG | BODY MASS INDEX: 27.28 KG/M2 | WEIGHT: 180 LBS | DIASTOLIC BLOOD PRESSURE: 86 MMHG | OXYGEN SATURATION: 98 % | HEIGHT: 68 IN

## 2025-09-16 DIAGNOSIS — I48.91 UNSPECIFIED ATRIAL FIBRILLATION: ICD-10-CM

## 2025-09-16 DIAGNOSIS — Z95.0 PRESENCE OF CARDIAC PACEMAKER: ICD-10-CM

## 2025-09-16 DIAGNOSIS — R00.1 BRADYCARDIA, UNSPECIFIED: ICD-10-CM

## 2025-09-16 DIAGNOSIS — I10 ESSENTIAL (PRIMARY) HYPERTENSION: ICD-10-CM

## 2025-09-16 PROCEDURE — 99214 OFFICE O/P EST MOD 30 MIN: CPT

## 2025-09-16 PROCEDURE — G2211 COMPLEX E/M VISIT ADD ON: CPT

## 2025-09-16 PROCEDURE — 93000 ELECTROCARDIOGRAM COMPLETE: CPT
